# Patient Record
Sex: FEMALE | Race: WHITE | NOT HISPANIC OR LATINO | Employment: OTHER | URBAN - METROPOLITAN AREA
[De-identification: names, ages, dates, MRNs, and addresses within clinical notes are randomized per-mention and may not be internally consistent; named-entity substitution may affect disease eponyms.]

---

## 2017-09-22 ENCOUNTER — LAB (OUTPATIENT)
Dept: LAB | Facility: HOSPITAL | Age: 77
End: 2017-09-22

## 2017-09-22 ENCOUNTER — TRANSCRIBE ORDERS (OUTPATIENT)
Dept: ADMINISTRATIVE | Facility: HOSPITAL | Age: 77
End: 2017-09-22

## 2017-09-22 DIAGNOSIS — M10.9 GOUT, UNSPECIFIED: ICD-10-CM

## 2017-09-22 DIAGNOSIS — I10 ESSENTIAL HYPERTENSION, MALIGNANT: ICD-10-CM

## 2017-09-22 DIAGNOSIS — I10 ESSENTIAL HYPERTENSION, MALIGNANT: Primary | ICD-10-CM

## 2017-09-22 DIAGNOSIS — Z00.00 ROUTINE GENERAL MEDICAL EXAMINATION AT A HEALTH CARE FACILITY: ICD-10-CM

## 2017-09-22 LAB
ALBUMIN SERPL-MCNC: 4 G/DL (ref 3.5–5.2)
ALBUMIN/GLOB SERPL: 1.5 G/DL
ALP SERPL-CCNC: 103 U/L (ref 39–117)
ALT SERPL W P-5'-P-CCNC: 19 U/L (ref 1–33)
ANION GAP SERPL CALCULATED.3IONS-SCNC: 11.6 MMOL/L
AST SERPL-CCNC: 21 U/L (ref 1–32)
BASOPHILS # BLD AUTO: 0.04 10*3/MM3 (ref 0–0.2)
BASOPHILS NFR BLD AUTO: 0.7 % (ref 0–1.5)
BILIRUB SERPL-MCNC: 0.4 MG/DL (ref 0.1–1.2)
BUN BLD-MCNC: 17 MG/DL (ref 8–23)
BUN/CREAT SERPL: 18.3 (ref 7–25)
CALCIUM SPEC-SCNC: 9.3 MG/DL (ref 8.6–10.5)
CHLORIDE SERPL-SCNC: 101 MMOL/L (ref 98–107)
CHOLEST SERPL-MCNC: 146 MG/DL (ref 0–200)
CO2 SERPL-SCNC: 28.4 MMOL/L (ref 22–29)
CREAT BLD-MCNC: 0.93 MG/DL (ref 0.57–1)
DEPRECATED RDW RBC AUTO: 47.2 FL (ref 37–54)
EOSINOPHIL # BLD AUTO: 0.24 10*3/MM3 (ref 0–0.7)
EOSINOPHIL NFR BLD AUTO: 4 % (ref 0.3–6.2)
ERYTHROCYTE [DISTWIDTH] IN BLOOD BY AUTOMATED COUNT: 15.3 % (ref 11.7–13)
GFR SERPL CREATININE-BSD FRML MDRD: 59 ML/MIN/1.73
GLOBULIN UR ELPH-MCNC: 2.6 GM/DL
GLUCOSE BLD-MCNC: 160 MG/DL (ref 65–99)
HCT VFR BLD AUTO: 38.3 % (ref 35.6–45.5)
HDLC SERPL-MCNC: 48 MG/DL (ref 40–60)
HGB BLD-MCNC: 11.7 G/DL (ref 11.9–15.5)
IMM GRANULOCYTES # BLD: 0 10*3/MM3 (ref 0–0.03)
IMM GRANULOCYTES NFR BLD: 0 % (ref 0–0.5)
LDLC SERPL CALC-MCNC: 70 MG/DL (ref 0–100)
LDLC/HDLC SERPL: 1.47 {RATIO}
LYMPHOCYTES # BLD AUTO: 1.08 10*3/MM3 (ref 0.9–4.8)
LYMPHOCYTES NFR BLD AUTO: 18.2 % (ref 19.6–45.3)
MCH RBC QN AUTO: 25.8 PG (ref 26.9–32)
MCHC RBC AUTO-ENTMCNC: 30.5 G/DL (ref 32.4–36.3)
MCV RBC AUTO: 84.4 FL (ref 80.5–98.2)
MONOCYTES # BLD AUTO: 0.39 10*3/MM3 (ref 0.2–1.2)
MONOCYTES NFR BLD AUTO: 6.6 % (ref 5–12)
NEUTROPHILS # BLD AUTO: 4.18 10*3/MM3 (ref 1.9–8.1)
NEUTROPHILS NFR BLD AUTO: 70.5 % (ref 42.7–76)
PLATELET # BLD AUTO: 277 10*3/MM3 (ref 140–500)
PMV BLD AUTO: 10.5 FL (ref 6–12)
POTASSIUM BLD-SCNC: 3.4 MMOL/L (ref 3.5–5.2)
PROT SERPL-MCNC: 6.6 G/DL (ref 6–8.5)
RBC # BLD AUTO: 4.54 10*6/MM3 (ref 3.9–5.2)
SODIUM BLD-SCNC: 141 MMOL/L (ref 136–145)
TRIGL SERPL-MCNC: 138 MG/DL (ref 0–150)
TSH SERPL DL<=0.05 MIU/L-ACNC: 1.98 MIU/ML (ref 0.27–4.2)
URATE SERPL-MCNC: 7.5 MG/DL (ref 2.4–5.7)
VLDLC SERPL-MCNC: 27.6 MG/DL (ref 5–40)
WBC NRBC COR # BLD: 5.93 10*3/MM3 (ref 4.5–10.7)

## 2017-09-22 PROCEDURE — 36415 COLL VENOUS BLD VENIPUNCTURE: CPT

## 2017-09-22 PROCEDURE — 84550 ASSAY OF BLOOD/URIC ACID: CPT | Performed by: INTERNAL MEDICINE

## 2017-09-22 PROCEDURE — 84443 ASSAY THYROID STIM HORMONE: CPT | Performed by: INTERNAL MEDICINE

## 2017-09-22 PROCEDURE — 80061 LIPID PANEL: CPT | Performed by: INTERNAL MEDICINE

## 2017-09-22 PROCEDURE — 80053 COMPREHEN METABOLIC PANEL: CPT | Performed by: INTERNAL MEDICINE

## 2017-09-22 PROCEDURE — 85025 COMPLETE CBC W/AUTO DIFF WBC: CPT | Performed by: INTERNAL MEDICINE

## 2017-09-26 ENCOUNTER — TRANSCRIBE ORDERS (OUTPATIENT)
Dept: ADMINISTRATIVE | Facility: HOSPITAL | Age: 77
End: 2017-09-26

## 2017-09-26 ENCOUNTER — LAB (OUTPATIENT)
Dept: LAB | Facility: HOSPITAL | Age: 77
End: 2017-09-26

## 2017-09-26 DIAGNOSIS — R73.09 OTHER ABNORMAL GLUCOSE: ICD-10-CM

## 2017-09-26 DIAGNOSIS — D64.9 ANEMIA, UNSPECIFIED: Primary | ICD-10-CM

## 2017-09-26 DIAGNOSIS — D64.9 ANEMIA, UNSPECIFIED: ICD-10-CM

## 2017-09-26 LAB
BASOPHILS # BLD AUTO: 0.03 10*3/MM3 (ref 0–0.2)
BASOPHILS NFR BLD AUTO: 0.5 % (ref 0–1.5)
DEPRECATED RDW RBC AUTO: 48 FL (ref 37–54)
EOSINOPHIL # BLD AUTO: 0.17 10*3/MM3 (ref 0–0.7)
EOSINOPHIL NFR BLD AUTO: 2.9 % (ref 0.3–6.2)
ERYTHROCYTE [DISTWIDTH] IN BLOOD BY AUTOMATED COUNT: 15.6 % (ref 11.7–13)
FERRITIN SERPL-MCNC: 18.7 NG/ML (ref 13–150)
FOLATE SERPL-MCNC: 8.71 NG/ML (ref 4.78–24.2)
HBA1C MFR BLD: 5.9 % (ref 4.8–5.6)
HCT VFR BLD AUTO: 38 % (ref 35.6–45.5)
HGB BLD-MCNC: 11.6 G/DL (ref 11.9–15.5)
IMM GRANULOCYTES # BLD: 0.02 10*3/MM3 (ref 0–0.03)
IMM GRANULOCYTES NFR BLD: 0.3 % (ref 0–0.5)
IRON 24H UR-MRATE: 35 MCG/DL (ref 37–145)
IRON SATN MFR SERPL: 8 % (ref 20–50)
LYMPHOCYTES # BLD AUTO: 1.13 10*3/MM3 (ref 0.9–4.8)
LYMPHOCYTES NFR BLD AUTO: 19.4 % (ref 19.6–45.3)
MCH RBC QN AUTO: 25.6 PG (ref 26.9–32)
MCHC RBC AUTO-ENTMCNC: 30.5 G/DL (ref 32.4–36.3)
MCV RBC AUTO: 83.7 FL (ref 80.5–98.2)
MONOCYTES # BLD AUTO: 0.36 10*3/MM3 (ref 0.2–1.2)
MONOCYTES NFR BLD AUTO: 6.2 % (ref 5–12)
NEUTROPHILS # BLD AUTO: 4.12 10*3/MM3 (ref 1.9–8.1)
NEUTROPHILS NFR BLD AUTO: 70.7 % (ref 42.7–76)
PLATELET # BLD AUTO: 310 10*3/MM3 (ref 140–500)
PMV BLD AUTO: 10.9 FL (ref 6–12)
RBC # BLD AUTO: 4.54 10*6/MM3 (ref 3.9–5.2)
RETICS/RBC NFR AUTO: 1.67 % (ref 0.5–1.5)
TIBC SERPL-MCNC: 443 MCG/DL
TRANSFERRIN SERPL-MCNC: 297 MG/DL (ref 200–360)
VIT B12 BLD-MCNC: 411 PG/ML (ref 211–946)
WBC NRBC COR # BLD: 5.83 10*3/MM3 (ref 4.5–10.7)

## 2017-09-26 PROCEDURE — 85025 COMPLETE CBC W/AUTO DIFF WBC: CPT | Performed by: INTERNAL MEDICINE

## 2017-09-26 PROCEDURE — 82607 VITAMIN B-12: CPT | Performed by: INTERNAL MEDICINE

## 2017-09-26 PROCEDURE — 83540 ASSAY OF IRON: CPT | Performed by: INTERNAL MEDICINE

## 2017-09-26 PROCEDURE — 84466 ASSAY OF TRANSFERRIN: CPT | Performed by: INTERNAL MEDICINE

## 2017-09-26 PROCEDURE — 82728 ASSAY OF FERRITIN: CPT | Performed by: INTERNAL MEDICINE

## 2017-09-26 PROCEDURE — 83036 HEMOGLOBIN GLYCOSYLATED A1C: CPT | Performed by: INTERNAL MEDICINE

## 2017-09-26 PROCEDURE — 36415 COLL VENOUS BLD VENIPUNCTURE: CPT

## 2017-09-26 PROCEDURE — 82746 ASSAY OF FOLIC ACID SERUM: CPT | Performed by: INTERNAL MEDICINE

## 2017-09-26 PROCEDURE — 85045 AUTOMATED RETICULOCYTE COUNT: CPT | Performed by: INTERNAL MEDICINE

## 2017-09-27 ENCOUNTER — PROCEDURE VISIT (OUTPATIENT)
Dept: OBSTETRICS AND GYNECOLOGY | Facility: CLINIC | Age: 77
End: 2017-09-27

## 2017-09-27 DIAGNOSIS — Z13.820 SCREENING FOR OSTEOPOROSIS: Primary | ICD-10-CM

## 2017-09-27 DIAGNOSIS — Z78.0 POSTMENOPAUSAL: ICD-10-CM

## 2017-09-27 PROCEDURE — 77080 DXA BONE DENSITY AXIAL: CPT | Performed by: OBSTETRICS & GYNECOLOGY

## 2017-12-18 ENCOUNTER — LAB (OUTPATIENT)
Dept: LAB | Facility: HOSPITAL | Age: 77
End: 2017-12-18

## 2017-12-18 ENCOUNTER — TRANSCRIBE ORDERS (OUTPATIENT)
Dept: ADMINISTRATIVE | Facility: HOSPITAL | Age: 77
End: 2017-12-18

## 2017-12-18 DIAGNOSIS — R73.09 OTHER ABNORMAL GLUCOSE: ICD-10-CM

## 2017-12-18 DIAGNOSIS — D64.9 ANEMIA, UNSPECIFIED TYPE: ICD-10-CM

## 2017-12-18 DIAGNOSIS — I10 ESSENTIAL HYPERTENSION, MALIGNANT: ICD-10-CM

## 2017-12-18 DIAGNOSIS — I10 ESSENTIAL HYPERTENSION, MALIGNANT: Primary | ICD-10-CM

## 2017-12-18 LAB
ALBUMIN SERPL-MCNC: 3.8 G/DL (ref 3.5–5.2)
ALBUMIN/GLOB SERPL: 1.1 G/DL
ALP SERPL-CCNC: 119 U/L (ref 39–117)
ALT SERPL W P-5'-P-CCNC: 19 U/L (ref 1–33)
ANION GAP SERPL CALCULATED.3IONS-SCNC: 11.6 MMOL/L
AST SERPL-CCNC: 20 U/L (ref 1–32)
BASOPHILS # BLD AUTO: 0.03 10*3/MM3 (ref 0–0.2)
BASOPHILS NFR BLD AUTO: 0.5 % (ref 0–1.5)
BILIRUB SERPL-MCNC: 0.3 MG/DL (ref 0.1–1.2)
BUN BLD-MCNC: 20 MG/DL (ref 8–23)
BUN/CREAT SERPL: 20.4 (ref 7–25)
CALCIUM SPEC-SCNC: 9.3 MG/DL (ref 8.6–10.5)
CHLORIDE SERPL-SCNC: 100 MMOL/L (ref 98–107)
CO2 SERPL-SCNC: 30.4 MMOL/L (ref 22–29)
CREAT BLD-MCNC: 0.98 MG/DL (ref 0.57–1)
DEPRECATED RDW RBC AUTO: 43.4 FL (ref 37–54)
EOSINOPHIL # BLD AUTO: 0.23 10*3/MM3 (ref 0–0.7)
EOSINOPHIL NFR BLD AUTO: 3.8 % (ref 0.3–6.2)
ERYTHROCYTE [DISTWIDTH] IN BLOOD BY AUTOMATED COUNT: 13.6 % (ref 11.7–13)
GFR SERPL CREATININE-BSD FRML MDRD: 55 ML/MIN/1.73
GLOBULIN UR ELPH-MCNC: 3.4 GM/DL
GLUCOSE BLD-MCNC: 112 MG/DL (ref 65–99)
HBA1C MFR BLD: 6.01 % (ref 4.8–5.6)
HCT VFR BLD AUTO: 41.2 % (ref 35.6–45.5)
HGB BLD-MCNC: 13.2 G/DL (ref 11.9–15.5)
IMM GRANULOCYTES # BLD: 0.02 10*3/MM3 (ref 0–0.03)
IMM GRANULOCYTES NFR BLD: 0.3 % (ref 0–0.5)
LYMPHOCYTES # BLD AUTO: 1.36 10*3/MM3 (ref 0.9–4.8)
LYMPHOCYTES NFR BLD AUTO: 22.5 % (ref 19.6–45.3)
MCH RBC QN AUTO: 27.9 PG (ref 26.9–32)
MCHC RBC AUTO-ENTMCNC: 32 G/DL (ref 32.4–36.3)
MCV RBC AUTO: 87.1 FL (ref 80.5–98.2)
MONOCYTES # BLD AUTO: 0.41 10*3/MM3 (ref 0.2–1.2)
MONOCYTES NFR BLD AUTO: 6.8 % (ref 5–12)
NEUTROPHILS # BLD AUTO: 4 10*3/MM3 (ref 1.9–8.1)
NEUTROPHILS NFR BLD AUTO: 66.1 % (ref 42.7–76)
PLATELET # BLD AUTO: 285 10*3/MM3 (ref 140–500)
PMV BLD AUTO: 10.8 FL (ref 6–12)
POTASSIUM BLD-SCNC: 3.9 MMOL/L (ref 3.5–5.2)
PROT SERPL-MCNC: 7.2 G/DL (ref 6–8.5)
RBC # BLD AUTO: 4.73 10*6/MM3 (ref 3.9–5.2)
SODIUM BLD-SCNC: 142 MMOL/L (ref 136–145)
WBC NRBC COR # BLD: 6.05 10*3/MM3 (ref 4.5–10.7)

## 2017-12-18 PROCEDURE — 83036 HEMOGLOBIN GLYCOSYLATED A1C: CPT | Performed by: INTERNAL MEDICINE

## 2017-12-18 PROCEDURE — 36415 COLL VENOUS BLD VENIPUNCTURE: CPT

## 2017-12-18 PROCEDURE — 85025 COMPLETE CBC W/AUTO DIFF WBC: CPT | Performed by: INTERNAL MEDICINE

## 2017-12-18 PROCEDURE — 80053 COMPREHEN METABOLIC PANEL: CPT | Performed by: INTERNAL MEDICINE

## 2018-06-06 DIAGNOSIS — D50.0 IRON DEFICIENCY ANEMIA DUE TO CHRONIC BLOOD LOSS: Primary | ICD-10-CM

## 2018-06-07 ENCOUNTER — APPOINTMENT (OUTPATIENT)
Dept: LAB | Facility: HOSPITAL | Age: 78
End: 2018-06-07

## 2018-06-07 DIAGNOSIS — D50.0 IRON DEFICIENCY ANEMIA DUE TO CHRONIC BLOOD LOSS: Primary | ICD-10-CM

## 2018-06-07 LAB
ALBUMIN SERPL-MCNC: 3.9 G/DL (ref 3.5–5.2)
ALBUMIN/GLOB SERPL: 1.1 G/DL
ALP SERPL-CCNC: 96 U/L (ref 39–117)
ALT SERPL W P-5'-P-CCNC: 23 U/L (ref 1–33)
ANION GAP SERPL CALCULATED.3IONS-SCNC: 12.4 MMOL/L
AST SERPL-CCNC: 24 U/L (ref 1–32)
BASOPHILS # BLD AUTO: 0.04 10*3/MM3 (ref 0–0.2)
BASOPHILS NFR BLD AUTO: 0.7 % (ref 0–1.5)
BILIRUB SERPL-MCNC: 0.4 MG/DL (ref 0.1–1.2)
BUN BLD-MCNC: 23 MG/DL (ref 8–23)
BUN/CREAT SERPL: 24.7 (ref 7–25)
CALCIUM SPEC-SCNC: 9 MG/DL (ref 8.6–10.5)
CHLORIDE SERPL-SCNC: 102 MMOL/L (ref 98–107)
CO2 SERPL-SCNC: 26.6 MMOL/L (ref 22–29)
CREAT BLD-MCNC: 0.93 MG/DL (ref 0.57–1)
DEPRECATED RDW RBC AUTO: 42.5 FL (ref 37–54)
EOSINOPHIL # BLD AUTO: 0.25 10*3/MM3 (ref 0–0.7)
EOSINOPHIL NFR BLD AUTO: 4.2 % (ref 0.3–6.2)
ERYTHROCYTE [DISTWIDTH] IN BLOOD BY AUTOMATED COUNT: 12.5 % (ref 11.7–13)
GFR SERPL CREATININE-BSD FRML MDRD: 59 ML/MIN/1.73
GLOBULIN UR ELPH-MCNC: 3.4 GM/DL
GLUCOSE BLD-MCNC: 86 MG/DL (ref 65–99)
HCT VFR BLD AUTO: 36.7 % (ref 35.6–45.5)
HGB BLD-MCNC: 11.3 G/DL (ref 11.9–15.5)
IMM GRANULOCYTES # BLD: 0 10*3/MM3 (ref 0–0.03)
IMM GRANULOCYTES NFR BLD: 0 % (ref 0–0.5)
LYMPHOCYTES # BLD AUTO: 1.21 10*3/MM3 (ref 0.9–4.8)
LYMPHOCYTES NFR BLD AUTO: 20.1 % (ref 19.6–45.3)
MCH RBC QN AUTO: 28.5 PG (ref 26.9–32)
MCHC RBC AUTO-ENTMCNC: 30.8 G/DL (ref 32.4–36.3)
MCV RBC AUTO: 92.7 FL (ref 80.5–98.2)
MONOCYTES # BLD AUTO: 0.32 10*3/MM3 (ref 0.2–1.2)
MONOCYTES NFR BLD AUTO: 5.3 % (ref 5–12)
NEUTROPHILS # BLD AUTO: 4.2 10*3/MM3 (ref 1.9–8.1)
NEUTROPHILS NFR BLD AUTO: 69.7 % (ref 42.7–76)
PLATELET # BLD AUTO: 296 10*3/MM3 (ref 140–500)
PMV BLD AUTO: 10.8 FL (ref 6–12)
POTASSIUM BLD-SCNC: 4.2 MMOL/L (ref 3.5–5.2)
PROT SERPL-MCNC: 7.3 G/DL (ref 6–8.5)
RBC # BLD AUTO: 3.96 10*6/MM3 (ref 3.9–5.2)
SODIUM BLD-SCNC: 141 MMOL/L (ref 136–145)
WBC NRBC COR # BLD: 6.02 10*3/MM3 (ref 4.5–10.7)

## 2018-06-07 PROCEDURE — 82652 VIT D 1 25-DIHYDROXY: CPT | Performed by: OBSTETRICS & GYNECOLOGY

## 2018-06-07 PROCEDURE — 85025 COMPLETE CBC W/AUTO DIFF WBC: CPT | Performed by: OBSTETRICS & GYNECOLOGY

## 2018-06-07 PROCEDURE — 80053 COMPREHEN METABOLIC PANEL: CPT | Performed by: OBSTETRICS & GYNECOLOGY

## 2018-06-07 PROCEDURE — 36415 COLL VENOUS BLD VENIPUNCTURE: CPT | Performed by: OBSTETRICS & GYNECOLOGY

## 2018-06-11 LAB — 1,25(OH)2D3 SERPL-MCNC: 31.4 PG/ML (ref 19.9–79.3)

## 2018-10-02 ENCOUNTER — LAB (OUTPATIENT)
Dept: LAB | Facility: HOSPITAL | Age: 78
End: 2018-10-02

## 2018-10-02 ENCOUNTER — TRANSCRIBE ORDERS (OUTPATIENT)
Dept: ADMINISTRATIVE | Facility: HOSPITAL | Age: 78
End: 2018-10-02

## 2018-10-02 DIAGNOSIS — Z00.00 ROUTINE GENERAL MEDICAL EXAMINATION AT A HEALTH CARE FACILITY: ICD-10-CM

## 2018-10-02 DIAGNOSIS — Z00.00 ROUTINE GENERAL MEDICAL EXAMINATION AT A HEALTH CARE FACILITY: Primary | ICD-10-CM

## 2018-10-02 LAB
ALBUMIN SERPL-MCNC: 3.8 G/DL (ref 3.5–5.2)
ALBUMIN/GLOB SERPL: 1.3 G/DL
ALP SERPL-CCNC: 109 U/L (ref 39–117)
ALT SERPL W P-5'-P-CCNC: 19 U/L (ref 1–33)
ANION GAP SERPL CALCULATED.3IONS-SCNC: 11.5 MMOL/L
AST SERPL-CCNC: 19 U/L (ref 1–32)
BACTERIA UR QL AUTO: ABNORMAL /HPF
BASOPHILS # BLD AUTO: 0.02 10*3/MM3 (ref 0–0.2)
BASOPHILS NFR BLD AUTO: 0.3 % (ref 0–1.5)
BILIRUB SERPL-MCNC: 0.3 MG/DL (ref 0.1–1.2)
BILIRUB UR QL STRIP: NEGATIVE
BUN BLD-MCNC: 21 MG/DL (ref 8–23)
BUN/CREAT SERPL: 22.8 (ref 7–25)
CALCIUM SPEC-SCNC: 9.3 MG/DL (ref 8.6–10.5)
CHLORIDE SERPL-SCNC: 101 MMOL/L (ref 98–107)
CHOLEST SERPL-MCNC: 138 MG/DL (ref 0–200)
CLARITY UR: ABNORMAL
CO2 SERPL-SCNC: 29.5 MMOL/L (ref 22–29)
COLOR UR: YELLOW
CREAT BLD-MCNC: 0.92 MG/DL (ref 0.57–1)
DEPRECATED RDW RBC AUTO: 40.7 FL (ref 37–54)
EOSINOPHIL # BLD AUTO: 0.27 10*3/MM3 (ref 0–0.7)
EOSINOPHIL NFR BLD AUTO: 4.3 % (ref 0.3–6.2)
ERYTHROCYTE [DISTWIDTH] IN BLOOD BY AUTOMATED COUNT: 13.5 % (ref 11.7–13)
GFR SERPL CREATININE-BSD FRML MDRD: 59 ML/MIN/1.73
GLOBULIN UR ELPH-MCNC: 3 GM/DL
GLUCOSE BLD-MCNC: 94 MG/DL (ref 65–99)
GLUCOSE UR STRIP-MCNC: NEGATIVE MG/DL
HCT VFR BLD AUTO: 36 % (ref 35.6–45.5)
HDLC SERPL-MCNC: 52 MG/DL (ref 40–60)
HGB BLD-MCNC: 11 G/DL (ref 11.9–15.5)
HGB UR QL STRIP.AUTO: NEGATIVE
HYALINE CASTS UR QL AUTO: ABNORMAL /LPF
IMM GRANULOCYTES # BLD: 0.01 10*3/MM3 (ref 0–0.03)
IMM GRANULOCYTES NFR BLD: 0.2 % (ref 0–0.5)
KETONES UR QL STRIP: NEGATIVE
LDLC SERPL CALC-MCNC: 61 MG/DL (ref 0–100)
LDLC/HDLC SERPL: 1.17 {RATIO}
LEUKOCYTE ESTERASE UR QL STRIP.AUTO: ABNORMAL
LYMPHOCYTES # BLD AUTO: 1.24 10*3/MM3 (ref 0.9–4.8)
LYMPHOCYTES NFR BLD AUTO: 19.7 % (ref 19.6–45.3)
MCH RBC QN AUTO: 25.5 PG (ref 26.9–32)
MCHC RBC AUTO-ENTMCNC: 30.6 G/DL (ref 32.4–36.3)
MCV RBC AUTO: 83.5 FL (ref 80.5–98.2)
MONOCYTES # BLD AUTO: 0.34 10*3/MM3 (ref 0.2–1.2)
MONOCYTES NFR BLD AUTO: 5.4 % (ref 5–12)
NEUTROPHILS # BLD AUTO: 4.42 10*3/MM3 (ref 1.9–8.1)
NEUTROPHILS NFR BLD AUTO: 70.3 % (ref 42.7–76)
NITRITE UR QL STRIP: NEGATIVE
PH UR STRIP.AUTO: 6 [PH] (ref 5–8)
PLATELET # BLD AUTO: 279 10*3/MM3 (ref 140–500)
PMV BLD AUTO: 10.9 FL (ref 6–12)
POTASSIUM BLD-SCNC: 3.7 MMOL/L (ref 3.5–5.2)
PROT SERPL-MCNC: 6.8 G/DL (ref 6–8.5)
PROT UR QL STRIP: NEGATIVE
RBC # BLD AUTO: 4.31 10*6/MM3 (ref 3.9–5.2)
RBC # UR: ABNORMAL /HPF
REF LAB TEST METHOD: ABNORMAL
SODIUM BLD-SCNC: 142 MMOL/L (ref 136–145)
SP GR UR STRIP: 1.02 (ref 1–1.03)
SQUAMOUS #/AREA URNS HPF: ABNORMAL /HPF
TRIGL SERPL-MCNC: 127 MG/DL (ref 0–150)
UROBILINOGEN UR QL STRIP: ABNORMAL
VLDLC SERPL-MCNC: 25.4 MG/DL (ref 5–40)
WBC NRBC COR # BLD: 6.29 10*3/MM3 (ref 4.5–10.7)
WBC UR QL AUTO: ABNORMAL /HPF

## 2018-10-02 PROCEDURE — 81001 URINALYSIS AUTO W/SCOPE: CPT | Performed by: INTERNAL MEDICINE

## 2018-10-02 PROCEDURE — 80061 LIPID PANEL: CPT | Performed by: INTERNAL MEDICINE

## 2018-10-02 PROCEDURE — 85025 COMPLETE CBC W/AUTO DIFF WBC: CPT | Performed by: INTERNAL MEDICINE

## 2018-10-02 PROCEDURE — 36415 COLL VENOUS BLD VENIPUNCTURE: CPT

## 2018-10-02 PROCEDURE — 80053 COMPREHEN METABOLIC PANEL: CPT | Performed by: INTERNAL MEDICINE

## 2018-10-04 ENCOUNTER — HOSPITAL ENCOUNTER (OUTPATIENT)
Dept: GENERAL RADIOLOGY | Facility: HOSPITAL | Age: 78
Discharge: HOME | End: 2018-10-04
Admitting: INTERNAL MEDICINE

## 2018-10-04 ENCOUNTER — TRANSCRIBE ORDERS (OUTPATIENT)
Dept: ADMINISTRATIVE | Facility: HOSPITAL | Age: 78
End: 2018-10-04

## 2018-10-04 DIAGNOSIS — M25.552 PAIN OF BOTH HIP JOINTS: Primary | ICD-10-CM

## 2018-10-04 DIAGNOSIS — M25.552 PAIN OF BOTH HIP JOINTS: ICD-10-CM

## 2018-10-04 DIAGNOSIS — M25.551 PAIN OF BOTH HIP JOINTS: Primary | ICD-10-CM

## 2018-10-04 DIAGNOSIS — M25.551 PAIN OF BOTH HIP JOINTS: ICD-10-CM

## 2018-10-04 PROCEDURE — 73521 X-RAY EXAM HIPS BI 2 VIEWS: CPT

## 2018-10-23 ENCOUNTER — TRANSCRIBE ORDERS (OUTPATIENT)
Dept: PHYSICAL THERAPY | Facility: HOSPITAL | Age: 78
End: 2018-10-23

## 2018-10-23 DIAGNOSIS — M16.0 OSTEOARTHRITIS OF BOTH HIPS, UNSPECIFIED OSTEOARTHRITIS TYPE: Primary | ICD-10-CM

## 2018-11-05 ENCOUNTER — HOSPITAL ENCOUNTER (OUTPATIENT)
Dept: PHYSICAL THERAPY | Facility: HOSPITAL | Age: 78
Setting detail: THERAPIES SERIES
Discharge: HOME | End: 2018-11-05
Attending: ORTHOPAEDIC SURGERY

## 2018-11-05 DIAGNOSIS — M25.551 HIP PAIN, BILATERAL: ICD-10-CM

## 2018-11-05 DIAGNOSIS — Z78.9 DIFFICULTY NAVIGATING STAIRS: ICD-10-CM

## 2018-11-05 DIAGNOSIS — R26.2 DIFFICULTY WALKING: ICD-10-CM

## 2018-11-05 DIAGNOSIS — G89.29 CHRONIC MIDLINE LOW BACK PAIN WITHOUT SCIATICA: Primary | ICD-10-CM

## 2018-11-05 DIAGNOSIS — M25.552 HIP PAIN, BILATERAL: ICD-10-CM

## 2018-11-05 DIAGNOSIS — M54.50 CHRONIC MIDLINE LOW BACK PAIN WITHOUT SCIATICA: Primary | ICD-10-CM

## 2018-11-05 PROCEDURE — 97110 THERAPEUTIC EXERCISES: CPT | Performed by: PHYSICAL THERAPIST

## 2018-11-05 PROCEDURE — G8979 MOBILITY GOAL STATUS: HCPCS

## 2018-11-05 PROCEDURE — G8978 MOBILITY CURRENT STATUS: HCPCS

## 2018-11-05 PROCEDURE — 97162 PT EVAL MOD COMPLEX 30 MIN: CPT | Performed by: PHYSICAL THERAPIST

## 2018-11-05 NOTE — THERAPY EVALUATION
Outpatient Physical Therapy Ortho Initial Evaluation  [facility]     Patient Name: Ivet Diaz  : 1940  MRN: [id number]  Today's Date: 2018      Visit Date: 2018    There is no problem list on file for this patient.       Past Medical History:   Diagnosis Date   • Breast cancer (CMS/HCC)    • Cancer (CMS/HCC)     Left breast   • Depression    • Hypertension    • Reflux esophagitis         Past Surgical History:   Procedure Laterality Date   • BREAST BIOPSY     • BREAST SURGERY      Left masectomy   • ENDOSCOPY N/A 2016    Procedure: ESOPHAGOGASTRODUODENOSCOPY  WITH BIOPSY;  Surgeon: Peter Corrigan MD;  Location: [facility] ENDOSCOPY;  Service:    • MASTECTOMY         Visit Dx:     ICD-10-CM ICD-9-CM   1. Chronic midline low back pain without sciatica M54.5 724.2    G89.29 338.29   2. Hip pain, bilateral M25.551 719.45    M25.552    3. Difficulty navigating stairs R68.89 V62.89   4. Difficulty walking R26.2 719.7             Patient History     Row Name 18 1300             History    Chief Complaint Difficulty with daily activities;Difficulty Walking;Pain;Falls/history of falls;Fatigue/poor endurance;Muscle weakness  -MP      Type of Pain Back pain;Hip pain  -MP      Date Current Problem(s) Began 10/19/18  -MP      Brief Description of Current Complaint vIet reports having pain in her hips B, R groin area and lower back.  Pain is intermittent, peaking at 8/10, in her R groin, but pain is not constant.   For example, sitting or being supine with a pillow under her knees she will be pain free.  She has difficulty with stairs and has a basement and goes down into there once per day.  She can walk 10 minutes and then pain will become an issue.  PMH:  L mastectomy and she is cancer free.  HTN fairly controlled by medication.  She reports falling at home in 2018 in her kitchen.  She feels that it was caused by slipping on a rug.    -MP      Patient/Caregiver Goals Relieve  pain;Improve mobility;Improve strength;Know what to do to help the symptoms  -MP      Current Tobacco Use No  -MP      Smoking Status Never  -MP      Patient's Rating of General Health Very good  -MP      Hand Dominance left-handed  -MP      Occupation/sports/leisure activities Retired.  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  She loves reading, travelling, [facility] and social activities.    -MP         Pain     Is your sleep disturbed? Yes  -MP      Total hours of sleep per night 7-8   -MP      What position do you sleep in? Right sidelying;Left sidelying  -MP         Fall Risk Assessment    Any falls in the past year: Yes  -MP      Number of falls reported in the last 12 months One  -MP      Factors that contributed to the fall: Tripped  -MP         Services    Are you currently receiving Home Health services No  -MP         Daily Activities    Primary Language English  -MP      Are you able to read Yes  -MP      Are you able to write Yes  -MP      How does patient learn best? Listening;Demonstration  -MP      Pt Participated in POC and Goals Yes  -MP         Safety    Are you being hurt, hit, or frightened by anyone at home or in your life? No  -MP      Are you being neglected by a caregiver No  -MP        User Key  (r) = Recorded By, (t) = Taken By, (c) = Cosigned By    Initials Name Provider Type    Prieto Phan, PT Physical Therapist                PT Ortho     Row Name 11/05/18 1300       Quarter Clearing    Quarter Clearing Lower Quarter Clearing  -MP       DTR- Lower Quarter Clearing    Patellar tendon (L2-4) Bilateral:;3- Slightly hyperactive response  -MP    Achilles tendon (S1-2) Bilateral:;1- Minimal response  -MP       Pathological Reflexes- Lower Quarter Clearing    Clonus Negative  -MP    Babinski Negative  -MP    Ambrose Negative  -MP       Sensory Screen for Light Touch- Lower Quarter Clearing    L1 (inguinal area) Bilateral:;Intact   -MP    L2 (anterior mid thigh) Bilateral:;Intact  -MP    L3 (distal anterior thigh) Bilateral:;Intact  -MP    L4 (medial lower leg/foot) Bilateral:;Intact  -MP    L5 (lateral lower leg/great toe) Bilateral:;Intact  -MP    S1 (bottom of foot) Bilateral:;Intact  -MP       Myotomal Screen- Lower Quarter Clearing    Hip flexion (L2) Bilateral:;4+ (Good +)  -MP    Knee extension (L3) Bilateral:;5 (Normal)  -MP    Ankle DF (L4) Bilateral:;4+ (Good +)  -MP    Great toe extension (L5) Bilateral:;4 (Good)  -MP    Ankle PF (S1) Bilateral:;4+ (Good +)  -MP    Knee flexion (S2) Bilateral:;4 (Good)  -MP       Lumbar ROM Screen- Lower Quarter Clearing    Lumbar Flexion Impaired   minimal loss  -MP    Lumbar Extension Impaired   moderate to severe loss  -MP    Lumbar Lateral Flexion Impaired   B moderate loss  -MP    Lumbar Rotation Impaired   moderate loss B  -MP       General ROM    GENERAL ROM COMMENTS B hip, standing, flexion 90 plus degrees B, abduction moderate loss B and extension B moderate loss.  In sitting, B knee motions and ankle motions were WNL B.  -MP       Pathomechanics    Pathomechanics Comments Hip inner quadrant test B positive, Lumbar Spine Loading Compression Test was negative.    -MP       Gait/Stairs Assessment/Training    Comment (Gait/Stairs) Ivet ambulated 40 feet on level surface using no assistive device, independently.  Her gait pattern is abnormal. There is less heel strike, B, L greater than the R.  She has bunions at the first MTP joints, B, but the L is worse and she has a severe Hallux Valgus deformity on the L where the first digit is so far over that it is going underneath the second.       -MP      User Key  (r) = Recorded By, (t) = Taken By, (c) = Cosigned By    Initials Name Provider Type    Prieto Phan PT Physical Therapist          Therapy Education  Education Details: Began her HEP with hip clams, hooklying, red theraband issued and SKTC stretch.  Given: HEP, Symptoms/condition  management  Program: New  How Provided: Written  Provided to: Patient  Level of Understanding: Teach back education performed           PT OP Goals     Row Name 11/05/18 1800          PT Short Term Goals    STG Date to Achieve 12/05/18  -MP     STG 1 Ivet is instructed in lumbar bracing, hooklying, to begin learning to use her core musculature to assist to offset forces in the lumbar spine with ADL performance.  -MP     STG 1 Progress New  -MP     STG 2 Light intensity NuSTep activity is begun.  -MP     STG 2 Progress New  -MP     STG 3 PSFS disability is reduced by 12%.  -MP     STG 3 Progress New  -MP        Long Term Goals    LTG Date to Achieve 02/03/19  -MP     LTG 1 Ivet is independent with aqua exercises for self care.  -MP     LTG 1 Progress New  -MP     LTG 2 She is also independent with a HEP, land activities in the wellness center and all self care.  -MP     LTG 2 Progress New  -MP     LTG 3 PSFS disability is reduced by 24%.  -MP     LTG 3 Progress New  -MP        Time Calculation    PT Goal Re-Cert Due Date 12/05/18  -MP       User Key  (r) = Recorded By, (t) = Taken By, (c) = Cosigned By    Initials Name Provider Type    Prieto Phan, PT Physical Therapist                PT Assessment/Plan     Row Name 11/05/18 1808          PT Assessment    Functional Limitations Decreased safety during functional activities;Impaired gait;Impaired locomotion;Limitations in functional capacity and performance;Performance in leisure activities  -MP     Impairments Balance;Locomotion;Endurance;Poor body mechanics;Posture;Pain;Range of motion;Gait  -MP     Assessment Comments Ivet Diaz presents today with and evolving issue of  pain, diminished tolerance to ADLs and history of a recent fall.  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  -MP     Please refer to paper survey for additional self-reported information Yes  -MP     Rehab Potential Good  -MP     Patient/caregiver participated in establishment of treatment plan and goals Yes  -MP     Patient would benefit from skilled therapy intervention Yes  -MP        PT Plan    PT Frequency --   1-2 x per week  -MP     Predicted Duration of Therapy Intervention (Therapy Eval) 6-8 weeks  -MP     Planned CPT's? PT EVAL MOD COMPLELITY: 07296;PT RE-EVAL: 34872;PT THER PROC EA 15 MIN: 14078;PT THER ACT EA 15 MIN: 25967;PT MANUAL THERAPY EA 15 MIN: 13567;PT AQUATIC THERAPY EA 15 MIN: 25354;PT NEUROMUSC RE-EDUCATION EA 15 MIN: 59480;PT SELF CARE/HOME MGMT/TRAIN EA 15: 15783;PT ELECTRICAL STIM UNATTEND: ;PT ULTRASOUND EA 15 MIN: 32922  -MP     PT Plan Comments Progress with lumbar bracing to introduce stabilization of the lumbar spine region and introduce the NuStep to her.  -MP       User Key  (r) = Recorded By, (t) = Taken By, (c) = Cosigned By    Initials Name Provider Type    Prieto Phan, PT Physical Therapist                  Exercises     Row Name 11/05/18 1800             Total Minutes    67021 - PT Therapeutic Exercise Minutes 10  -MP         Exercise 1    Exercise Name 1 Hip clams, red theraband in hooklying, x 20.  SKTC x 10 B.  -MP        User Key  (r) = Recorded By, (t) = Taken By, (c) = Cosigned By    Initials Name Provider Type    Prieto Phan, PT Physical Therapist           Outcome Measure Options: Patient Specific Functional Scale  Patient Specific Functional Scale  Activity 1: Ambulate 10 minutes on level surface.  Activity 1 Score: 5  Activity 2: Ascending/descending stairs.  Activity 2 Score: 3  Activity 3: Sit to stand transfer.  Activity 3 Score: 4  Total Score: 0  Patient Specific Functional Scale Commetns: 12/30, 60k% disability      Time Calculation:     Therapy Suggested Charges     Code   Minutes Charges    24684 (CPT®) Hc  Pt Neuromusc Re Education Ea 15 Min      45463 (CPT®) Hc Pt Ther Proc Ea 15 Min 10 1    43620 (CPT®) Hc Gait Training Ea 15 Min      34153 (CPT®) Hc Pt Therapeutic Act Ea 15 Min      81002 (CPT®) Hc Pt Manual Therapy Ea 15 Min      54659 (CPT®) Hc Pt Ther Massage- Per 15 Min      10042 (CPT®) Hc Pt Iontophoresis Ea 15 Min      62003 (CPT®) Hc Pt Elec Stim Ea-Per 15 Min      87251 (CPT®) Hc Pt Ultrasound Ea 15 Min      02556 (CPT®) Hc Pt Self Care/Mgmt/Train Ea 15 Min      38780 (CPT®) Hc Pt Prosthetic (S) Train Initial Encounter, Each 15 Min      44934 (CPT®) Hc Orthotic(S) Mgmt/Train Initial Encounter, Each 15min      99378 (CPT®) Hc Pt Aquatic Therapy Ea 15 Min      56043 (CPT®) Hc Pt Orthotic(S)/Prosthetic(S) Encounter, Each 15 Min       (CPT®) Hc Pt Electrical Stim Unattended      Total  10 1          Start Time: 1345  Stop Time: 1430  Time Calculation (min): 45 min     Therapy Charges for Today     Code Description Service Date Service Provider Modifiers Qty    12224739238 HC PT EVAL MOD COMPLEXITY 2 11/5/2018 Prieto Vincent, PT GP 1    19210917323 HC PT THER PROC EA 15 MIN 11/5/2018 Prieto Vincent, PT GP 1          PT G-Codes  Outcome Measure Options: Patient Specific Functional Scale         Prieto Vincent PT  11/5/2018

## 2018-11-07 ENCOUNTER — HOSPITAL ENCOUNTER (OUTPATIENT)
Dept: PHYSICAL THERAPY | Facility: HOSPITAL | Age: 78
Setting detail: THERAPIES SERIES
Discharge: HOME | End: 2018-11-07

## 2018-11-07 DIAGNOSIS — M54.50 CHRONIC MIDLINE LOW BACK PAIN WITHOUT SCIATICA: Primary | ICD-10-CM

## 2018-11-07 DIAGNOSIS — M25.552 HIP PAIN, BILATERAL: ICD-10-CM

## 2018-11-07 DIAGNOSIS — R26.2 DIFFICULTY WALKING: ICD-10-CM

## 2018-11-07 DIAGNOSIS — Z78.9 DIFFICULTY NAVIGATING STAIRS: ICD-10-CM

## 2018-11-07 DIAGNOSIS — G89.29 CHRONIC MIDLINE LOW BACK PAIN WITHOUT SCIATICA: Primary | ICD-10-CM

## 2018-11-07 DIAGNOSIS — M25.551 HIP PAIN, BILATERAL: ICD-10-CM

## 2018-11-07 PROCEDURE — 97110 THERAPEUTIC EXERCISES: CPT | Performed by: PHYSICAL THERAPIST

## 2018-11-07 PROCEDURE — 97761 PROSTHETIC TRAING 1ST ENC: CPT | Performed by: PHYSICAL THERAPIST

## 2018-11-07 NOTE — THERAPY TREATMENT NOTE
Outpatient Physical Therapy Ortho Treatment Note  [facility]     Patient Name: Ivet Diaz  : 1940  MRN: [id number]  Today's Date: 2018      Visit Date: 2018    Visit Dx:    ICD-10-CM ICD-9-CM   1. Chronic midline low back pain without sciatica M54.5 724.2    G89.29 338.29   2. Hip pain, bilateral M25.551 719.45    M25.552    3. Difficulty navigating stairs R68.89 V62.89   4. Difficulty walking R26.2 719.7       There is no problem list on file for this patient.       Past Medical History:   Diagnosis Date   • Breast cancer (CMS/HCC)    • Cancer (CMS/HCC)     Left breast   • Depression    • Hypertension    • Reflux esophagitis         Past Surgical History:   Procedure Laterality Date   • BREAST BIOPSY     • BREAST SURGERY      Left masectomy   • ENDOSCOPY N/A 2016    Procedure: ESOPHAGOGASTRODUODENOSCOPY  WITH BIOPSY;  Surgeon: Peter Corrigan MD;  Location: [facility] ENDOSCOPY;  Service:    • MASTECTOMY               PT Ortho     Row Name 18 1300       Quarter Clearing    Quarter Clearing Lower Quarter Clearing  -MP       DTR- Lower Quarter Clearing    Patellar tendon (L2-4) Bilateral:;3- Slightly hyperactive response  -MP    Achilles tendon (S1-2) Bilateral:;1- Minimal response  -MP       Pathological Reflexes- Lower Quarter Clearing    Clonus Negative  -MP    Babinski Negative  -MP    Ambrose Negative  -MP       Sensory Screen for Light Touch- Lower Quarter Clearing    L1 (inguinal area) Bilateral:;Intact  -MP    L2 (anterior mid thigh) Bilateral:;Intact  -MP    L3 (distal anterior thigh) Bilateral:;Intact  -MP    L4 (medial lower leg/foot) Bilateral:;Intact  -MP    L5 (lateral lower leg/great toe) Bilateral:;Intact  -MP    S1 (bottom of foot) Bilateral:;Intact  -MP       Myotomal Screen- Lower Quarter Clearing    Hip flexion (L2) Bilateral:;4+ (Good +)  -MP    Knee extension (L3) Bilateral:;5 (Normal)  -MP    Ankle DF (L4) Bilateral:;4+ (Good +)  -MP    Great toe  extension (L5) Bilateral:;4 (Good)  -MP    Ankle PF (S1) Bilateral:;4+ (Good +)  -MP    Knee flexion (S2) Bilateral:;4 (Good)  -MP       Lumbar ROM Screen- Lower Quarter Clearing    Lumbar Flexion Impaired   minimal loss  -MP    Lumbar Extension Impaired   moderate to severe loss  -MP    Lumbar Lateral Flexion Impaired   B moderate loss  -MP    Lumbar Rotation Impaired   moderate loss B  -MP       General ROM    GENERAL ROM COMMENTS B hip, standing, flexion 90 plus degrees B, abduction moderate loss B and extension B moderate loss.  In sitting, B knee motions and ankle motions were WNL B.  -MP       Pathomechanics    Pathomechanics Comments Hip inner quadrant test B positive, Lumbar Spine Loading Compression Test was negative.    -MP       Gait/Stairs Assessment/Training    Comment (Gait/Stairs) Ivet ambulated 40 feet on level surface using no assistive device, independently.  Her gait pattern is abnormal. There is less heel strike, B, L greater than the R.  She has bunions at the first MTP joints, B, but the L is worse and she has a severe Hallux Valgus deformity on the L where the first digit is so far over that it is going underneath the second.       -MP      User Key  (r) = Recorded By, (t) = Taken By, (c) = Cosigned By    Initials Name Provider Type    Prieto Phan PT Physical Therapist                PT Assessment/Plan     Row Name 11/07/18 1429          PT Assessment    Assessment Comments Ivet tolerated treatment well.  She has kinetic chain issues in the lumbar spine and hip that will be helped with treatment over time.  -MP        PT Plan    PT Plan Comments Monitor the effects of today's treatment, begin NuStep and manual therapy to relieve pain and increase mobility.  -MP       User Key  (r) = Recorded By, (t) = Taken By, (c) = Cosigned By    Initials Name Provider Type    Prieto Phan, PT Physical Therapist                Exercises     Row Name 11/07/18 1400             Subjective Comments     Subjective Comments Pain is located in the R groin   -MP         Subjective Pain    Able to rate subjective pain? yes  -MP      Pre-Treatment Pain Level 5  -MP      Post-Treatment Pain Level 7  -MP         Total Minutes    29942 - PT Therapeutic Exercise Minutes 30  -MP         Exercise 1    Exercise Name 1 Refer to land flow sheet  -MP      Additional Comments Lumbar bracing, hooklying, 10s x 10 and step up/throught, 2 inch step, 10 x 2 B.  -MP        User Key  (r) = Recorded By, (t) = Taken By, (c) = Cosigned By    Initials Name Provider Type    Prieto Phan PT Physical Therapist                  PT OP Goals     Row Name 11/07/18 1400          PT Short Term Goals    STG Date to Achieve 12/05/18  -MP     STG 1 Ivet is instructed in lumbar bracing, hooklying, to begin learning to use her core musculature to assist to offset forces in the lumbar spine with ADL performance.  -MP     STG 1 Progress Met  -MP     STG 2 Light intensity NuSTep activity is begun.  -MP     STG 2 Progress Ongoing  -MP     STG 3 PSFS disability is reduced by 12%.  -MP     STG 3 Progress Ongoing  -MP        Long Term Goals    LTG Date to Achieve 02/03/19  -MP     LTG 1 Ivet is independent with aqua exercises for self care.  -MP     LTG 1 Progress Ongoing  -MP     LTG 2 She is also independent with a HEP, land activities in the wellness center and all self care.  -MP     LTG 2 Progress Ongoing  -MP     LTG 3 PSFS disability is reduced by 24%.  -MP     LTG 3 Progress Ongoing  -MP       User Key  (r) = Recorded By, (t) = Taken By, (c) = Cosigned By    Initials Name Provider Type    Prieto Phan PT Physical Therapist          Therapy Education  Education Details: Progressed HEP with lumbar bracing, hooklying.  Given: HEP, Symptoms/condition management  Program: New  How Provided: Written  Provided to: Patient  Level of Understanding: Teach back education performed       Time Calculation:   Start Time: 1345  Stop Time: 1430  Time  Calculation (min): 45 min  Therapy Suggested Charges     Code   Minutes Charges    01546 (CPT®) Hc Pt Neuromusc Re Education Ea 15 Min      62112 (CPT®) Hc Pt Ther Proc Ea 15 Min 30 2    27685 (CPT®) Hc Gait Training Ea 15 Min      92384 (CPT®) Hc Pt Therapeutic Act Ea 15 Min      33481 (CPT®) Hc Pt Manual Therapy Ea 15 Min      10633 (CPT®) Hc Pt Ther Massage- Per 15 Min      16164 (CPT®) Hc Pt Iontophoresis Ea 15 Min      30545 (CPT®) Hc Pt Elec Stim Ea-Per 15 Min      71406 (CPT®) Hc Pt Ultrasound Ea 15 Min      81731 (CPT®) Hc Pt Self Care/Mgmt/Train Ea 15 Min      75270 (CPT®) Hc Pt Prosthetic (S) Train Initial Encounter, Each 15 Min      65238 (CPT®) Hc Orthotic(S) Mgmt/Train Initial Encounter, Each 15min      84080 (CPT®) Hc Pt Aquatic Therapy Ea 15 Min      79076 (CPT®) Hc Pt Orthotic(S)/Prosthetic(S) Encounter, Each 15 Min       (CPT®) Hc Pt Electrical Stim Unattended      Total  30 2        Therapy Charges for Today     Code Description Service Date Service Provider Modifiers Qty    99332100449 HC PT THER PROC EA 15 MIN 11/7/2018 Prieto Vincent, PT GP 2    83840635591 HC PT PROSTHETIC (S) TRAIN INITIAL ENCOUNTER, EACH 15 MIN 11/7/2018 Prieto Vincent, PT GP 1        Prieto Vincent, PT  11/7/2018

## 2018-11-12 ENCOUNTER — HOSPITAL ENCOUNTER (OUTPATIENT)
Dept: PHYSICAL THERAPY | Facility: HOSPITAL | Age: 78
Setting detail: THERAPIES SERIES
Discharge: HOME | End: 2018-11-12

## 2018-11-12 DIAGNOSIS — M54.50 CHRONIC MIDLINE LOW BACK PAIN WITHOUT SCIATICA: Primary | ICD-10-CM

## 2018-11-12 DIAGNOSIS — R26.2 DIFFICULTY WALKING: ICD-10-CM

## 2018-11-12 DIAGNOSIS — M25.552 HIP PAIN, BILATERAL: ICD-10-CM

## 2018-11-12 DIAGNOSIS — G89.29 CHRONIC MIDLINE LOW BACK PAIN WITHOUT SCIATICA: Primary | ICD-10-CM

## 2018-11-12 DIAGNOSIS — Z78.9 DIFFICULTY NAVIGATING STAIRS: ICD-10-CM

## 2018-11-12 DIAGNOSIS — M25.551 HIP PAIN, BILATERAL: ICD-10-CM

## 2018-11-12 PROCEDURE — 97110 THERAPEUTIC EXERCISES: CPT | Performed by: PHYSICAL THERAPIST

## 2018-11-12 PROCEDURE — 97140 MANUAL THERAPY 1/> REGIONS: CPT | Performed by: PHYSICAL THERAPIST

## 2018-11-12 NOTE — THERAPY TREATMENT NOTE
Outpatient Physical Therapy Ortho Treatment Note  [facility]     Patient Name: Ivet Diaz  : 1940  MRN: [id number]  Today's Date: 2018      Visit Date: 2018    Visit Dx:    ICD-10-CM ICD-9-CM   1. Chronic midline low back pain without sciatica M54.5 724.2    G89.29 338.29   2. Hip pain, bilateral M25.551 719.45    M25.552    3. Difficulty navigating stairs R68.89 V62.89   4. Difficulty walking R26.2 719.7       There is no problem list on file for this patient.       Past Medical History:   Diagnosis Date   • Breast cancer (CMS/HCC)    • Cancer (CMS/HCC)     Left breast   • Depression    • Hypertension    • Reflux esophagitis         Past Surgical History:   Procedure Laterality Date   • BREAST BIOPSY     • BREAST SURGERY      Left masectomy   • MASTECTOMY             PT Assessment/Plan     Row Name 18 1431          PT Assessment    Assessment Comments  Ivet tolerated treatment well.  There was a reduction in pain from 6/10 to 2/10 and she progressed therapeutic exercises.  -MP        PT Plan    PT Plan Comments  Continue progressing per her response to treatment.  -MP       User Key  (r) = Recorded By, (t) = Taken By, (c) = Cosigned By    Initials Name Provider Type    Prieto Phan, PT Physical Therapist              Exercises     Row Name 18 1400 18 1300          Subjective Comments    Subjective Comments  Ivet reports that her R groin while she was trying to sleep last night.    -MP  --        Subjective Pain    Able to rate subjective pain?  yes  -MP  --     Pre-Treatment Pain Level  6  -MP  --        Total Minutes    08713 - PT Therapeutic Exercise Minutes  35  -MP  --     29308 - PT Manual Therapy Minutes  --  10  -MP        Exercise 1    Exercise Name 1  Refer to land flow sheet  -MP  --     Additional Comments  Progressed therapeutic exercises with the NuStep x 5 minutes, workload of 4.  -MP  --        Exercise 2    Additional Comments  Also began  wand flexion, hooklying, x 10 with dowel ladarius.  -MP  --       User Key  (r) = Recorded By, (t) = Taken By, (c) = Cosigned By    Initials Name Provider Type    Prieto Phan PT Physical Therapist             Manual Rx (last 36 hours)      Manual Treatments     Row Name 11/12/18 1300             Total Minutes    81104 - PT Manual Therapy Minutes  10  -MP         Manual Rx 1    Manual Rx 1 Location  R hip  -MP      Manual Rx 1 Type  Inner quadrant approximation/AROM  -MP      Manual Rx 1 Duration  30 repetitions  -MP         Manual Rx 2    Manual Rx 2 Location  Lumbar spine  -MP      Manual Rx 2 Type  Decompression/legs pulled  -MP      Manual Rx 2 Duration  60s on, 15s off x 3  -MP        User Key  (r) = Recorded By, (t) = Taken By, (c) = Cosigned By    Initials Name Provider Type    Prieto Phan PT Physical Therapist          PT OP Goals     Row Name 11/12/18 1400          PT Short Term Goals    STG Date to Achieve  12/05/18  -MP     STG 1  Ivet is instructed in lumbar bracing, hooklying, to begin learning to use her core musculature to assist to offset forces in the lumbar spine with ADL performance.  -MP     STG 1 Progress  Met  -MP     STG 2  Light intensity NuSTep activity is begun.  -MP     STG 2 Progress  Met  -MP     STG 3  PSFS disability is reduced by 12%.  -MP     STG 3 Progress  Ongoing  -MP        Long Term Goals    LTG Date to Achieve  02/03/19  -MP     LTG 1  Ivet is independent with aqua exercises for self care.  -MP     LTG 1 Progress  Ongoing  -MP     LTG 2  She is also independent with a HEP, land activities in the wellness center and all self care.  -MP     LTG 2 Progress  Ongoing  -MP     LTG 3  PSFS disability is reduced by 24%.  -MP     LTG 3 Progress  Ongoing  -MP       User Key  (r) = Recorded By, (t) = Taken By, (c) = Cosigned By    Initials Name Provider Type    Prieto Phan PT Physical Therapist          Therapy Education  Education Details: Progressed her HEP with wand  flexion, hooklying, to elongate the spine, improve posture and help the lumbar spine.  Given: HEP, Symptoms/condition management  Program: New  How Provided: Written  Provided to: Patient  Level of Understanding: Teach back education performed     Time Calculation:   Start Time: 1345  Stop Time: 1430  Time Calculation (min): 45 min  Therapy Suggested Charges     Code   Minutes Charges    66533 (CPT®) Hc Pt Neuromusc Re Education Ea 15 Min      49466 (CPT®) Hc Pt Ther Proc Ea 15 Min 35 2    16156 (CPT®) Hc Gait Training Ea 15 Min      44106 (CPT®) Hc Pt Therapeutic Act Ea 15 Min      60373 (CPT®) Hc Pt Manual Therapy Ea 15 Min 10 1    55533 (CPT®) Hc Pt Ther Massage- Per 15 Min      87762 (CPT®) Hc Pt Iontophoresis Ea 15 Min      70592 (CPT®) Hc Pt Elec Stim Ea-Per 15 Min      85060 (CPT®) Hc Pt Ultrasound Ea 15 Min      22015 (CPT®) Hc Pt Self Care/Mgmt/Train Ea 15 Min      76254 (CPT®) Hc Pt Prosthetic (S) Train Initial Encounter, Each 15 Min      40361 (CPT®) Hc Orthotic(S) Mgmt/Train Initial Encounter, Each 15min      46340 (CPT®) Hc Pt Aquatic Therapy Ea 15 Min      47279 (CPT®) Hc Pt Orthotic(S)/Prosthetic(S) Encounter, Each 15 Min       (CPT®) Hc Pt Electrical Stim Unattended      Total  45 3        Therapy Charges for Today     Code Description Service Date Service Provider Modifiers Qty    83630115232 HC PT THER PROC EA 15 MIN 11/12/2018 Prieto Vincent, PT GP 2    68465994165 HC PT MANUAL THERAPY EA 15 MIN 11/12/2018 Prieto Vincent, PT GP 1        Prieto Vincent PT  11/12/2018

## 2018-11-13 DIAGNOSIS — Z12.31 VISIT FOR SCREENING MAMMOGRAM: Primary | ICD-10-CM

## 2018-11-14 ENCOUNTER — HOSPITAL ENCOUNTER (OUTPATIENT)
Dept: PHYSICAL THERAPY | Facility: HOSPITAL | Age: 78
Setting detail: THERAPIES SERIES
Discharge: HOME | End: 2018-11-14

## 2018-11-14 DIAGNOSIS — Z78.9 DIFFICULTY NAVIGATING STAIRS: ICD-10-CM

## 2018-11-14 DIAGNOSIS — R26.2 DIFFICULTY WALKING: ICD-10-CM

## 2018-11-14 DIAGNOSIS — G89.29 CHRONIC MIDLINE LOW BACK PAIN WITHOUT SCIATICA: Primary | ICD-10-CM

## 2018-11-14 DIAGNOSIS — M25.551 HIP PAIN, BILATERAL: ICD-10-CM

## 2018-11-14 DIAGNOSIS — M25.552 HIP PAIN, BILATERAL: ICD-10-CM

## 2018-11-14 DIAGNOSIS — M54.50 CHRONIC MIDLINE LOW BACK PAIN WITHOUT SCIATICA: Primary | ICD-10-CM

## 2018-11-14 PROCEDURE — 97140 MANUAL THERAPY 1/> REGIONS: CPT | Performed by: PHYSICAL THERAPIST

## 2018-11-14 PROCEDURE — 97110 THERAPEUTIC EXERCISES: CPT | Performed by: PHYSICAL THERAPIST

## 2018-11-14 NOTE — THERAPY TREATMENT NOTE
"    Outpatient Physical Therapy Ortho Treatment Note  [facility]     Patient Name: Ivet Diaz  : 1940  MRN: [id number]  Today's Date: 2018      Visit Date: 2018    Visit Dx:    ICD-10-CM ICD-9-CM   1. Chronic midline low back pain without sciatica M54.5 724.2    G89.29 338.29   2. Hip pain, bilateral M25.551 719.45    M25.552    3. Difficulty navigating stairs R68.89 V62.89   4. Difficulty walking R26.2 719.7       There is no problem list on file for this patient.       Past Medical History:   Diagnosis Date   • Breast cancer (CMS/HCC)    • Cancer (CMS/HCC)     Left breast   • Depression    • Hypertension    • Reflux esophagitis         Past Surgical History:   Procedure Laterality Date   • BREAST BIOPSY     • BREAST SURGERY      Left masectomy   • MASTECTOMY                         PT Assessment/Plan     Row Name 18 1330          PT Assessment    Functional Limitations  --  -RA     Assessment Comments  Patient does report some benefit from therapy and steroid medication.  She notes pain worse with extended standing/walking and going up>down stairs.  After performing 2\" step ups, she noted she was starting to get \"twinge\" in R groin/hip.  She does feel manual distraction is helpful.    -RA        PT Plan    PT Plan Comments  Continue skilled therapy working on mobility, core/LE strengthening, and function  -RA       User Key  (r) = Recorded By, (t) = Taken By, (c) = Cosigned By    Initials Name Provider Type    RA Shantelle Bonilla, PT Physical Therapist              Exercises     Row Name 18 1300             Subjective Comments    Subjective Comments  Taking prednisone - think it's hellping some.  Pain is worse after extended standing/walking (especially on concrete) with activities like standing at [employer]  and 45 minute [employer] shopping trip.   -RA         Subjective Pain    Able to rate subjective pain?  yes  -RA      Pre-Treatment Pain Level  5  -RA      " Post-Treatment Pain Level  3  -RA      Subjective Pain Comment  hip/groin hurting some  -RA         Total Minutes    42959 - PT Therapeutic Exercise Minutes  35  -RA      63251 - PT Manual Therapy Minutes  10  -RA         Exercise 1    Exercise Name 1  Refer to land flow sheet  -RA        User Key  (r) = Recorded By, (t) = Taken By, (c) = Cosigned By    Initials Name Provider Type    Shantelle Eastman, PT Physical Therapist                        Manual Rx (last 36 hours)      Manual Treatments     Row Name 11/14/18 1300             Total Minutes    82259 - PT Manual Therapy Minutes  10  -RA         Manual Rx 1    Manual Rx 1 Location  R hip  -RA      Manual Rx 1 Type  Inner quadrant approximation/AROM  -RA      Manual Rx 1 Duration  30 repetitions  -RA         Manual Rx 2    Manual Rx 2 Location  Lumbar spine  -RA      Manual Rx 2 Type  Decompression/legs pulled  -RA      Manual Rx 2 Duration  60s on, 15s off x 3  -RA        User Key  (r) = Recorded By, (t) = Taken By, (c) = Cosigned By    Initials Name Provider Type    Shantelle Eastman, PT Physical Therapist              Therapy Education  Given: HEP, Symptoms/condition management  Program: Reinforced  How Provided: Verbal, Demonstration  Provided to: Patient  Level of Understanding: Teach back education performed, Verbalized              Time Calculation:   Start Time: 1335  Stop Time: 1420  Time Calculation (min): 45 min  Therapy Suggested Charges     Code   Minutes Charges    70564 (CPT®) Hc Pt Neuromusc Re Education Ea 15 Min      42941 (CPT®) Hc Pt Ther Proc Ea 15 Min 35 2    40689 (CPT®) Hc Gait Training Ea 15 Min      35149 (CPT®) Hc Pt Therapeutic Act Ea 15 Min      58941 (CPT®) Hc Pt Manual Therapy Ea 15 Min 10 1    45388 (CPT®) Hc Pt Ther Massage- Per 15 Min      54239 (CPT®) Hc Pt Iontophoresis Ea 15 Min      28162 (CPT®) Hc Pt Elec Stim Ea-Per 15 Min      11675 (CPT®) Hc Pt Ultrasound Ea 15 Min      10573 (CPT®) Hc Pt Self Care/Mgmt/Train Ea 15  Min      01904 (CPT®) Hc Pt Prosthetic (S) Train Initial Encounter, Each 15 Min      22843 (CPT®) Hc Orthotic(S) Mgmt/Train Initial Encounter, Each 15min      28699 (CPT®) Hc Pt Aquatic Therapy Ea 15 Min      42860 (CPT®) Hc Pt Orthotic(S)/Prosthetic(S) Encounter, Each 15 Min       (CPT®) Hc Pt Electrical Stim Unattended      Total  45 3        Therapy Charges for Today     Code Description Service Date Service Provider Modifiers Qty    67666363003 HC PT THER PROC EA 15 MIN 11/14/2018 Shantelle Bonilla, PT GP 2    85762219894 HC PT MANUAL THERAPY EA 15 MIN 11/14/2018 Shantelle Bonilla, PT GP 1                    Shantelle Bonilla, PT  11/14/2018

## 2018-11-19 ENCOUNTER — HOSPITAL ENCOUNTER (OUTPATIENT)
Dept: PHYSICAL THERAPY | Facility: HOSPITAL | Age: 78
Setting detail: THERAPIES SERIES
Discharge: HOME | End: 2018-11-19

## 2018-11-19 DIAGNOSIS — M25.551 HIP PAIN, BILATERAL: ICD-10-CM

## 2018-11-19 DIAGNOSIS — M25.552 HIP PAIN, BILATERAL: ICD-10-CM

## 2018-11-19 DIAGNOSIS — R26.2 DIFFICULTY WALKING: ICD-10-CM

## 2018-11-19 DIAGNOSIS — Z78.9 DIFFICULTY NAVIGATING STAIRS: ICD-10-CM

## 2018-11-19 DIAGNOSIS — M54.50 CHRONIC MIDLINE LOW BACK PAIN WITHOUT SCIATICA: Primary | ICD-10-CM

## 2018-11-19 DIAGNOSIS — G89.29 CHRONIC MIDLINE LOW BACK PAIN WITHOUT SCIATICA: Primary | ICD-10-CM

## 2018-11-19 PROCEDURE — 97110 THERAPEUTIC EXERCISES: CPT | Performed by: PHYSICAL THERAPIST

## 2018-11-21 ENCOUNTER — APPOINTMENT (OUTPATIENT)
Dept: PHYSICAL THERAPY | Facility: HOSPITAL | Age: 78
End: 2018-11-21

## 2018-11-26 ENCOUNTER — HOSPITAL ENCOUNTER (OUTPATIENT)
Dept: PHYSICAL THERAPY | Facility: HOSPITAL | Age: 78
Setting detail: THERAPIES SERIES
Discharge: HOME | End: 2018-11-26

## 2018-11-26 DIAGNOSIS — G89.29 CHRONIC MIDLINE LOW BACK PAIN WITHOUT SCIATICA: Primary | ICD-10-CM

## 2018-11-26 DIAGNOSIS — Z78.9 DIFFICULTY NAVIGATING STAIRS: ICD-10-CM

## 2018-11-26 DIAGNOSIS — M54.50 CHRONIC MIDLINE LOW BACK PAIN WITHOUT SCIATICA: Primary | ICD-10-CM

## 2018-11-26 DIAGNOSIS — R26.2 DIFFICULTY WALKING: ICD-10-CM

## 2018-11-26 DIAGNOSIS — M25.552 HIP PAIN, BILATERAL: ICD-10-CM

## 2018-11-26 DIAGNOSIS — M25.551 HIP PAIN, BILATERAL: ICD-10-CM

## 2018-11-26 PROCEDURE — 97110 THERAPEUTIC EXERCISES: CPT | Performed by: PHYSICAL THERAPIST

## 2018-11-26 NOTE — THERAPY TREATMENT NOTE
Outpatient Physical Therapy Ortho Treatment Note  [facility]     Patient Name: Ivet Diaz  : 1940  MRN: [id number]  Today's Date: 2018      Visit Date: 2018    Visit Dx:    ICD-10-CM ICD-9-CM   1. Chronic midline low back pain without sciatica M54.5 724.2    G89.29 338.29   2. Hip pain, bilateral M25.551 719.45    M25.552    3. Difficulty navigating stairs R68.89 V62.89   4. Difficulty walking R26.2 719.7       There is no problem list on file for this patient.       Past Medical History:   Diagnosis Date   • Breast cancer (CMS/HCC)    • Cancer (CMS/HCC)     Left breast   • Depression    • Hypertension    • Reflux esophagitis         Past Surgical History:   Procedure Laterality Date   • BREAST BIOPSY     • BREAST SURGERY      Left masectomy   • MASTECTOMY         PT Ortho     Row Name 18 1400       Subjective Comments    Subjective Comments  Ivet reported that she   -MP       Subjective Pain    Able to rate subjective pain?  yes  -MP    Pre-Treatment Pain Level  1  -MP    Post-Treatment Pain Level  0  -MP       Posture/Observations    Posture/Observations Comments  In sitting, the orthotics were checked for contact in full plantarflexion B.  They remained in full contact B.  -MP      User Key  (r) = Recorded By, (t) = Taken By, (c) = Cosigned By    Initials Name Provider Type    Prieto Phan, PT Physical Therapist            PT Assessment/Plan     Row Name 18 1445          PT Assessment    Assessment Comments  Initial response to walking with the orthotics was good.  The fit is proper and with time she should adapt well and her kineitic chain should benefit from the use of the orthtoics.  -MP        PT Plan    PT Plan Comments  Monitor her response to treatment/orthotics, reasses and continue.  -MP       User Key  (r) = Recorded By, (t) = Taken By, (c) = Cosigned By    Initials Name Provider Type    Prieto Phan PT Physical Therapist              Exercises      Row Name 11/26/18 1400             Subjective Comments    Subjective Comments  Ivet reported that she   -MP         Subjective Pain    Able to rate subjective pain?  yes  -MP      Pre-Treatment Pain Level  1  -MP      Post-Treatment Pain Level  0  -MP         Total Minutes    18616 - PT Therapeutic Exercise Minutes  45  -MP         Exercise 1    Exercise Name 1  Refer to land flow sheet  -MP      Additional Comments  Progressed theraeputic exercises with runner's stretch, orthotics in her shoes, 30s x 3 B.  Step ups and through were advanced to 4 inches, 10 x 2 B.  -MP        User Key  (r) = Recorded By, (t) = Taken By, (c) = Cosigned By    Initials Name Provider Type    Prieto Phan, PT Physical Therapist          PT OP Goals     Row Name 11/26/18 1400          PT Short Term Goals    STG Date to Achieve  12/05/18  -MP     STG 1  Ivet is instructed in lumbar bracing, hooklying, to begin learning to use her core musculature to assist to offset forces in the lumbar spine with ADL performance.  -MP     STG 1 Progress  Met  -MP     STG 2  Light intensity NuSTep activity is begun.  -MP     STG 2 Progress  Met  -MP     STG 3  PSFS disability is reduced by 12%.  -MP     STG 3 Progress  Ongoing  -MP        Long Term Goals    LTG Date to Achieve  02/03/19  -MP     LTG 1  Ivet is independent with aqua exercises for self care.  -MP     LTG 1 Progress  Ongoing  -MP     LTG 2  She is also independent with a HEP, land activities in the wellness center and all self care.  -MP     LTG 2 Progress  Ongoing  -MP     LTG 3  PSFS disability is reduced by 24%.  -MP     LTG 3 Progress  Ongoing  -MP       User Key  (r) = Recorded By, (t) = Taken By, (c) = Cosigned By    Initials Name Provider Type    Prieto Phan, PT Physical Therapist          Therapy Education  Education Details: Runner's stretch was added to her HEP.  Given: HEP, Symptoms/condition management  Program: New  How Provided: Written  Provided to:  Patient  Level of Understanding: Teach back education performed       Time Calculation:   Start Time: 1345  Stop Time: 1430  Time Calculation (min): 45 min  Therapy Suggested Charges     Code   Minutes Charges    09805 (CPT®) Hc Pt Neuromusc Re Education Ea 15 Min      96711 (CPT®) Hc Pt Ther Proc Ea 15 Min 45 3    80616 (CPT®) Hc Gait Training Ea 15 Min      70947 (CPT®) Hc Pt Therapeutic Act Ea 15 Min      14944 (CPT®) Hc Pt Manual Therapy Ea 15 Min      17901 (CPT®) Hc Pt Ther Massage- Per 15 Min      84729 (CPT®) Hc Pt Iontophoresis Ea 15 Min      01859 (CPT®) Hc Pt Elec Stim Ea-Per 15 Min      19923 (CPT®) Hc Pt Ultrasound Ea 15 Min      02386 (CPT®) Hc Pt Self Care/Mgmt/Train Ea 15 Min      48296 (CPT®) Hc Pt Prosthetic (S) Train Initial Encounter, Each 15 Min      32338 (CPT®) Hc Orthotic(S) Mgmt/Train Initial Encounter, Each 15min      83864 (CPT®) Hc Pt Aquatic Therapy Ea 15 Min      43352 (CPT®) Hc Pt Orthotic(S)/Prosthetic(S) Encounter, Each 15 Min       (CPT®) Hc Pt Electrical Stim Unattended      Total  45 3        Therapy Charges for Today     Code Description Service Date Service Provider Modifiers Qty    18775781188 HC PT THER PROC EA 15 MIN 11/26/2018 Prieto Vincent, PT GP 3        Prieto Vincent PT  11/26/2018

## 2018-11-28 ENCOUNTER — HOSPITAL ENCOUNTER (OUTPATIENT)
Dept: PHYSICAL THERAPY | Facility: HOSPITAL | Age: 78
Setting detail: THERAPIES SERIES
Discharge: HOME | End: 2018-11-28

## 2018-11-28 DIAGNOSIS — Z78.9 DIFFICULTY NAVIGATING STAIRS: ICD-10-CM

## 2018-11-28 DIAGNOSIS — G89.29 CHRONIC MIDLINE LOW BACK PAIN WITHOUT SCIATICA: Primary | ICD-10-CM

## 2018-11-28 DIAGNOSIS — M25.552 HIP PAIN, BILATERAL: ICD-10-CM

## 2018-11-28 DIAGNOSIS — M54.50 CHRONIC MIDLINE LOW BACK PAIN WITHOUT SCIATICA: Primary | ICD-10-CM

## 2018-11-28 DIAGNOSIS — R26.2 DIFFICULTY WALKING: ICD-10-CM

## 2018-11-28 DIAGNOSIS — M25.551 HIP PAIN, BILATERAL: ICD-10-CM

## 2018-11-28 PROCEDURE — 97110 THERAPEUTIC EXERCISES: CPT | Performed by: PHYSICAL THERAPIST

## 2018-11-28 NOTE — THERAPY PROGRESS REPORT/RE-CERT
Outpatient Physical Therapy Ortho Re-Assessment  [facility]     Patient Name: Ivet Diaz  : 1940  MRN: [id number]  Today's Date: 2018      Visit Date: 2018    There is no problem list on file for this patient.       Past Medical History:   Diagnosis Date   • Breast cancer (CMS/HCC)    • Cancer (CMS/HCC)     Left breast   • Depression    • Hypertension    • Reflux esophagitis         Past Surgical History:   Procedure Laterality Date   • BREAST BIOPSY     • BREAST SURGERY      Left masectomy   • MASTECTOMY         Visit Dx:     ICD-10-CM ICD-9-CM   1. Chronic midline low back pain without sciatica M54.5 724.2    G89.29 338.29   2. Hip pain, bilateral M25.551 719.45    M25.552    3. Difficulty navigating stairs R68.89 V62.89   4. Difficulty walking R26.2 719.7           PT Ortho     Row Name 18 1400       Myotomal Screen- Lower Quarter Clearing    Hip flexion (L2)  Bilateral:;4+ (Good +)  -MP    Knee extension (L3)  Bilateral:;5 (Normal)  -MP    Ankle DF (L4)  Bilateral:;5 (Normal)  -MP    Great toe extension (L5)  Bilateral:;4+ (Good +)  -MP    Ankle PF (S1)  -- R 4+/5, L 4-/5  -MP    Knee flexion (S2)  Bilateral:;4+ (Good +)  -MP    Row Name 18 1400       Subjective Comments    Subjective Comments  Ivet reported that she   -MP       Subjective Pain    Able to rate subjective pain?  yes  -MP    Pre-Treatment Pain Level  1  -MP    Post-Treatment Pain Level  0  -MP       Posture/Observations    Posture/Observations Comments  In sitting, the orthotics were checked for contact in full plantarflexion B.  They remained in full contact B.  -MP      User Key  (r) = Recorded By, (t) = Taken By, (c) = Cosigned By    Initials Name Provider Type    Prieto Phan PT Physical Therapist        Therapy Education  Education Details: Heel raises were added to her HEP.  Given: HEP, Symptoms/condition management  Program: New  How Provided: Written  Provided to: Patient  Level of  Understanding: Teach back education performed     PT OP Goals     Row Name 11/28/18 1400          PT Short Term Goals    STG Date to Achieve  12/05/18  -MP     STG 1  Ivet is instructed in lumbar bracing, hooklying, to begin learning to use her core musculature to assist to offset forces in the lumbar spine with ADL performance.  -MP     STG 1 Progress  Met  -MP     STG 2  Light intensity NuSTep activity is begun.  -MP     STG 2 Progress  Met  -MP     STG 3  PSFS disability is reduced by 12%.  -MP     STG 3 Progress  Ongoing  -MP        Long Term Goals    LTG Date to Achieve  02/03/19  -MP     LTG 1  Ivet is independent with aqua exercises for self care.  -MP     LTG 1 Progress  Ongoing  -MP     LTG 2  She is also independent with a HEP, land activities in the wellness center and all self care.  -MP     LTG 2 Progress  Ongoing  -MP     LTG 3  PSFS disability is reduced by 24%.  -MP     LTG 3 Progress  Ongoing  -MP       User Key  (r) = Recorded By, (t) = Taken By, (c) = Cosigned By    Initials Name Provider Type    Prieto Phan, PT Physical Therapist          PT Assessment/Plan     Row Name 11/28/18 2256          PT Assessment    Functional Limitations  Decreased safety during functional activities;Impaired gait;Impaired locomotion;Limitations in community activities;Limitations in functional capacity and performance  -MP     Impairments  Pain;Range of motion;Muscle strength;Poor body mechanics;Locomotion;Endurance;Gait  -MP     Assessment Comments  After seven skilled PT visits Ivet is experiencing pain less frequently and improving tolerance to weightbearing activities like standing and walking.  She continues to be a skilled PT candidate and would benefit from 4 more weeks, part aqua and returning to land, to meet goals and continue to improve pain and function.  -MP     Please refer to paper survey for additional self-reported information  Yes  -MP     Rehab Potential  Good  -MP     Patient/caregiver  participated in establishment of treatment plan and goals  Yes  -MP     Patient would benefit from skilled therapy intervention  Yes  -MP        PT Plan    PT Frequency  2x/week  -MP     Predicted Duration of Therapy Intervention (Therapy Eval)  4 weeks  -MP     Planned CPT's?  PT RE-EVAL: 84733;PT EVAL MOD COMPLELITY: 11578;PT THER ACT EA 15 MIN: 81041;PT THER PROC EA 15 MIN: 64367;PT AQUATIC THERAPY EA 15 MIN: 27002;PT MANUAL THERAPY EA 15 MIN: 11811;PT NEUROMUSC RE-EDUCATION EA 15 MIN: 30240;PT SELF CARE/HOME MGMT/TRAIN EA 15: 79675;PT ELECTRICAL STIM UNATTEND: ;PT ULTRASOUND EA 15 MIN: 96196  -MP     PT Plan Comments  She is to go to water for 2 weeks to learn appropriate therapeutic exercises and then return to land to finish treatment.  -MP       User Key  (r) = Recorded By, (t) = Taken By, (c) = Cosigned By    Initials Name Provider Type    Prieto Phan PT Physical Therapist            Exercises     Row Name 11/28/18 1400             Subjective Comments    Subjective Comments  Ivet stated that she has been shopping at [employer] for 45 minutes and her hips, R greater than L, are hurting  -MP         Subjective Pain    Able to rate subjective pain?  yes  -MP      Pre-Treatment Pain Level  7  -MP      Subjective Pain Comment  Ivet reports that since beginning PT she has pain less frequently and standing and walking tolerance is greater.  Standing greater than 10 minutes is uncomfortable.  -MP         Total Minutes    49824 - PT Therapeutic Exercise Minutes  45  -MP         Exercise 1    Exercise Name 1  Refer to land flow sheet  -MP        User Key  (r) = Recorded By, (t) = Taken By, (c) = Cosigned By    Initials Name Provider Type    Prieto Phan, PT Physical Therapist         Patient Specific Functional Scale  Activity 1 Score: 6  Activity 2 Score: 3  Activity 3 Score: 5  Total Score: 0  Patient Specific Functional Scale Commetns: 14/30, 53% disability      Time Calculation:     Therapy Suggested  Charges     Code   Minutes Charges    60575 (CPT®) Hc Pt Neuromusc Re Education Ea 15 Min      26900 (CPT®) Hc Pt Ther Proc Ea 15 Min 45 3    62138 (CPT®) Hc Gait Training Ea 15 Min      29172 (CPT®) Hc Pt Therapeutic Act Ea 15 Min      99615 (CPT®) Hc Pt Manual Therapy Ea 15 Min      53101 (CPT®) Hc Pt Ther Massage- Per 15 Min      52457 (CPT®) Hc Pt Iontophoresis Ea 15 Min      89855 (CPT®) Hc Pt Elec Stim Ea-Per 15 Min      20142 (CPT®) Hc Pt Ultrasound Ea 15 Min      21621 (CPT®) Hc Pt Self Care/Mgmt/Train Ea 15 Min      36622 (CPT®) Hc Pt Prosthetic (S) Train Initial Encounter, Each 15 Min      36684 (CPT®) Hc Orthotic(S) Mgmt/Train Initial Encounter, Each 15min      56892 (CPT®) Hc Pt Aquatic Therapy Ea 15 Min      94269 (CPT®) Hc Pt Orthotic(S)/Prosthetic(S) Encounter, Each 15 Min       (CPT®) Hc Pt Electrical Stim Unattended      Total  45 3          Start Time: 1345  Stop Time: 1430  Time Calculation (min): 45 min     Therapy Charges for Today     Code Description Service Date Service Provider Modifiers Qty    02813863288 HC PT THER PROC EA 15 MIN 11/28/2018 Prieto Vincetn, PT GP 3          Prieto Vincent, PT  11/28/2018

## 2018-12-03 ENCOUNTER — HOSPITAL ENCOUNTER (OUTPATIENT)
Dept: PHYSICAL THERAPY | Facility: HOSPITAL | Age: 78
Setting detail: THERAPIES SERIES
Discharge: HOME | End: 2018-12-03

## 2018-12-03 DIAGNOSIS — M25.551 HIP PAIN, BILATERAL: ICD-10-CM

## 2018-12-03 DIAGNOSIS — Z78.9 DIFFICULTY NAVIGATING STAIRS: ICD-10-CM

## 2018-12-03 DIAGNOSIS — M25.552 HIP PAIN, BILATERAL: ICD-10-CM

## 2018-12-03 DIAGNOSIS — G89.29 CHRONIC MIDLINE LOW BACK PAIN WITHOUT SCIATICA: Primary | ICD-10-CM

## 2018-12-03 DIAGNOSIS — R26.2 DIFFICULTY WALKING: ICD-10-CM

## 2018-12-03 DIAGNOSIS — M54.50 CHRONIC MIDLINE LOW BACK PAIN WITHOUT SCIATICA: Primary | ICD-10-CM

## 2018-12-03 PROCEDURE — 97113 AQUATIC THERAPY/EXERCISES: CPT

## 2018-12-04 ENCOUNTER — APPOINTMENT (OUTPATIENT)
Dept: WOMENS IMAGING | Facility: HOSPITAL | Age: 78
End: 2018-12-04

## 2018-12-04 PROCEDURE — 77067 SCR MAMMO BI INCL CAD: CPT | Performed by: RADIOLOGY

## 2018-12-04 PROCEDURE — MDREVIEWSP: Performed by: RADIOLOGY

## 2018-12-04 PROCEDURE — 77063 BREAST TOMOSYNTHESIS BI: CPT | Performed by: RADIOLOGY

## 2018-12-05 ENCOUNTER — HOSPITAL ENCOUNTER (OUTPATIENT)
Dept: PHYSICAL THERAPY | Facility: HOSPITAL | Age: 78
Setting detail: THERAPIES SERIES
Discharge: HOME | End: 2018-12-05

## 2018-12-05 DIAGNOSIS — Z12.31 VISIT FOR SCREENING MAMMOGRAM: ICD-10-CM

## 2018-12-05 DIAGNOSIS — G89.29 CHRONIC MIDLINE LOW BACK PAIN WITHOUT SCIATICA: Primary | ICD-10-CM

## 2018-12-05 DIAGNOSIS — M25.551 HIP PAIN, BILATERAL: ICD-10-CM

## 2018-12-05 DIAGNOSIS — Z78.9 DIFFICULTY NAVIGATING STAIRS: ICD-10-CM

## 2018-12-05 DIAGNOSIS — M25.552 HIP PAIN, BILATERAL: ICD-10-CM

## 2018-12-05 DIAGNOSIS — R26.2 DIFFICULTY WALKING: ICD-10-CM

## 2018-12-05 DIAGNOSIS — M54.50 CHRONIC MIDLINE LOW BACK PAIN WITHOUT SCIATICA: Primary | ICD-10-CM

## 2018-12-05 PROCEDURE — 97113 AQUATIC THERAPY/EXERCISES: CPT

## 2018-12-10 ENCOUNTER — HOSPITAL ENCOUNTER (OUTPATIENT)
Dept: PHYSICAL THERAPY | Facility: HOSPITAL | Age: 78
Setting detail: THERAPIES SERIES
Discharge: HOME | End: 2018-12-10

## 2018-12-10 DIAGNOSIS — R26.2 DIFFICULTY WALKING: ICD-10-CM

## 2018-12-10 DIAGNOSIS — G89.29 CHRONIC MIDLINE LOW BACK PAIN WITHOUT SCIATICA: Primary | ICD-10-CM

## 2018-12-10 DIAGNOSIS — M25.552 HIP PAIN, BILATERAL: ICD-10-CM

## 2018-12-10 DIAGNOSIS — Z78.9 DIFFICULTY NAVIGATING STAIRS: ICD-10-CM

## 2018-12-10 DIAGNOSIS — M54.50 CHRONIC MIDLINE LOW BACK PAIN WITHOUT SCIATICA: Primary | ICD-10-CM

## 2018-12-10 DIAGNOSIS — M25.551 HIP PAIN, BILATERAL: ICD-10-CM

## 2018-12-10 PROCEDURE — 97113 AQUATIC THERAPY/EXERCISES: CPT

## 2018-12-11 NOTE — THERAPY TREATMENT NOTE
Outpatient Physical Therapy Ortho Treatment Note  [facility]     Patient Name: Ivet Diaz  : 1940  MRN: [id number]  Today's Date: 12/10/2018      Visit Date: 2018    Visit Dx:    ICD-10-CM ICD-9-CM   1. Chronic midline low back pain without sciatica M54.5 724.2    G89.29 338.29   2. Hip pain, bilateral M25.551 719.45    M25.552    3. Difficulty navigating stairs R68.89 V62.89   4. Difficulty walking R26.2 719.7       There is no problem list on file for this patient.       Past Medical History:   Diagnosis Date   • Breast cancer (CMS/HCC)    • Cancer (CMS/HCC)     Left breast   • Depression    • Hypertension    • Reflux esophagitis         Past Surgical History:   Procedure Laterality Date   • BREAST BIOPSY     • BREAST SURGERY      Left masectomy   • MASTECTOMY             18 1600   Subjective Comments   Subjective Comments was a little sore not bad.    Subjective Pain   Able to rate subjective pain? yes   Pre-Treatment Pain Level 3   Subjective Pain Comment I dont mind some soreness,    Aquatics   Aquatics performed? Yes   53307 - Aquatic Therapy Minutes 50   Aquatics LE   Water Walk forward;side;backward  (100ft, 100 ft, 50 ft )   Stretch 1 seated water plank x 10 LN    Stretch 2 seated LAQ for HS warm up x 10    Stretch 3 standing calf stretch x 30 sec x 2   Vertical Traction LN and able to stabilize quickley   (1 min and then progressed ex )   Abdominals noodle  (LN rectus x 10 and obliques x 10 )   Hip Abd/Add 10 b    Hip Flex/Ext 10 flex b/ ex 5 b    March in Place 20 alt    Toe/Heel Raises 10/10   Bicycle with LN x 1 min    Mini Squat 10    Stretch Other 1 wall walk stretch knees bent but feels stretch x 30 sec x 1    Aquatics UE   Stretch 1 wall push up x 10    Stretch 2 standing side bend B    Scap Retraction/Row row x 10 blue paddles L 1   I's/T's/Y's I, T and horiz shld add abd x 10 blue paddles L1   Abdominals blue paddless rotation open for obliques x 5 b    Exercise  1   Exercise Name 1 single leg stance x 3 sec b x 3    Exercise 2   Exercise Name 2 march walking 1 lap         12/05/18 1600   PT Assessment   Assessment Comments second pool session, pt only had mild soreness so progressed ex. Needs a few ex for balance and then will transition back to land.    PT Plan   PT Plan Comments progress to self management with pictures/HEP and review.           Time Calculation:   Start Time: 0405  Stop Time: 0455  Time Calculation (min): 50 min  Total Timed Code Minutes- PT: 50 minute(s)  Therapy Suggested Charges     Code   Minutes Charges    45432 (CPT®) Hc Pt Neuromusc Re Education Ea 15 Min      05727 (CPT®) Hc Pt Ther Proc Ea 15 Min      76309 (CPT®) Hc Gait Training Ea 15 Min      93597 (CPT®) Hc Pt Therapeutic Act Ea 15 Min      19944 (CPT®) Hc Pt Manual Therapy Ea 15 Min      33337 (CPT®) Hc Pt Ther Massage- Per 15 Min      34881 (CPT®) Hc Pt Iontophoresis Ea 15 Min      21366 (CPT®) Hc Pt Elec Stim Ea-Per 15 Min      46351 (CPT®) Hc Pt Ultrasound Ea 15 Min      81507 (CPT®) Hc Pt Self Care/Mgmt/Train Ea 15 Min      56865 (CPT®) Hc Pt Prosthetic (S) Train Initial Encounter, Each 15 Min      41018 (CPT®) Hc Orthotic(S) Mgmt/Train Initial Encounter, Each 15min      37268 (CPT®) Hc Pt Aquatic Therapy Ea 15 Min 50 3    53677 (CPT®) Hc Pt Orthotic(S)/Prosthetic(S) Encounter, Each 15 Min       (CPT®) Hc Pt Electrical Stim Unattended      Total  50 3            HC PT AQUATIC THERAPY EA 15 MIN [id number]   12/05/2018 Bev Guadalupe, PT GP 3 Filed                   Bev Guadalupe, PT  12/10/2018

## 2018-12-11 NOTE — THERAPY TREATMENT NOTE
Outpatient Physical Therapy Ortho Treatment Note  [facility]     Patient Name: Ivet Diaz  : 1940  MRN: [id number]  Today's Date: 12/10/2018      Visit Date: 2018    Visit Dx:    ICD-10-CM ICD-9-CM   1. Chronic midline low back pain without sciatica M54.5 724.2    G89.29 338.29   2. Hip pain, bilateral M25.551 719.45    M25.552    3. Difficulty navigating stairs R68.89 V62.89   4. Difficulty walking R26.2 719.7       There is no problem list on file for this patient.       Past Medical History:   Diagnosis Date   • Breast cancer (CMS/HCC)    • Cancer (CMS/HCC)     Left breast   • Depression    • Hypertension    • Reflux esophagitis         Past Surgical History:   Procedure Laterality Date   • BREAST BIOPSY     • BREAST SURGERY      Left masectomy   • MASTECTOMY             18 1400   Subjective Comments   Subjective Comments Is doing a little better. Has a membership to gym/pool. Hope to avoid surgery    Subjective Pain   Able to rate subjective pain? yes   Pre-Treatment Pain Level 6   Subjective Pain Comment water feels good. It is probably what i would stick with. I did some classes but these exercises are different    Aquatics   Aquatics performed? Yes   88570 - Aquatic Therapy Minutes 40   Aquatics LE   Water Walk forward;side;backward  (50 ft each )   Stretch 1 seated water plank x 10 LN    Stretch 2 seated LAQ for HS warm up x 10    Stretch 3 standing calf stretch x 30 sec x 2   Vertical Traction LN x 1 min to learn how, rail as needed to stabilize    Abdominals noodle  (small noodle x 10 rectus )   Hip Abd/Add 10 b    Hip Flex/Ext 10 flex b    March in Place 10 alt    Toe/Heel Raises 10/10   Bicycle bench and with LN 1 min each              18   PT Assessment   Assessment Comments first pool session, has 3, will need to progress quicklly and review HEP by 3rd visit. Pt gets flexibility benefits in water.,    PT Plan   PT Plan Comments f/u on soreness. Progress  core/arms if mild.          Time Calculation:   Start Time: 1350  Stop Time: 1430  Time Calculation (min): 40 min  Total Timed Code Minutes- PT: 40 minute(s)  Therapy Suggested Charges     Code   Minutes Charges    25080 (CPT®) Hc Pt Neuromusc Re Education Ea 15 Min      53527 (CPT®) Hc Pt Ther Proc Ea 15 Min      73376 (CPT®) Hc Gait Training Ea 15 Min      23475 (CPT®) Hc Pt Therapeutic Act Ea 15 Min      03437 (CPT®) Hc Pt Manual Therapy Ea 15 Min      52521 (CPT®) Hc Pt Ther Massage- Per 15 Min      51655 (CPT®) Hc Pt Iontophoresis Ea 15 Min      84020 (CPT®) Hc Pt Elec Stim Ea-Per 15 Min      36308 (CPT®) Hc Pt Ultrasound Ea 15 Min      93872 (CPT®) Hc Pt Self Care/Mgmt/Train Ea 15 Min      97742 (CPT®) Hc Pt Prosthetic (S) Train Initial Encounter, Each 15 Min      43442 (CPT®) Hc Orthotic(S) Mgmt/Train Initial Encounter, Each 15min      22409 (CPT®) Hc Pt Aquatic Therapy Ea 15 Min 40 3    41400 (CPT®) Hc Pt Orthotic(S)/Prosthetic(S) Encounter, Each 15 Min       (CPT®) Hc Pt Electrical Stim Unattended      Total  40 3           HC PT AQUATIC THERAPY EA 15 MIN [id number]   12/03/2018 Bev Guadalupe, PT GP 3 Filed                 Bev Guadalupe, PT  12/10/2018

## 2018-12-11 NOTE — THERAPY TREATMENT NOTE
Outpatient Physical Therapy Ortho Treatment Note  [facility]     Patient Name: Ivet Diaz  : 1940  MRN: [id number]  Today's Date: 12/10/2018      Visit Date: 12/10/2018    Visit Dx:    ICD-10-CM ICD-9-CM   1. Chronic midline low back pain without sciatica M54.5 724.2    G89.29 338.29   2. Hip pain, bilateral M25.551 719.45    M25.552    3. Difficulty navigating stairs R68.89 V62.89   4. Difficulty walking R26.2 719.7       There is no problem list on file for this patient.       Past Medical History:   Diagnosis Date   • Breast cancer (CMS/HCC)    • Cancer (CMS/HCC)     Left breast   • Depression    • Hypertension    • Reflux esophagitis         Past Surgical History:   Procedure Laterality Date   • BREAST BIOPSY     • BREAST SURGERY      Left masectomy   • MASTECTOMY                         PT Assessment/Plan     Row Name 12/10/18 2200          PT Assessment    Assessment Comments  3 or 3 pool sessions. Pt feels benefits of strength, but still has pain. 6/10 today pre session /10 post.. Pt is overall pleased with progress.   -SP        PT Plan    PT Plan Comments  transitions back to land PT. Has water HEP pictures.   -SP       User Key  (r) = Recorded By, (t) = Taken By, (c) = Cosigned By    Initials Name Provider Type    Bev Travis, PT Physical Therapist              Exercises     Row Name 12/10/18 1800             Subjective Comments    Subjective Comments  Was doing a little better, but sore today.   -SP         Subjective Pain    Able to rate subjective pain?  yes  -SP      Pre-Treatment Pain Level  6  -SP      Post-Treatment Pain Level  4  -SP      Subjective Pain Comment  goal is not surgery, I think the exercise program is good.   -SP         Aquatics    Aquatics performed?  Yes  -SP      60519 - Aquatic Therapy Minutes  45  -SP         Aquatics LE    Water Walk  forward;side;backward 50ft each   -SP      Stretch 1  seated water plank x 10 LN   -SP      Stretch 2  seated  LAQ for HS warm up x 10   -SP      Stretch 3  standing calf stretch x 30 sec x 2  -SP      Stretch Other 1  wall walk stretch knees bent,  feels stretch x 30 sec x 2  -SP      Stretch Other 2  hip glides small noodle at knee, then anklle for HS stretch then for hip rotoator stretch   -SP      Vertical Traction  LN x 1 min   -SP      Abdominals  noodle rectus x 10/ obliques x 10   -SP      Hip Abd/Add  10 b  -SP      Hip Flex/Ext  10/10b  -SP      March in Place  20   -SP      Mini Squat  10  -SP      Toe/Heel Raises  10/10  -SP      Uni-Clock  1xb changed from tap in 3 directions to clock/ccw x 10 b   -SP      Step Ups  5 b last pool step rail ed to march up and then hip flex opposite and step down.   -SP      Bicycle  Ln and bench x 2 min   -SP         Aquatics UE    Stretch 1  wall push up x 10   -SP      Stretch 2  standing sb x 2 b   -SP      Scap Retraction/Row  row x 10 blue paddles L 1  -SP      I's/T's/Y's  I, T and horiz shld add abd x 10 blue paddles L1  -SP      Abdominals  blue paddless rotation open for obliques x 5 b   -SP         Exercise 1    Exercise Name 1  single leg stance x 3-5 sec b x 5  -SP         Exercise 2    Exercise Name 2  march walking 1 lap  -SP         Exercise 3    Exercise Name 3  tandam walk   -SP      Time 3  50 ft   -SP         Exercise 4    Exercise Name 4  double knee tuck ups x 10   -SP      Cueing 4  Verbal  -SP         Exercise 5    Exercise Name 5  Pt ed on program, and provided a copy of hep pictures.   -SP        User Key  (r) = Recorded By, (t) = Taken By, (c) = Cosigned By    Initials Name Provider Type    Bev Travis, PT Physical Therapist                    Time Calculation:   Start Time: 0145  Stop Time: 0230  Time Calculation (min): 45 min  Total Timed Code Minutes- PT: 45 minute(s)  Therapy Suggested Charges     Code   Minutes Charges    44398 (CPT®) Hc Pt Neuromusc Re Education Ea 15 Min      34876 (CPT®) Hc Pt Ther Proc Ea 15 Min      95886 (CPT®) Hc Gait  Training Ea 15 Min      54849 (CPT®) Hc Pt Therapeutic Act Ea 15 Min      77996 (CPT®) Hc Pt Manual Therapy Ea 15 Min      94342 (CPT®) Hc Pt Ther Massage- Per 15 Min      23467 (CPT®) Hc Pt Iontophoresis Ea 15 Min      80700 (CPT®) Hc Pt Elec Stim Ea-Per 15 Min      78502 (CPT®) Hc Pt Ultrasound Ea 15 Min      89761 (CPT®) Hc Pt Self Care/Mgmt/Train Ea 15 Min      20649 (CPT®) Hc Pt Prosthetic (S) Train Initial Encounter, Each 15 Min      94737 (CPT®) Hc Orthotic(S) Mgmt/Train Initial Encounter, Each 15min      11067 (CPT®) Hc Pt Aquatic Therapy Ea 15 Min 45 3    64273 (CPT®) Hc Pt Orthotic(S)/Prosthetic(S) Encounter, Each 15 Min       (CPT®) Hc Pt Electrical Stim Unattended      Total  45 3        Therapy Charges for Today     Code Description Service Date Service Provider Modifiers Qty    69990541863 HC PT AQUATIC THERAPY EA 15 MIN 12/10/2018 Bev Guadalupe, PT GP 3                    Bev Guadalupe, PT  12/10/2018

## 2018-12-12 ENCOUNTER — HOSPITAL ENCOUNTER (OUTPATIENT)
Dept: PHYSICAL THERAPY | Facility: HOSPITAL | Age: 78
Setting detail: THERAPIES SERIES
Discharge: HOME | End: 2018-12-12

## 2018-12-12 DIAGNOSIS — R26.2 DIFFICULTY WALKING: ICD-10-CM

## 2018-12-12 DIAGNOSIS — M54.50 CHRONIC MIDLINE LOW BACK PAIN WITHOUT SCIATICA: Primary | ICD-10-CM

## 2018-12-12 DIAGNOSIS — G89.29 CHRONIC MIDLINE LOW BACK PAIN WITHOUT SCIATICA: Primary | ICD-10-CM

## 2018-12-12 DIAGNOSIS — M25.552 HIP PAIN, BILATERAL: ICD-10-CM

## 2018-12-12 DIAGNOSIS — M25.551 HIP PAIN, BILATERAL: ICD-10-CM

## 2018-12-12 DIAGNOSIS — Z78.9 DIFFICULTY NAVIGATING STAIRS: ICD-10-CM

## 2018-12-12 PROCEDURE — G8978 MOBILITY CURRENT STATUS: HCPCS

## 2018-12-12 PROCEDURE — G8979 MOBILITY GOAL STATUS: HCPCS

## 2018-12-12 PROCEDURE — 97530 THERAPEUTIC ACTIVITIES: CPT

## 2018-12-14 NOTE — SIGNIFICANT NOTE
12/13/18 1900   Patient Specific Functional Scale   Activity 1 Score 4   Activity 2 Score 3   Activity 3 Score 3   Total Score 0   Patient Specific Functional Scale Commetns 10/30 or 33%

## 2018-12-14 NOTE — THERAPY TREATMENT NOTE
Outpatient Physical Therapy Ortho Treatment Note  [facility]     Patient Name: Ivet Diaz  : 1940  MRN: [id number]  Today's Date: 2018      Visit Date: 2018    Visit Dx:    ICD-10-CM ICD-9-CM   1. Chronic midline low back pain without sciatica M54.5 724.2    G89.29 338.29   2. Hip pain, bilateral M25.551 719.45    M25.552    3. Difficulty navigating stairs R68.89 V62.89   4. Difficulty walking R26.2 719.7       There is no problem list on file for this patient.       Past Medical History:   Diagnosis Date   • Breast cancer (CMS/HCC)    • Cancer (CMS/HCC)     Left breast   • Depression    • Hypertension    • Reflux esophagitis         Past Surgical History:   Procedure Laterality Date   • BREAST BIOPSY     • BREAST SURGERY      Left masectomy   • MASTECTOMY                         PT Assessment/Plan     Row Name 18          PT Assessment    Assessment Comments  Transitioned back to land. Pain 3-4/10 pre session and 0/10 post session. Benefits from LE strength. Pt needs cues with ex and with equipment cued on how to self set up. Gait has decreased heel strike and push off.   -SP        PT Plan    PT Plan Comments  Step ex with limited hold today and needs to progress quaility and reps.   -SP       User Key  (r) = Recorded By, (t) = Taken By, (c) = Cosigned By    Initials Name Provider Type    Bev Travis, PT Physical Therapist              Exercises     Row Name 18 1900 18          Subjective Comments    Subjective Comments  --  Doing better.   -SP        Subjective Pain    Able to rate subjective pain?  --  yes  -SP     Pre-Treatment Pain Level  --  4  -SP     Post-Treatment Pain Level  --  0  -SP     Subjective Pain Comment  --  it is not hurting after the exercis   -SP        Aquatics    Aquatics performed?  --  No  -SP        Total Minutes    90249 - PT Therapeutic Activity Minutes  --  -SP  40  -SP        Exercise 1    Exercise Name 1  --   see exercise flow sheet in paper chart.   -SP     Additional Comments  --  Resumed land ex after 3 sessions in water. She has tight heelcords. and walks with decreased heel strike and push off. . Ex focus LE strength.   -SP       User Key  (r) = Recorded By, (t) = Taken By, (c) = Cosigned By    Initials Name Provider Type    SP Bev Guadalupe, PT Physical Therapist                                  Outcome Measure Options: Patient Specific Functional Scale  Patient Specific Functional Scale  Activity 1 Score: 4  Activity 2 Score: 3  Activity 3 Score: 3  Total Score: 0  Patient Specific Functional Scale Commetns: 10/30 or 33%       Time Calculation:   Start Time: 1350  Stop Time: 1430  Time Calculation (min): 40 min  Total Timed Code Minutes- PT: 40 minute(s)  Therapy Suggested Charges     Code   Minutes Charges    27759 (CPT®) Hc Pt Neuromusc Re Education Ea 15 Min      86687 (CPT®) Hc Pt Ther Proc Ea 15 Min      19503 (CPT®) Hc Gait Training Ea 15 Min      96604 (CPT®) Hc Pt Therapeutic Act Ea 15 Min 40 3    85927 (CPT®) Hc Pt Manual Therapy Ea 15 Min      37278 (CPT®) Hc Pt Ther Massage- Per 15 Min      64483 (CPT®) Hc Pt Iontophoresis Ea 15 Min      44811 (CPT®) Hc Pt Elec Stim Ea-Per 15 Min      98390 (CPT®) Hc Pt Ultrasound Ea 15 Min      38197 (CPT®) Hc Pt Self Care/Mgmt/Train Ea 15 Min      62250 (CPT®) Hc Pt Prosthetic (S) Train Initial Encounter, Each 15 Min      53985 (CPT®) Hc Orthotic(S) Mgmt/Train Initial Encounter, Each 15min      09297 (CPT®) Hc Pt Aquatic Therapy Ea 15 Min      71701 (CPT®) Hc Pt Orthotic(S)/Prosthetic(S) Encounter, Each 15 Min       (CPT®) Hc Pt Electrical Stim Unattended      Total  40 3        Therapy Charges for Today     Code Description Service Date Service Provider Modifiers Qty    67774417810 HC PT MOBILITY CURRENT 12/12/2018 Bev Guadalupe, PT GP, CJ 1    16739299225 HC PT MOBILITY PROJECTED 12/12/2018 Bev Guadalupe, PT GP, CJ 1    89691401273 HC PT THERAPEUTIC  ACT EA 15 MIN 12/12/2018 Bev Guadalupe, PT GP 3          PT G-Codes  PT Professional Judgement Used?: Yes  Outcome Measure Options: Patient Specific Functional Scale  Functional Limitation: Mobility: Walking and moving around  Mobility: Walking and Moving Around Current Status (): At least 20 percent but less than 40 percent impaired, limited or restricted  Mobility: Walking and Moving Around Goal Status (): At least 20 percent but less than 40 percent impaired, limited or restricted         Bev Guadalupe, PT  12/13/2018

## 2018-12-17 ENCOUNTER — HOSPITAL ENCOUNTER (OUTPATIENT)
Dept: PHYSICAL THERAPY | Facility: HOSPITAL | Age: 78
Setting detail: THERAPIES SERIES
Discharge: HOME | End: 2018-12-17

## 2018-12-17 DIAGNOSIS — M54.50 CHRONIC MIDLINE LOW BACK PAIN WITHOUT SCIATICA: Primary | ICD-10-CM

## 2018-12-17 DIAGNOSIS — R26.2 DIFFICULTY WALKING: ICD-10-CM

## 2018-12-17 DIAGNOSIS — M25.551 HIP PAIN, BILATERAL: ICD-10-CM

## 2018-12-17 DIAGNOSIS — Z78.9 DIFFICULTY NAVIGATING STAIRS: ICD-10-CM

## 2018-12-17 DIAGNOSIS — M25.552 HIP PAIN, BILATERAL: ICD-10-CM

## 2018-12-17 DIAGNOSIS — G89.29 CHRONIC MIDLINE LOW BACK PAIN WITHOUT SCIATICA: Primary | ICD-10-CM

## 2018-12-17 PROCEDURE — 97110 THERAPEUTIC EXERCISES: CPT | Performed by: PHYSICAL THERAPIST

## 2018-12-17 PROCEDURE — 97140 MANUAL THERAPY 1/> REGIONS: CPT | Performed by: PHYSICAL THERAPIST

## 2018-12-17 NOTE — THERAPY TREATMENT NOTE
Outpatient Physical Therapy Ortho Treatment Note  [facility]     Patient Name: Ivet Diaz  : 1940  MRN: [id number]  Today's Date: 2018      Visit Date: 2018    Visit Dx:    ICD-10-CM ICD-9-CM   1. Chronic midline low back pain without sciatica M54.5 724.2    G89.29 338.29   2. Hip pain, bilateral M25.551 719.45    M25.552    3. Difficulty navigating stairs R68.89 V62.89   4. Difficulty walking R26.2 719.7       There is no problem list on file for this patient.       Past Medical History:   Diagnosis Date   • Breast cancer (CMS/HCC)    • Cancer (CMS/HCC)     Left breast   • Depression    • Hypertension    • Reflux esophagitis         Past Surgical History:   Procedure Laterality Date   • BREAST BIOPSY     • BREAST SURGERY      Left masectomy   • MASTECTOMY           PT Assessment/Plan     Row Name 18 1635          PT Assessment    Assessment Comments  Ivet is noticing improved sleep which is a sign that skilled PT has helped.  She is still adjusting to her orthotics and that will take time to help her further.    -MP        PT Plan    PT Plan Comments  She is taking off for two weeks due to holiday schedule and her  having surgery. She will need reassessing when she comes back for her next visit.  -MP       User Key  (r) = Recorded By, (t) = Taken By, (c) = Cosigned By    Initials Name Provider Type    Prieto Phan, PT Physical Therapist              Exercises     Row Name 18 1500             Subjective Comments    Subjective Comments  Ivet stated that she is noticing pain in the R hip and groin area.  She is improved in that she now gets awakened by pain once every 6 nights instead of every night.  -MP         Subjective Pain    Able to rate subjective pain?  yes  -MP      Pre-Treatment Pain Level  2  -MP      Post-Treatment Pain Level  2  -MP         Total Minutes    98412 - PT Therapeutic Exercise Minutes  35  -MP      34642 - PT Manual Therapy Minutes   10  -MP         Exercise 1    Exercise Name 1  Refer to land flow sheet  -MP      Cueing 1  Verbal  -MP      Additional Comments  Progressed therapeutic exercises with SKTC x 10 B.   -MP        User Key  (r) = Recorded By, (t) = Taken By, (c) = Cosigned By    Initials Name Provider Type    MP Prieto Vincent, PT Physical Therapist             Manual Rx (last 36 hours)      Manual Treatments     Row Name 12/17/18 1500             Total Minutes    64177 - PT Manual Therapy Minutes  10  -MP         Manual Rx 1    Manual Rx 1 Location  R hip  -MP      Manual Rx 1 Type  Inner quadrant compression and PROM  -MP         Manual Rx 2    Manual Rx 2 Location  R hip  -MP      Manual Rx 2 Type  inferior glides  -MP      Manual Rx 2 Grade  2-3  -MP      Manual Rx 2 Duration  x 20  -MP        User Key  (r) = Recorded By, (t) = Taken By, (c) = Cosigned By    Initials Name Provider Type    MP Prieto Vincent, PT Physical Therapist          PT OP Goals     Row Name 12/17/18 1600          PT Short Term Goals    STG Date to Achieve  12/05/18  -MP     STG 1  Ivet is instructed in lumbar bracing, hooklying, to begin learning to use her core musculature to assist to offset forces in the lumbar spine with ADL performance.  -MP     STG 1 Progress  Met  -MP     STG 2  Light intensity NuSTep activity is begun.  -MP     STG 2 Progress  Met  -MP     STG 3  PSFS disability is reduced by 12%.  -MP     STG 3 Progress  Ongoing  -MP        Long Term Goals    LTG Date to Achieve  02/03/19  -MP     LTG 1  Ivet is independent with aqua exercises for self care.  -MP     LTG 1 Progress  Ongoing  -MP     LTG 2  She is also independent with a HEP, land activities in the wellness center and all self care.  -MP     LTG 2 Progress  Ongoing  -MP     LTG 3  PSFS disability is reduced by 24%.  -MP     LTG 3 Progress  Ongoing  -MP       User Key  (r) = Recorded By, (t) = Taken By, (c) = Cosigned By    Initials Name Provider Type    MP Prieto Vincent PT Physical  Therapist          Therapy Education  Education Details: Progressed her HEP with SKTC stretch.  She also required a reminder to progress the hold time on her lumbar bracing exercise.  Given: HEP, Symptoms/condition management  Program: New, Reinforced  How Provided: Written, Verbal  Provided to: Patient  Level of Understanding: Teach back education performed    Time Calculation:   Start Time: 1515  Stop Time: 1600  Time Calculation (min): 45 min  Therapy Suggested Charges     Code   Minutes Charges    21351 (CPT®) Hc Pt Neuromusc Re Education Ea 15 Min      71249 (CPT®) Hc Pt Ther Proc Ea 15 Min 35 2    21062 (CPT®) Hc Gait Training Ea 15 Min      91274 (CPT®) Hc Pt Therapeutic Act Ea 15 Min      22079 (CPT®) Hc Pt Manual Therapy Ea 15 Min 10 1    68577 (CPT®) Hc Pt Ther Massage- Per 15 Min      88525 (CPT®) Hc Pt Iontophoresis Ea 15 Min      82843 (CPT®) Hc Pt Elec Stim Ea-Per 15 Min      11383 (CPT®) Hc Pt Ultrasound Ea 15 Min      57388 (CPT®) Hc Pt Self Care/Mgmt/Train Ea 15 Min      00059 (CPT®) Hc Pt Prosthetic (S) Train Initial Encounter, Each 15 Min      71498 (CPT®) Hc Orthotic(S) Mgmt/Train Initial Encounter, Each 15min      19793 (CPT®) Hc Pt Aquatic Therapy Ea 15 Min      69455 (CPT®) Hc Pt Orthotic(S)/Prosthetic(S) Encounter, Each 15 Min       (CPT®) Hc Pt Electrical Stim Unattended      Total  45 3        Therapy Charges for Today     Code Description Service Date Service Provider Modifiers Qty    55925570712 HC PT THER PROC EA 15 MIN 12/17/2018 Prieto Vincent, PT GP 2    78397567054 HC PT MANUAL THERAPY EA 15 MIN 12/17/2018 Prieto Vincent, PT GP 1            Prieto Vincent PT  12/17/2018

## 2018-12-20 ENCOUNTER — APPOINTMENT (OUTPATIENT)
Dept: PHYSICAL THERAPY | Facility: HOSPITAL | Age: 78
End: 2018-12-20

## 2018-12-27 ENCOUNTER — LAB (OUTPATIENT)
Dept: LAB | Facility: HOSPITAL | Age: 78
End: 2018-12-27

## 2018-12-27 ENCOUNTER — TRANSCRIBE ORDERS (OUTPATIENT)
Dept: ADMINISTRATIVE | Facility: HOSPITAL | Age: 78
End: 2018-12-27

## 2018-12-27 DIAGNOSIS — R10.9 STOMACH ACHE: Primary | ICD-10-CM

## 2018-12-27 DIAGNOSIS — R10.9 STOMACH ACHE: ICD-10-CM

## 2018-12-27 LAB
ALBUMIN SERPL-MCNC: 3.9 G/DL (ref 3.5–5.2)
ALBUMIN/GLOB SERPL: 1 G/DL
ALP SERPL-CCNC: 123 U/L (ref 39–117)
ALT SERPL W P-5'-P-CCNC: 68 U/L (ref 1–33)
AMYLASE SERPL-CCNC: 36 U/L (ref 28–100)
ANION GAP SERPL CALCULATED.3IONS-SCNC: 14 MMOL/L
AST SERPL-CCNC: 113 U/L (ref 1–32)
BACTERIA UR QL AUTO: ABNORMAL /HPF
BASOPHILS # BLD AUTO: 0.03 10*3/MM3 (ref 0–0.2)
BASOPHILS NFR BLD AUTO: 0.2 % (ref 0–1.5)
BILIRUB SERPL-MCNC: 0.5 MG/DL (ref 0.1–1.2)
BILIRUB UR QL STRIP: NEGATIVE
BUN BLD-MCNC: 21 MG/DL (ref 8–23)
BUN/CREAT SERPL: 16.4 (ref 7–25)
CALCIUM SPEC-SCNC: 10.5 MG/DL (ref 8.6–10.5)
CHLORIDE SERPL-SCNC: 98 MMOL/L (ref 98–107)
CLARITY UR: CLEAR
CO2 SERPL-SCNC: 29 MMOL/L (ref 22–29)
COLOR UR: YELLOW
CREAT BLD-MCNC: 1.28 MG/DL (ref 0.57–1)
DEPRECATED RDW RBC AUTO: 42.5 FL (ref 37–54)
EOSINOPHIL # BLD AUTO: 0.04 10*3/MM3 (ref 0–0.7)
EOSINOPHIL NFR BLD AUTO: 0.3 % (ref 0.3–6.2)
ERYTHROCYTE [DISTWIDTH] IN BLOOD BY AUTOMATED COUNT: 13.1 % (ref 11.7–13)
GFR SERPL CREATININE-BSD FRML MDRD: 40 ML/MIN/1.73
GLOBULIN UR ELPH-MCNC: 4 GM/DL
GLUCOSE BLD-MCNC: 114 MG/DL (ref 65–99)
GLUCOSE UR STRIP-MCNC: NEGATIVE MG/DL
HCT VFR BLD AUTO: 38.9 % (ref 35.6–45.5)
HGB BLD-MCNC: 12.4 G/DL (ref 11.9–15.5)
HGB UR QL STRIP.AUTO: ABNORMAL
HYALINE CASTS UR QL AUTO: ABNORMAL /LPF
IMM GRANULOCYTES # BLD AUTO: 0.03 10*3/MM3 (ref 0–0.03)
IMM GRANULOCYTES NFR BLD AUTO: 0.2 % (ref 0–0.5)
KETONES UR QL STRIP: NEGATIVE
LEUKOCYTE ESTERASE UR QL STRIP.AUTO: ABNORMAL
LIPASE SERPL-CCNC: 17 U/L (ref 13–60)
LYMPHOCYTES # BLD AUTO: 1.3 10*3/MM3 (ref 0.9–4.8)
LYMPHOCYTES NFR BLD AUTO: 10.6 % (ref 19.6–45.3)
MCH RBC QN AUTO: 28.2 PG (ref 26.9–32)
MCHC RBC AUTO-ENTMCNC: 31.9 G/DL (ref 32.4–36.3)
MCV RBC AUTO: 88.6 FL (ref 80.5–98.2)
MONOCYTES # BLD AUTO: 0.85 10*3/MM3 (ref 0.2–1.2)
MONOCYTES NFR BLD AUTO: 6.9 % (ref 5–12)
NEUTROPHILS # BLD AUTO: 10.02 10*3/MM3 (ref 1.9–8.1)
NEUTROPHILS NFR BLD AUTO: 81.8 % (ref 42.7–76)
NITRITE UR QL STRIP: NEGATIVE
PH UR STRIP.AUTO: 6 [PH] (ref 5–8)
PLATELET # BLD AUTO: 316 10*3/MM3 (ref 140–500)
PMV BLD AUTO: 10.8 FL (ref 6–12)
POTASSIUM BLD-SCNC: 3.4 MMOL/L (ref 3.5–5.2)
PROT SERPL-MCNC: 7.9 G/DL (ref 6–8.5)
PROT UR QL STRIP: ABNORMAL
RBC # BLD AUTO: 4.39 10*6/MM3 (ref 3.9–5.2)
RBC # UR: ABNORMAL /HPF
REF LAB TEST METHOD: ABNORMAL
SODIUM BLD-SCNC: 141 MMOL/L (ref 136–145)
SP GR UR STRIP: 1.03 (ref 1–1.03)
SQUAMOUS #/AREA URNS HPF: ABNORMAL /HPF
UROBILINOGEN UR QL STRIP: ABNORMAL
WBC NRBC COR # BLD: 12.27 10*3/MM3 (ref 4.5–10.7)
WBC UR QL AUTO: ABNORMAL /HPF

## 2018-12-27 PROCEDURE — 82150 ASSAY OF AMYLASE: CPT | Performed by: INTERNAL MEDICINE

## 2018-12-27 PROCEDURE — 36415 COLL VENOUS BLD VENIPUNCTURE: CPT

## 2018-12-27 PROCEDURE — 83690 ASSAY OF LIPASE: CPT | Performed by: INTERNAL MEDICINE

## 2018-12-27 PROCEDURE — 85025 COMPLETE CBC W/AUTO DIFF WBC: CPT | Performed by: INTERNAL MEDICINE

## 2018-12-27 PROCEDURE — 81001 URINALYSIS AUTO W/SCOPE: CPT | Performed by: INTERNAL MEDICINE

## 2018-12-27 PROCEDURE — 80053 COMPREHEN METABOLIC PANEL: CPT | Performed by: INTERNAL MEDICINE

## 2018-12-31 ENCOUNTER — APPOINTMENT (OUTPATIENT)
Dept: PHYSICAL THERAPY | Facility: HOSPITAL | Age: 78
End: 2018-12-31

## 2019-01-02 ENCOUNTER — HOSPITAL ENCOUNTER (OUTPATIENT)
Dept: PHYSICAL THERAPY | Facility: HOSPITAL | Age: 79
Setting detail: THERAPIES SERIES
Discharge: HOME | End: 2019-01-02

## 2019-01-02 DIAGNOSIS — M54.50 CHRONIC MIDLINE LOW BACK PAIN WITHOUT SCIATICA: Primary | ICD-10-CM

## 2019-01-02 DIAGNOSIS — M25.552 HIP PAIN, BILATERAL: ICD-10-CM

## 2019-01-02 DIAGNOSIS — Z78.9 DIFFICULTY NAVIGATING STAIRS: ICD-10-CM

## 2019-01-02 DIAGNOSIS — G89.29 CHRONIC MIDLINE LOW BACK PAIN WITHOUT SCIATICA: Primary | ICD-10-CM

## 2019-01-02 DIAGNOSIS — M25.551 HIP PAIN, BILATERAL: ICD-10-CM

## 2019-01-02 DIAGNOSIS — R26.2 DIFFICULTY WALKING: ICD-10-CM

## 2019-01-02 PROCEDURE — 97110 THERAPEUTIC EXERCISES: CPT | Performed by: PHYSICAL THERAPIST

## 2019-01-02 NOTE — THERAPY TREATMENT NOTE
Outpatient Physical Therapy Ortho Treatment Note  [facility]     Patient Name: Ivet Diaz  : 1940  MRN: [id number]  Today's Date: 2019      Visit Date: 2019    Visit Dx:    ICD-10-CM ICD-9-CM   1. Chronic midline low back pain without sciatica M54.5 724.2    G89.29 338.29   2. Hip pain, bilateral M25.551 719.45    M25.552    3. Difficulty navigating stairs R68.89 V62.89   4. Difficulty walking R26.2 719.7       There is no problem list on file for this patient.       Past Medical History:   Diagnosis Date   • Breast cancer (CMS/HCC)    • Cancer (CMS/HCC)     Left breast   • Depression    • Hypertension    • Reflux esophagitis         Past Surgical History:   Procedure Laterality Date   • BREAST BIOPSY     • BREAST SURGERY      Left masectomy   • ENDOSCOPY N/A 2016    Procedure: ESOPHAGOGASTRODUODENOSCOPY  WITH BIOPSY;  Surgeon: Peter Corrigan MD;  Location: [facility] ENDOSCOPY;  Service:    • MASTECTOMY           PT Assessment/Plan     Row Name 19 1810          PT Assessment    Assessment Comments  Ivet is improving slowly and adjusting to her orthotics.  -MP        PT Plan    PT Plan Comments  Reassess and treat next visit.  -MP       User Key  (r) = Recorded By, (t) = Taken By, (c) = Cosigned By    Initials Name Provider Type    Prieto Phan, PT Physical Therapist              Exercises     Row Name 19 1700             Subjective Comments    Subjective Comments  Ivet reported that she purchased some new shoes and her orthotics are feeling better since doing that.  -MP         Subjective Pain    Able to rate subjective pain?  yes  -MP      Pre-Treatment Pain Level  0  -MP      Post-Treatment Pain Level  0  -MP         Total Minutes    60691 - PT Therapeutic Exercise Minutes  40  -MP         Exercise 1    Exercise Name 1  Refer to land flow sheet  -MP      Additional Comments  Progressed therapeutic exercises with heel rasises, dual, x 20, and runner's stretch  30s x 3.  -MP        User Key  (r) = Recorded By, (t) = Taken By, (c) = Cosigned By    Initials Name Provider Type    Prieto Phan PT Physical Therapist          PT OP Goals     Row Name 01/02/19 1700          PT Short Term Goals    STG Date to Achieve  12/05/18  -MP     STG 1  Ivet is instructed in lumbar bracing, hooklying, to begin learning to use her core musculature to assist to offset forces in the lumbar spine with ADL performance.  -MP     STG 1 Progress  Met  -MP     STG 2  Light intensity NuSTep activity is begun.  -MP     STG 2 Progress  Met  -MP     STG 3  PSFS disability is reduced by 12%.  -MP     STG 3 Progress  Ongoing  -MP        Long Term Goals    LTG Date to Achieve  02/03/19  -MP     LTG 1  Ivet is independent with aqua exercises for self care.  -MP     LTG 1 Progress  Ongoing  -MP     LTG 2  She is also independent with a HEP, land activities in the wellness center and all self care.  -MP     LTG 2 Progress  Ongoing  -MP     LTG 3  PSFS disability is reduced by 24%.  -MP     LTG 3 Progress  Ongoing  -MP       User Key  (r) = Recorded By, (t) = Taken By, (c) = Cosigned By    Initials Name Provider Type    Prieto Phan PT Physical Therapist          Therapy Education  Education Details: Runner's stretch and heel raises were added to her HEP.  Given: HEP  Program: New  How Provided: Written, Verbal  Provided to: Patient  Level of Understanding: Teach back education performed     Time Calculation:   Start Time: 1510  Stop Time: 1550  Time Calculation (min): 40 min  Therapy Suggested Charges     Code   Minutes Charges    39116 (CPT®) Hc Pt Neuromusc Re Education Ea 15 Min      63843 (CPT®) Hc Pt Ther Proc Ea 15 Min 40 3    04901 (CPT®) Hc Gait Training Ea 15 Min      63013 (CPT®) Hc Pt Therapeutic Act Ea 15 Min      18389 (CPT®) Hc Pt Manual Therapy Ea 15 Min      11177 (CPT®) Hc Pt Ther Massage- Per 15 Min      56213 (CPT®) Hc Pt Iontophoresis Ea 15 Min      16176 (CPT®) Hc Pt Elec Stim  Ea-Per 15 Min      65946 (CPT®) Hc Pt Ultrasound Ea 15 Min      46807 (CPT®) Hc Pt Self Care/Mgmt/Train Ea 15 Min      20412 (CPT®) Hc Pt Prosthetic (S) Train Initial Encounter, Each 15 Min      06533 (CPT®) Hc Orthotic(S) Mgmt/Train Initial Encounter, Each 15min      94422 (CPT®) Hc Pt Aquatic Therapy Ea 15 Min      62968 (CPT®) Hc Pt Orthotic(S)/Prosthetic(S) Encounter, Each 15 Min       (CPT®) Hc Pt Electrical Stim Unattended      Total  40 3        Therapy Charges for Today     Code Description Service Date Service Provider Modifiers Qty    45710696553 HC PT THER PROC EA 15 MIN 1/2/2019 Prieto Vincent, PT GP 3          Prieto Vincent, PT  1/2/2019

## 2019-01-04 ENCOUNTER — LAB (OUTPATIENT)
Dept: LAB | Facility: HOSPITAL | Age: 79
End: 2019-01-04

## 2019-01-04 ENCOUNTER — TRANSCRIBE ORDERS (OUTPATIENT)
Dept: ADMINISTRATIVE | Facility: HOSPITAL | Age: 79
End: 2019-01-04

## 2019-01-04 ENCOUNTER — HOSPITAL ENCOUNTER (OUTPATIENT)
Dept: GENERAL RADIOLOGY | Facility: HOSPITAL | Age: 79
Discharge: HOME | End: 2019-01-04
Admitting: INTERNAL MEDICINE

## 2019-01-04 DIAGNOSIS — R10.9 STOMACH ACHE: ICD-10-CM

## 2019-01-04 DIAGNOSIS — R05.9 COUGH: Primary | ICD-10-CM

## 2019-01-04 DIAGNOSIS — R05.9 COUGH: ICD-10-CM

## 2019-01-04 LAB
ALBUMIN SERPL-MCNC: 3.7 G/DL (ref 3.5–5.2)
ALBUMIN/GLOB SERPL: 1.2 G/DL
ALP SERPL-CCNC: 151 U/L (ref 39–117)
ALT SERPL W P-5'-P-CCNC: 20 U/L (ref 1–33)
ANION GAP SERPL CALCULATED.3IONS-SCNC: 8.4 MMOL/L
AST SERPL-CCNC: 18 U/L (ref 1–32)
BASOPHILS # BLD AUTO: 0.01 10*3/MM3 (ref 0–0.2)
BASOPHILS NFR BLD AUTO: 0.2 % (ref 0–1.5)
BILIRUB SERPL-MCNC: 0.2 MG/DL (ref 0.1–1.2)
BUN BLD-MCNC: 25 MG/DL (ref 8–23)
BUN/CREAT SERPL: 25.3 (ref 7–25)
CALCIUM SPEC-SCNC: 9.9 MG/DL (ref 8.6–10.5)
CHLORIDE SERPL-SCNC: 101 MMOL/L (ref 98–107)
CO2 SERPL-SCNC: 31.6 MMOL/L (ref 22–29)
CREAT BLD-MCNC: 0.99 MG/DL (ref 0.57–1)
DEPRECATED RDW RBC AUTO: 40.6 FL (ref 37–54)
EOSINOPHIL # BLD AUTO: 0.35 10*3/MM3 (ref 0–0.7)
EOSINOPHIL NFR BLD AUTO: 5.7 % (ref 0.3–6.2)
ERYTHROCYTE [DISTWIDTH] IN BLOOD BY AUTOMATED COUNT: 12.9 % (ref 11.7–13)
GFR SERPL CREATININE-BSD FRML MDRD: 54 ML/MIN/1.73
GLOBULIN UR ELPH-MCNC: 3 GM/DL
GLUCOSE BLD-MCNC: 102 MG/DL (ref 65–99)
HCT VFR BLD AUTO: 37.9 % (ref 35.6–45.5)
HGB BLD-MCNC: 12.1 G/DL (ref 11.9–15.5)
IMM GRANULOCYTES # BLD AUTO: 0.02 10*3/MM3 (ref 0–0.03)
IMM GRANULOCYTES NFR BLD AUTO: 0.3 % (ref 0–0.5)
LYMPHOCYTES # BLD AUTO: 1.08 10*3/MM3 (ref 0.9–4.8)
LYMPHOCYTES NFR BLD AUTO: 17.7 % (ref 19.6–45.3)
MCH RBC QN AUTO: 27.4 PG (ref 26.9–32)
MCHC RBC AUTO-ENTMCNC: 31.9 G/DL (ref 32.4–36.3)
MCV RBC AUTO: 85.7 FL (ref 80.5–98.2)
MONOCYTES # BLD AUTO: 0.41 10*3/MM3 (ref 0.2–1.2)
MONOCYTES NFR BLD AUTO: 6.7 % (ref 5–12)
NEUTROPHILS # BLD AUTO: 4.24 10*3/MM3 (ref 1.9–8.1)
NEUTROPHILS NFR BLD AUTO: 69.7 % (ref 42.7–76)
PLATELET # BLD AUTO: 397 10*3/MM3 (ref 140–500)
PMV BLD AUTO: 10.4 FL (ref 6–12)
POTASSIUM BLD-SCNC: 4.4 MMOL/L (ref 3.5–5.2)
PROT SERPL-MCNC: 6.7 G/DL (ref 6–8.5)
RBC # BLD AUTO: 4.42 10*6/MM3 (ref 3.9–5.2)
SODIUM BLD-SCNC: 141 MMOL/L (ref 136–145)
WBC NRBC COR # BLD: 6.09 10*3/MM3 (ref 4.5–10.7)

## 2019-01-04 PROCEDURE — 36415 COLL VENOUS BLD VENIPUNCTURE: CPT

## 2019-01-04 PROCEDURE — 71046 X-RAY EXAM CHEST 2 VIEWS: CPT

## 2019-01-04 PROCEDURE — 85025 COMPLETE CBC W/AUTO DIFF WBC: CPT | Performed by: INTERNAL MEDICINE

## 2019-01-04 PROCEDURE — 80053 COMPREHEN METABOLIC PANEL: CPT | Performed by: INTERNAL MEDICINE

## 2019-01-08 ENCOUNTER — HOSPITAL ENCOUNTER (OUTPATIENT)
Dept: PHYSICAL THERAPY | Facility: HOSPITAL | Age: 79
Setting detail: THERAPIES SERIES
Discharge: HOME | End: 2019-01-08

## 2019-01-08 DIAGNOSIS — M54.50 CHRONIC MIDLINE LOW BACK PAIN WITHOUT SCIATICA: Primary | ICD-10-CM

## 2019-01-08 DIAGNOSIS — Z78.9 DIFFICULTY NAVIGATING STAIRS: ICD-10-CM

## 2019-01-08 DIAGNOSIS — G89.29 CHRONIC MIDLINE LOW BACK PAIN WITHOUT SCIATICA: Primary | ICD-10-CM

## 2019-01-08 DIAGNOSIS — R26.2 DIFFICULTY WALKING: ICD-10-CM

## 2019-01-08 DIAGNOSIS — M25.552 HIP PAIN, BILATERAL: ICD-10-CM

## 2019-01-08 DIAGNOSIS — M25.551 HIP PAIN, BILATERAL: ICD-10-CM

## 2019-01-08 PROCEDURE — 97110 THERAPEUTIC EXERCISES: CPT | Performed by: PHYSICAL THERAPIST

## 2019-01-08 NOTE — THERAPY DISCHARGE NOTE
Outpatient Physical Therapy Ortho Treatment Note/Discharge Summary  [facility]     Patient Name: Ivet Diaz  : 1940  MRN: [id number]  Today's Date: 2019      Visit Date: 2019    Visit Dx:    ICD-10-CM ICD-9-CM   1. Chronic midline low back pain without sciatica M54.5 724.2    G89.29 338.29   2. Hip pain, bilateral M25.551 719.45    M25.552    3. Difficulty navigating stairs R68.89 V62.89   4. Difficulty walking R26.2 719.7       There is no problem list on file for this patient.       Past Medical History:   Diagnosis Date   • Breast cancer (CMS/HCC)    • Cancer (CMS/HCC)     Left breast   • Depression    • Hypertension    • Reflux esophagitis         Past Surgical History:   Procedure Laterality Date   • BREAST BIOPSY     • BREAST SURGERY      Left masectomy   • ENDOSCOPY N/A 2016    Procedure: ESOPHAGOGASTRODUODENOSCOPY  WITH BIOPSY;  Surgeon: Peter Corrigan MD;  Location: [facility] ENDOSCOPY;  Service:    • MASTECTOMY               Exercises     Row Name 19 1500             Subjective Comments    Subjective Comments  Ivet reported that she experienced 2 days of zero pain last weekend.  -MP         Subjective Pain    Able to rate subjective pain?  yes  -MP      Pre-Treatment Pain Level  0  -MP      Post-Treatment Pain Level  0  -MP         Total Minutes    75215 - PT Therapeutic Exercise Minutes  30  -MP         Exercise 1    Exercise Name 1  Refer to land flow sheet  -MP        User Key  (r) = Recorded By, (t) = Taken By, (c) = Cosigned By    Initials Name Provider Type    Prieto Phan, PT Physical Therapist              PT OP Goals     Row Name 19 1500          PT Short Term Goals    STG Date to Achieve  18  -MP     STG 1  Ivet is instructed in lumbar bracing, hooklying, to begin learning to use her core musculature to assist to offset forces in the lumbar spine with ADL performance.  -MP     STG 1 Progress  Met  -MP     STG 2  Light intensity NuSTep  activity is begun.  -MP     STG 2 Progress  Met  -MP     STG 3  PSFS disability is reduced by 12%.  -MP     STG 3 Progress  Met  -MP        Long Term Goals    LTG Date to Achieve  02/03/19  -MP     LTG 1  Ivet is independent with aqua exercises for self care.  -MP     LTG 1 Progress  Met  -MP     LTG 2  She is also independent with a HEP, land activities in the wellness center and all self care.  -MP     LTG 2 Progress  Met  -MP     LTG 3  PSFS disability is reduced by 24%.  -MP     LTG 3 Progress  Not Met  -MP       User Key  (r) = Recorded By, (t) = Taken By, (c) = Cosigned By    Initials Name Provider Type    Prieto Phan, PT Physical Therapist          Therapy Education  Education Details: Reviewed her HEP and gave instructions to continue the NuStep, leg press, hip abduction and step ups.  I also recommended that she confer with her  and possible try the seated hamstring curl.    Given: HEP, Symptoms/condition management  Program: New, Reinforced  How Provided: Verbal, Written  Provided to: Patient  Level of Understanding: Teach back education performed       Patient Specific Functional Scale  Activity 1 Score: 8  Activity 2 Score: 5  Activity 3 Score: 6  Total Score: 0  Patient Specific Functional Scale Commetns: 19/30, 37% disability      Time Calculation:   Start Time: 1445  Stop Time: 1515  Time Calculation (min): 30 min  Therapy Suggested Charges     Code   Minutes Charges    48188 (CPT®) Hc Pt Neuromusc Re Education Ea 15 Min      12239 (CPT®) Hc Pt Ther Proc Ea 15 Min 30 2    19041 (CPT®) Hc Gait Training Ea 15 Min      57917 (CPT®) Hc Pt Therapeutic Act Ea 15 Min      10583 (CPT®) Hc Pt Manual Therapy Ea 15 Min      76276 (CPT®) Hc Pt Ther Massage- Per 15 Min      57710 (CPT®) Hc Pt Iontophoresis Ea 15 Min      58133 (CPT®) Hc Pt Elec Stim Ea-Per 15 Min      52982 (CPT®) Hc Pt Ultrasound Ea 15 Min      92716 (CPT®) Hc Pt Self Care/Mgmt/Train Ea 15 Min      67949 (CPT®) Hc Pt Prosthetic (S)  Train Initial Encounter, Each 15 Min      49841 (CPT®) Hc Orthotic(S) Mgmt/Train Initial Encounter, Each 15min      33728 (CPT®) Hc Pt Aquatic Therapy Ea 15 Min      63434 (CPT®) Hc Pt Orthotic(S)/Prosthetic(S) Encounter, Each 15 Min       (CPT®) Hc Pt Electrical Stim Unattended      Total  30 2        Therapy Charges for Today     Code Description Service Date Service Provider Modifiers Qty    35606148695 HC PT THER PROC EA 15 MIN 1/8/2019 Prieto Vincent, PT GP 2        OP PT Discharge Summary  Date of Discharge: 01/08/19  Reason for Discharge: Independent  Outcomes Achieved: Patient able to partially acheive established goals  Discharge Destination: Home with home program  Discharge Instructions/Additional Comments: Ivet is improved and will need to continue her HEP and new activites at the wellness center to keep things moving in the right direction.      Prieto Vincent, PT  1/8/2019

## 2019-03-25 ENCOUNTER — APPOINTMENT (OUTPATIENT)
Dept: GENERAL RADIOLOGY | Facility: HOSPITAL | Age: 79
End: 2019-03-25

## 2019-03-25 ENCOUNTER — ANESTHESIA EVENT (OUTPATIENT)
Dept: PERIOP | Facility: HOSPITAL | Age: 79
End: 2019-03-25

## 2019-03-25 ENCOUNTER — APPOINTMENT (OUTPATIENT)
Dept: CARDIOLOGY | Facility: HOSPITAL | Age: 79
End: 2019-03-25

## 2019-03-25 ENCOUNTER — APPOINTMENT (OUTPATIENT)
Dept: CT IMAGING | Facility: HOSPITAL | Age: 79
End: 2019-03-25

## 2019-03-25 ENCOUNTER — HOSPITAL ENCOUNTER (INPATIENT)
Facility: HOSPITAL | Age: 79
LOS: 4 days | Discharge: HOME | End: 2019-03-29
Attending: EMERGENCY MEDICINE | Admitting: INTERNAL MEDICINE

## 2019-03-25 ENCOUNTER — ANESTHESIA (OUTPATIENT)
Dept: PERIOP | Facility: HOSPITAL | Age: 79
End: 2019-03-25

## 2019-03-25 DIAGNOSIS — R53.1 RIGHT SIDED WEAKNESS: ICD-10-CM

## 2019-03-25 DIAGNOSIS — I63.9 ACUTE CVA (CEREBROVASCULAR ACCIDENT) (HCC): Primary | ICD-10-CM

## 2019-03-25 LAB
ABO GROUP BLD: NORMAL
ALBUMIN SERPL-MCNC: 4.1 G/DL (ref 3.5–5.2)
ALBUMIN/GLOB SERPL: 1.3 G/DL
ALP SERPL-CCNC: 95 U/L (ref 39–117)
ALT SERPL W P-5'-P-CCNC: 14 U/L (ref 1–33)
ANION GAP SERPL CALCULATED.3IONS-SCNC: 13.8 MMOL/L
APTT PPP: 25 SECONDS (ref 22.7–35.4)
AST SERPL-CCNC: 15 U/L (ref 1–32)
BASOPHILS # BLD AUTO: 0.04 10*3/MM3 (ref 0–0.2)
BASOPHILS NFR BLD AUTO: 0.5 % (ref 0–1.5)
BILIRUB SERPL-MCNC: 0.3 MG/DL (ref 0.2–1.2)
BLD GP AB SCN SERPL QL: NEGATIVE
BUN BLD-MCNC: 25 MG/DL (ref 8–23)
BUN/CREAT SERPL: 23.4 (ref 7–25)
CALCIUM SPEC-SCNC: 9.5 MG/DL (ref 8.6–10.5)
CHLORIDE SERPL-SCNC: 103 MMOL/L (ref 98–107)
CO2 SERPL-SCNC: 26.2 MMOL/L (ref 22–29)
CREAT BLD-MCNC: 1.07 MG/DL (ref 0.57–1)
DEPRECATED RDW RBC AUTO: 38.8 FL (ref 37–54)
EOSINOPHIL # BLD AUTO: 0.16 10*3/MM3 (ref 0–0.4)
EOSINOPHIL NFR BLD AUTO: 2.1 % (ref 0.3–6.2)
ERYTHROCYTE [DISTWIDTH] IN BLOOD BY AUTOMATED COUNT: 12.4 % (ref 12.3–15.4)
GFR SERPL CREATININE-BSD FRML MDRD: 50 ML/MIN/1.73
GLOBULIN UR ELPH-MCNC: 3.1 GM/DL
GLUCOSE BLD-MCNC: 160 MG/DL (ref 65–99)
GLUCOSE BLDC GLUCOMTR-MCNC: 135 MG/DL (ref 70–130)
GLUCOSE BLDC GLUCOMTR-MCNC: 149 MG/DL (ref 70–130)
HCT VFR BLD AUTO: 36.2 % (ref 34–46.6)
HGB BLD-MCNC: 10.9 G/DL (ref 12–15.9)
HOLD SPECIMEN: NORMAL
HOLD SPECIMEN: NORMAL
IMM GRANULOCYTES # BLD AUTO: 0.03 10*3/MM3 (ref 0–0.05)
IMM GRANULOCYTES NFR BLD AUTO: 0.4 % (ref 0–0.5)
INR PPP: 1.04 (ref 0.9–1.1)
LYMPHOCYTES # BLD AUTO: 0.87 10*3/MM3 (ref 0.7–3.1)
LYMPHOCYTES NFR BLD AUTO: 11.5 % (ref 19.6–45.3)
MCH RBC QN AUTO: 25.8 PG (ref 26.6–33)
MCHC RBC AUTO-ENTMCNC: 30.1 G/DL (ref 31.5–35.7)
MCV RBC AUTO: 85.6 FL (ref 79–97)
MONOCYTES # BLD AUTO: 0.4 10*3/MM3 (ref 0.1–0.9)
MONOCYTES NFR BLD AUTO: 5.3 % (ref 5–12)
NEUTROPHILS # BLD AUTO: 6.06 10*3/MM3 (ref 1.4–7)
NEUTROPHILS NFR BLD AUTO: 80.2 % (ref 42.7–76)
NRBC BLD AUTO-RTO: 0 /100 WBC (ref 0–0)
PLATELET # BLD AUTO: 301 10*3/MM3 (ref 140–450)
PMV BLD AUTO: 11 FL (ref 6–12)
POTASSIUM BLD-SCNC: 3.5 MMOL/L (ref 3.5–5.2)
PROT SERPL-MCNC: 7.2 G/DL (ref 6–8.5)
PROTHROMBIN TIME: 13.4 SECONDS (ref 11.7–14.2)
RBC # BLD AUTO: 4.23 10*6/MM3 (ref 3.77–5.28)
RH BLD: POSITIVE
SODIUM BLD-SCNC: 143 MMOL/L (ref 136–145)
T&S EXPIRATION DATE: NORMAL
TROPONIN T SERPL-MCNC: <0.01 NG/ML (ref 0–0.03)
WBC NRBC COR # BLD: 7.56 10*3/MM3 (ref 3.4–10.8)
WHOLE BLOOD HOLD SPECIMEN: NORMAL
WHOLE BLOOD HOLD SPECIMEN: NORMAL

## 2019-03-25 PROCEDURE — B31FYZZ FLUOROSCOPY OF LEFT VERTEBRAL ARTERY USING OTHER CONTRAST: ICD-10-PCS | Performed by: RADIOLOGY

## 2019-03-25 PROCEDURE — 0042T HC CT CEREBRAL PERFUSION W/WO CONTRAST: CPT

## 2019-03-25 PROCEDURE — 25010000002 DEXAMETHASONE PER 1 MG: Performed by: NURSE ANESTHETIST, CERTIFIED REGISTERED

## 2019-03-25 PROCEDURE — 93306 TTE W/DOPPLER COMPLETE: CPT | Performed by: INTERNAL MEDICINE

## 2019-03-25 PROCEDURE — C1887 CATHETER, GUIDING: HCPCS | Performed by: RADIOLOGY

## 2019-03-25 PROCEDURE — 85730 THROMBOPLASTIN TIME PARTIAL: CPT | Performed by: EMERGENCY MEDICINE

## 2019-03-25 PROCEDURE — C1760 CLOSURE DEV, VASC: HCPCS | Performed by: RADIOLOGY

## 2019-03-25 PROCEDURE — 25010000002 FENTANYL CITRATE (PF) 100 MCG/2ML SOLUTION: Performed by: NURSE ANESTHETIST, CERTIFIED REGISTERED

## 2019-03-25 PROCEDURE — 0 IODIXANOL PER 1 ML: Performed by: EMERGENCY MEDICINE

## 2019-03-25 PROCEDURE — 86850 RBC ANTIBODY SCREEN: CPT | Performed by: EMERGENCY MEDICINE

## 2019-03-25 PROCEDURE — 93005 ELECTROCARDIOGRAM TRACING: CPT | Performed by: EMERGENCY MEDICINE

## 2019-03-25 PROCEDURE — 93306 TTE W/DOPPLER COMPLETE: CPT

## 2019-03-25 PROCEDURE — C1894 INTRO/SHEATH, NON-LASER: HCPCS | Performed by: RADIOLOGY

## 2019-03-25 PROCEDURE — 70496 CT ANGIOGRAPHY HEAD: CPT

## 2019-03-25 PROCEDURE — 03CG3Z7 EXTIRPATION OF MATTER FROM INTRACRANIAL ARTERY USING STENT RETRIEVER, PERCUTANEOUS APPROACH: ICD-10-PCS | Performed by: RADIOLOGY

## 2019-03-25 PROCEDURE — 93010 ELECTROCARDIOGRAM REPORT: CPT | Performed by: INTERNAL MEDICINE

## 2019-03-25 PROCEDURE — 0 IOPAMIDOL PER 1 ML: Performed by: EMERGENCY MEDICINE

## 2019-03-25 PROCEDURE — 82962 GLUCOSE BLOOD TEST: CPT

## 2019-03-25 PROCEDURE — 70498 CT ANGIOGRAPHY NECK: CPT

## 2019-03-25 PROCEDURE — 86900 BLOOD TYPING SEROLOGIC ABO: CPT | Performed by: EMERGENCY MEDICINE

## 2019-03-25 PROCEDURE — 99285 EMERGENCY DEPT VISIT HI MDM: CPT

## 2019-03-25 PROCEDURE — 86901 BLOOD TYPING SEROLOGIC RH(D): CPT | Performed by: EMERGENCY MEDICINE

## 2019-03-25 PROCEDURE — 80053 COMPREHEN METABOLIC PANEL: CPT | Performed by: EMERGENCY MEDICINE

## 2019-03-25 PROCEDURE — 85610 PROTHROMBIN TIME: CPT | Performed by: EMERGENCY MEDICINE

## 2019-03-25 PROCEDURE — C1773 RET DEV, INSERTABLE: HCPCS | Performed by: RADIOLOGY

## 2019-03-25 PROCEDURE — 25010000002 PROPOFOL 10 MG/ML EMULSION: Performed by: NURSE ANESTHETIST, CERTIFIED REGISTERED

## 2019-03-25 PROCEDURE — 84484 ASSAY OF TROPONIN QUANT: CPT | Performed by: EMERGENCY MEDICINE

## 2019-03-25 PROCEDURE — 25010000002 FENTANYL CITRATE (PF) 100 MCG/2ML SOLUTION: Performed by: INTERNAL MEDICINE

## 2019-03-25 PROCEDURE — 25010000002 HEPARIN (PORCINE) PER 1000 UNITS: Performed by: RADIOLOGY

## 2019-03-25 PROCEDURE — 82565 ASSAY OF CREATININE: CPT

## 2019-03-25 PROCEDURE — 83036 HEMOGLOBIN GLYCOSYLATED A1C: CPT | Performed by: INTERNAL MEDICINE

## 2019-03-25 PROCEDURE — C1769 GUIDE WIRE: HCPCS | Performed by: RADIOLOGY

## 2019-03-25 PROCEDURE — 85025 COMPLETE CBC W/AUTO DIFF WBC: CPT | Performed by: EMERGENCY MEDICINE

## 2019-03-25 PROCEDURE — 71045 X-RAY EXAM CHEST 1 VIEW: CPT

## 2019-03-25 PROCEDURE — 25010000002 PERFLUTREN (DEFINITY) 8.476 MG IN SODIUM CHLORIDE 0.9 % 10 ML INJECTION: Performed by: PSYCHIATRY & NEUROLOGY

## 2019-03-25 PROCEDURE — 80061 LIPID PANEL: CPT | Performed by: INTERNAL MEDICINE

## 2019-03-25 PROCEDURE — 99221 1ST HOSP IP/OBS SF/LOW 40: CPT | Performed by: PSYCHIATRY & NEUROLOGY

## 2019-03-25 PROCEDURE — 25010000002 ONDANSETRON PER 1 MG: Performed by: NURSE ANESTHETIST, CERTIFIED REGISTERED

## 2019-03-25 RX ORDER — HYDROCODONE BITARTRATE AND ACETAMINOPHEN 7.5; 325 MG/1; MG/1
1 TABLET ORAL ONCE AS NEEDED
Status: CANCELLED | OUTPATIENT
Start: 2019-03-25

## 2019-03-25 RX ORDER — PROMETHAZINE HYDROCHLORIDE 25 MG/ML
12.5 INJECTION, SOLUTION INTRAMUSCULAR; INTRAVENOUS ONCE AS NEEDED
Status: CANCELLED | OUTPATIENT
Start: 2019-03-25

## 2019-03-25 RX ORDER — DIPHENHYDRAMINE HCL 25 MG
25 CAPSULE ORAL
Status: CANCELLED | OUTPATIENT
Start: 2019-03-25

## 2019-03-25 RX ORDER — FENTANYL CITRATE 50 UG/ML
50 INJECTION, SOLUTION INTRAMUSCULAR; INTRAVENOUS
Status: CANCELLED | OUTPATIENT
Start: 2019-03-25

## 2019-03-25 RX ORDER — PROPOFOL 10 MG/ML
VIAL (ML) INTRAVENOUS AS NEEDED
Status: DISCONTINUED | OUTPATIENT
Start: 2019-03-25 | End: 2019-03-25 | Stop reason: SURG

## 2019-03-25 RX ORDER — OXYCODONE AND ACETAMINOPHEN 7.5; 325 MG/1; MG/1
1 TABLET ORAL ONCE AS NEEDED
Status: CANCELLED | OUTPATIENT
Start: 2019-03-25

## 2019-03-25 RX ORDER — SODIUM CHLORIDE 0.9 % (FLUSH) 0.9 %
3-10 SYRINGE (ML) INJECTION AS NEEDED
Status: DISCONTINUED | OUTPATIENT
Start: 2019-03-25 | End: 2019-03-29 | Stop reason: HOSPADM

## 2019-03-25 RX ORDER — EPHEDRINE SULFATE 50 MG/ML
INJECTION, SOLUTION INTRAVENOUS AS NEEDED
Status: DISCONTINUED | OUTPATIENT
Start: 2019-03-25 | End: 2019-03-25 | Stop reason: SURG

## 2019-03-25 RX ORDER — SODIUM CHLORIDE 0.9 % (FLUSH) 0.9 %
3 SYRINGE (ML) INJECTION EVERY 12 HOURS SCHEDULED
Status: DISCONTINUED | OUTPATIENT
Start: 2019-03-25 | End: 2019-03-29 | Stop reason: HOSPADM

## 2019-03-25 RX ORDER — ATORVASTATIN CALCIUM 80 MG/1
80 TABLET, FILM COATED ORAL NIGHTLY
Status: DISCONTINUED | OUTPATIENT
Start: 2019-03-25 | End: 2019-03-29 | Stop reason: HOSPADM

## 2019-03-25 RX ORDER — SODIUM CHLORIDE 0.9 % (FLUSH) 0.9 %
10 SYRINGE (ML) INJECTION AS NEEDED
Status: DISCONTINUED | OUTPATIENT
Start: 2019-03-25 | End: 2019-03-29 | Stop reason: HOSPADM

## 2019-03-25 RX ORDER — DEXAMETHASONE SODIUM PHOSPHATE 10 MG/ML
INJECTION INTRAMUSCULAR; INTRAVENOUS AS NEEDED
Status: DISCONTINUED | OUTPATIENT
Start: 2019-03-25 | End: 2019-03-25 | Stop reason: SURG

## 2019-03-25 RX ORDER — PROMETHAZINE HYDROCHLORIDE 25 MG/1
25 TABLET ORAL ONCE AS NEEDED
Status: CANCELLED | OUTPATIENT
Start: 2019-03-25

## 2019-03-25 RX ORDER — LABETALOL HYDROCHLORIDE 5 MG/ML
5 INJECTION, SOLUTION INTRAVENOUS
Status: CANCELLED | OUTPATIENT
Start: 2019-03-25

## 2019-03-25 RX ORDER — NALOXONE HCL 0.4 MG/ML
0.2 VIAL (ML) INJECTION AS NEEDED
Status: CANCELLED | OUTPATIENT
Start: 2019-03-25

## 2019-03-25 RX ORDER — ASPIRIN 325 MG
325 TABLET ORAL DAILY
Status: DISCONTINUED | OUTPATIENT
Start: 2019-03-25 | End: 2019-03-26

## 2019-03-25 RX ORDER — SODIUM CHLORIDE 9 MG/ML
INJECTION, SOLUTION INTRAVENOUS CONTINUOUS PRN
Status: DISCONTINUED | OUTPATIENT
Start: 2019-03-25 | End: 2019-03-25 | Stop reason: SURG

## 2019-03-25 RX ORDER — FENTANYL CITRATE 50 UG/ML
25 INJECTION, SOLUTION INTRAMUSCULAR; INTRAVENOUS
Status: DISCONTINUED | OUTPATIENT
Start: 2019-03-25 | End: 2019-03-27

## 2019-03-25 RX ORDER — HYDRALAZINE HYDROCHLORIDE 20 MG/ML
5 INJECTION INTRAMUSCULAR; INTRAVENOUS
Status: CANCELLED | OUTPATIENT
Start: 2019-03-25

## 2019-03-25 RX ORDER — ACETAMINOPHEN 325 MG/1
650 TABLET ORAL EVERY 6 HOURS PRN
Status: DISCONTINUED | OUTPATIENT
Start: 2019-03-25 | End: 2019-03-29 | Stop reason: HOSPADM

## 2019-03-25 RX ORDER — ONDANSETRON 2 MG/ML
INJECTION INTRAMUSCULAR; INTRAVENOUS AS NEEDED
Status: DISCONTINUED | OUTPATIENT
Start: 2019-03-25 | End: 2019-03-25 | Stop reason: SURG

## 2019-03-25 RX ORDER — ACETAMINOPHEN 325 MG/1
650 TABLET ORAL ONCE AS NEEDED
Status: CANCELLED | OUTPATIENT
Start: 2019-03-25

## 2019-03-25 RX ORDER — FLUMAZENIL 0.1 MG/ML
0.2 INJECTION INTRAVENOUS AS NEEDED
Status: CANCELLED | OUTPATIENT
Start: 2019-03-25

## 2019-03-25 RX ORDER — ASPIRIN 300 MG/1
300 SUPPOSITORY RECTAL DAILY
Status: DISCONTINUED | OUTPATIENT
Start: 2019-03-25 | End: 2019-03-26

## 2019-03-25 RX ORDER — ROCURONIUM BROMIDE 10 MG/ML
INJECTION, SOLUTION INTRAVENOUS AS NEEDED
Status: DISCONTINUED | OUTPATIENT
Start: 2019-03-25 | End: 2019-03-25 | Stop reason: SURG

## 2019-03-25 RX ORDER — ONDANSETRON 2 MG/ML
4 INJECTION INTRAMUSCULAR; INTRAVENOUS ONCE AS NEEDED
Status: CANCELLED | OUTPATIENT
Start: 2019-03-25

## 2019-03-25 RX ORDER — EPHEDRINE SULFATE 50 MG/ML
5 INJECTION, SOLUTION INTRAVENOUS ONCE AS NEEDED
Status: CANCELLED | OUTPATIENT
Start: 2019-03-25

## 2019-03-25 RX ORDER — IODIXANOL 320 MG/ML
200 INJECTION, SOLUTION INTRAVASCULAR
Status: COMPLETED | OUTPATIENT
Start: 2019-03-25 | End: 2019-03-25

## 2019-03-25 RX ORDER — DIPHENHYDRAMINE HYDROCHLORIDE 50 MG/ML
12.5 INJECTION INTRAMUSCULAR; INTRAVENOUS
Status: CANCELLED | OUTPATIENT
Start: 2019-03-25

## 2019-03-25 RX ORDER — PROMETHAZINE HYDROCHLORIDE 25 MG/1
25 SUPPOSITORY RECTAL ONCE AS NEEDED
Status: CANCELLED | OUTPATIENT
Start: 2019-03-25

## 2019-03-25 RX ORDER — FENTANYL CITRATE 50 UG/ML
INJECTION, SOLUTION INTRAMUSCULAR; INTRAVENOUS AS NEEDED
Status: DISCONTINUED | OUTPATIENT
Start: 2019-03-25 | End: 2019-03-25 | Stop reason: SURG

## 2019-03-25 RX ORDER — LIDOCAINE HYDROCHLORIDE 20 MG/ML
INJECTION, SOLUTION INFILTRATION; PERINEURAL AS NEEDED
Status: DISCONTINUED | OUTPATIENT
Start: 2019-03-25 | End: 2019-03-25 | Stop reason: SURG

## 2019-03-25 RX ORDER — HYDROMORPHONE HYDROCHLORIDE 1 MG/ML
0.5 INJECTION, SOLUTION INTRAMUSCULAR; INTRAVENOUS; SUBCUTANEOUS
Status: CANCELLED | OUTPATIENT
Start: 2019-03-25

## 2019-03-25 RX ADMIN — IODIXANOL 90 ML: 320 INJECTION, SOLUTION INTRAVASCULAR at 12:15

## 2019-03-25 RX ADMIN — PROPOFOL 150 MG: 10 INJECTION, EMULSION INTRAVENOUS at 12:34

## 2019-03-25 RX ADMIN — IOPAMIDOL 150 ML: 755 INJECTION, SOLUTION INTRAVENOUS at 11:34

## 2019-03-25 RX ADMIN — SUGAMMADEX 200 MG: 100 INJECTION, SOLUTION INTRAVENOUS at 13:55

## 2019-03-25 RX ADMIN — FENTANYL CITRATE 25 MCG: 50 INJECTION INTRAMUSCULAR; INTRAVENOUS at 15:04

## 2019-03-25 RX ADMIN — LIDOCAINE HYDROCHLORIDE 100 MG: 20 INJECTION, SOLUTION INFILTRATION; PERINEURAL at 12:34

## 2019-03-25 RX ADMIN — SODIUM CHLORIDE: 9 INJECTION, SOLUTION INTRAVENOUS at 12:28

## 2019-03-25 RX ADMIN — ATORVASTATIN CALCIUM 80 MG: 80 TABLET, FILM COATED ORAL at 20:47

## 2019-03-25 RX ADMIN — FENTANYL CITRATE 100 MCG: 50 INJECTION INTRAMUSCULAR; INTRAVENOUS at 12:34

## 2019-03-25 RX ADMIN — SODIUM CHLORIDE, PRESERVATIVE FREE 3 ML: 5 INJECTION INTRAVENOUS at 20:47

## 2019-03-25 RX ADMIN — ONDANSETRON 4 MG: 2 INJECTION INTRAMUSCULAR; INTRAVENOUS at 13:52

## 2019-03-25 RX ADMIN — PROPOFOL 50 MG: 10 INJECTION, EMULSION INTRAVENOUS at 13:08

## 2019-03-25 RX ADMIN — DEXAMETHASONE SODIUM PHOSPHATE 4 MG: 10 INJECTION INTRAMUSCULAR; INTRAVENOUS at 12:43

## 2019-03-25 RX ADMIN — ACETAMINOPHEN 650 MG: 325 TABLET, FILM COATED ORAL at 16:35

## 2019-03-25 RX ADMIN — ASPIRIN 325 MG: 325 TABLET ORAL at 20:54

## 2019-03-25 RX ADMIN — ROCURONIUM BROMIDE 50 MG: 10 INJECTION INTRAVENOUS at 12:34

## 2019-03-25 RX ADMIN — EPHEDRINE SULFATE 5 MG: 50 INJECTION INTRAMUSCULAR; INTRAVENOUS; SUBCUTANEOUS at 12:49

## 2019-03-25 RX ADMIN — PERFLUTREN 2 ML: 6.52 INJECTION, SUSPENSION INTRAVENOUS at 18:25

## 2019-03-25 NOTE — ANESTHESIA PREPROCEDURE EVALUATION
Anesthesia Evaluation     Patient summary reviewed and Nursing notes reviewed     NPO Liquid Status: Waived due to emergency           Airway   Mallampati: II  TM distance: >3 FB  Neck ROM: full  No difficulty expected  Dental      Pulmonary    Cardiovascular     (+) hypertension,       Neuro/Psych  (+) CVA, psychiatric history Depression,     GI/Hepatic/Renal/Endo      Musculoskeletal     Abdominal    Substance History      OB/GYN          Other      history of cancer                    Anesthesia Plan    ASA 3 - emergent     general     intravenous induction   Anesthetic plan, all risks, benefits, and alternatives have been provided, discussed and informed consent has been obtained with: patient.

## 2019-03-25 NOTE — ANESTHESIA PROCEDURE NOTES
Airway  Urgency: elective      General Information and Staff    Patient location during procedure: OR  CRNA: Gilbert Drew CRNA    Indications and Patient Condition  Indications for airway management: airway protection    Preoxygenated: yes  MILS maintained throughout  Mask difficulty assessment: 1 - vent by mask    Final Airway Details  Final airway type: endotracheal airway      Successful airway: ETT    Successful intubation technique: direct laryngoscopy  Blade: Chavez  Blade size: 3  ETT size (mm): 7.0  Cormack-Lehane Classification: grade I - full view of glottis  Placement verified by: chest auscultation   Measured from: teeth  ETT to teeth (cm): 22  Number of attempts at approach: 1

## 2019-03-25 NOTE — ANESTHESIA POSTPROCEDURE EVALUATION
"Patient: Ivet Diaz    Procedure Summary     Date:  03/25/19 [facility] / [facility]    Anesthesia Start:  1227 Anesthesia Stop:  1426    Procedure:  MECHANICAL THROMBECTOMY (Left ) Diagnosis:      Surgeon:  Truong Chambers MD Provider:  Vera Aguirre MD    Anesthesia Type:  general ASA Status:  3 - Emergent          Anesthesia Type: general  Last vitals  BP   132/74 (03/25/19 1156)   Temp   36.5 °C (97.7 °F) (03/25/19 1428)   Pulse   55 (03/25/19 1156)   Resp   16 (03/25/19 1156)     SpO2   95 % (03/25/19 1156)     Post Anesthesia Care and Evaluation      Comments: Patient discharged before being evaluated by an Anesthesiologist. No apparent complications per the record.  This case was not medically directed. I am completing this chart for medical records purposes; I personally have no medical involvement with this patient.    /74   Pulse 55   Temp 36.5 °C (97.7 °F) (Oral)   Resp 16   Ht 170.2 cm (67\")   Wt 79.7 kg (175 lb 9.6 oz)   SpO2 95%   BMI 27.50 kg/m²           "

## 2019-03-26 ENCOUNTER — APPOINTMENT (OUTPATIENT)
Dept: CARDIOLOGY | Facility: HOSPITAL | Age: 79
End: 2019-03-26

## 2019-03-26 ENCOUNTER — APPOINTMENT (OUTPATIENT)
Dept: MRI IMAGING | Facility: HOSPITAL | Age: 79
End: 2019-03-26

## 2019-03-26 LAB
AORTIC DIMENSIONLESS INDEX: 0.7 (DI)
BH CV ECHO MEAS - AI DEC SLOPE: 303 CM/SEC^2
BH CV ECHO MEAS - AI MAX PG: 88 MMHG
BH CV ECHO MEAS - AI MAX VEL: 469 CM/SEC
BH CV ECHO MEAS - AI P1/2T: 453.4 MSEC
BH CV ECHO MEAS - AO MAX PG (FULL): 4.6 MMHG
BH CV ECHO MEAS - AO MAX PG: 10.8 MMHG
BH CV ECHO MEAS - AO MEAN PG (FULL): 2 MMHG
BH CV ECHO MEAS - AO MEAN PG: 5 MMHG
BH CV ECHO MEAS - AO ROOT AREA (BSA CORRECTED): 1.6
BH CV ECHO MEAS - AO ROOT AREA: 7.5 CM^2
BH CV ECHO MEAS - AO ROOT DIAM: 3.1 CM
BH CV ECHO MEAS - AO V2 MAX: 164 CM/SEC
BH CV ECHO MEAS - AO V2 MEAN: 108 CM/SEC
BH CV ECHO MEAS - AO V2 VTI: 39.2 CM
BH CV ECHO MEAS - ASC AORTA: 2.8 CM
BH CV ECHO MEAS - AVA(I,A): 2.1 CM^2
BH CV ECHO MEAS - AVA(I,D): 2.1 CM^2
BH CV ECHO MEAS - AVA(V,A): 2.1 CM^2
BH CV ECHO MEAS - AVA(V,D): 2.1 CM^2
BH CV ECHO MEAS - BSA(HAYCOCK): 2 M^2
BH CV ECHO MEAS - BSA: 1.9 M^2
BH CV ECHO MEAS - BZI_BMI: 27.4 KILOGRAMS/M^2
BH CV ECHO MEAS - BZI_METRIC_HEIGHT: 170.2 CM
BH CV ECHO MEAS - BZI_METRIC_WEIGHT: 79.4 KG
BH CV ECHO MEAS - EDV(CUBED): 97.3 ML
BH CV ECHO MEAS - EDV(MOD-SP2): 57 ML
BH CV ECHO MEAS - EDV(MOD-SP4): 80 ML
BH CV ECHO MEAS - EDV(TEICH): 97.3 ML
BH CV ECHO MEAS - EF(CUBED): 87.5 %
BH CV ECHO MEAS - EF(MOD-BP): 65 %
BH CV ECHO MEAS - EF(MOD-SP2): 63.2 %
BH CV ECHO MEAS - EF(MOD-SP4): 70 %
BH CV ECHO MEAS - EF(TEICH): 81.4 %
BH CV ECHO MEAS - ESV(CUBED): 12.2 ML
BH CV ECHO MEAS - ESV(MOD-SP2): 21 ML
BH CV ECHO MEAS - ESV(MOD-SP4): 24 ML
BH CV ECHO MEAS - ESV(TEICH): 18.1 ML
BH CV ECHO MEAS - FS: 50 %
BH CV ECHO MEAS - IVS/LVPW: 1.1
BH CV ECHO MEAS - IVSD: 1 CM
BH CV ECHO MEAS - LAT PEAK E' VEL: 9.9 CM/SEC
BH CV ECHO MEAS - LV DIASTOLIC VOL/BSA (35-75): 41.9 ML/M^2
BH CV ECHO MEAS - LV MASS(C)D: 148.1 GRAMS
BH CV ECHO MEAS - LV MASS(C)DI: 77.5 GRAMS/M^2
BH CV ECHO MEAS - LV MAX PG: 6.2 MMHG
BH CV ECHO MEAS - LV MEAN PG: 3 MMHG
BH CV ECHO MEAS - LV SYSTOLIC VOL/BSA (12-30): 12.6 ML/M^2
BH CV ECHO MEAS - LV V1 MAX: 124 CM/SEC
BH CV ECHO MEAS - LV V1 MEAN: 84 CM/SEC
BH CV ECHO MEAS - LV V1 VTI: 28.6 CM
BH CV ECHO MEAS - LVIDD: 4.6 CM
BH CV ECHO MEAS - LVIDS: 2.3 CM
BH CV ECHO MEAS - LVLD AP2: 6.8 CM
BH CV ECHO MEAS - LVLD AP4: 7.4 CM
BH CV ECHO MEAS - LVLS AP2: 5.5 CM
BH CV ECHO MEAS - LVLS AP4: 5.9 CM
BH CV ECHO MEAS - LVOT AREA (M): 2.8 CM^2
BH CV ECHO MEAS - LVOT AREA: 2.8 CM^2
BH CV ECHO MEAS - LVOT DIAM: 1.9 CM
BH CV ECHO MEAS - LVPWD: 0.9 CM
BH CV ECHO MEAS - MED PEAK E' VEL: 8 CM/SEC
BH CV ECHO MEAS - MV A DUR: 194 SEC
BH CV ECHO MEAS - MV A MAX VEL: 96 CM/SEC
BH CV ECHO MEAS - MV DEC SLOPE: 215 CM/SEC^2
BH CV ECHO MEAS - MV DEC TIME: 366 SEC
BH CV ECHO MEAS - MV E MAX VEL: 78.6 CM/SEC
BH CV ECHO MEAS - MV E/A: 0.82
BH CV ECHO MEAS - MV MAX PG: 4.2 MMHG
BH CV ECHO MEAS - MV MEAN PG: 1 MMHG
BH CV ECHO MEAS - MV V2 MAX: 103 CM/SEC
BH CV ECHO MEAS - MV V2 MEAN: 51 CM/SEC
BH CV ECHO MEAS - MV V2 VTI: 33.1 CM
BH CV ECHO MEAS - MVA(VTI): 2.4 CM^2
BH CV ECHO MEAS - PA ACC TIME: 0.1 SEC
BH CV ECHO MEAS - PA MAX PG (FULL): 4 MMHG
BH CV ECHO MEAS - PA MAX PG: 8.6 MMHG
BH CV ECHO MEAS - PA PR(ACCEL): 36.3 MMHG
BH CV ECHO MEAS - PA V2 MAX: 147 CM/SEC
BH CV ECHO MEAS - PVA(V,A): 1.5 CM^2
BH CV ECHO MEAS - PVA(V,D): 1.5 CM^2
BH CV ECHO MEAS - QP/QS: 0.51
BH CV ECHO MEAS - RV MAX PG: 4.7 MMHG
BH CV ECHO MEAS - RV MEAN PG: 2 MMHG
BH CV ECHO MEAS - RV V1 MAX: 108 CM/SEC
BH CV ECHO MEAS - RV V1 MEAN: 62.5 CM/SEC
BH CV ECHO MEAS - RV V1 VTI: 20.4 CM
BH CV ECHO MEAS - RVOT AREA: 2 CM^2
BH CV ECHO MEAS - RVOT DIAM: 1.6 CM
BH CV ECHO MEAS - SI(AO): 154.9 ML/M^2
BH CV ECHO MEAS - SI(CUBED): 44.6 ML/M^2
BH CV ECHO MEAS - SI(LVOT): 42.4 ML/M^2
BH CV ECHO MEAS - SI(MOD-SP2): 18.8 ML/M^2
BH CV ECHO MEAS - SI(MOD-SP4): 29.3 ML/M^2
BH CV ECHO MEAS - SI(TEICH): 41.5 ML/M^2
BH CV ECHO MEAS - SV(AO): 295.9 ML
BH CV ECHO MEAS - SV(CUBED): 85.2 ML
BH CV ECHO MEAS - SV(LVOT): 81.1 ML
BH CV ECHO MEAS - SV(MOD-SP2): 36 ML
BH CV ECHO MEAS - SV(MOD-SP4): 56 ML
BH CV ECHO MEAS - SV(RVOT): 41 ML
BH CV ECHO MEAS - SV(TEICH): 79.2 ML
BH CV ECHO MEAS - TAPSE (>1.6): 3.2 CM2
BH CV ECHO MEASUREMENTS AVERAGE E/E' RATIO: 8.78
BH CV VAS BP RIGHT ARM: NORMAL MMHG
BH CV XLRA - RV BASE: 3.87 CM
BH CV XLRA - TDI S': 18.2 CM/SEC
CHOLEST SERPL-MCNC: 149 MG/DL (ref 0–200)
GLUCOSE BLDC GLUCOMTR-MCNC: 134 MG/DL (ref 70–130)
GLUCOSE BLDC GLUCOMTR-MCNC: 146 MG/DL (ref 70–130)
GLUCOSE BLDC GLUCOMTR-MCNC: 153 MG/DL (ref 70–130)
GLUCOSE BLDC GLUCOMTR-MCNC: 167 MG/DL (ref 70–130)
HBA1C MFR BLD: 5.2 % (ref 4.8–5.6)
HDLC SERPL-MCNC: 49 MG/DL (ref 40–60)
LDLC SERPL CALC-MCNC: 68 MG/DL (ref 0–100)
LDLC/HDLC SERPL: 1.39 {RATIO}
LEFT ATRIUM VOLUME INDEX: 27.7 ML/M2
LV EF 2D ECHO EST: 65 %
MAXIMAL PREDICTED HEART RATE: 143 BPM
STRESS TARGET HR: 122 BPM
TRIGL SERPL-MCNC: 159 MG/DL (ref 0–150)
VLDLC SERPL-MCNC: 31.8 MG/DL (ref 5–40)

## 2019-03-26 PROCEDURE — 97110 THERAPEUTIC EXERCISES: CPT

## 2019-03-26 PROCEDURE — 82962 GLUCOSE BLOOD TEST: CPT

## 2019-03-26 PROCEDURE — 99233 SBSQ HOSP IP/OBS HIGH 50: CPT | Performed by: NURSE PRACTITIONER

## 2019-03-26 PROCEDURE — 97162 PT EVAL MOD COMPLEX 30 MIN: CPT

## 2019-03-26 PROCEDURE — A9577 INJ MULTIHANCE: HCPCS | Performed by: INTERNAL MEDICINE

## 2019-03-26 PROCEDURE — 97535 SELF CARE MNGMENT TRAINING: CPT

## 2019-03-26 PROCEDURE — 99223 1ST HOSP IP/OBS HIGH 75: CPT | Performed by: INTERNAL MEDICINE

## 2019-03-26 PROCEDURE — 97166 OT EVAL MOD COMPLEX 45 MIN: CPT

## 2019-03-26 PROCEDURE — 0296T HC EXT ECG > 48HR TO 21 DAY RCRD W/CONECT INTL RCRD: CPT

## 2019-03-26 PROCEDURE — 0 GADOBENATE DIMEGLUMINE 529 MG/ML SOLUTION: Performed by: INTERNAL MEDICINE

## 2019-03-26 PROCEDURE — 70553 MRI BRAIN STEM W/O & W/DYE: CPT

## 2019-03-26 RX ORDER — LOSARTAN POTASSIUM AND HYDROCHLOROTHIAZIDE 25; 100 MG/1; MG/1
1 TABLET ORAL DAILY
COMMUNITY
End: 2019-03-29 | Stop reason: HOSPADM

## 2019-03-26 RX ORDER — CLOPIDOGREL BISULFATE 75 MG/1
75 TABLET ORAL DAILY
Status: DISCONTINUED | OUTPATIENT
Start: 2019-03-26 | End: 2019-03-26

## 2019-03-26 RX ORDER — ASPIRIN 81 MG/1
81 TABLET, CHEWABLE ORAL DAILY
Status: DISCONTINUED | OUTPATIENT
Start: 2019-03-27 | End: 2019-03-28

## 2019-03-26 RX ORDER — PANTOPRAZOLE SODIUM 40 MG/1
40 TABLET, DELAYED RELEASE ORAL
Status: DISCONTINUED | OUTPATIENT
Start: 2019-03-26 | End: 2019-03-29 | Stop reason: HOSPADM

## 2019-03-26 RX ORDER — AMLODIPINE BESYLATE 10 MG/1
10 TABLET ORAL DAILY
COMMUNITY
End: 2019-03-29 | Stop reason: HOSPADM

## 2019-03-26 RX ADMIN — SODIUM CHLORIDE, PRESERVATIVE FREE 3 ML: 5 INJECTION INTRAVENOUS at 20:13

## 2019-03-26 RX ADMIN — ASPIRIN 325 MG: 325 TABLET ORAL at 08:29

## 2019-03-26 RX ADMIN — SODIUM CHLORIDE, PRESERVATIVE FREE 3 ML: 5 INJECTION INTRAVENOUS at 08:29

## 2019-03-26 RX ADMIN — PANTOPRAZOLE SODIUM 40 MG: 40 TABLET, DELAYED RELEASE ORAL at 14:49

## 2019-03-26 RX ADMIN — ATORVASTATIN CALCIUM 80 MG: 80 TABLET, FILM COATED ORAL at 20:13

## 2019-03-26 RX ADMIN — GADOBENATE DIMEGLUMINE 16 ML: 529 INJECTION, SOLUTION INTRAVENOUS at 10:11

## 2019-03-27 ENCOUNTER — APPOINTMENT (OUTPATIENT)
Dept: CT IMAGING | Facility: HOSPITAL | Age: 79
End: 2019-03-27

## 2019-03-27 LAB
GLUCOSE BLDC GLUCOMTR-MCNC: 131 MG/DL (ref 70–130)
TSH SERPL DL<=0.05 MIU/L-ACNC: 0.68 MIU/ML (ref 0.27–4.2)
VIT B12 BLD-MCNC: 344 PG/ML (ref 211–946)

## 2019-03-27 PROCEDURE — 99233 SBSQ HOSP IP/OBS HIGH 50: CPT | Performed by: NURSE PRACTITIONER

## 2019-03-27 PROCEDURE — 99233 SBSQ HOSP IP/OBS HIGH 50: CPT | Performed by: INTERNAL MEDICINE

## 2019-03-27 PROCEDURE — 82962 GLUCOSE BLOOD TEST: CPT

## 2019-03-27 PROCEDURE — 82607 VITAMIN B-12: CPT | Performed by: NURSE PRACTITIONER

## 2019-03-27 PROCEDURE — 97110 THERAPEUTIC EXERCISES: CPT

## 2019-03-27 PROCEDURE — 25010000002 HYDRALAZINE PER 20 MG: Performed by: NURSE PRACTITIONER

## 2019-03-27 PROCEDURE — 84443 ASSAY THYROID STIM HORMONE: CPT | Performed by: NURSE PRACTITIONER

## 2019-03-27 PROCEDURE — 70450 CT HEAD/BRAIN W/O DYE: CPT

## 2019-03-27 RX ORDER — METOPROLOL TARTRATE 50 MG/1
50 TABLET, FILM COATED ORAL DAILY
Status: DISCONTINUED | OUTPATIENT
Start: 2019-03-27 | End: 2019-03-28

## 2019-03-27 RX ORDER — NITROGLYCERIN 0.4 MG/1
0.4 TABLET SUBLINGUAL
Status: DISCONTINUED | OUTPATIENT
Start: 2019-03-27 | End: 2019-03-29 | Stop reason: HOSPADM

## 2019-03-27 RX ORDER — LANSOPRAZOLE
30 KIT
Status: DISCONTINUED | OUTPATIENT
Start: 2019-03-28 | End: 2019-03-28 | Stop reason: SDUPTHER

## 2019-03-27 RX ORDER — HYDRALAZINE HYDROCHLORIDE 20 MG/ML
20 INJECTION INTRAMUSCULAR; INTRAVENOUS EVERY 6 HOURS PRN
Status: DISCONTINUED | OUTPATIENT
Start: 2019-03-27 | End: 2019-03-29 | Stop reason: HOSPADM

## 2019-03-27 RX ORDER — HYDRALAZINE HYDROCHLORIDE 20 MG/ML
10 INJECTION INTRAMUSCULAR; INTRAVENOUS EVERY 6 HOURS PRN
Status: DISCONTINUED | OUTPATIENT
Start: 2019-03-27 | End: 2019-03-29 | Stop reason: HOSPADM

## 2019-03-27 RX ADMIN — METOPROLOL TARTRATE 50 MG: 50 TABLET, FILM COATED ORAL at 20:34

## 2019-03-27 RX ADMIN — ATORVASTATIN CALCIUM 80 MG: 80 TABLET, FILM COATED ORAL at 20:34

## 2019-03-27 RX ADMIN — SODIUM CHLORIDE, PRESERVATIVE FREE 3 ML: 5 INJECTION INTRAVENOUS at 20:37

## 2019-03-27 RX ADMIN — SODIUM CHLORIDE, PRESERVATIVE FREE 3 ML: 5 INJECTION INTRAVENOUS at 08:03

## 2019-03-27 RX ADMIN — LOSARTAN POTASSIUM: 50 TABLET, FILM COATED ORAL at 20:34

## 2019-03-27 RX ADMIN — PANTOPRAZOLE SODIUM 40 MG: 40 TABLET, DELAYED RELEASE ORAL at 06:58

## 2019-03-27 RX ADMIN — HYDRALAZINE HYDROCHLORIDE 10 MG: 20 INJECTION INTRAMUSCULAR; INTRAVENOUS at 09:46

## 2019-03-28 LAB
ANION GAP SERPL CALCULATED.3IONS-SCNC: 11.8 MMOL/L
BUN BLD-MCNC: 24 MG/DL (ref 8–23)
BUN/CREAT SERPL: 27.3 (ref 7–25)
CALCIUM SPEC-SCNC: 9 MG/DL (ref 8.6–10.5)
CHLORIDE SERPL-SCNC: 105 MMOL/L (ref 98–107)
CO2 SERPL-SCNC: 25.2 MMOL/L (ref 22–29)
CREAT BLD-MCNC: 0.88 MG/DL (ref 0.57–1)
CREAT BLDA-MCNC: 1.1 MG/DL (ref 0.6–1.3)
DEPRECATED RDW RBC AUTO: 40.6 FL (ref 37–54)
ERYTHROCYTE [DISTWIDTH] IN BLOOD BY AUTOMATED COUNT: 12.9 % (ref 12.3–15.4)
GFR SERPL CREATININE-BSD FRML MDRD: 62 ML/MIN/1.73
GLUCOSE BLD-MCNC: 129 MG/DL (ref 65–99)
HCT VFR BLD AUTO: 32 % (ref 34–46.6)
HGB BLD-MCNC: 9.5 G/DL (ref 12–15.9)
MCH RBC QN AUTO: 25.7 PG (ref 26.6–33)
MCHC RBC AUTO-ENTMCNC: 29.7 G/DL (ref 31.5–35.7)
MCV RBC AUTO: 86.7 FL (ref 79–97)
PLATELET # BLD AUTO: 356 10*3/MM3 (ref 140–450)
PMV BLD AUTO: 10.3 FL (ref 6–12)
POTASSIUM BLD-SCNC: 3.4 MMOL/L (ref 3.5–5.2)
RBC # BLD AUTO: 3.69 10*6/MM3 (ref 3.77–5.28)
SODIUM BLD-SCNC: 142 MMOL/L (ref 136–145)
WBC NRBC COR # BLD: 9.42 10*3/MM3 (ref 3.4–10.8)

## 2019-03-28 PROCEDURE — 80048 BASIC METABOLIC PNL TOTAL CA: CPT | Performed by: INTERNAL MEDICINE

## 2019-03-28 PROCEDURE — 93010 ELECTROCARDIOGRAM REPORT: CPT | Performed by: INTERNAL MEDICINE

## 2019-03-28 PROCEDURE — 97110 THERAPEUTIC EXERCISES: CPT | Performed by: OCCUPATIONAL THERAPIST

## 2019-03-28 PROCEDURE — 97110 THERAPEUTIC EXERCISES: CPT

## 2019-03-28 PROCEDURE — 93005 ELECTROCARDIOGRAM TRACING: CPT | Performed by: INTERNAL MEDICINE

## 2019-03-28 PROCEDURE — 99232 SBSQ HOSP IP/OBS MODERATE 35: CPT | Performed by: INTERNAL MEDICINE

## 2019-03-28 PROCEDURE — 97535 SELF CARE MNGMENT TRAINING: CPT | Performed by: OCCUPATIONAL THERAPIST

## 2019-03-28 PROCEDURE — 85027 COMPLETE CBC AUTOMATED: CPT | Performed by: INTERNAL MEDICINE

## 2019-03-28 PROCEDURE — 99232 SBSQ HOSP IP/OBS MODERATE 35: CPT | Performed by: NURSE PRACTITIONER

## 2019-03-28 RX ORDER — METOPROLOL TARTRATE 50 MG/1
50 TABLET, FILM COATED ORAL EVERY 12 HOURS SCHEDULED
Status: DISCONTINUED | OUTPATIENT
Start: 2019-03-28 | End: 2019-03-29 | Stop reason: HOSPADM

## 2019-03-28 RX ORDER — CHOLECALCIFEROL (VITAMIN D3) 125 MCG
1000 CAPSULE ORAL DAILY
Status: DISCONTINUED | OUTPATIENT
Start: 2019-03-28 | End: 2019-03-29 | Stop reason: HOSPADM

## 2019-03-28 RX ORDER — LORAZEPAM 0.5 MG/1
0.5 TABLET ORAL ONCE
Status: COMPLETED | OUTPATIENT
Start: 2019-03-28 | End: 2019-03-28

## 2019-03-28 RX ADMIN — ASPIRIN 81 MG: 81 TABLET, CHEWABLE ORAL at 08:19

## 2019-03-28 RX ADMIN — SERTRALINE 50 MG: 50 TABLET, FILM COATED ORAL at 08:19

## 2019-03-28 RX ADMIN — SODIUM CHLORIDE, PRESERVATIVE FREE 3 ML: 5 INJECTION INTRAVENOUS at 20:49

## 2019-03-28 RX ADMIN — METOPROLOL TARTRATE 50 MG: 50 TABLET, FILM COATED ORAL at 08:19

## 2019-03-28 RX ADMIN — APIXABAN 5 MG: 5 TABLET, FILM COATED ORAL at 20:48

## 2019-03-28 RX ADMIN — METOPROLOL TARTRATE 50 MG: 50 TABLET, FILM COATED ORAL at 02:14

## 2019-03-28 RX ADMIN — ACETAMINOPHEN 650 MG: 325 TABLET, FILM COATED ORAL at 08:19

## 2019-03-28 RX ADMIN — METOPROLOL TARTRATE 50 MG: 50 TABLET, FILM COATED ORAL at 20:48

## 2019-03-28 RX ADMIN — LORAZEPAM 0.5 MG: 0.5 TABLET ORAL at 01:26

## 2019-03-28 RX ADMIN — ATORVASTATIN CALCIUM 80 MG: 80 TABLET, FILM COATED ORAL at 20:48

## 2019-03-28 RX ADMIN — Medication 1000 MCG: at 11:44

## 2019-03-28 RX ADMIN — SODIUM CHLORIDE, PRESERVATIVE FREE 3 ML: 5 INJECTION INTRAVENOUS at 08:20

## 2019-03-29 VITALS
DIASTOLIC BLOOD PRESSURE: 78 MMHG | SYSTOLIC BLOOD PRESSURE: 144 MMHG | TEMPERATURE: 98.4 F | OXYGEN SATURATION: 97 % | BODY MASS INDEX: 26.64 KG/M2 | WEIGHT: 169.75 LBS | RESPIRATION RATE: 18 BRPM | HEIGHT: 67 IN | HEART RATE: 61 BPM

## 2019-03-29 PROCEDURE — 99232 SBSQ HOSP IP/OBS MODERATE 35: CPT | Performed by: NURSE PRACTITIONER

## 2019-03-29 RX ORDER — METOPROLOL TARTRATE 50 MG/1
50 TABLET, FILM COATED ORAL 2 TIMES DAILY
Qty: 60 TABLET | Refills: 0 | Status: SHIPPED | OUTPATIENT
Start: 2019-03-29 | End: 2022-06-13 | Stop reason: DRUGHIGH

## 2019-03-29 RX ORDER — POTASSIUM CHLORIDE 750 MG/1
40 CAPSULE, EXTENDED RELEASE ORAL ONCE
Status: COMPLETED | OUTPATIENT
Start: 2019-03-29 | End: 2019-03-29

## 2019-03-29 RX ORDER — ATORVASTATIN CALCIUM 80 MG/1
80 TABLET, FILM COATED ORAL NIGHTLY
Qty: 30 TABLET | Refills: 0 | Status: SHIPPED | OUTPATIENT
Start: 2019-03-29

## 2019-03-29 RX ADMIN — Medication 1000 MCG: at 08:32

## 2019-03-29 RX ADMIN — POTASSIUM CHLORIDE 40 MEQ: 750 CAPSULE, EXTENDED RELEASE ORAL at 16:18

## 2019-03-29 RX ADMIN — SERTRALINE 50 MG: 50 TABLET, FILM COATED ORAL at 08:32

## 2019-03-29 RX ADMIN — METOPROLOL TARTRATE 50 MG: 50 TABLET, FILM COATED ORAL at 08:32

## 2019-03-29 RX ADMIN — PANTOPRAZOLE SODIUM 40 MG: 40 TABLET, DELAYED RELEASE ORAL at 06:34

## 2019-03-29 RX ADMIN — APIXABAN 5 MG: 5 TABLET, FILM COATED ORAL at 08:32

## 2019-03-29 RX ADMIN — SODIUM CHLORIDE, PRESERVATIVE FREE 3 ML: 5 INJECTION INTRAVENOUS at 08:32

## 2019-03-30 ENCOUNTER — READMISSION MANAGEMENT (OUTPATIENT)
Dept: CALL CENTER | Facility: HOSPITAL | Age: 79
End: 2019-03-30

## 2019-04-01 ENCOUNTER — READMISSION MANAGEMENT (OUTPATIENT)
Dept: CALL CENTER | Facility: HOSPITAL | Age: 79
End: 2019-04-01

## 2019-04-01 NOTE — OUTREACH NOTE
Stroke Week 1 Survey      Responses   Facility patient discharged from?  [place]   Does the patient have one of the following disease processes/diagnoses(primary or secondary)?  Stroke (TIA)   Is there a successful TCM telephone encounter documented?  No   Week 1 attempt successful?  Yes   Call start time  1610   Call end time  1625   Discharge diagnosis  Acute CVA-Mechanical thrombectomy this visit   Is patient permission given to speak with other caregiver?  Yes   Person spoke with today (if not patient) and relationship  Mr Diaz /    Meds reviewed with patient/caregiver?  Yes   Is the patient having any side effects they believe may be caused by any medication additions or changes?  No   Does the patient have all medications ordered at discharge?  Yes   Is the patient taking all medications as directed (includes completed medication regime)?  Yes   What is the Home health agency?   [facility]   Has home health visited the patient within 72 hours of discharge?  Call prior to 72 hours   Psychosocial issues?  No   Does the patient require any assistance with activities of daily living such as eating, bathing, dressing, walking, etc.?  No   Does the patient have any residual symptoms from stroke/TIA?  Yes   Residual symptoms comments   reports symptoms have improved but she will follow up with opthalmatology for mild vision changes.    Does the patient understand the diet ordered at discharge?  Yes   Did the patient receive a copy of their discharge instructions?  Yes   Nursing interventions  Reviewed instructions with patient   What is the patient's perception of their health status since discharge?  Improving   Nursing interventions  Nurse provided patient education   Is the patient able to teach back FAST for Stroke?  Yes   Is the patient/caregiver able to teach back the risk factors for a stroke?  High blood pressure-goal below 120/80, High Cholesterol, History of Afib   Is the patient/caregiver able  to teach back signs and symptoms related to disease process for when to call PCP?  Yes   Is the patient/caregiver able to teach back signs and symptoms related to disease process for when to call 911?  Yes   Is the patient/caregiver able to teach back the hierarchy of who to call/visit for symptoms/problems? PCP, Specialist, Home health nurse, Urgent Care, ED, 911  Yes   Additional teach back comments  Instructed patient's  if wife develops trouble breathing, confusion, weakness or numbness, chest pain, or difficulty with vision or speech to seek care right away.    Week 1 call completed?  Yes          Osei Pompa RN

## 2019-04-02 NOTE — PROGRESS NOTES
Case Management Discharge Note    Final Note: Home with [facility] and family. David PZA/CCP    Destination      No service has been selected for the patient.      Durable Medical Equipment      No service has been selected for the patient.      Dialysis/Infusion      No service has been selected for the patient.      Home Medical Care - Selection Complete      Service Provider Request Status Selected Services Address Phone Number Fax Number    [facility] Selected Home Health Services [address] [address] [phone]      Therapy      No service has been selected for the patient.      Community Resources      No service has been selected for the patient.        Transportation Services  Private: Car    Final Discharge Disposition Code: 06 - home with home health care

## 2019-04-08 ENCOUNTER — READMISSION MANAGEMENT (OUTPATIENT)
Dept: CALL CENTER | Facility: HOSPITAL | Age: 79
End: 2019-04-08

## 2019-04-08 NOTE — OUTREACH NOTE
Stroke Week 2 Survey      Responses   Facility patient discharged from?  [place]   Does the patient have one of the following disease processes/diagnoses(primary or secondary)?  Stroke (TIA)   Week 2 attempt successful?  No   Unsuccessful attempts  Attempt 1          Rosie Orozco RN

## 2019-04-09 ENCOUNTER — READMISSION MANAGEMENT (OUTPATIENT)
Dept: CALL CENTER | Facility: HOSPITAL | Age: 79
End: 2019-04-09

## 2019-04-09 NOTE — OUTREACH NOTE
Stroke Week 2 Survey      Responses   Facility patient discharged from?  [place]   Does the patient have one of the following disease processes/diagnoses(primary or secondary)?  Stroke (TIA)   Week 2 attempt successful?  Yes   Call start time  1647   Call end time  1706   Discharge diagnosis  Acute CVA-Mechanical thrombectomy this visit   Is patient permission given to speak with other caregiver?  Yes   List who call center can speak with  Dr Diaz /    Person spoke with today (if not patient) and relationship  Spoke with patient and    Meds reviewed with patient/caregiver?  Yes   Is the patient having any side effects they believe may be caused by any medication additions or changes?  No   Does the patient have all medications ordered at discharge?  Yes   Is the patient taking all medications as directed (includes completed medication regime)?  Yes   Does the patient have a primary care provider?   Yes   Does the patient have an appointment with their PCP within 7 days of discharge?  Greater than 7 days   Comments regarding PCP  Patient saw PCP yesterday. Had appt scheduled prior to hospitalization.    Nursing Interventions  Verified appointment date/time/provider   Has the patient kept scheduled appointments due by today?  Yes   What is the Home health agency?   [facility]   Has home health visited the patient within 72 hours of discharge?  Yes   Home health comments   states that PT came for evaluation, but not needed. He states that patient not homebound now and still need cognitive, occupational and speech therapy at outpt.    Psychosocial issues?  No   Does the patient require any assistance with activities of daily living such as eating, bathing, dressing, walking, etc.?  No   Does the patient have any residual symptoms from stroke/TIA?  Yes   Residual symptoms comments  Word finding and expressive aphasia, cognitive deficit, right sided weakness   Does the patient understand the diet  ordered at discharge?  Yes   Notified Case Management  -- [Patient /  would like to know where to obtain the outpt therapy services needed. Email sent to case management. ]   Did the patient receive a copy of their discharge instructions?  Yes   Nursing interventions  Reviewed instructions with patient   What is the patient's perception of their health status since discharge?  Improving   Nursing interventions  Nurse provided patient education   Is the patient able to teach back FAST for Stroke?  Yes   Is the patient/caregiver able to teach back the risk factors for a stroke?  High blood pressure-goal below 120/80, High Cholesterol, Physical inactivity and obesity, History of TIAs   Is the patient/caregiver able to teach back signs and symptoms related to disease process for when to call PCP?  Yes   Is the patient/caregiver able to teach back signs and symptoms related to disease process for when to call 911?  Yes   Week 2 call completed?  Yes          Ofelia Castillo RN

## 2019-04-10 ENCOUNTER — TELEPHONE (OUTPATIENT)
Dept: SOCIAL WORK | Facility: HOSPITAL | Age: 79
End: 2019-04-10

## 2019-04-10 ENCOUNTER — TRANSCRIBE ORDERS (OUTPATIENT)
Dept: ADMINISTRATIVE | Facility: HOSPITAL | Age: 79
End: 2019-04-10

## 2019-04-10 ENCOUNTER — HOSPITAL ENCOUNTER (OUTPATIENT)
Dept: GENERAL RADIOLOGY | Facility: HOSPITAL | Age: 79
Discharge: HOME | End: 2019-04-10
Admitting: INTERNAL MEDICINE

## 2019-04-10 DIAGNOSIS — R05.9 COUGH: Primary | ICD-10-CM

## 2019-04-10 DIAGNOSIS — R05.9 COUGH: ICD-10-CM

## 2019-04-10 DIAGNOSIS — R06.00 DYSPNEA, UNSPECIFIED TYPE: ICD-10-CM

## 2019-04-10 PROCEDURE — 71046 X-RAY EXAM CHEST 2 VIEWS: CPT

## 2019-04-10 NOTE — TELEPHONE ENCOUNTER
"Received notice from the call center that pt's spouse would like her to be referred for outpt OT and ST.  Call placed to pt's  Dr. Tylor Diaz at number listed.  He requests a call back due to being unavailable.  Attempted a call back after approximately one hour and got VM which states that it \"is full\" and not allowed to leave VM.  Will attempt f/u at a later time.  "

## 2019-04-10 NOTE — TELEPHONE ENCOUNTER
Call again placed to pt's  and spoke with Dr. Diaz.  They have obtained the orders for Ms. Diaz's therapy and have an appointment arranged for Monday.  No further assistance needed.

## 2019-04-11 ENCOUNTER — TELEPHONE (OUTPATIENT)
Dept: NEUROLOGY | Facility: CLINIC | Age: 79
End: 2019-04-11

## 2019-04-11 NOTE — TELEPHONE ENCOUNTER
Two Week  Stroke Phone Call  Spoke with the patient  · Admission Date: 3/25/2019   · Discharge Date: 3/29/2019  · Discharge Destination: Home  · Meds reviewed with patient/caregiver?    [x]Yes [] No   o Cholesterol Reducing: Lipitor  - Understands purpose     [x]  Yes     []  No    - Understands how to take      [x]  Yes     []  No   o Anit-Coagulate: Eliquis  - Understands purpose     [x]  Yes     []  No    - Understands how to take      [x]  Yes     []  No     · Is the patient taking all medication as directed?   [x]  Yes  []  No  · Discussed personal risk factors   [x]  Yes []  No    o High blood pressure   - Bp goal <130/80   o High cholesterol   - Review desired LDL goal <70  o Atrial fibrillation   • Discussed signs and symptoms of stroke and when patient to call 911?   [x]  Yes []  No  o Sudden weakness or numbness of the face, arm, or leg especially on one side of the body  o Sudden confusion, trouble speaking or understanding  o Sudden trouble seeing in one or both eyes   o Sudden trouble walking, dizziness, loss of balance or coordination  o Sudden severe headaches with no known cause    Notified Patient that if any of these symptoms occur to call 911  · Does the patient have any new signs or symptoms of a stroke?   []  Yes     [x]  No  · Does the patietnt have an appointment with PCP?  [x]  Yes     []  No  · Does the patient have 3 month Stroke Clinic appointment?  Office will call to schedule.  Reminded patient to schedule her follow up apt  · Is the patient currently in therapy, outpatient, or home health?  [x]  Yes     []  No    Needs a referral?      []  Yes     [x]  No  Patient Satisfaction   · How would you rate your satisfaction with the instructions provided about your specific risk factors for stroke?   []Poor  [] Fair  [] Good [x] Very Good  [] Excellent   · How would you rate your satisfaction with the instructions provided on the warnings signs and symptoms of stroke?   []Poor  [] Fair  []  Good [x] Very Good  [] Excellent   · How well did we explain the importance of calling 911 to activate the emergency medical system for new signs and symptoms of stroke?    []Poor  [] Fair  [] Good [x] Very Good  [] Excellent   · Would you recommend the stroke center to your friends and family?  Definitely Would Not  [] Probably Would Not [] Neutral []  Probably Would [x] Definitely Would

## 2019-04-15 ENCOUNTER — TRANSCRIBE ORDERS (OUTPATIENT)
Dept: SPEECH THERAPY | Facility: HOSPITAL | Age: 79
End: 2019-04-15

## 2019-04-15 DIAGNOSIS — R41.89 COGNITIVE DEFICITS: ICD-10-CM

## 2019-04-15 DIAGNOSIS — Z86.73 HISTORY OF RECENT STROKE: Primary | ICD-10-CM

## 2019-04-17 ENCOUNTER — HOSPITAL ENCOUNTER (OUTPATIENT)
Dept: SPEECH THERAPY | Facility: HOSPITAL | Age: 79
Setting detail: THERAPIES SERIES
Discharge: HOME | End: 2019-04-17

## 2019-04-17 ENCOUNTER — READMISSION MANAGEMENT (OUTPATIENT)
Dept: CALL CENTER | Facility: HOSPITAL | Age: 79
End: 2019-04-17

## 2019-04-17 DIAGNOSIS — I63.9 ACUTE CVA (CEREBROVASCULAR ACCIDENT) (HCC): Primary | ICD-10-CM

## 2019-04-17 DIAGNOSIS — R47.01 APHASIA: ICD-10-CM

## 2019-04-17 DIAGNOSIS — R41.841 COGNITIVE COMMUNICATION DEFICIT: ICD-10-CM

## 2019-04-17 PROCEDURE — 96105 ASSESSMENT OF APHASIA: CPT

## 2019-04-17 NOTE — OUTREACH NOTE
Stroke Week 3 Survey      Responses   Facility patient discharged from?  [place]   Does the patient have one of the following disease processes/diagnoses(primary or secondary)?  Stroke (TIA)   Week 3 attempt successful?  Yes   Call start time  1505   Rescheduled  Rescheduled-pt requested [Answerer requests patient be called back tomorrow.]   Call end time  150          Ofelia Castillo RN

## 2019-04-17 NOTE — THERAPY EVALUATION
Outpatient Speech Language Pathology   Adult Speech Language Cognitive Initial Evaluation  [facility]     Patient Name: Ivet Diaz  : 1940  MRN: [id number]  Today's Date: 2019        Visit Date: 2019   Patient Active Problem List   Diagnosis   • Acute CVA (cerebrovascular accident) (CMS/HCC)        Past Medical History:   Diagnosis Date   • Breast cancer (CMS/HCC)    • Cancer (CMS/HCC)     Left breast   • Depression    • Hypertension    • Reflux esophagitis         Past Surgical History:   Procedure Laterality Date   • BREAST BIOPSY     • BREAST SURGERY  1998    Left masectomy   • EMBOLECTOMY Left 3/25/2019    Procedure: MECHANICAL THROMBECTOMY;  Surgeon: Truong Chambers MD;  Location: [facility] HYBRID OR ;  Service: Interventional Radiology   • ENDOSCOPY N/A 2016    Procedure: ESOPHAGOGASTRODUODENOSCOPY  WITH BIOPSY;  Surgeon: Peter Corrigan MD;  Location: [facility] ENDOSCOPY;  Service:    • MASTECTOMY Left          Visit Dx:    ICD-10-CM ICD-9-CM   1. Acute CVA (cerebrovascular accident) (CMS/HCC) I63.9 434.91   2. Aphasia R47.01 784.3   3. Cognitive communication deficit R41.841 799.52           SLP SLC Evaluation - 19 1400        Communication Assessment/Intervention    Document Type  evaluation  (Pended)    -BL    Subjective Information  no complaints  (Pended)    -    Patient Observations  alert;cooperative;agree to therapy  (Pended)    -BL    Patient/Family Observations  Pt accompanied to session with her older sister. Pt demonstrated good effort and motivation.   (Pended)    -BL    Patient Effort  good  (Pended)    -    Symptoms Noted During/After Treatment  none  (Pended)    -       General Information    Patient Profile Reviewed  yes  (Pended)    -BL    Pertinent History Of Current Problem  Pt presents to SLP with history of CVA, acute infarct L temporal lobe extending to L occipital lobe. Admitted to [facility] 3/25/2019-3/29/2019. Pt reports no previous CVA in  history. Pt complains of problems with short term memory and sometimes confuses dates, times and the year. Pt reports no trouble with word finding. Mrs. Diaz enjoys reading, watching tennis, and traveling. Her prior education level is bachelors degree and she took some masters level classes as well.   (Pended)    -BL    Precautions/Limitations, Vision  WFL with corrective lenses, patient with right hemianopsia, right visual field cut. Will continue diagnostic assessment of her visual deficits  (Pended)    -BL    Precautions/Limitations, Hearing  WFL  (Pended)    -BL    Prior Level of Function-Communication  WFL  (Pended)    -    Plans/Goals Discussed with  patient;family  (Pended)    -    Barriers to Rehab  none identified  (Pended)    -BL    Patient's Goals for Discharge  functional communication  (Pended)    -    Family Goals for Discharge  family did not state  (Pended)    -    Standardized Assessment Used  Western Aphasia Battery;CLQT  (Pended)    -       Standardized Tests    Formal Speech Language Tests  WAB: Western Aphasia Battery  (Pended)    -BL    Cognitive/Memory Tests  CLQT: Cognitive Linguistic Quick Test  (Pended)    -BL       Spontaneous Speech    Information Content  9  (Pended)    -BL    Fluency  9  (Pended)    -BL    Spontaneous Speech Total  18  (Pended)    -BL       Comprehension    Yes/No Questions  60  (Pended)    -BL    Auditory Word Recongition  60  (Pended)    -    Sequential Commands  80  (Pended)    -BL    Comprehension Total  200  (Pended)    -BL       Repetition    Repetition Total  98  (Pended)    -BL       Naming    Object  51  (Pended)    -BL    Word Fluency  5  (Pended)    -BL    Sentence Completion  10  (Pended)    -BL    Responsive Speech  10  (Pended)    -BL    Naming Total  76  (Pended)    -BL       Aphasia    Aphasia Quotient  90.8  (Pended)    -BL    Aphasia Severity  Very Mild (>90)  (Pended)    -BL    Type of Aphasia  Mild expressive aphasia   (Pended)    -BL        Cortical    WAB Comments  Pt demonstrated good effort during the assessment. Mrs. Diaz's strengths include constructing sentences, following commands, and answering simple and complex yes/no questions. Pt showed difficulty within the areas of word fluency and object naming. Regarding word fluency, pt named 5 animals within 1 minute. She also repeated two animals when doing word fluency task. Pt demonstrated mild difficulty with object naming characterized by perseveration, hestiations, and halting with word finding. Pt needed prompts for the following words; matches, paperclip, lock, tape, and screwdriver. Overall, pt scores within mild aphasia range. However, word fluency and object naming were moderately impaired.    (Pended)    -BL       CLQT (The Cognitive Linguistic Quick Test)    CLQT Comments  SLP started administration of CLQT. Will complete next week, pt is demonstrating visual spatial deficits and reports vision changes with R eye.   (Pended)    -BL       SLP Clinical Impressions    SLP Diagnosis  Mild expressive aphasia, mild/moderate cognitive communication impairment  (Pended)    -BL    Rehab Potential/Prognosis  good  (Pended)    -    SLC Criteria for Skilled Therapy Interventions Met  yes  (Pended)    -BL    Functional Impact  difficulty completing home management task;restrictions in personal and social life;difficulty in expressing complex messages  (Pended)    -BL       Recommendations    Therapy Frequency (SLP SLC)  1 day per week  (Pended)    -BL    Predicted Duration Therapy Intervention (Days)  until discharge  (Pended)    -BL    Anticipated Dischage Disposition  home  (Pended)    -      User Key  (r) = Recorded By, (t) = Taken By, (c) = Cosigned By    Initials Name Provider Type    BL Soo Beckett Speech Therapy Student Speech and Language Pathologist                         OP SLP Education     Row Name 04/17/19 0697       Education    Barriers to Learning  No barriers  identified  (Pended)   -    Education Provided  Described results of evaluation;Patient expressed understanding of evaluation  (Pended)   -    Assessed  Learning needs;Learning motivation  (Pended)   -BL    Learning Motivation  Strong  (Pended)   -    Learning Method  Explanation;Demonstration  (Pended)   -    Teaching Response  Verbalized understanding;Demonstrated understanding  (Pended)   -      User Key  (r) = Recorded By, (t) = Taken By, (c) = Cosigned By    Initials Name Effective Dates    BL Soo Beckett Speech Therapy Student 01/07/19 -           SLP OP Goals     Row Name 04/17/19 1400          Goal Type Needed    Goal Type Needed  Verbal Expression;Written Language Comprehension  (Pended)   -BL        Subjective Pain    Able to rate subjective pain?  yes  (Pended)   -BL     Pre-Treatment Pain Level  0  (Pended)   -BL     Post-Treatment Pain Level  0  (Pended)   -BL        Written Language Comprehension Goals    Written Language Comprehension LTG's  Patient will be able to comprehend written material in home/home and social  environment  (Pended)   -BL     Patient will be able to comprehend written material in home/home and social  environment  90%:;without cues  (Pended)   -BL     Status: Patient will be able to comprehend written material in home/home and social  environment  New  (Pended)   -BL     Written Language Comprehension STG's  Patient will improve comprehension of written language skills by following two-step written directions;Patient will improve comprehension of written language skills by scanning appropriately (left to right, top to bottom) to complete functional reading task  (Pended)   -BL     Patient will improve comprehension of written language skills by scanning appropriately (left to right, top to bottom) to complete functional reading task  90%:;without cues  (Pended)   -BL     Status:Patient will improve comprehension of written language skills by scanning appropriately  (left to right, top to bottom) to complete functional reading task  New  (Pended)   -BL     Patient will improve comprehension of written language skills by following two-step written directions  90%:;without cues  (Pended)   -BL     Status: Patient will improve comprehension of written language skills by following two-step written directions  New  (Pended)   -BL        Verbal Expression Goals    Verbal Expression LTG's  Patient will be able to use verbal expressive language skills to communicate effectively in social/avocational/work setting  (Pended)   -BL     Patient will be able to use verbal expressive language skills to communicate effectively in social/avocational/work setting  90%:;without cues  (Pended)   -BL     Status: Patient will be able to use verbal expressive language skills to communicate effectively in social/avocational/work setting  New  (Pended)   -BL     Verbal Expression STG's  Patient will improve verbal expressive language skills by completing divergent naming tasks;Patient will improve verbal expressive language skills by describing attributes, function, action and/or uses of an object/item;Patient will improve verbal expressive language skills by completing convergent naming tasks  (Pended)   -BL     Patient will improve verbal expressive language skills by completing divergent naming tasks  90%:;without cues  (Pended)   -BL     Status: Patient will improve verbal expressive language skills by completing divergent naming tasks  New  (Pended)   -BL     Patient will improve verbal expressive language skills by describing attributes, function, action and/or uses of an object/item  90%:;without cues  (Pended)   -BL     Status: Patient will improve verbal expressive language skills by describing attributes, function, action and/or uses of an object/item  New  (Pended)   -BL     Patient will improve verbal expressive language skills by completing convergent naming tasks  90%:;without cues   (Pended)   -BL     Status: Patient will improve verbal expressive language skills by completing convergent naming tasks  New  (Pended)   -BL       User Key  (r) = Recorded By, (t) = Taken By, (c) = Cosigned By    Initials Name Provider Type    BL Soo Beckett Speech Therapy Student Speech and Language Pathologist          OP SLP Assessment/Plan - 04/17/19 1454        SLP Assessment    Functional Problems  Speech Language- Adult/Cognition  (Pended)    -BL    Impact on Function: Adult Speech Language/Cognition  Restrictions in personal and social life;Difficulty participating in avocational activities  (Pended)    -BL    Clinical Impression: Speech Language-Adult/Congnition  Mild:;Expressive Aphasia  (Pended)    -BL    Please refer to paper survey for additional self-reported information  Yes  (Pended)    -BL    Please refer to items scanned into chart for additional diagnostic informaiton and handouts as provided by clinician  Yes  (Pended)    -BL    SLP Diagnosis  --  (Pended)  mild expressive aphasia     mild expressive aphasia   -BL    Prognosis  Good (comment)  (Pended)    -BL    Patient/caregiver participated in establishment of treatment plan and goals  Yes  (Pended)    -BL    Patient would benefit from skilled therapy intervention  Yes  (Pended)    -BL       SLP Plan    Frequency  --  (Pended)  1x per week    1x per week  -BL    Duration  --  (Pended)  until discharge    until discharge  -BL    Planned CPT's?  SLP INDIVIDUAL SPEECH THERAPY: 87987  (Pended)    -BL    Expected Duration Therapy Session - minutes  45-60 minutes  (Pended)    -BL      User Key  (r) = Recorded By, (t) = Taken By, (c) = Cosigned By    Initials Name Provider Type    BL Soo Beckett Speech Therapy Student Speech and Language Pathologist             SLP Outcome Measures (last 72 hours)      SLP Outcome Measures     Row Name 04/17/19 1400             SLP Outcome Measures    Outcome Measure Used?  Adult NOMS  (Pended)   -BL          Adult FCM Scores    FCM Chosen  Verbal Expression  (Pended)   -BL      Verbal Expression FCM Score  5  (Pended)   -BL        User Key  (r) = Recorded By, (t) = Taken By, (c) = Cosigned By    Initials Name Effective Dates    Soo Finley Speech Therapy Student 01/07/19 -            [id number]  Time Calculation:   SLP Start Time: (P) 0900  SLP Stop Time: (P) 1000  SLP Time Calculation (min): (P) 60 min    Therapy Charges for Today     Code Description Service Date Service Provider Modifiers Qty    [id number]  ST ASSESSMENT OF APHASIA PER HOUR 4/17/2019 Soo Beckett, Speech Therapy Student GN 2                   Soo Beckett Speech Therapy Student  4/17/2019

## 2019-04-18 ENCOUNTER — TRANSCRIBE ORDERS (OUTPATIENT)
Dept: ADMINISTRATIVE | Facility: HOSPITAL | Age: 79
End: 2019-04-18

## 2019-04-18 ENCOUNTER — READMISSION MANAGEMENT (OUTPATIENT)
Dept: CALL CENTER | Facility: HOSPITAL | Age: 79
End: 2019-04-18

## 2019-04-18 DIAGNOSIS — Z86.73 HISTORY OF STROKE: ICD-10-CM

## 2019-04-18 DIAGNOSIS — R06.02 SOB (SHORTNESS OF BREATH): Primary | ICD-10-CM

## 2019-04-18 NOTE — OUTREACH NOTE
Stroke Week 3 Survey      Responses   Facility patient discharged from?  [place]   Does the patient have one of the following disease processes/diagnoses(primary or secondary)?  Stroke (TIA)   Week 3 attempt successful?  No   Rescheduled  Revoked          Nettie Montoya RN

## 2019-04-19 ENCOUNTER — HOSPITAL ENCOUNTER (OUTPATIENT)
Dept: CARDIOLOGY | Facility: HOSPITAL | Age: 79
Discharge: HOME | End: 2019-04-19

## 2019-04-19 ENCOUNTER — HOSPITAL ENCOUNTER (OUTPATIENT)
Dept: CT IMAGING | Facility: HOSPITAL | Age: 79
Discharge: HOME | End: 2019-04-19
Admitting: INTERNAL MEDICINE

## 2019-04-19 DIAGNOSIS — R06.02 SOB (SHORTNESS OF BREATH): ICD-10-CM

## 2019-04-19 DIAGNOSIS — Z86.73 HISTORY OF STROKE: ICD-10-CM

## 2019-04-19 LAB
BH CV LOWER VASCULAR LEFT COMMON FEMORAL AUGMENT: NORMAL
BH CV LOWER VASCULAR LEFT COMMON FEMORAL COMPETENT: NORMAL
BH CV LOWER VASCULAR LEFT COMMON FEMORAL COMPRESS: NORMAL
BH CV LOWER VASCULAR LEFT COMMON FEMORAL PHASIC: NORMAL
BH CV LOWER VASCULAR LEFT COMMON FEMORAL SPONT: NORMAL
BH CV LOWER VASCULAR LEFT DISTAL FEMORAL COMPRESS: NORMAL
BH CV LOWER VASCULAR LEFT GASTRONEMIUS COMPRESS: NORMAL
BH CV LOWER VASCULAR LEFT GREATER SAPH AK COMPRESS: NORMAL
BH CV LOWER VASCULAR LEFT GREATER SAPH BK COMPRESS: NORMAL
BH CV LOWER VASCULAR LEFT MID FEMORAL AUGMENT: NORMAL
BH CV LOWER VASCULAR LEFT MID FEMORAL COMPETENT: NORMAL
BH CV LOWER VASCULAR LEFT MID FEMORAL COMPRESS: NORMAL
BH CV LOWER VASCULAR LEFT MID FEMORAL PHASIC: NORMAL
BH CV LOWER VASCULAR LEFT MID FEMORAL SPONT: NORMAL
BH CV LOWER VASCULAR LEFT PERONEAL COMPRESS: NORMAL
BH CV LOWER VASCULAR LEFT POPLITEAL AUGMENT: NORMAL
BH CV LOWER VASCULAR LEFT POPLITEAL COMPETENT: NORMAL
BH CV LOWER VASCULAR LEFT POPLITEAL COMPRESS: NORMAL
BH CV LOWER VASCULAR LEFT POPLITEAL PHASIC: NORMAL
BH CV LOWER VASCULAR LEFT POPLITEAL SPONT: NORMAL
BH CV LOWER VASCULAR LEFT POSTERIOR TIBIAL COMPRESS: NORMAL
BH CV LOWER VASCULAR LEFT PROXIMAL FEMORAL COMPRESS: NORMAL
BH CV LOWER VASCULAR LEFT SAPHENOFEMORAL JUNCTION AUGMENT: NORMAL
BH CV LOWER VASCULAR LEFT SAPHENOFEMORAL JUNCTION COMPETENT: NORMAL
BH CV LOWER VASCULAR LEFT SAPHENOFEMORAL JUNCTION COMPRESS: NORMAL
BH CV LOWER VASCULAR LEFT SAPHENOFEMORAL JUNCTION PHASIC: NORMAL
BH CV LOWER VASCULAR LEFT SAPHENOFEMORAL JUNCTION SPONT: NORMAL
BH CV LOWER VASCULAR RIGHT COMMON FEMORAL AUGMENT: NORMAL
BH CV LOWER VASCULAR RIGHT COMMON FEMORAL COMPETENT: NORMAL
BH CV LOWER VASCULAR RIGHT COMMON FEMORAL COMPRESS: NORMAL
BH CV LOWER VASCULAR RIGHT COMMON FEMORAL PHASIC: NORMAL
BH CV LOWER VASCULAR RIGHT COMMON FEMORAL SPONT: NORMAL
BH CV LOWER VASCULAR RIGHT DISTAL FEMORAL COMPRESS: NORMAL
BH CV LOWER VASCULAR RIGHT GASTRONEMIUS COMPRESS: NORMAL
BH CV LOWER VASCULAR RIGHT GREATER SAPH AK COMPRESS: NORMAL
BH CV LOWER VASCULAR RIGHT GREATER SAPH BK COMPRESS: NORMAL
BH CV LOWER VASCULAR RIGHT MID FEMORAL AUGMENT: NORMAL
BH CV LOWER VASCULAR RIGHT MID FEMORAL COMPETENT: NORMAL
BH CV LOWER VASCULAR RIGHT MID FEMORAL COMPRESS: NORMAL
BH CV LOWER VASCULAR RIGHT MID FEMORAL PHASIC: NORMAL
BH CV LOWER VASCULAR RIGHT MID FEMORAL SPONT: NORMAL
BH CV LOWER VASCULAR RIGHT PERONEAL COMPRESS: NORMAL
BH CV LOWER VASCULAR RIGHT POPLITEAL AUGMENT: NORMAL
BH CV LOWER VASCULAR RIGHT POPLITEAL COMPETENT: NORMAL
BH CV LOWER VASCULAR RIGHT POPLITEAL COMPRESS: NORMAL
BH CV LOWER VASCULAR RIGHT POPLITEAL PHASIC: NORMAL
BH CV LOWER VASCULAR RIGHT POPLITEAL SPONT: NORMAL
BH CV LOWER VASCULAR RIGHT POSTERIOR TIBIAL COMPRESS: NORMAL
BH CV LOWER VASCULAR RIGHT PROXIMAL FEMORAL COMPRESS: NORMAL
BH CV LOWER VASCULAR RIGHT SAPHENOFEMORAL JUNCTION AUGMENT: NORMAL
BH CV LOWER VASCULAR RIGHT SAPHENOFEMORAL JUNCTION COMPETENT: NORMAL
BH CV LOWER VASCULAR RIGHT SAPHENOFEMORAL JUNCTION COMPRESS: NORMAL
BH CV LOWER VASCULAR RIGHT SAPHENOFEMORAL JUNCTION PHASIC: NORMAL
BH CV LOWER VASCULAR RIGHT SAPHENOFEMORAL JUNCTION SPONT: NORMAL
CREAT BLDA-MCNC: 0.8 MG/DL (ref 0.6–1.3)

## 2019-04-19 PROCEDURE — 82565 ASSAY OF CREATININE: CPT

## 2019-04-19 PROCEDURE — 93970 EXTREMITY STUDY: CPT

## 2019-04-19 PROCEDURE — 71275 CT ANGIOGRAPHY CHEST: CPT

## 2019-04-19 PROCEDURE — 0 IOPAMIDOL PER 1 ML: Performed by: INTERNAL MEDICINE

## 2019-04-19 RX ADMIN — IOPAMIDOL 95 ML: 755 INJECTION, SOLUTION INTRAVENOUS at 12:21

## 2019-04-24 ENCOUNTER — HOSPITAL ENCOUNTER (OUTPATIENT)
Dept: SPEECH THERAPY | Facility: HOSPITAL | Age: 79
Setting detail: THERAPIES SERIES
Discharge: HOME | End: 2019-04-24

## 2019-04-24 DIAGNOSIS — R47.01 APHASIA: ICD-10-CM

## 2019-04-24 DIAGNOSIS — I63.9 ACUTE CVA (CEREBROVASCULAR ACCIDENT) (HCC): Primary | ICD-10-CM

## 2019-04-24 DIAGNOSIS — R41.841 COGNITIVE COMMUNICATION DEFICIT: ICD-10-CM

## 2019-04-24 PROCEDURE — 92507 TX SP LANG VOICE COMM INDIV: CPT

## 2019-04-24 NOTE — THERAPY TREATMENT NOTE
Outpatient Speech Language Pathology   Adult Speech Language Cognitive Treatment Note  [facility]     Patient Name: Ivet Diaz  : 1940  MRN: [id number]  Today's Date: 2019         Visit Date: 2019   Patient Active Problem List   Diagnosis   • Acute CVA (cerebrovascular accident) (CMS/HCC)          Visit Dx:    ICD-10-CM ICD-9-CM   1. Acute CVA (cerebrovascular accident) (CMS/HCC) I63.9 434.91   2. Aphasia R47.01 784.3   3. Cognitive communication deficit R41.841 799.52                         SLP OP Goals     Row Name 19 0900          Goal Type Needed    Goal Type Needed  Memory;Auditory Comprehension;Probelm Solving  (Pended)   -BL        Subjective Comments    Subjective Comments  Pt was pleasant and cooperative during the session. She demonstrated good effort on all tasks. SLP completed CLQT and performed diagnostic assessment.   (Pended)   -BL        Subjective Pain    Able to rate subjective pain?  yes  (Pended)   -BL     Pre-Treatment Pain Level  0  (Pended)   -BL     Post-Treatment Pain Level  0  (Pended)   -BL        Written Language Comprehension Goals    Patient will improve comprehension of written language skills by following two-step written directions  90%:;without cues  (Pended)   -BL     Status: Patient will improve comprehension of written language skills by following two-step written directions  --  (Pended)  Ongoing  -BL     Comments: Patient will improve comprehension of written language skills by following two-step written directions  70% accuracy without cues following 2 step written directives   (Pended)   -BL        Verbal Expression Goals    Patient will improve verbal expressive language skills by completing divergent naming tasks  90%:;without cues  (Pended)   -BL     Status: Patient will improve verbal expressive language skills by completing divergent naming tasks  --  (Pended)  Ongoing  -BL     Comments: Patient will improve verbal expressive language  skills by completing divergent naming tasks  Avg. 5 items named per category without cues. Pt often named items that were previously repeated.   (Pended)   -BL        Auditory Comprehension Goals    Auditory Comprehension LTG's  Patient will comprehend abstract and complex information presented in all social, avocational, or work settings  (Pended)   -BL     Patient will comprehend abstract and complex information presented in all social, avocational, or work settings  90%:;without cues  (Pended)   -BL     Status: Patient will comprehend abstract and complex information presented in all social, avocational, or work settings  New  (Pended)   -BL     Auditory Comprehension STG's  Patient will improve comprehension of spoken language by following 2 step directions  (Pended)   -BL     Patient will improve comprehension of spoken language by following 2 step directions  90%:;without cues  (Pended)   -BL     Status: Patient will improve comprehension of spoken language by following 2 step directions  New  (Pended)   -BL     Comments: Patient will improve comprehension of spoken language by following 2 step directions  Completed diagnostic assessment, 70% accuracy without cues following 2-step verbal directives.  (Pended)   -BL        Memory Goals    Memory LTG's  Patient will be able to remember  information needed to participate in activities of daily living  (Pended)   -BL     Patient will be able to remember  information needed to participate in activities of daily living  Independently  (Pended)   -BL     Status: Patient will be able to remember  information needed to participate in activities of daily living  New  (Pended)   -BL     Memory STG's  Patient will demonstrate improved ability to recall information by immediately recalling a series of words;Patient’s memory skills will be enhanced as reported by patient by utilizing internal memory strategies to recall up to 3 pieces of information after a 5- minute delay   (Pended)   -BL     Patient will demonstrate improved ability to recall information by immediately recalling a series of words  90%:;without cues  (Pended)   -BL     Status: Patient will demonstrate improved ability to recall information by immediately recalling a series of words  New  (Pended)   -BL     Comments: Patient will demonstrate improved ability to recall information by immediately recalling a series of words  Word list retention task, 50% accuracy without cues.   (Pended)   -BL     Patient’s memory skills will be enhanced as reported by patient by utilizing internal memory strategies to recall up to 3 pieces of information after a 5- minute delay  90%:;without cues  (Pended)   -BL     Status: Patient’s memory skills will be enhanced as reported by patient by utilizing internal memory strategies to recall up to 3 pieces of information after a 5- minute delay  New  (Pended)   -BL     Comments: Patient’s memory skills will be enhanced as reported by patient by utilizing internal memory strategies to recall up to 3 pieces of information after a 5- minute delay  Diagnostic assessment completed over immediate and delayed recall. 50% accuracy without cues immediate recall of doctors' appointments. 10% accuracy without cues delayed recall (5 minutes) of doctors' appointments.   (Pended)   -BL        Problem Solving Goals    Problem Solving LTG's  Patient will be able to engage in avocational activities requiring high level cognitive skills  (Pended)   -BL     Patient will be able to engage in avocational activities requiring high level cognitive skills  Independently  (Pended)   -BL     Status: Patient will be able to engage in avocational activities requiring high level cognitive skills  New  (Pended)   -BL     Problem Solving STG's  Patient will improve ability to analyze problems and determine solutions by correctly sequencing __ steps to complete an activity  (Pended)   -BL     Patient will improve ability to  analyze problems and determine solutions by correctly sequencing __ steps to complete an activity  90%:;without cues  (Pended)   -BL     Status: Patient will improve ability to analyze problems and determine solutions by correctly sequencing __ steps to complete an activity  New  (Pended)   -BL     Comments: Patient will improve ability to analyze problems and determine solutions by correctly sequencing __ steps to complete an activity  Diagnostic assessment completed for sequencing steps for a simple task, 80% accuracy without cues.   (Pended)   -BL       User Key  (r) = Recorded By, (t) = Taken By, (c) = Cosigned By    Initials Name Provider Type    BL Soo Beckett Speech Therapy Student Speech and Language Pathologist        CLQT scores:   Attention: 90 (Moderately impaired)  Memory: 125 (Mildly impaired)  Executive function: 15 (Mildly impaired)  Language: 28 (WNL)  Visuospatial skills: 42 (Mildly impaired)   Clock drawin (Moderately impaired)     Composite severity rating: 3.0 (Mildly impaired)                Time Calculation:   SLP Start Time: (P) 0900  SLP Stop Time: (P) 1000  SLP Time Calculation (min): (P) 60 min    Therapy Charges for Today     Code Description Service Date Service Provider Modifiers Qty    [id number]  ST TREATMENT SPEECH 4 2019 Soo Beckett Speech Therapy Student GN 1                   Soo Beckett Speech Therapy Student  2019

## 2019-04-25 ENCOUNTER — HOSPITAL ENCOUNTER (OUTPATIENT)
Facility: HOSPITAL | Age: 79
Discharge: HOME | End: 2019-04-27
Attending: EMERGENCY MEDICINE | Admitting: HOSPITALIST

## 2019-04-25 DIAGNOSIS — D64.9 FATIGUE ASSOCIATED WITH ANEMIA: ICD-10-CM

## 2019-04-25 DIAGNOSIS — D64.9 SYMPTOMATIC ANEMIA: Primary | ICD-10-CM

## 2019-04-25 DIAGNOSIS — K92.2 LOWER GI BLEED: ICD-10-CM

## 2019-04-25 DIAGNOSIS — I10 HYPERTENSION, UNSPECIFIED TYPE: ICD-10-CM

## 2019-04-25 PROBLEM — C50.919 BREAST CANCER (HCC): Status: ACTIVE | Noted: 2019-04-25

## 2019-04-25 PROBLEM — F32.A DEPRESSION: Status: ACTIVE | Noted: 2019-04-25

## 2019-04-25 PROBLEM — K21.00 REFLUX ESOPHAGITIS: Status: ACTIVE | Noted: 2019-04-25

## 2019-04-25 LAB
ABO GROUP BLD: NORMAL
ALBUMIN SERPL-MCNC: 3.7 G/DL (ref 3.5–5.2)
ALBUMIN/GLOB SERPL: 1.3 G/DL
ALP SERPL-CCNC: 165 U/L (ref 39–117)
ALT SERPL W P-5'-P-CCNC: 43 U/L (ref 1–33)
ANION GAP SERPL CALCULATED.3IONS-SCNC: 8.7 MMOL/L
AST SERPL-CCNC: 42 U/L (ref 1–32)
BACTERIA UR QL AUTO: ABNORMAL /HPF
BASOPHILS # BLD AUTO: 0.04 10*3/MM3 (ref 0–0.2)
BASOPHILS NFR BLD AUTO: 0.7 % (ref 0–1.5)
BILIRUB SERPL-MCNC: 0.5 MG/DL (ref 0.2–1.2)
BILIRUB UR QL STRIP: NEGATIVE
BLD GP AB SCN SERPL QL: NEGATIVE
BUN BLD-MCNC: 14 MG/DL (ref 8–23)
BUN/CREAT SERPL: 13 (ref 7–25)
CALCIUM SPEC-SCNC: 9.3 MG/DL (ref 8.6–10.5)
CHLORIDE SERPL-SCNC: 103 MMOL/L (ref 98–107)
CLARITY UR: CLEAR
CO2 SERPL-SCNC: 26.3 MMOL/L (ref 22–29)
COLOR UR: YELLOW
CREAT BLD-MCNC: 1.08 MG/DL (ref 0.57–1)
DEPRECATED RDW RBC AUTO: 46.7 FL (ref 37–54)
EOSINOPHIL # BLD AUTO: 0.24 10*3/MM3 (ref 0–0.4)
EOSINOPHIL NFR BLD AUTO: 4.2 % (ref 0.3–6.2)
ERYTHROCYTE [DISTWIDTH] IN BLOOD BY AUTOMATED COUNT: 16.5 % (ref 12.3–15.4)
GFR SERPL CREATININE-BSD FRML MDRD: 49 ML/MIN/1.73
GLOBULIN UR ELPH-MCNC: 2.8 GM/DL
GLUCOSE BLD-MCNC: 146 MG/DL (ref 65–99)
GLUCOSE UR STRIP-MCNC: NEGATIVE MG/DL
HCT VFR BLD AUTO: 25.9 % (ref 34–46.6)
HEMOCCULT STL QL: NEGATIVE
HGB BLD-MCNC: 7.2 G/DL (ref 12–15.9)
HGB UR QL STRIP.AUTO: ABNORMAL
HOLD SPECIMEN: NORMAL
HOLD SPECIMEN: NORMAL
HYALINE CASTS UR QL AUTO: ABNORMAL /LPF
IMM GRANULOCYTES # BLD AUTO: 0.04 10*3/MM3 (ref 0–0.05)
IMM GRANULOCYTES NFR BLD AUTO: 0.7 % (ref 0–0.5)
KETONES UR QL STRIP: NEGATIVE
LEUKOCYTE ESTERASE UR QL STRIP.AUTO: ABNORMAL
LIPASE SERPL-CCNC: 20 U/L (ref 13–60)
LYMPHOCYTES # BLD AUTO: 0.95 10*3/MM3 (ref 0.7–3.1)
LYMPHOCYTES NFR BLD AUTO: 16.5 % (ref 19.6–45.3)
MAGNESIUM SERPL-MCNC: 2 MG/DL (ref 1.6–2.4)
MCH RBC QN AUTO: 21.6 PG (ref 26.6–33)
MCHC RBC AUTO-ENTMCNC: 27.8 G/DL (ref 31.5–35.7)
MCV RBC AUTO: 77.5 FL (ref 79–97)
MONOCYTES # BLD AUTO: 0.41 10*3/MM3 (ref 0.1–0.9)
MONOCYTES NFR BLD AUTO: 7.1 % (ref 5–12)
NEUTROPHILS # BLD AUTO: 4.08 10*3/MM3 (ref 1.7–7)
NEUTROPHILS NFR BLD AUTO: 70.8 % (ref 42.7–76)
NITRITE UR QL STRIP: NEGATIVE
NRBC BLD AUTO-RTO: 0.2 /100 WBC (ref 0–0.2)
NT-PROBNP SERPL-MCNC: 1364 PG/ML (ref 5–1800)
PH UR STRIP.AUTO: 6.5 [PH] (ref 5–8)
PLATELET # BLD AUTO: 260 10*3/MM3 (ref 140–450)
PMV BLD AUTO: 10.4 FL (ref 6–12)
POTASSIUM BLD-SCNC: 3.4 MMOL/L (ref 3.5–5.2)
PROT SERPL-MCNC: 6.5 G/DL (ref 6–8.5)
PROT UR QL STRIP: ABNORMAL
RBC # BLD AUTO: 3.34 10*6/MM3 (ref 3.77–5.28)
RBC # UR: ABNORMAL /HPF
REF LAB TEST METHOD: ABNORMAL
RH BLD: POSITIVE
SODIUM BLD-SCNC: 138 MMOL/L (ref 136–145)
SP GR UR STRIP: 1.02 (ref 1–1.03)
SQUAMOUS #/AREA URNS HPF: ABNORMAL /HPF
T&S EXPIRATION DATE: NORMAL
TROPONIN T SERPL-MCNC: <0.01 NG/ML (ref 0–0.03)
UROBILINOGEN UR QL STRIP: ABNORMAL
WBC NRBC COR # BLD: 5.76 10*3/MM3 (ref 3.4–10.8)
WBC UR QL AUTO: ABNORMAL /HPF
WHOLE BLOOD HOLD SPECIMEN: NORMAL
WHOLE BLOOD HOLD SPECIMEN: NORMAL

## 2019-04-25 PROCEDURE — 83735 ASSAY OF MAGNESIUM: CPT | Performed by: EMERGENCY MEDICINE

## 2019-04-25 PROCEDURE — 86900 BLOOD TYPING SEROLOGIC ABO: CPT | Performed by: EMERGENCY MEDICINE

## 2019-04-25 PROCEDURE — 80053 COMPREHEN METABOLIC PANEL: CPT | Performed by: EMERGENCY MEDICINE

## 2019-04-25 PROCEDURE — 86900 BLOOD TYPING SEROLOGIC ABO: CPT

## 2019-04-25 PROCEDURE — 99284 EMERGENCY DEPT VISIT MOD MDM: CPT

## 2019-04-25 PROCEDURE — G0378 HOSPITAL OBSERVATION PER HR: HCPCS

## 2019-04-25 PROCEDURE — 96375 TX/PRO/DX INJ NEW DRUG ADDON: CPT

## 2019-04-25 PROCEDURE — 81001 URINALYSIS AUTO W/SCOPE: CPT | Performed by: EMERGENCY MEDICINE

## 2019-04-25 PROCEDURE — 84484 ASSAY OF TROPONIN QUANT: CPT | Performed by: EMERGENCY MEDICINE

## 2019-04-25 PROCEDURE — 83880 ASSAY OF NATRIURETIC PEPTIDE: CPT | Performed by: EMERGENCY MEDICINE

## 2019-04-25 PROCEDURE — 85025 COMPLETE CBC W/AUTO DIFF WBC: CPT | Performed by: EMERGENCY MEDICINE

## 2019-04-25 PROCEDURE — 86901 BLOOD TYPING SEROLOGIC RH(D): CPT | Performed by: EMERGENCY MEDICINE

## 2019-04-25 PROCEDURE — 86850 RBC ANTIBODY SCREEN: CPT | Performed by: EMERGENCY MEDICINE

## 2019-04-25 PROCEDURE — 83690 ASSAY OF LIPASE: CPT | Performed by: EMERGENCY MEDICINE

## 2019-04-25 PROCEDURE — 36430 TRANSFUSION BLD/BLD COMPNT: CPT

## 2019-04-25 PROCEDURE — 96374 THER/PROPH/DIAG INJ IV PUSH: CPT

## 2019-04-25 PROCEDURE — 25010000002 HYDRALAZINE PER 20 MG: Performed by: INTERNAL MEDICINE

## 2019-04-25 PROCEDURE — 86923 COMPATIBILITY TEST ELECTRIC: CPT

## 2019-04-25 PROCEDURE — 82272 OCCULT BLD FECES 1-3 TESTS: CPT | Performed by: EMERGENCY MEDICINE

## 2019-04-25 PROCEDURE — P9016 RBC LEUKOCYTES REDUCED: HCPCS

## 2019-04-25 RX ORDER — HYDRALAZINE HYDROCHLORIDE 20 MG/ML
40 INJECTION INTRAMUSCULAR; INTRAVENOUS EVERY 6 HOURS PRN
Status: DISCONTINUED | OUTPATIENT
Start: 2019-04-25 | End: 2019-04-27 | Stop reason: HOSPADM

## 2019-04-25 RX ORDER — ATORVASTATIN CALCIUM 80 MG/1
80 TABLET, FILM COATED ORAL NIGHTLY
Status: DISCONTINUED | OUTPATIENT
Start: 2019-04-25 | End: 2019-04-27 | Stop reason: HOSPADM

## 2019-04-25 RX ORDER — PANTOPRAZOLE SODIUM 40 MG/10ML
80 INJECTION, POWDER, LYOPHILIZED, FOR SOLUTION INTRAVENOUS ONCE
Status: COMPLETED | OUTPATIENT
Start: 2019-04-25 | End: 2019-04-25

## 2019-04-25 RX ORDER — SODIUM CHLORIDE 0.9 % (FLUSH) 0.9 %
3 SYRINGE (ML) INJECTION EVERY 12 HOURS SCHEDULED
Status: DISCONTINUED | OUTPATIENT
Start: 2019-04-25 | End: 2019-04-27 | Stop reason: HOSPADM

## 2019-04-25 RX ORDER — METOPROLOL TARTRATE 50 MG/1
50 TABLET, FILM COATED ORAL EVERY 12 HOURS SCHEDULED
Status: DISCONTINUED | OUTPATIENT
Start: 2019-04-25 | End: 2019-04-27 | Stop reason: HOSPADM

## 2019-04-25 RX ORDER — ONDANSETRON 2 MG/ML
4 INJECTION INTRAMUSCULAR; INTRAVENOUS EVERY 6 HOURS PRN
Status: DISCONTINUED | OUTPATIENT
Start: 2019-04-25 | End: 2019-04-27 | Stop reason: HOSPADM

## 2019-04-25 RX ORDER — NITROGLYCERIN 0.4 MG/1
0.4 TABLET SUBLINGUAL
Status: DISCONTINUED | OUTPATIENT
Start: 2019-04-25 | End: 2019-04-27 | Stop reason: HOSPADM

## 2019-04-25 RX ORDER — ACETAMINOPHEN 325 MG/1
650 TABLET ORAL EVERY 4 HOURS PRN
Status: DISCONTINUED | OUTPATIENT
Start: 2019-04-25 | End: 2019-04-27 | Stop reason: HOSPADM

## 2019-04-25 RX ORDER — CHOLECALCIFEROL (VITAMIN D3) 125 MCG
1000 CAPSULE ORAL DAILY
Status: DISCONTINUED | OUTPATIENT
Start: 2019-04-25 | End: 2019-04-27 | Stop reason: HOSPADM

## 2019-04-25 RX ORDER — SODIUM CHLORIDE 0.9 % (FLUSH) 0.9 %
3-10 SYRINGE (ML) INJECTION AS NEEDED
Status: DISCONTINUED | OUTPATIENT
Start: 2019-04-25 | End: 2019-04-27 | Stop reason: HOSPADM

## 2019-04-25 RX ORDER — PANTOPRAZOLE SODIUM 40 MG/1
40 TABLET, DELAYED RELEASE ORAL
Status: DISCONTINUED | OUTPATIENT
Start: 2019-04-26 | End: 2019-04-27 | Stop reason: HOSPADM

## 2019-04-25 RX ADMIN — METOPROLOL TARTRATE 50 MG: 50 TABLET, FILM COATED ORAL at 23:54

## 2019-04-25 RX ADMIN — Medication 1000 MCG: at 23:37

## 2019-04-25 RX ADMIN — ATORVASTATIN CALCIUM 80 MG: 80 TABLET, FILM COATED ORAL at 23:36

## 2019-04-25 RX ADMIN — SODIUM CHLORIDE, PRESERVATIVE FREE 3 ML: 5 INJECTION INTRAVENOUS at 21:30

## 2019-04-25 RX ADMIN — SERTRALINE 50 MG: 50 TABLET, FILM COATED ORAL at 23:36

## 2019-04-25 RX ADMIN — HYDRALAZINE HYDROCHLORIDE 40 MG: 20 INJECTION INTRAMUSCULAR; INTRAVENOUS at 19:53

## 2019-04-25 RX ADMIN — PANTOPRAZOLE SODIUM 80 MG: 40 INJECTION, POWDER, FOR SOLUTION INTRAVENOUS at 15:34

## 2019-04-26 ENCOUNTER — APPOINTMENT (OUTPATIENT)
Dept: GENERAL RADIOLOGY | Facility: HOSPITAL | Age: 79
End: 2019-04-26

## 2019-04-26 PROBLEM — I50.33 ACUTE ON CHRONIC DIASTOLIC HEART FAILURE (HCC): Status: ACTIVE | Noted: 2019-04-26

## 2019-04-26 PROBLEM — D68.32 HEMORRHAGIC DISORDER DUE TO EXTRINSIC CIRCULATING ANTICOAGULANTS: Status: ACTIVE | Noted: 2019-04-26

## 2019-04-26 LAB
ABO + RH BLD: NORMAL
ABO + RH BLD: NORMAL
ALBUMIN SERPL-MCNC: 4.1 G/DL (ref 3.5–5.2)
ALBUMIN/GLOB SERPL: 1.3 G/DL
ALP SERPL-CCNC: 167 U/L (ref 39–117)
ALT SERPL W P-5'-P-CCNC: 40 U/L (ref 1–33)
ANION GAP SERPL CALCULATED.3IONS-SCNC: 12.2 MMOL/L
AST SERPL-CCNC: 34 U/L (ref 1–32)
BASOPHILS # BLD AUTO: 0.04 10*3/MM3 (ref 0–0.2)
BASOPHILS NFR BLD AUTO: 0.5 % (ref 0–1.5)
BH BB BLOOD EXPIRATION DATE: NORMAL
BH BB BLOOD EXPIRATION DATE: NORMAL
BH BB BLOOD TYPE BARCODE: 5100
BH BB BLOOD TYPE BARCODE: 5100
BH BB DISPENSE STATUS: NORMAL
BH BB DISPENSE STATUS: NORMAL
BH BB PRODUCT CODE: NORMAL
BH BB PRODUCT CODE: NORMAL
BH BB UNIT NUMBER: NORMAL
BH BB UNIT NUMBER: NORMAL
BILIRUB SERPL-MCNC: 1.2 MG/DL (ref 0.2–1.2)
BUN BLD-MCNC: 11 MG/DL (ref 8–23)
BUN/CREAT SERPL: 13.9 (ref 7–25)
CALCIUM SPEC-SCNC: 9.5 MG/DL (ref 8.6–10.5)
CHLORIDE SERPL-SCNC: 102 MMOL/L (ref 98–107)
CO2 SERPL-SCNC: 23.8 MMOL/L (ref 22–29)
CREAT BLD-MCNC: 0.79 MG/DL (ref 0.57–1)
DEPRECATED RDW RBC AUTO: 47.5 FL (ref 37–54)
EOSINOPHIL # BLD AUTO: 0 10*3/MM3 (ref 0–0.4)
EOSINOPHIL NFR BLD AUTO: 0 % (ref 0.3–6.2)
ERYTHROCYTE [DISTWIDTH] IN BLOOD BY AUTOMATED COUNT: 16.7 % (ref 12.3–15.4)
FERRITIN SERPL-MCNC: 18.5 NG/ML (ref 13–150)
FOLATE SERPL-MCNC: 9.64 NG/ML (ref 4.78–24.2)
GFR SERPL CREATININE-BSD FRML MDRD: 71 ML/MIN/1.73
GLOBULIN UR ELPH-MCNC: 3.2 GM/DL
GLUCOSE BLD-MCNC: 148 MG/DL (ref 65–99)
HCT VFR BLD AUTO: 36.8 % (ref 34–46.6)
HCT VFR BLD AUTO: 37.1 % (ref 34–46.6)
HGB BLD-MCNC: 10.8 G/DL (ref 12–15.9)
HGB BLD-MCNC: 11.1 G/DL (ref 12–15.9)
IRON 24H UR-MRATE: 30 MCG/DL (ref 37–145)
IRON SATN MFR SERPL: 6 % (ref 20–50)
LYMPHOCYTES # BLD AUTO: 0.79 10*3/MM3 (ref 0.7–3.1)
LYMPHOCYTES NFR BLD AUTO: 9.5 % (ref 19.6–45.3)
MCH RBC QN AUTO: 23.4 PG (ref 26.6–33)
MCHC RBC AUTO-ENTMCNC: 29.9 G/DL (ref 31.5–35.7)
MCV RBC AUTO: 78.1 FL (ref 79–97)
MONOCYTES # BLD AUTO: 0.39 10*3/MM3 (ref 0.1–0.9)
MONOCYTES NFR BLD AUTO: 4.7 % (ref 5–12)
NEUTROPHILS # BLD AUTO: 6.98 10*3/MM3 (ref 1.7–7)
NEUTROPHILS NFR BLD AUTO: 84.3 % (ref 42.7–76)
PLATELET # BLD AUTO: 279 10*3/MM3 (ref 140–450)
PMV BLD AUTO: 10.6 FL (ref 6–12)
POTASSIUM BLD-SCNC: 4 MMOL/L (ref 3.5–5.2)
PROT SERPL-MCNC: 7.3 G/DL (ref 6–8.5)
RBC # BLD AUTO: 4.75 10*6/MM3 (ref 3.77–5.28)
RETICS # AUTO: 0.09 10*6/MM3 (ref 0.02–0.13)
RETICS/RBC NFR AUTO: 1.9 % (ref 0.7–1.9)
SODIUM BLD-SCNC: 138 MMOL/L (ref 136–145)
TIBC SERPL-MCNC: 522 MCG/DL (ref 298–536)
TRANSFERRIN SERPL-MCNC: 350 MG/DL (ref 200–360)
UNIT  ABO: NORMAL
UNIT  ABO: NORMAL
UNIT  RH: NORMAL
UNIT  RH: NORMAL
VIT B12 BLD-MCNC: 1532 PG/ML (ref 211–946)
WBC NRBC COR # BLD: 8.28 10*3/MM3 (ref 3.4–10.8)

## 2019-04-26 PROCEDURE — 83540 ASSAY OF IRON: CPT | Performed by: INTERNAL MEDICINE

## 2019-04-26 PROCEDURE — 84466 ASSAY OF TRANSFERRIN: CPT | Performed by: INTERNAL MEDICINE

## 2019-04-26 PROCEDURE — 99204 OFFICE O/P NEW MOD 45 MIN: CPT | Performed by: INTERNAL MEDICINE

## 2019-04-26 PROCEDURE — 82746 ASSAY OF FOLIC ACID SERUM: CPT | Performed by: INTERNAL MEDICINE

## 2019-04-26 PROCEDURE — 85018 HEMOGLOBIN: CPT | Performed by: INTERNAL MEDICINE

## 2019-04-26 PROCEDURE — 71045 X-RAY EXAM CHEST 1 VIEW: CPT

## 2019-04-26 PROCEDURE — 82607 VITAMIN B-12: CPT | Performed by: INTERNAL MEDICINE

## 2019-04-26 PROCEDURE — 85025 COMPLETE CBC W/AUTO DIFF WBC: CPT | Performed by: INTERNAL MEDICINE

## 2019-04-26 PROCEDURE — 85045 AUTOMATED RETICULOCYTE COUNT: CPT | Performed by: INTERNAL MEDICINE

## 2019-04-26 PROCEDURE — 25010000002 FUROSEMIDE PER 20 MG: Performed by: HOSPITALIST

## 2019-04-26 PROCEDURE — 96375 TX/PRO/DX INJ NEW DRUG ADDON: CPT

## 2019-04-26 PROCEDURE — G0378 HOSPITAL OBSERVATION PER HR: HCPCS

## 2019-04-26 PROCEDURE — 82728 ASSAY OF FERRITIN: CPT | Performed by: INTERNAL MEDICINE

## 2019-04-26 PROCEDURE — 80053 COMPREHEN METABOLIC PANEL: CPT | Performed by: INTERNAL MEDICINE

## 2019-04-26 PROCEDURE — 85014 HEMATOCRIT: CPT | Performed by: INTERNAL MEDICINE

## 2019-04-26 RX ORDER — POLYETHYLENE GLYCOL 3350, SODIUM CHLORIDE, POTASSIUM CHLORIDE, SODIUM BICARBONATE, AND SODIUM SULFATE 240; 5.84; 2.98; 6.72; 22.72 G/4L; G/4L; G/4L; G/4L; G/4L
2000 POWDER, FOR SOLUTION ORAL 2 TIMES DAILY
Status: COMPLETED | OUTPATIENT
Start: 2019-04-26 | End: 2019-04-27

## 2019-04-26 RX ORDER — HYDROXYZINE PAMOATE 25 MG/1
25 CAPSULE ORAL ONCE
Status: COMPLETED | OUTPATIENT
Start: 2019-04-26 | End: 2019-04-26

## 2019-04-26 RX ORDER — POTASSIUM CHLORIDE 750 MG/1
40 CAPSULE, EXTENDED RELEASE ORAL ONCE
Status: COMPLETED | OUTPATIENT
Start: 2019-04-26 | End: 2019-04-26

## 2019-04-26 RX ORDER — FUROSEMIDE 10 MG/ML
40 INJECTION INTRAMUSCULAR; INTRAVENOUS ONCE
Status: COMPLETED | OUTPATIENT
Start: 2019-04-26 | End: 2019-04-26

## 2019-04-26 RX ADMIN — METOPROLOL TARTRATE 50 MG: 50 TABLET, FILM COATED ORAL at 20:17

## 2019-04-26 RX ADMIN — Medication 1000 MCG: at 08:49

## 2019-04-26 RX ADMIN — SODIUM CHLORIDE, PRESERVATIVE FREE 3 ML: 5 INJECTION INTRAVENOUS at 08:49

## 2019-04-26 RX ADMIN — ATORVASTATIN CALCIUM 80 MG: 80 TABLET, FILM COATED ORAL at 20:17

## 2019-04-26 RX ADMIN — HYDROXYZINE PAMOATE 25 MG: 25 CAPSULE ORAL at 02:04

## 2019-04-26 RX ADMIN — SERTRALINE 50 MG: 50 TABLET, FILM COATED ORAL at 08:49

## 2019-04-26 RX ADMIN — METOPROLOL TARTRATE 50 MG: 50 TABLET, FILM COATED ORAL at 08:49

## 2019-04-26 RX ADMIN — FUROSEMIDE 40 MG: 10 INJECTION, SOLUTION INTRAMUSCULAR; INTRAVENOUS at 17:50

## 2019-04-26 RX ADMIN — POLYETHYLENE GLYCOL 3350, SODIUM CHLORIDE, POTASSIUM CHLORIDE, SODIUM BICARBONATE, AND SODIUM SULFATE 2000 ML: 240; 5.84; 2.98; 6.72; 22.72 POWDER, FOR SOLUTION ORAL at 18:22

## 2019-04-26 RX ADMIN — SODIUM CHLORIDE, PRESERVATIVE FREE 3 ML: 5 INJECTION INTRAVENOUS at 20:17

## 2019-04-26 RX ADMIN — PANTOPRAZOLE SODIUM 40 MG: 40 TABLET, DELAYED RELEASE ORAL at 17:50

## 2019-04-26 RX ADMIN — PANTOPRAZOLE SODIUM 40 MG: 40 TABLET, DELAYED RELEASE ORAL at 06:46

## 2019-04-26 RX ADMIN — POTASSIUM CHLORIDE 40 MEQ: 750 CAPSULE, EXTENDED RELEASE ORAL at 02:04

## 2019-04-27 ENCOUNTER — ANESTHESIA (OUTPATIENT)
Dept: GASTROENTEROLOGY | Facility: HOSPITAL | Age: 79
End: 2019-04-27

## 2019-04-27 ENCOUNTER — APPOINTMENT (OUTPATIENT)
Dept: GENERAL RADIOLOGY | Facility: HOSPITAL | Age: 79
End: 2019-04-27

## 2019-04-27 ENCOUNTER — ANESTHESIA EVENT (OUTPATIENT)
Dept: GASTROENTEROLOGY | Facility: HOSPITAL | Age: 79
End: 2019-04-27

## 2019-04-27 VITALS
RESPIRATION RATE: 16 BRPM | OXYGEN SATURATION: 96 % | TEMPERATURE: 98.2 F | HEIGHT: 64 IN | DIASTOLIC BLOOD PRESSURE: 59 MMHG | HEART RATE: 60 BPM | SYSTOLIC BLOOD PRESSURE: 166 MMHG | WEIGHT: 156.5 LBS | BODY MASS INDEX: 26.72 KG/M2

## 2019-04-27 LAB
ALBUMIN SERPL-MCNC: 4.1 G/DL (ref 3.5–5.2)
ANION GAP SERPL CALCULATED.3IONS-SCNC: 13.1 MMOL/L
BUN BLD-MCNC: 11 MG/DL (ref 8–23)
BUN/CREAT SERPL: 10 (ref 7–25)
CALCIUM SPEC-SCNC: 9.8 MG/DL (ref 8.6–10.5)
CHLORIDE SERPL-SCNC: 100 MMOL/L (ref 98–107)
CO2 SERPL-SCNC: 28.9 MMOL/L (ref 22–29)
CREAT BLD-MCNC: 1.1 MG/DL (ref 0.57–1)
DEPRECATED RDW RBC AUTO: 47.8 FL (ref 37–54)
ERYTHROCYTE [DISTWIDTH] IN BLOOD BY AUTOMATED COUNT: 17.2 % (ref 12.3–15.4)
GFR SERPL CREATININE-BSD FRML MDRD: 48 ML/MIN/1.73
GLUCOSE BLD-MCNC: 113 MG/DL (ref 65–99)
HCT VFR BLD AUTO: 37.9 % (ref 34–46.6)
HCT VFR BLD AUTO: 39.1 % (ref 34–46.6)
HGB BLD-MCNC: 10.9 G/DL (ref 12–15.9)
HGB BLD-MCNC: 11.6 G/DL (ref 12–15.9)
MAGNESIUM SERPL-MCNC: 2 MG/DL (ref 1.6–2.4)
MCH RBC QN AUTO: 22.9 PG (ref 26.6–33)
MCHC RBC AUTO-ENTMCNC: 29.7 G/DL (ref 31.5–35.7)
MCV RBC AUTO: 77.1 FL (ref 79–97)
PHOSPHATE SERPL-MCNC: 3.2 MG/DL (ref 2.5–4.5)
PLATELET # BLD AUTO: 323 10*3/MM3 (ref 140–450)
PMV BLD AUTO: 10.5 FL (ref 6–12)
POTASSIUM BLD-SCNC: 3.5 MMOL/L (ref 3.5–5.2)
RBC # BLD AUTO: 5.07 10*6/MM3 (ref 3.77–5.28)
SODIUM BLD-SCNC: 142 MMOL/L (ref 136–145)
WBC NRBC COR # BLD: 7.75 10*3/MM3 (ref 3.4–10.8)

## 2019-04-27 PROCEDURE — 63710000001 VITAMIN B-12 500 MCG TABLET: Performed by: INTERNAL MEDICINE

## 2019-04-27 PROCEDURE — G0378 HOSPITAL OBSERVATION PER HR: HCPCS

## 2019-04-27 PROCEDURE — 43239 EGD BIOPSY SINGLE/MULTIPLE: CPT | Performed by: INTERNAL MEDICINE

## 2019-04-27 PROCEDURE — 25010000002 PROPOFOL 10 MG/ML EMULSION: Performed by: ANESTHESIOLOGY

## 2019-04-27 PROCEDURE — A9270 NON-COVERED ITEM OR SERVICE: HCPCS | Performed by: INTERNAL MEDICINE

## 2019-04-27 PROCEDURE — 85027 COMPLETE CBC AUTOMATED: CPT | Performed by: HOSPITALIST

## 2019-04-27 PROCEDURE — 74250 X-RAY XM SM INT 1CNTRST STD: CPT

## 2019-04-27 PROCEDURE — 85014 HEMATOCRIT: CPT | Performed by: INTERNAL MEDICINE

## 2019-04-27 PROCEDURE — 88305 TISSUE EXAM BY PATHOLOGIST: CPT | Performed by: INTERNAL MEDICINE

## 2019-04-27 PROCEDURE — 80069 RENAL FUNCTION PANEL: CPT | Performed by: HOSPITALIST

## 2019-04-27 PROCEDURE — 85018 HEMOGLOBIN: CPT | Performed by: INTERNAL MEDICINE

## 2019-04-27 PROCEDURE — 63710000001 SERTRALINE 50 MG TABLET: Performed by: INTERNAL MEDICINE

## 2019-04-27 PROCEDURE — 45380 COLONOSCOPY AND BIOPSY: CPT | Performed by: INTERNAL MEDICINE

## 2019-04-27 PROCEDURE — 87081 CULTURE SCREEN ONLY: CPT | Performed by: INTERNAL MEDICINE

## 2019-04-27 PROCEDURE — 83735 ASSAY OF MAGNESIUM: CPT | Performed by: HOSPITALIST

## 2019-04-27 RX ORDER — AMLODIPINE BESYLATE 10 MG/1
10 TABLET ORAL DAILY
Qty: 30 TABLET | Refills: 1 | Status: SHIPPED | OUTPATIENT
Start: 2019-04-27

## 2019-04-27 RX ORDER — LIDOCAINE HYDROCHLORIDE 20 MG/ML
INJECTION, SOLUTION INFILTRATION; PERINEURAL AS NEEDED
Status: DISCONTINUED | OUTPATIENT
Start: 2019-04-27 | End: 2019-04-27 | Stop reason: SURG

## 2019-04-27 RX ORDER — PROPOFOL 10 MG/ML
VIAL (ML) INTRAVENOUS AS NEEDED
Status: DISCONTINUED | OUTPATIENT
Start: 2019-04-27 | End: 2019-04-27 | Stop reason: SURG

## 2019-04-27 RX ORDER — PROPOFOL 10 MG/ML
VIAL (ML) INTRAVENOUS CONTINUOUS PRN
Status: DISCONTINUED | OUTPATIENT
Start: 2019-04-27 | End: 2019-04-27 | Stop reason: SURG

## 2019-04-27 RX ORDER — SODIUM CHLORIDE, SODIUM LACTATE, POTASSIUM CHLORIDE, CALCIUM CHLORIDE 600; 310; 30; 20 MG/100ML; MG/100ML; MG/100ML; MG/100ML
INJECTION, SOLUTION INTRAVENOUS CONTINUOUS PRN
Status: DISCONTINUED | OUTPATIENT
Start: 2019-04-27 | End: 2019-04-27 | Stop reason: SURG

## 2019-04-27 RX ADMIN — BARIUM SULFATE 240 ML: 960 POWDER, FOR SUSPENSION ORAL at 13:24

## 2019-04-27 RX ADMIN — LIDOCAINE HYDROCHLORIDE 60 MG: 20 INJECTION, SOLUTION INFILTRATION; PERINEURAL at 11:01

## 2019-04-27 RX ADMIN — SODIUM CHLORIDE, POTASSIUM CHLORIDE, SODIUM LACTATE AND CALCIUM CHLORIDE: 600; 310; 30; 20 INJECTION, SOLUTION INTRAVENOUS at 10:46

## 2019-04-27 RX ADMIN — PROPOFOL 100 MCG/KG/MIN: 10 INJECTION, EMULSION INTRAVENOUS at 11:00

## 2019-04-27 RX ADMIN — Medication 1000 MCG: at 14:12

## 2019-04-27 RX ADMIN — SODIUM CHLORIDE, PRESERVATIVE FREE 3 ML: 5 INJECTION INTRAVENOUS at 08:57

## 2019-04-27 RX ADMIN — POLYETHYLENE GLYCOL 3350, SODIUM CHLORIDE, POTASSIUM CHLORIDE, SODIUM BICARBONATE, AND SODIUM SULFATE 2000 ML: 240; 5.84; 2.98; 6.72; 22.72 POWDER, FOR SOLUTION ORAL at 05:10

## 2019-04-27 RX ADMIN — METOPROLOL TARTRATE 50 MG: 50 TABLET, FILM COATED ORAL at 08:24

## 2019-04-27 RX ADMIN — SERTRALINE 50 MG: 50 TABLET, FILM COATED ORAL at 14:12

## 2019-04-27 RX ADMIN — PROPOFOL 100 MG: 10 INJECTION, EMULSION INTRAVENOUS at 11:01

## 2019-04-27 NOTE — ANESTHESIA POSTPROCEDURE EVALUATION
"Patient: Ivet Diaz    Procedure Summary     Date:  04/27/19 Room / Location:  [facility] ENDOSCOPY 1 /  [facility]    Anesthesia Start:  1057 Anesthesia Stop:  1134    Procedures:       COLONOSCOPY TO CECUM AND TO TI WITH COLD BX POLYPECTOMY (N/A )      ESOPHAGOGASTRODUODENOSCOPY WITH BIOPSIES (N/A Esophagus) Diagnosis:       Symptomatic anemia      (Symptomatic anemia [D64.9])    Surgeon:  Peter Jimenez MD Provider:  Guillermo Addison MD    Anesthesia Type:  MAC ASA Status:  3          Anesthesia Type: MAC  Last vitals  BP   118/69 (04/27/19 1145)   Temp   36.7 °C (98.1 °F) (04/27/19 0802)   Pulse   55 (04/27/19 1145)   Resp   16 (04/27/19 1145)     SpO2   93 % (04/27/19 1156)     Post Anesthesia Care and Evaluation    Patient location during evaluation: PACU  Patient participation: complete - patient participated  Level of consciousness: awake  Pain score: 0  Pain management: adequate  Airway patency: patent  Anesthetic complications: No anesthetic complications  PONV Status: none  Cardiovascular status: acceptable  Respiratory status: acceptable  Hydration status: acceptable    Comments: /69 (BP Location: Right leg, Patient Position: Lying)   Pulse 55   Temp 36.7 °C (98.1 °F) (Oral)   Resp 16   Ht 163.8 cm (64.49\")   Wt 71 kg (156 lb 8 oz)   SpO2 93%   BMI 26.46 kg/m²       "

## 2019-04-27 NOTE — ANESTHESIA PREPROCEDURE EVALUATION
Anesthesia Evaluation     Patient summary reviewed and Nursing notes reviewed                Airway   Mallampati: I  TM distance: >3 FB  Neck ROM: full  No difficulty expected  Dental - normal exam     Pulmonary - negative pulmonary ROS and normal exam   Cardiovascular - normal exam    (+) hypertension,       Neuro/Psych  (+) CVA, psychiatric history,     GI/Hepatic/Renal/Endo - negative ROS     Musculoskeletal (-) negative ROS    Abdominal  - normal exam    Bowel sounds: normal.   Substance History - negative use     OB/GYN negative ob/gyn ROS         Other      history of cancer                    Anesthesia Plan    ASA 3     MAC     Anesthetic plan, all risks, benefits, and alternatives have been provided, discussed and informed consent has been obtained with: patient.

## 2019-04-28 ENCOUNTER — READMISSION MANAGEMENT (OUTPATIENT)
Dept: CALL CENTER | Facility: HOSPITAL | Age: 79
End: 2019-04-28

## 2019-04-28 LAB — UREASE TISS QL: NEGATIVE

## 2019-04-28 NOTE — OUTREACH NOTE
Prep Survey      Responses   Facility patient discharged from?  [place]   Is patient eligible?  Yes   Discharge diagnosis  Symptomatic anemia,    Acute on chronic diastolic heart failure     Does the patient have one of the following disease processes/diagnoses(primary or secondary)?  CHF   Does the patient have Home health ordered?  No   Is there a DME ordered?  No   General alerts for this patient  Recent CVA   Prep survey completed?  Yes          Judith Alanis RN

## 2019-04-29 LAB
CYTO UR: NORMAL
LAB AP CASE REPORT: NORMAL
PATH REPORT.FINAL DX SPEC: NORMAL
PATH REPORT.GROSS SPEC: NORMAL

## 2019-04-30 ENCOUNTER — READMISSION MANAGEMENT (OUTPATIENT)
Dept: CALL CENTER | Facility: HOSPITAL | Age: 79
End: 2019-04-30

## 2019-04-30 NOTE — OUTREACH NOTE
CHF Week 1 Survey      Responses   Facility patient discharged from?  [place]   Does the patient have one of the following disease processes/diagnoses(primary or secondary)?  CHF   Is there a successful TCM telephone encounter documented?  No   CHF Week 1 attempt successful?  No   Unsuccessful attempts  Attempt 1          Lima Lovett, RN

## 2019-05-01 ENCOUNTER — READMISSION MANAGEMENT (OUTPATIENT)
Dept: CALL CENTER | Facility: HOSPITAL | Age: 79
End: 2019-05-01

## 2019-05-01 ENCOUNTER — HOSPITAL ENCOUNTER (OUTPATIENT)
Dept: SPEECH THERAPY | Facility: HOSPITAL | Age: 79
Setting detail: THERAPIES SERIES
Discharge: HOME | End: 2019-05-01

## 2019-05-01 DIAGNOSIS — I63.9 ACUTE CVA (CEREBROVASCULAR ACCIDENT) (HCC): Primary | ICD-10-CM

## 2019-05-01 DIAGNOSIS — R47.01 APHASIA: ICD-10-CM

## 2019-05-01 DIAGNOSIS — R41.841 COGNITIVE COMMUNICATION DEFICIT: ICD-10-CM

## 2019-05-01 PROCEDURE — 92507 TX SP LANG VOICE COMM INDIV: CPT | Performed by: SPEECH-LANGUAGE PATHOLOGIST

## 2019-05-01 NOTE — OUTREACH NOTE
CHF Week 1 Survey      Responses   Facility patient discharged from?  [place]   Does the patient have one of the following disease processes/diagnoses(primary or secondary)?  CHF   Is there a successful TCM telephone encounter documented?  No   CHF Week 1 attempt successful?  Yes   Call start time  1741   Call end time  1744   General alerts for this patient  Recent CVA   Discharge diagnosis  Symptomatic anemia,    Acute on chronic diastolic heart failure     Is patient permission given to speak with other caregiver?  Yes   List who call center can speak with  Dr Diaz /    Person spoke with today (if not patient) and relationship  Spoke with patient and    Meds reviewed with patient/caregiver?  Yes   Is the patient having any side effects they believe may be caused by any medication additions or changes?  No   Does the patient have all medications ordered at discharge?  Yes   Is the patient taking all medications as directed (includes completed medication regime)?  Yes   Does the patient have a primary care provider?   Yes   Does the patient have an appointment with their PCP within 7 days of discharge?  Yes   Comments regarding PCP  She has seen her PCP .    Has the patient kept scheduled appointments due by today?  Yes   What is the Home health agency?   [facility]   Has home health visited the patient within 72 hours of discharge?  Yes   Psychosocial issues?  No   Did the patient receive a copy of their discharge instructions?  Yes   Nursing interventions  Reviewed instructions with patient   What is the patient's perception of their health status since discharge?  Improving   Is the patient weighing daily?  No   Does the patient have scales?  Yes   Daily weight interventions  Education provided on importance of daily weight   Is the patient able to teach back Heart Failure diet management?  Yes   Is the patient able to teach back Heart Failure Zones?  Yes   Is the patient able to teach back signs  and symptoms of worsening condition? (i.e. weight gain, shortness of air, etc.)  Yes   Is the patient/caregiver able to teach back the hierarchy of who to call/visit for symptoms/problems? PCP, Specialist, Home health nurse, Urgent Care, ED, 911  Yes    CHF Week 1 call completed?  Yes          Deb Aldridge RN

## 2019-05-01 NOTE — THERAPY TREATMENT NOTE
Outpatient Speech Language Pathology   Adult Speech Language Cognitive Treatment Note  [facility]     Patient Name: Ivet Diaz  : 1940  MRN: [id number]  Today's Date: 2019         Visit Date: 2019   Patient Active Problem List   Diagnosis   • Acute CVA (cerebrovascular accident) (CMS/HCC)   • Symptomatic anemia   • Hypertension   • Depression   • Reflux esophagitis   • Breast cancer (CMS/HCC)   • Acute on chronic diastolic heart failure (CMS/HCC)   • Hemorrhagic disorder due to extrinsic circulating anticoagulants (CMS/HCC)          Visit Dx:    ICD-10-CM ICD-9-CM   1. Acute CVA (cerebrovascular accident) (CMS/HCC) I63.9 434.91   2. Aphasia R47.01 784.3   3. Cognitive communication deficit R41.841 799.52                         SLP OP Goals     Row Name 19 1200          Subjective Comments    Subjective Comments  Patient is pleasant and cooperative  -KA        Subjective Pain    Able to rate subjective pain?  yes  -KA     Pre-Treatment Pain Level  0  -KA     Post-Treatment Pain Level  0  -KA        Written Language Comprehension Goals    Patient will improve comprehension of written language skills by scanning appropriately (left to right, top to bottom) to complete functional reading task  90%:;without cues  -KA     Status:Patient will improve comprehension of written language skills by scanning appropriately (left to right, top to bottom) to complete functional reading task  Progressing as expected  -KA     Comments: Patient will improve comprehension of written language skills by scanning appropriately (left to right, top to bottom) to complete functional reading task  Patient participated in multiple visuospatial tasks today, visual scanning in the maze drawing lines=80% difficulty determining where to start, symbol matching task on IPAD where pt had to match a target symbol within a large grid=70% without cues, pattern recreation task incorporating visual memory where patient to  recreate order of a pattern within a grid pt unable to complete task without max cues and repeititions 0% without cues and 50% max cues.  -KA        Memory Goals    Patient will demonstrate improved ability to recall information by immediately recalling a series of words  90%:;without cues  -KA     Status: Patient will demonstrate improved ability to recall information by immediately recalling a series of words  -- ongoing  -KA     Comments: Patient will demonstrate improved ability to recall information by immediately recalling a series of words  Immediate recall of pictures in visual memory task picture back memory was pt has to recall previous picture shown 50% without cues  -KA        Problem Solving Goals    Problem Solving STG's  Patient will improve ability to analyze problems and determine solutions by completing functional math problems  -KA     Patient will improve ability to analyze problems and determine solutions by completing functional math problems  90%:;without cues  -KA     Status: Patient will improve ability to analyze problems and determine solutions by completing functional math problems  New  -KA     Comments: Patient will improve ability to analyze problems and determine solutions by completing functional math problems  clock math=60% without cues and determining correct time on a clock=80% increased time at times  -KA       User Key  (r) = Recorded By, (t) = Taken By, (c) = Cosigned By    Initials Name Provider Type    Chetan Nicholas MA,CCC-SLP Speech and Language Pathologist                         Time Calculation:   SLP Start Time: 0900  SLP Stop Time: 1000  SLP Time Calculation (min): 60 min    Therapy Charges for Today     Code Description Service Date Service Provider Modifiers Qty    87108527059  ST TREATMENT SPEECH 4 5/1/2019 Chetan Machuca MA,CCC-SLP GN 1                   Chetan Machuca MA,CCC-SLP  5/1/2019

## 2019-05-07 DIAGNOSIS — D50.0 IRON DEFICIENCY ANEMIA DUE TO CHRONIC BLOOD LOSS: Primary | ICD-10-CM

## 2019-05-07 DIAGNOSIS — D50.0 IRON DEFICIENCY ANEMIA DUE TO CHRONIC BLOOD LOSS: ICD-10-CM

## 2019-05-08 ENCOUNTER — READMISSION MANAGEMENT (OUTPATIENT)
Dept: CALL CENTER | Facility: HOSPITAL | Age: 79
End: 2019-05-08

## 2019-05-08 ENCOUNTER — TELEPHONE (OUTPATIENT)
Dept: GASTROENTEROLOGY | Facility: CLINIC | Age: 79
End: 2019-05-08

## 2019-05-08 ENCOUNTER — HOSPITAL ENCOUNTER (OUTPATIENT)
Dept: SPEECH THERAPY | Facility: HOSPITAL | Age: 79
Setting detail: THERAPIES SERIES
Discharge: HOME | End: 2019-05-08

## 2019-05-08 DIAGNOSIS — I63.9 ACUTE CVA (CEREBROVASCULAR ACCIDENT) (HCC): Primary | ICD-10-CM

## 2019-05-08 DIAGNOSIS — R41.841 COGNITIVE COMMUNICATION DEFICIT: ICD-10-CM

## 2019-05-08 DIAGNOSIS — R47.01 APHASIA: ICD-10-CM

## 2019-05-08 LAB
BASOPHILS # BLD AUTO: 0 X10E3/UL (ref 0–0.2)
BASOPHILS NFR BLD AUTO: 1 %
EOSINOPHIL # BLD AUTO: 0.2 X10E3/UL (ref 0–0.4)
EOSINOPHIL NFR BLD AUTO: 4 %
ERYTHROCYTE [DISTWIDTH] IN BLOOD BY AUTOMATED COUNT: 18.1 % (ref 12.3–15.4)
HCT VFR BLD AUTO: 34.5 % (ref 34–46.6)
HGB BLD-MCNC: 10.2 G/DL (ref 11.1–15.9)
IMM GRANULOCYTES # BLD AUTO: 0 X10E3/UL (ref 0–0.1)
IMM GRANULOCYTES NFR BLD AUTO: 0 %
LYMPHOCYTES # BLD AUTO: 1.1 X10E3/UL (ref 0.7–3.1)
LYMPHOCYTES NFR BLD AUTO: 19 %
MCH RBC QN AUTO: 22.4 PG (ref 26.6–33)
MCHC RBC AUTO-ENTMCNC: 29.6 G/DL (ref 31.5–35.7)
MCV RBC AUTO: 76 FL (ref 79–97)
MONOCYTES # BLD AUTO: 0.4 X10E3/UL (ref 0.1–0.9)
MONOCYTES NFR BLD AUTO: 7 %
NEUTROPHILS # BLD AUTO: 3.8 X10E3/UL (ref 1.4–7)
NEUTROPHILS NFR BLD AUTO: 69 %
PLATELET # BLD AUTO: 354 X10E3/UL (ref 150–379)
RBC # BLD AUTO: 4.55 X10E6/UL (ref 3.77–5.28)
WBC # BLD AUTO: 5.5 X10E3/UL (ref 3.4–10.8)

## 2019-05-08 PROCEDURE — 92507 TX SP LANG VOICE COMM INDIV: CPT | Performed by: SPEECH-LANGUAGE PATHOLOGIST

## 2019-05-08 NOTE — OUTREACH NOTE
CHF Week 2 Survey      Responses   Facility patient discharged from?  [place]   Does the patient have one of the following disease processes/diagnoses(primary or secondary)?  CHF   Week 2 attempt successful?  No   Unsuccessful attempts  Attempt 1          Vrea Castañeda RN

## 2019-05-08 NOTE — TELEPHONE ENCOUNTER
Call to pt.  Advise per Dr Jimenez that path from recent egd came back normal.  Colon polyp that was removed was not cancerous, but was precancerous.  Recommend consideration of another c/s in 5 yrs.  Verb understanding.    C/s for 4/27/24 placed in recall.

## 2019-05-08 NOTE — TELEPHONE ENCOUNTER
----- Message from Peter Jimenez MD sent at 5/4/2019  3:17 PM EDT -----  Tell him that pathology from his recent EGD came back normal.  The colon polyp that was removed was not cancerous but was precancerous.  I would recommend consideration of another colonoscopy in 5 years.. Thx. kjh

## 2019-05-08 NOTE — THERAPY TREATMENT NOTE
Outpatient Speech Language Pathology   Adult Speech Language Cognitive Treatment Note  [facility]     Patient Name: Ivet Diaz  : 1940  MRN: [id number]  Today's Date: 2019         Visit Date: 2019   Patient Active Problem List   Diagnosis   • Acute CVA (cerebrovascular accident) (CMS/HCC)   • Symptomatic anemia   • Hypertension   • Depression   • Reflux esophagitis   • Breast cancer (CMS/HCC)   • Acute on chronic diastolic heart failure (CMS/HCC)   • Hemorrhagic disorder due to extrinsic circulating anticoagulants (CMS/HCC)          Visit Dx:    ICD-10-CM ICD-9-CM   1. Acute CVA (cerebrovascular accident) (CMS/HCC) I63.9 434.91   2. Aphasia R47.01 784.3   3. Cognitive communication deficit R41.841 799.52                         SLP OP Goals     Row Name 19 1100          Subjective Comments    Subjective Comments  Patient is pleasant and cooperative. Patient has been added to twice a week starting next week on  at 11. Patient puts forth adequate effort in therapy.  -KA        Subjective Pain    Able to rate subjective pain?  yes  -KA     Pre-Treatment Pain Level  0  -KA     Post-Treatment Pain Level  0  -KA        Written Language Comprehension Goals    Written Language Comprehension STG's  Patient will improve comprehension of written language skills by answering written questions related to home management/social/work tasks (bills, recipes, tv guide, emails, procedures)  -KA     Patient will improve comprehension of written language skills by scanning appropriately (left to right, top to bottom) to complete functional reading task  90%:;without cues  -KA     Status:Patient will improve comprehension of written language skills by scanning appropriately (left to right, top to bottom) to complete functional reading task  Progressing as expected  -KA     Comments: Patient will improve comprehension of written language skills by scanning appropriately (left to right, top to bottom)  to complete functional reading task  Visual scanning during all reading tasks today including presciption label and functional math word problems 100% without cues   -KA     Patient will improve comprehension of written language skills by answering written questions related to home management/social/work tasks (bills, recipes, tv guide, emails, procedures)  90%:;without cues  -KA     Status: Patient will improve comprehension of written language skills by answering written questions related to home management/social/work tasks (bills, recipes, tv guide, emails, procedures)  New  -KA     Comments: Patient will improve comprehension of written language skills by answering written questions related to home management/social/work tasks (bills, recipes, tv guide, emails, procedures)  Answering questions on prescription label task 70% without cues and 100% with min cues   -KA        Memory Goals    Patient will demonstrate improved ability to recall information by immediately recalling a series of words  90%:;without cues  -KA     Status: Patient will demonstrate improved ability to recall information by immediately recalling a series of words  -- ongoing  -KA     Comments: Patient will demonstrate improved ability to recall information by immediately recalling a series of words  immediate recall of four words and answer question on four words 85% without cues and immediate recall of short functional andre/email=75% and delayed recall after 3-5 minutes on three attempts (review of email every time) 50% without cues and 75% with cues   -KA     Comments: Patient’s memory skills will be enhanced as reported by patient by utilizing internal memory strategies to recall up to 3 pieces of information after a 5- minute delay  see goal above  -KA        Problem Solving Goals    Problem Solving STG's  Patient will improve ability to analyze problems and determine solutions by completing a visual representation/filling in a chart by  following  written directions  -KA     Patient will improve ability to analyze problems and determine solutions by completing a visual representation/filling in a chart by following  written directions  90%:;without cues  -KA     Status: Patient will improve ability to analyze problems and determine solutions by completing a visual representation/filling in a chart by following  written directions  New  -KA     Comments: Patient will improve ability to analyze problems and determine solutions by completing a visual representation/filling in a chart by following  written directions  min to mod logic puzzle determining location of items based on following directions and utilizng clues 70% without cues and 100% with min to mod cues   -KA     Patient will improve ability to analyze problems and determine solutions by completing functional math problems  90%:;without cues  -KA     Status: Patient will improve ability to analyze problems and determine solutions by completing functional math problems  -- ongoing  -KA     Comments: Patient will improve ability to analyze problems and determine solutions by completing functional math problems  functional math word problems=66% without cues   -KA       User Key  (r) = Recorded By, (t) = Taken By, (c) = Cosigned By    Initials Name Provider Type    Chetan Nicholas MA,CCC-SLP Speech and Language Pathologist                         Time Calculation:   SLP Start Time: 0900  SLP Stop Time: 1000  SLP Time Calculation (min): 60 min    Therapy Charges for Today     Code Description Service Date Service Provider Modifiers Qty    35405111299  ST TREATMENT SPEECH 4 5/8/2019 Chetan Machuca MA,CCC-SLP GN 1                   Chetan Machuca MA,CCC-SLP  5/8/2019

## 2019-05-10 ENCOUNTER — READMISSION MANAGEMENT (OUTPATIENT)
Dept: CALL CENTER | Facility: HOSPITAL | Age: 79
End: 2019-05-10

## 2019-05-10 NOTE — OUTREACH NOTE
CHF Week 2 Survey      Responses   Facility patient discharged from?  [place]   Does the patient have one of the following disease processes/diagnoses(primary or secondary)?  CHF   Week 2 attempt successful?  Yes   Call start time  1050   Call end time  1057   Discharge diagnosis  Symptomatic anemia,    Acute on chronic diastolic heart failure     Meds reviewed with patient/caregiver?  Yes   Is the patient having any side effects they believe may be caused by any medication additions or changes?  No   Does the patient have all medications ordered at discharge?  Yes   Is the patient taking all medications as directed (includes completed medication regime)?  Yes   Comments regarding appointments  outpt speech therapy changed to 2x/week   Does the patient have a primary care provider?   Yes   Does the patient have an appointment with their PCP within 7 days of discharge?  Yes   Has the patient kept scheduled appointments due by today?  Yes   What is the Home health agency?   none needed   Has home health visited the patient within 72 hours of discharge?  N/A   Psychosocial issues?  No   Did the patient receive a copy of their discharge instructions?  Yes   Nursing interventions  Reviewed instructions with patient   What is the patient's perception of their health status since discharge?  Improving   Nursing interventions  Nurse provided patient education   Is the patient weighing daily?  No   Does the patient have scales?  Yes   Daily weight interventions  Education provided on importance of daily weight   Is the patient able to teach back Heart Failure diet management?  Yes   Is the patient able to teach back Heart Failure Zones?  Yes   Is the patient able to teach back signs and symptoms of worsening condition? (i.e. weight gain, shortness of air, etc.)  Yes   Is the patient/caregiver able to teach back the hierarchy of who to call/visit for symptoms/problems? PCP, Specialist, Home health nurse, Urgent Care, ED,  911  Yes   CHF Week 2 call completed?  Yes          Iliana Bran RN

## 2019-05-13 ENCOUNTER — HOSPITAL ENCOUNTER (OUTPATIENT)
Dept: SPEECH THERAPY | Facility: HOSPITAL | Age: 79
Setting detail: THERAPIES SERIES
Discharge: HOME | End: 2019-05-13

## 2019-05-13 DIAGNOSIS — R41.841 COGNITIVE COMMUNICATION DEFICIT: ICD-10-CM

## 2019-05-13 DIAGNOSIS — R47.01 APHASIA: ICD-10-CM

## 2019-05-13 DIAGNOSIS — I63.9 ACUTE CVA (CEREBROVASCULAR ACCIDENT) (HCC): Primary | ICD-10-CM

## 2019-05-13 PROCEDURE — 92507 TX SP LANG VOICE COMM INDIV: CPT | Performed by: SPEECH-LANGUAGE PATHOLOGIST

## 2019-05-13 NOTE — THERAPY TREATMENT NOTE
Outpatient Speech Language Pathology   Adult Speech Language Cognitive Treatment Note  [facility]     Patient Name: Ivet Diaz  : 1940  MRN: [id number]  Today's Date: 2019         Visit Date: 2019   Patient Active Problem List   Diagnosis   • Acute CVA (cerebrovascular accident) (CMS/HCC)   • Symptomatic anemia   • Hypertension   • Depression   • Reflux esophagitis   • Breast cancer (CMS/HCC)   • Acute on chronic diastolic heart failure (CMS/HCC)   • Hemorrhagic disorder due to extrinsic circulating anticoagulants (CMS/HCC)          Visit Dx:    ICD-10-CM ICD-9-CM   1. Acute CVA (cerebrovascular accident) (CMS/HCC) I63.9 434.91   2. Aphasia R47.01 784.3   3. Cognitive communication deficit R41.841 799.52                         SLP OP Goals     Row Name 19 1200          Subjective Comments    Subjective Comments  Patient is pleasant and cooperative, she brought her IPAD today however did not have her apple ID password to download apps  -KA        Subjective Pain    Able to rate subjective pain?  yes  -KA     Pre-Treatment Pain Level  0  -KA     Post-Treatment Pain Level  0  -KA        Written Language Comprehension Goals    Patient will improve comprehension of written language skills by scanning appropriately (left to right, top to bottom) to complete functional reading task  90%:;without cues  -KA     Status:Patient will improve comprehension of written language skills by scanning appropriately (left to right, top to bottom) to complete functional reading task  Achieved  -KA     Comments: Patient will improve comprehension of written language skills by scanning appropriately (left to right, top to bottom) to complete functional reading task  Visual scanning during all reading tasks today including reading a paragraph and  functional math word problems 100% without cues   -KA     Patient will improve comprehension of written language skills by answering written questions related to  home management/social/work tasks (bills, recipes, tv guide, emails, procedures)  90%:;without cues  -KA     Status: Patient will improve comprehension of written language skills by answering written questions related to home management/social/work tasks (bills, recipes, tv guide, emails, procedures)  -- ongoing  -KA     Comments: Patient will improve comprehension of written language skills by answering written questions related to home management/social/work tasks (bills, recipes, tv guide, emails, procedures)  Inferences about paragraphs 80% without cues   -KA        Memory Goals    Memory STG's  Patient’s memory skills will be enhanced as reported by patient by using external memory aides  -KA     Patient will demonstrate improved ability to recall information by immediately recalling a series of words  90%:;without cues  -KA     Status: Patient will demonstrate improved ability to recall information by immediately recalling a series of words  -- ongoing  -KA     Comments: Patient will demonstrate improved ability to recall information by immediately recalling a series of words   In IPAD task with delayed recall of 2 words after 10 second delay 80% without cues. immediate recall of four facts from a functional visual memory task where patient had to recall doctors appointment 75% with immediate recall, attempted delayed recall of same information after 2 to 4 minute delays with multiple attempts of rereading material and utilizing visualization and repetition however pt unable to recall with multiple attempts after delay 0%    -KA     Patient’s memory skills will be enhanced as reported by patient by using external memory aides  90%:;without cues  -KA     Status: Patient’s memory skills will be enhanced as reported by patient by using external memory aides  New  -KA     Comments: Patient’s memory skills will be enhanced as reported by patient by using external memory aides  Patient unable to recall current  month, and SLP discussed external memory aids including use of calendar. Patient has a calendar she reports on her table in her kitchen and states she utilizes daily. SLP printed a May 2019 calendar and wrote down her speech therapy appointments and practiced recall of date and month. SLP discussed importance she hang on refrigerator and practice reviewing daily and frequently..   -KA        Problem Solving Goals    Patient will improve ability to analyze problems and determine solutions by correctly sequencing __ steps to complete an activity  90%:;without cues  -KA     Status: Patient will improve ability to analyze problems and determine solutions by correctly sequencing __ steps to complete an activity  Progressing as expected  -KA     Comments: Patient will improve ability to analyze problems and determine solutions by correctly sequencing __ steps to complete an activity  100% with 5 step functional sequencing task without cues  -KA     Patient will improve ability to analyze problems and determine solutions by completing functional math problems  90%:;without cues  -KA     Status: Patient will improve ability to analyze problems and determine solutions by completing functional math problems  Progressing as expected  -KA     Comments: Patient will improve ability to analyze problems and determine solutions by completing functional math problems  functional math word problems=75% without cues   -KA       User Key  (r) = Recorded By, (t) = Taken By, (c) = Cosigned By    Initials Name Provider Type    Chetan Nicholas MA,CCC-SLP Speech and Language Pathologist                         Time Calculation:   SLP Start Time: 1100  SLP Stop Time: 1200  SLP Time Calculation (min): 60 min    Therapy Charges for Today     Code Description Service Date Service Provider Modifiers Qty    [id number] [facility] TREATMENT SPEECH 4 5/13/2019 Chetan Machuca MA,CCC-SLP GN 1                   Chetan Machuca MA,CCC-SLP  5/13/2019

## 2019-05-15 ENCOUNTER — HOSPITAL ENCOUNTER (OUTPATIENT)
Dept: SPEECH THERAPY | Facility: HOSPITAL | Age: 79
Setting detail: THERAPIES SERIES
Discharge: HOME | End: 2019-05-15

## 2019-05-15 DIAGNOSIS — I63.9 ACUTE CVA (CEREBROVASCULAR ACCIDENT) (HCC): Primary | ICD-10-CM

## 2019-05-15 DIAGNOSIS — R41.841 COGNITIVE COMMUNICATION DEFICIT: ICD-10-CM

## 2019-05-15 DIAGNOSIS — R47.01 APHASIA: ICD-10-CM

## 2019-05-15 PROCEDURE — 92507 TX SP LANG VOICE COMM INDIV: CPT | Performed by: SPEECH-LANGUAGE PATHOLOGIST

## 2019-05-15 NOTE — THERAPY TREATMENT NOTE
Outpatient Speech Language Pathology   Adult Speech Language Cognitive Treatment Note  [facility]     Patient Name: Ivet Diaz  : 1940  MRN: [id number]  Today's Date: 5/15/2019         Visit Date: 05/15/2019   Patient Active Problem List   Diagnosis   • Acute CVA (cerebrovascular accident) (CMS/HCC)   • Symptomatic anemia   • Hypertension   • Depression   • Reflux esophagitis   • Breast cancer (CMS/HCC)   • Acute on chronic diastolic heart failure (CMS/HCC)   • Hemorrhagic disorder due to extrinsic circulating anticoagulants (CMS/HCC)          Visit Dx:    ICD-10-CM ICD-9-CM   1. Acute CVA (cerebrovascular accident) (CMS/HCC) I63.9 434.91   2. Aphasia R47.01 784.3   3. Cognitive communication deficit R41.841 799.52                         SLP OP Goals     Row Name 05/15/19 1000          Subjective Comments    Subjective Comments  Patient is pleasant and cooperative. SLP had dicussion with patient (sister present during session) regarding memory strategies and external aids she will need to implement daily with the help/assistance of her caregiver/ to carryover see below.  -KA        Subjective Pain    Able to rate subjective pain?  yes  -KA     Pre-Treatment Pain Level  0  -KA     Post-Treatment Pain Level  0  -KA        Written Language Comprehension Goals    Patient will improve comprehension of written language skills by answering written questions related to home management/social/work tasks (bills, recipes, tv guide, emails, procedures)  90%:;without cues  -KA     Status: Patient will improve comprehension of written language skills by answering written questions related to home management/social/work tasks (bills, recipes, tv guide, emails, procedures)  -- ongoing  -KA     Comments: Patient will improve comprehension of written language skills by answering written questions related to home management/social/work tasks (bills, recipes, tv guide, emails, procedures)  Inferences about  paragraphs 60% without cues   -KA        Memory Goals    Patient will demonstrate improved ability to recall information by immediately recalling a series of words  90%:;without cues  -KA     Status: Patient will demonstrate improved ability to recall information by immediately recalling a series of words  Progressing as expected  -KA     Comments: Patient will demonstrate improved ability to recall information by immediately recalling a series of words  Immediate recall of detail in short paragraphs (2 to 3 sentences) 80% without cues. Immediate recall of 5 words in wrong category task where she is determinewhich word does not belong with the others,  50% without cues and 80% with repetitions and min cues. Immediate recall of four words and answer question on four words 80% without cues   -KA     Patient’s memory skills will be enhanced as reported by patient by using external memory aides  90%:;without cues  -KA     Status: Patient’s memory skills will be enhanced as reported by patient by using external memory aides  -- ongoing  -KA     Comments: Patient’s memory skills will be enhanced as reported by patient by using external memory aides  SLP discussed the importance (her sister present) her caregiver/ help patient implement memory strategies/external aids on a daily basis for sucessful carryover and benefit. SLP provided written handouts on education completed today for pt to share with her . Patient's  is retiring next month and SLP asked he attend some sessions. Discussed with pt recommend he help her look at the calendars daily and discuss current month and cross off the previous day and discuss current date etc. Today, pt not oriented to current month and stated it was Janurary, SLP cued pt by asking her to thinkof the current weather outside to help her and to think of most recent holidays (derby and mothers day) after this prompting pt recalled current month of May. After pt studied  her calendar, delayed recall of month, date and year after 20 minute delay 0/3 without cues. SLP discussed with pt recommend she write in a notebook daily as a journal the date, summarize events during the day, activites she completed, current events etc. SLP stated her  will need to help implement this with pt and make sure she completes daily. Patient is to bring in a notebook next Monday for SLP to assist and start with patient.   -KA        Problem Solving Goals    Patient will improve ability to analyze problems and determine solutions by correctly sequencing __ steps to complete an activity  90%:;without cues  -KA     Status: Patient will improve ability to analyze problems and determine solutions by correctly sequencing __ steps to complete an activity  Achieved  -KA     Comments: Patient will improve ability to analyze problems and determine solutions by correctly sequencing __ steps to complete an activity  100% with 5 step functional sequencing task without cues  -KA     Patient will improve ability to analyze problems and determine solutions by completing functional math problems  90%:;without cues  -KA     Status: Patient will improve ability to analyze problems and determine solutions by completing functional math problems  -- ongoing  -KA     Comments: Patient will improve ability to analyze problems and determine solutions by completing functional math problems  70% without cues difficulty with time math   -KA       User Key  (r) = Recorded By, (t) = Taken By, (c) = Cosigned By    Initials Name Provider Type    Chetan Nicholas MA,CCC-SLP Speech and Language Pathologist                         Time Calculation:   SLP Start Time: 0900  SLP Stop Time: 1000  SLP Time Calculation (min): 60 min    Therapy Charges for Today     Code Description Service Date Service Provider Modifiers Qty    [id number] [facility] TREATMENT SPEECH 4 5/15/2019 Chetan Machuca MA,CCC-SLP ABISAI Benton  ALMAS Machuca,CCC-SLP  5/15/2019

## 2019-05-17 ENCOUNTER — READMISSION MANAGEMENT (OUTPATIENT)
Dept: CALL CENTER | Facility: HOSPITAL | Age: 79
End: 2019-05-17

## 2019-05-17 NOTE — OUTREACH NOTE
CHF Week 3 Survey      Responses   Facility patient discharged from?  [place]   Does the patient have one of the following disease processes/diagnoses(primary or secondary)?  CHF   Week 3 attempt successful?  Yes   Call start time  1211   Call end time  1213   Discharge diagnosis  Symptomatic anemia,    Acute on chronic diastolic heart failure     Meds reviewed with patient/caregiver?  Yes   Is the patient having any side effects they believe may be caused by any medication additions or changes?  No   Does the patient have all medications ordered at discharge?  Yes   Is the patient taking all medications as directed (includes completed medication regime)?  Yes   Does the patient have a primary care provider?   Yes   Does the patient have an appointment with their PCP within 7 days of discharge?  Yes   Has the patient kept scheduled appointments due by today?  Yes   Psychosocial issues?  No   Did the patient receive a copy of their discharge instructions?  Yes   Nursing interventions  Reviewed instructions with patient   What is the patient's perception of their health status since discharge?  Improving   Nursing interventions  Nurse provided patient education   Is the patient weighing daily?  No   Does the patient have scales?  Yes   Daily weight interventions  Education provided on importance of daily weight   Is the patient able to teach back Heart Failure diet management?  Yes   Is the patient able to teach back Heart Failure Zones?  Yes   Is the patient able to teach back signs and symptoms of worsening condition? (i.e. weight gain, shortness of air, etc.)  Yes   Is the patient/caregiver able to teach back the hierarchy of who to call/visit for symptoms/problems? PCP, Specialist, Home health nurse, Urgent Care, ED, 911  Yes   CHF Week 3 call completed?  Yes          Jennifer Fontaine RN

## 2019-05-20 ENCOUNTER — HOSPITAL ENCOUNTER (OUTPATIENT)
Dept: SPEECH THERAPY | Facility: HOSPITAL | Age: 79
Setting detail: THERAPIES SERIES
Discharge: HOME | End: 2019-05-20

## 2019-05-20 DIAGNOSIS — I63.9 ACUTE CVA (CEREBROVASCULAR ACCIDENT) (HCC): Primary | ICD-10-CM

## 2019-05-20 DIAGNOSIS — R47.01 APHASIA: ICD-10-CM

## 2019-05-20 DIAGNOSIS — R41.841 COGNITIVE COMMUNICATION DEFICIT: ICD-10-CM

## 2019-05-20 PROCEDURE — 92507 TX SP LANG VOICE COMM INDIV: CPT | Performed by: SPEECH-LANGUAGE PATHOLOGIST

## 2019-05-20 NOTE — THERAPY PROGRESS REPORT/RE-CERT
Outpatient Speech Language Pathology   Adult Speech Language Cognitive Progress Note  [facility]     Patient Name: Ivet Diaz  : 1940  MRN: [id number]  Today's Date: 2019         Visit Date: 2019   Patient Active Problem List   Diagnosis   • Acute CVA (cerebrovascular accident) (CMS/HCC)   • Symptomatic anemia   • Hypertension   • Depression   • Reflux esophagitis   • Breast cancer (CMS/HCC)   • Acute on chronic diastolic heart failure (CMS/HCC)   • Hemorrhagic disorder due to extrinsic circulating anticoagulants (CMS/HCC)          Visit Dx:    ICD-10-CM ICD-9-CM   1. Acute CVA (cerebrovascular accident) (CMS/HCC) I63.9 434.91   2. Aphasia R47.01 784.3   3. Cognitive communication deficit R41.841 799.52                         SLP OP Goals     Row Name 19 1200          Goal Type Needed    Goal Type Needed  Attention/Orientation  -KA        Subjective Comments    Subjective Comments  Patient is pleasant and cooperative.   -KA        Subjective Pain    Able to rate subjective pain?  yes  -KA     Pre-Treatment Pain Level  0  -KA     Post-Treatment Pain Level  0  -KA        Written Language Comprehension Goals    Patient will be able to comprehend written material in home/home and social  environment  90%:;without cues  -KA     Status: Patient will be able to comprehend written material in home/home and social  environment  Progressing as expected  -KA     Comments: Patient will be able to comprehend written material in home/home and social  environment  Improvement with visual scanning for all functional reading tasks where patient has met this goal. Patients reading comprehension for short paragraphs, including inferences about paragraphs incorporating reasoning skills, averages 60%). Answering functional questions on home management tasks averaging 70% without cues.    -KA     Patient will improve comprehension of written language skills by answering written questions related to home  management/social/work tasks (bills, recipes, tv guide, emails, procedures)  90%:;without cues  -KA     Status: Patient will improve comprehension of written language skills by answering written questions related to home management/social/work tasks (bills, recipes, tv guide, emails, procedures)  Progressing as expected  -KA     Comments: Patient will improve comprehension of written language skills by answering written questions related to home management/social/work tasks (bills, recipes, tv guide, emails, procedures)  Inferences about paragraphs 60% without cues   -KA        Memory Goals    Patient will be able to remember  information needed to participate in activities of daily living  Independently  -KA     Status: Patient will be able to remember  information needed to participate in activities of daily living  -- ongoing  -KA     Comments: Patient will be able to remember  information needed to participate in activities of daily living  Patient has demonstrated progress with use of external memory aids to incorporate in her every daily living including journal and calendar to assist with recall of date and day to day activities (see below). patient has not demonstrated progress with delayed/short term memory and ongoing therapy is recommended to continue training and use of memory strategies to improve pt's ability to form new memories  -KA     Patient will demonstrate improved ability to recall information by immediately recalling a series of words  90%:;without cues  -KA     Status: Patient will demonstrate improved ability to recall information by immediately recalling a series of words  -- ongoing  -KA     Comments: Patient will demonstrate improved ability to recall information by immediately recalling a series of words  Immediate recall of facts in short paragraph 80% with choice of 3 (multiple choice), delayed recall of 3 words after 10 second delay 100%, Delayed recall of three related words,  practiced throughout entire duration of therapy session, pt asked to recall word after 2-3 minute delay (one time 5 minute delay), pt wrote down words and practiced repetition and visualization as strategies to assist with recall of three words, pt unable to recall three words despite repetitively reviewing the three words throughout session 0% accuracy without cues and 33% accuracy with max cues. Patient's delayed recall of date after delay after review from calendar 3/3 1005  -KA     Comments: Patient’s memory skills will be enhanced as reported by patient by utilizing internal memory strategies to recall up to 3 pieces of information after a 5- minute delay  see goal above  -KA     Patient’s memory skills will be enhanced as reported by patient by using external memory aides  90%:;without cues  -KA     Status: Patient’s memory skills will be enhanced as reported by patient by using external memory aides  Progressing as expected  -KA     Comments: Patient’s memory skills will be enhanced as reported by patient by using external memory aides  Patient brought a notebook with her and has started writing in daily enteries including writing down the date and summarizing daily events. Patient wrote down today the current date and day of the week and brief summary of todays session with SLP assistance.   -KA        Problem Solving Goals    Patient will be able to engage in avocational activities requiring high level cognitive skills  Independently  -KA     Status: Patient will be able to engage in avocational activities requiring high level cognitive skills  Progressing as expected  -KA     Comments: Patient will be able to engage in avocational activities requiring high level cognitive skills  Improvement with sequencing, met goal for 5 steps and averaging 75% with 6 steps, Improvement with functional math skills averaging 70%   -KA     Patient will improve ability to analyze problems and determine solutions by  correctly sequencing __ steps to complete an activity  90%:;without cues  -KA     Status: Patient will improve ability to analyze problems and determine solutions by correctly sequencing __ steps to complete an activity  Progressing as expected  -KA     Comments: Patient will improve ability to analyze problems and determine solutions by correctly sequencing __ steps to complete an activity  Increased complexity today of 6 steps in functional sequencing task, patient performed 75% without cues, slight confusion during task.   -KA        Attention/Orientation Goals    Attention/Orientation LTG's  Patient will be able to interact safely in home environment  -KA     Patient will be able to interact safely in home environment  With Supervision  -KA     Status: Patient will be able to interact safely in home environment  New  -KA     Attention/Orientation STG's  Patient will improve orientation skills by orienting to time/date  -KA     Patient will improve orientation skills by orienting to time/date  90%:;without cues  -KA     Status: Patient will improve orientation skills by orienting to time/date  New  -KA     Comments: Patient will improve orientation skills by orienting to time/date  Use of strategies required to recall date, (external memoy aids see above), delayed recall of date after review from calendar 3/3 100% without cues, in the beginning of the session before pt looked at calendar 1/3 33% without cues   -KA       User Key  (r) = Recorded By, (t) = Taken By, (c) = Cosigned By    Initials Name Provider Type    Chetan Nicholas MA,[facility]SLP Speech and Language Pathologist          OP SLP Education     Row Name 05/20/19 3046       Education    Assessed  Learning needs;Learning motivation  -KA    Learning Motivation  Strong  -KA    Learning Method  Explanation;Written materials;Teach back;Demonstration  -MERRITT    Teaching Response  Verbalized understanding  -MERRITT      User Key  (r) = Recorded By, (t) = Taken By, (c)  = Cosigned By    Initials Name Effective Dates    Chetan Nicholas MA,CCC-SLP 06/08/18 -           OP SLP Assessment/Plan - 05/20/19 1354        SLP Assessment    Functional Problems  Speech Language- Adult/Cognition   -MERRITT    Impact on Function: Adult Speech Language/Cognition  Trouble learning or remembering new information;Difficulty participating in avocational activities;Difficulty sequencing or problem solving to complete ADLs;Restrictions in personal and social life;Difficulty following directions;Decreased recall of personal information and medical history;Requires supervision   -MERRITT    Clinical Impression: Speech Language-Adult/Congnition  Moderate-Severe:;Cognitive Communication Impairment   -MERRITT    Prognosis  Good (comment)   -MERRITT    Patient/caregiver participated in establishment of treatment plan and goals  Yes   -KA    Patient would benefit from skilled therapy intervention  Yes   -KA       SLP Plan    Planned CPT's?  SLP INDIVIDUAL SPEECH THERAPY: 49238   -EMRRITT    Expected Duration Therapy Session - minutes  45-60 minutes   -MERRITT      User Key  (r) = Recorded By, (t) = Taken By, (c) = Cosigned By    Initials Name Provider Type    Chetan Nicholas MA,CCC-SLP Speech and Language Pathologist                 Time Calculation:   SLP Start Time: 1100  SLP Stop Time: 1200  SLP Time Calculation (min): 60 min    Therapy Charges for Today     Code Description Service Date Service Provider Modifiers Qty    76197021632 [facility] TREATMENT SPEECH 4 5/20/2019 Chetan Machuca MA,CCC-SLP GN 1                   Chetan Machuca MA,CCC-SLP  5/20/2019

## 2019-05-22 ENCOUNTER — HOSPITAL ENCOUNTER (OUTPATIENT)
Dept: SPEECH THERAPY | Facility: HOSPITAL | Age: 79
Setting detail: THERAPIES SERIES
Discharge: HOME | End: 2019-05-22

## 2019-05-22 DIAGNOSIS — R41.841 COGNITIVE COMMUNICATION DEFICIT: ICD-10-CM

## 2019-05-22 DIAGNOSIS — R47.01 APHASIA: ICD-10-CM

## 2019-05-22 DIAGNOSIS — I63.9 ACUTE CVA (CEREBROVASCULAR ACCIDENT) (HCC): Primary | ICD-10-CM

## 2019-05-22 PROCEDURE — G0515 COGNITIVE SKILLS DEVELOPMENT: HCPCS

## 2019-05-22 NOTE — THERAPY TREATMENT NOTE
Outpatient Speech Language Pathology   Adult Speech Language Cognitive Treatment Note  [facility]     Patient Name: Ivet Diaz  : 1940  MRN: [id number]  Today's Date: 2019         Visit Date: 2019   Patient Active Problem List   Diagnosis   • Acute CVA (cerebrovascular accident) (CMS/HCC)   • Symptomatic anemia   • Hypertension   • Depression   • Reflux esophagitis   • Breast cancer (CMS/HCC)   • Acute on chronic diastolic heart failure (CMS/HCC)   • Hemorrhagic disorder due to extrinsic circulating anticoagulants (CMS/HCC)     Visit Dx:    ICD-10-CM ICD-9-CM   1. Acute CVA (cerebrovascular accident) (CMS/HCC) I63.9 434.91   2. Cognitive communication deficit R41.841 799.52   3. Aphasia R47.01 784.3     SLP OP Goals     Row Name 19 0900          Subjective Comments    Subjective Comments  Pt. arrived with her sister who remained in the session. Pt. was pleasant and cooperative throghout session, stated she was tired and did not sleep well last night.   -KB        Verbal Expression Goals    Patient will improve verbal expressive language skills by completing divergent naming tasks  90%:;without cues  -KB     Status: Patient will improve verbal expressive language skills by completing divergent naming tasks  Progressing as expected  -KB     Comments: Patient will improve verbal expressive language skills by completing divergent naming tasks  Pt. completed category exclusion task with 72% accuracy; mod cues for 100% accuracy.  -KB     Patient will improve verbal expressive language skills by describing attributes, function, action and/or uses of an object/item  90%:;without cues  -KB     Status: Patient will improve verbal expressive language skills by describing attributes, function, action and/or uses of an object/item  Progressing as expected  -KB     Comments: Patient will improve verbal expressive language skills by describing attributes, function, action and/or uses of an  object/item  POt. completed word deduction task with 50% accuracy; up to 90% with mod verbal cues.   -KB        Memory Goals    Patient will demonstrate improved ability to recall information by immediately recalling a series of words  90%:;without cues  -KB     Status: Patient will demonstrate improved ability to recall information by immediately recalling a series of words  Progressing as expected  -KB     Comments: Patient will demonstrate improved ability to recall information by immediately recalling a series of words  Pt. was able to recall 3 unrelated words following a 10 second distractor with 80% accuracy.  -KB     Patient’s memory skills will be enhanced as reported by patient by using external memory aides  90%:;without cues  -KB     Status: Patient’s memory skills will be enhanced as reported by patient by using external memory aides  Progressing as expected  -KB     Comments: Patient’s memory skills will be enhanced as reported by patient by using external memory aides  Patient brought her memory journal with two entries (5/15, 5/20). With assistance of her sister, she recalled details about events she has coming up in June. Will enterinto June calendar next session.   -KB        Problem Solving Goals    Patient will improve ability to analyze problems and determine solutions by correctly sequencing __ steps to complete an activity  90%:;without cues  -KB     Status: Patient will improve ability to analyze problems and determine solutions by correctly sequencing __ steps to complete an activity  Progressing as expected  -KB     Comments: Patient will improve ability to analyze problems and determine solutions by correctly sequencing __ steps to complete an activity  Pt. sequenced 6 steps with 71% accuracy.   -KB     Patient will improve ability to analyze problems and determine solutions by completing a visual representation/filling in a chart by following  written directions  90%:;without cues  -KB      Status: Patient will improve ability to analyze problems and determine solutions by completing a visual representation/filling in a chart by following  written directions  Progressing as expected  -KB     Comments: Patient will improve ability to analyze problems and determine solutions by completing a visual representation/filling in a chart by following  written directions  Pt. completed mildly-complex logic task with written directions with 25% accuracy independently; consistent min-mod cues required to complete task.   -KB       User Key  (r) = Recorded By, (t) = Taken By, (c) = Cosigned By    Initials Name Provider Type    KB Bruton, Katherine L Speech and Language Pathologist          OP SLP Education     Row Name 05/22/19 1000       Education    Barriers to Learning  No barriers identified  -KB    Education Provided  Patient demonstrated recommended strategies  -KB    Assessed  Learning needs;Learning motivation  -KB    Learning Motivation  Strong  -KB    Learning Method  Explanation  -KB    Teaching Response  Verbalized understanding  -KB      User Key  (r) = Recorded By, (t) = Taken By, (c) = Cosigned By    Initials Name Effective Dates    KB Bruton, Katherine L 03/07/18 -           OP SLP Assessment/Plan - 05/22/19 1000        SLP Assessment    Functional Problems  Speech Language- Adult/Cognition   -KB       SLP Plan    Plan Comments  Continue current POC.    -KB      User Key  (r) = Recorded By, (t) = Taken By, (c) = Cosigned By    Initials Name Provider Type    KB Bruton, Katherine L Speech and Language Pathologist            Time Calculation:   SLP Start Time: 0900  SLP Stop Time: 1000  SLP Time Calculation (min): 60 min    Therapy Charges for Today     Code Description Service Date Service Provider Modifiers Qty    [id number]  ST DEV OF COGN SKILLS EACH 15 MIN 5/22/2019 Bruton, Katherine L  4          Katherine L Bruton  5/22/2019

## 2019-05-24 ENCOUNTER — READMISSION MANAGEMENT (OUTPATIENT)
Dept: CALL CENTER | Facility: HOSPITAL | Age: 79
End: 2019-05-24

## 2019-05-24 NOTE — OUTREACH NOTE
CHF Week 4 Survey      Responses   Facility patient discharged from?  [place]   Does the patient have one of the following disease processes/diagnoses(primary or secondary)?  CHF   Week 4 attempt successful?  Yes   Call start time  1120   Call end time  1122   Meds reviewed with patient/caregiver?  Yes   Is the patient taking all medications as directed (includes completed medication regime)?  Yes   Has the patient kept scheduled appointments due by today?  Yes   Week 4 Call Completed?  Yes   Would the patient like one additional call?  No   Graduated  Yes   Did the patient feel the follow up calls were helpful during their recovery period?  Yes   Was the number of calls appropriate?  Yes   Wrap up additional comments  Pt doing well and reports no additional problems.          Vera Castañeda RN

## 2019-05-29 ENCOUNTER — HOSPITAL ENCOUNTER (OUTPATIENT)
Dept: SPEECH THERAPY | Facility: HOSPITAL | Age: 79
Setting detail: THERAPIES SERIES
Discharge: HOME | End: 2019-05-29

## 2019-05-29 DIAGNOSIS — R41.841 COGNITIVE COMMUNICATION DEFICIT: ICD-10-CM

## 2019-05-29 DIAGNOSIS — I63.9 ACUTE CVA (CEREBROVASCULAR ACCIDENT) (HCC): Primary | ICD-10-CM

## 2019-05-29 PROCEDURE — 92507 TX SP LANG VOICE COMM INDIV: CPT | Performed by: SPEECH-LANGUAGE PATHOLOGIST

## 2019-05-29 NOTE — THERAPY TREATMENT NOTE
Outpatient Speech Language Pathology   Adult Speech Language Cognitive Treatment Note  [facility]     Patient Name: Ivet Diaz  : 1940  MRN: [id number]  Today's Date: 2019         Visit Date: 2019   Patient Active Problem List   Diagnosis   • Acute CVA (cerebrovascular accident) (CMS/HCC)   • Symptomatic anemia   • Hypertension   • Depression   • Reflux esophagitis   • Breast cancer (CMS/HCC)   • Acute on chronic diastolic heart failure (CMS/HCC)   • Hemorrhagic disorder due to extrinsic circulating anticoagulants (CMS/HCC)          Visit Dx:    ICD-10-CM ICD-9-CM   1. Acute CVA (cerebrovascular accident) (CMS/HCC) I63.9 434.91   2. Cognitive communication deficit R41.841 799.52                         SLP OP Goals     Row Name 19 1000          Subjective Comments    Subjective Comments  Patient is pleasant and cooperative, puts forth adequate effort during therapy.  -KA        Subjective Pain    Able to rate subjective pain?  yes  -KA     Pre-Treatment Pain Level  0  -KA     Post-Treatment Pain Level  0  -KA        Written Language Comprehension Goals    Patient will improve comprehension of written language skills by answering written questions related to home management/social/work tasks (bills, recipes, tv guide, emails, procedures)  90%:;without cues  -KA     Status: Patient will improve comprehension of written language skills by answering written questions related to home management/social/work tasks (bills, recipes, tv guide, emails, procedures)  -- ongoing  -KA     Comments: Patient will improve comprehension of written language skills by answering written questions related to home management/social/work tasks (bills, recipes, tv guide, emails, procedures)  Inferences about paragraphs 60% without cues   -KA        Memory Goals    Patient will demonstrate improved ability to recall information by immediately recalling a series of words  90%:;without cues  -KA     Status:  Patient will demonstrate improved ability to recall information by immediately recalling a series of words  -- ongoing  -KA     Comments: Patient will demonstrate improved ability to recall information by immediately recalling a series of words  Utilized calendar today to assist with recall of date, pt not oriented to date without calendar. (thought it was september 2020), delayed recall of date after studying calendar 2/3 66% without cues. Immediate recall of facts from short paragraphs=77.5% without cues Delayed recall of three related words after 2-3 minute delay, four trials where pt to look at words written on sheet of paper, visualize and repeat words, 0% without cues on first 3 attempts and 33% with cues and on last attempt 2/3 66% without cues   -KA     Patient’s memory skills will be enhanced as reported by patient by using external memory aides  90%:;without cues  -KA     Status: Patient’s memory skills will be enhanced as reported by patient by using external memory aides  Progressing as expected  -KA     Comments: Patient’s memory skills will be enhanced as reported by patient by using external memory aides  Patient brought her memory journal however has not written in journal since 5/20, SLP reinforced the importance her  help her daily. Patient wrote down entry for today with SLPs assistance to summarize tasks completed today.    -KA        Problem Solving Goals    Patient will improve ability to analyze problems and determine solutions by correctly sequencing __ steps to complete an activity  90%:;without cues  -KA     Status: Patient will improve ability to analyze problems and determine solutions by correctly sequencing __ steps to complete an activity  Progressing as expected  -KA     Comments: Patient will improve ability to analyze problems and determine solutions by correctly sequencing __ steps to complete an activity  Pt. sequenced 5 steps with 75% accuracy.   -KA     Patient will  improve ability to analyze problems and determine solutions by completing functional math problems  90%:;without cues  -KA     Status: Patient will improve ability to analyze problems and determine solutions by completing functional math problems  Progressing as expected  -KA     Comments: Patient will improve ability to analyze problems and determine solutions by completing functional math problems  70% without cues difficulty with time math   -KA       User Key  (r) = Recorded By, (t) = Taken By, (c) = Cosigned By    Initials Name Provider Type    Chetan Nicholas MA,CCC-SLP Speech and Language Pathologist                         Time Calculation:   SLP Start Time: 0900  SLP Stop Time: 1000  SLP Time Calculation (min): 60 min    Therapy Charges for Today     Code Description Service Date Service Provider Modifiers Qty    [id number]  ST TREATMENT SPEECH 4 5/29/2019 Chetan Machuca MA,CCC-SLP GN 1                   Chetan Machuca MA,CCC-SLP  5/29/2019

## 2019-06-03 ENCOUNTER — HOSPITAL ENCOUNTER (OUTPATIENT)
Dept: SPEECH THERAPY | Facility: HOSPITAL | Age: 79
Setting detail: THERAPIES SERIES
Discharge: HOME | End: 2019-06-03

## 2019-06-03 DIAGNOSIS — I63.9 ACUTE CVA (CEREBROVASCULAR ACCIDENT) (HCC): Primary | ICD-10-CM

## 2019-06-03 DIAGNOSIS — R41.841 COGNITIVE COMMUNICATION DEFICIT: ICD-10-CM

## 2019-06-03 DIAGNOSIS — R47.01 APHASIA: ICD-10-CM

## 2019-06-03 PROCEDURE — 92507 TX SP LANG VOICE COMM INDIV: CPT | Performed by: SPEECH-LANGUAGE PATHOLOGIST

## 2019-06-03 NOTE — THERAPY TREATMENT NOTE
Outpatient Speech Language Pathology   Adult Speech Language Cognitive Treatment Note  [facility]     Patient Name: Ivet Diaz  : 1940  MRN: [id number]  Today's Date: 6/3/2019         Visit Date: 2019   Patient Active Problem List   Diagnosis   • Acute CVA (cerebrovascular accident) (CMS/HCC)   • Symptomatic anemia   • Hypertension   • Depression   • Reflux esophagitis   • Breast cancer (CMS/HCC)   • Acute on chronic diastolic heart failure (CMS/HCC)   • Hemorrhagic disorder due to extrinsic circulating anticoagulants (CMS/HCC)          Visit Dx:    ICD-10-CM ICD-9-CM   1. Acute CVA (cerebrovascular accident) (CMS/HCC) I63.9 434.91   2. Cognitive communication deficit R41.841 799.52   3. Aphasia R47.01 784.3                         SLP OP Goals     Row Name 19 1200          Subjective Comments    Subjective Comments  Patient is pleasant and cooperative. Continued discussion on importance of completing daily journal entry to help recall of daily important events  -KA        Subjective Pain    Able to rate subjective pain?  yes  -KA     Pre-Treatment Pain Level  0  -KA     Post-Treatment Pain Level  0  -KA        Written Language Comprehension Goals    Written Language Comprehension STG's  Patient will improve comprehension of written language skills by reading short paragraphs and correctly answering written Yes/No questions  -KA     Patient will improve comprehension of written language skills by reading short paragraphs and correctly answering written Yes/No questions  90%:;without cues  -KA     Status: Patient will improve comprehension of written language skills by reading short paragraphs and correctly answering written Yes/No questions  New  -KA     Comments: Patient will improve comprehension of written language skills by reading short paragraphs and correctly answering written Yes/No questions  Reading comprehension of short paragraph and answering questions 80% with multiple  choice  -KA     Patient will improve comprehension of written language skills by answering written questions related to home management/social/work tasks (bills, recipes, tv guide, emails, procedures)  90%:;without cues  -KA     Status: Patient will improve comprehension of written language skills by answering written questions related to home management/social/work tasks (bills, recipes, tv guide, emails, procedures)  Progressing as expected  -KA     Comments: Patient will improve comprehension of written language skills by answering written questions related to home management/social/work tasks (bills, recipes, tv guide, emails, procedures)  Inferences about paragraphs 100% without cues improvement today   -KA        Memory Goals    Memory STG's  Patient will demonstrate improved ability to recall information by listening to paragraph and answering yes/no questions  -KA     Patient will demonstrate improved ability to recall information by immediately recalling a series of words  90%:;without cues  -KA     Status: Patient will demonstrate improved ability to recall information by immediately recalling a series of words  -- ongoing  -KA     Comments: Patient will demonstrate improved ability to recall information by immediately recalling a series of words  delayed recall of four related words after 10 second delay on IPAD task with multiple choice 70% without cues, and delayed recall of three related words  (same words targeted last week) after 1-2 min delay 0% without cues and 50% with cues.   -KA     Patient’s memory skills will be enhanced as reported by patient by using external memory aides  90%:;without cues  -KA     Status: Patient’s memory skills will be enhanced as reported by patient by using external memory aides  Progress slower than expected  -KA     Comments: Patient’s memory skills will be enhanced as reported by patient by using external memory aides  Patient has not been writing journal entries,   not able to attend therapy until July. SLP had pt with cueing write down summary of tasks completing today in therapy including the three words she is to try to recall, and pt also wrote down a summary of story read in the beginning of the session (while referring to the material, pt did not recall after end of the session). Discussed the importance of writing down date every day, summarizing current events, daily acitivites  -KA     Patient will demonstrate improved ability to recall information by listening to paragraph and answering yes/no questions  90%:;without cues  -KA     Status: Patient will demonstrate improved ability to recall information by listening to paragraph and answering yes/no questions  Progressing as expected  -KA     Comments: Patient will demonstrate improved ability to recall information by listening to paragraph and answering yes/no questions  answering questions on detail from short stories with multiple choice 80% without cues.   -KA        Attention/Orientation Goals    Patient will improve orientation skills by orienting to time/date  90%:;without cues  -KA     Status: Patient will improve orientation skills by orienting to time/date  -- ongoing  -KA     Comments: Patient will improve orientation skills by orienting to time/date  oriented to date without cues or calendar 0/3 and delayed recall after reviewing calendar 3/3  -KA       User Key  (r) = Recorded By, (t) = Taken By, (c) = Cosigned By    Initials Name Provider Type    Chetan Nicholas MA,CCC-SLP Speech and Language Pathologist                         Time Calculation:   SLP Start Time: 1100  SLP Stop Time: 1200  SLP Time Calculation (min): 60 min    Therapy Charges for Today     Code Description Service Date Service Provider Modifiers Qty    [id number]  ST TREATMENT SPEECH 4 6/3/2019 Chetan Machuca MA,CCC-SLP GN 1                   Chetan Machuca MA,CCC-SLP  6/3/2019

## 2019-06-05 ENCOUNTER — HOSPITAL ENCOUNTER (OUTPATIENT)
Dept: SPEECH THERAPY | Facility: HOSPITAL | Age: 79
Setting detail: THERAPIES SERIES
Discharge: HOME | End: 2019-06-05

## 2019-06-05 DIAGNOSIS — R41.841 COGNITIVE COMMUNICATION DEFICIT: ICD-10-CM

## 2019-06-05 DIAGNOSIS — R47.01 APHASIA: ICD-10-CM

## 2019-06-05 DIAGNOSIS — I63.9 ACUTE CVA (CEREBROVASCULAR ACCIDENT) (HCC): Primary | ICD-10-CM

## 2019-06-05 PROCEDURE — 92507 TX SP LANG VOICE COMM INDIV: CPT | Performed by: SPEECH-LANGUAGE PATHOLOGIST

## 2019-06-05 NOTE — THERAPY TREATMENT NOTE
Outpatient Speech Language Pathology   Adult Speech Language Cognitive Treatment Note  [facility]     Patient Name: Ivet Diaz  : 1940  MRN: [id number]  Today's Date: 2019         Visit Date: 2019   Patient Active Problem List   Diagnosis   • Acute CVA (cerebrovascular accident) (CMS/HCC)   • Symptomatic anemia   • Hypertension   • Depression   • Reflux esophagitis   • Breast cancer (CMS/HCC)   • Acute on chronic diastolic heart failure (CMS/HCC)   • Hemorrhagic disorder due to extrinsic circulating anticoagulants (CMS/HCC)          Visit Dx:    ICD-10-CM ICD-9-CM   1. Acute CVA (cerebrovascular accident) (CMS/HCC) I63.9 434.91   2. Cognitive communication deficit R41.841 799.52   3. Aphasia R47.01 784.3                         SLP OP Goals     Row Name 19 1100 19 1000       Subjective Comments    Subjective Comments  --  -KA  Patient is pleasant and cooperative.   -KA       Subjective Pain    Able to rate subjective pain?  --  yes  -KA    Pre-Treatment Pain Level  --  0  -KA    Post-Treatment Pain Level  --  0  -KA       Written Language Comprehension Goals    Patient will improve comprehension of written language skills by answering written questions related to home management/social/work tasks (bills, recipes, tv guide, emails, procedures)  --  90%:;without cues  -KA    Status: Patient will improve comprehension of written language skills by answering written questions related to home management/social/work tasks (bills, recipes, tv guide, emails, procedures)  --  Progressing as expected  -KA    Comments: Patient will improve comprehension of written language skills by answering written questions related to home management/social/work tasks (bills, recipes, tv guide, emails, procedures)  --  Inferences about paragraphs 70% without cues   -KA       Memory Goals    Patient will demonstrate improved ability to recall information by immediately recalling a series of words  --   90%:;without cues  -KA    Status: Patient will demonstrate improved ability to recall information by immediately recalling a series of words  --  -- ongoing  -KA    Comments: Patient will demonstrate improved ability to recall information by immediately recalling a series of words  --  Recall of four related word after a 10 second delay on IPAD task, 85% (given a choice). Delayed recall of three related words (same words from previous sessions) throughout session 0% without cues and 60% with cues, some improvement with accuracy with cues.   -KA    Patient’s memory skills will be enhanced as reported by patient by using external memory aides  --  90%:;without cues  -KA    Status: Patient’s memory skills will be enhanced as reported by patient by using external memory aides  --  -- ongoing  -KA    Comments: Patient’s memory skills will be enhanced as reported by patient by using external memory aides  --  Patient continues to not write daily entires in her journal, except in therapy where she wrote a summary of todays session with SLP assistance.   -KA    Patient will demonstrate improved ability to recall information by listening to paragraph and answering yes/no questions  --  90%:;without cues  -KA    Status: Patient will demonstrate improved ability to recall information by listening to paragraph and answering yes/no questions  --  Progressing as expected  -KA    Comments: Patient will demonstrate improved ability to recall information by listening to paragraph and answering yes/no questions  --  recall of detail in short paragraph pt accuracy when given a multiple choice of 3 is 80%, however her accuracy without the choice of 3 drops to 25% when not given the multple choice.   -KA       Problem Solving Goals    Patient will improve ability to analyze problems and determine solutions by completing a visual representation/filling in a chart by following  written directions  --  90%:;without cues  -KA    Status:  Patient will improve ability to analyze problems and determine solutions by completing a visual representation/filling in a chart by following  written directions  --  Progressing as expected  -KA    Comments: Patient will improve ability to analyze problems and determine solutions by completing a visual representation/filling in a chart by following  written directions  --  Pt. completed mildly-complex logic task with written directions with 80% accuracy independently min cues required at times   -MERRITT      User Key  (r) = Recorded By, (t) = Taken By, (c) = Cosigned By    Initials Name Provider Type    Chetan Nicholas MA,CCC-SLP Speech and Language Pathologist                         Time Calculation:   SLP Start Time: 0900  SLP Stop Time: 1000  SLP Time Calculation (min): 60 min    Therapy Charges for Today     Code Description Service Date Service Provider Modifiers Qty    [id number]  ST TREATMENT SPEECH 4 6/5/2019 Chetan Machuca MA,CCC-SLP GN 1                   Chetan Machuca MA,CCC-SLP  6/5/2019

## 2019-06-10 ENCOUNTER — TRANSCRIBE ORDERS (OUTPATIENT)
Dept: ADMINISTRATIVE | Facility: HOSPITAL | Age: 79
End: 2019-06-10

## 2019-06-10 ENCOUNTER — LAB (OUTPATIENT)
Dept: LAB | Facility: HOSPITAL | Age: 79
End: 2019-06-10

## 2019-06-10 ENCOUNTER — HOSPITAL ENCOUNTER (OUTPATIENT)
Dept: SPEECH THERAPY | Facility: HOSPITAL | Age: 79
Setting detail: THERAPIES SERIES
Discharge: HOME | End: 2019-06-10

## 2019-06-10 DIAGNOSIS — M10.9 GOUT, UNSPECIFIED CAUSE, UNSPECIFIED CHRONICITY, UNSPECIFIED SITE: ICD-10-CM

## 2019-06-10 DIAGNOSIS — R41.841 COGNITIVE COMMUNICATION DEFICIT: Primary | ICD-10-CM

## 2019-06-10 DIAGNOSIS — D64.9 ANEMIA, UNSPECIFIED TYPE: ICD-10-CM

## 2019-06-10 DIAGNOSIS — E78.5 HYPERLIPIDEMIA, UNSPECIFIED HYPERLIPIDEMIA TYPE: ICD-10-CM

## 2019-06-10 DIAGNOSIS — I10 ESSENTIAL HYPERTENSION, MALIGNANT: ICD-10-CM

## 2019-06-10 DIAGNOSIS — I10 ESSENTIAL HYPERTENSION, MALIGNANT: Primary | ICD-10-CM

## 2019-06-10 LAB
ALBUMIN SERPL-MCNC: 3.7 G/DL (ref 3.5–5.2)
ALBUMIN/GLOB SERPL: 1.2 G/DL
ALP SERPL-CCNC: 266 U/L (ref 39–117)
ALT SERPL W P-5'-P-CCNC: 22 U/L (ref 1–33)
ANION GAP SERPL CALCULATED.3IONS-SCNC: 12.5 MMOL/L
AST SERPL-CCNC: 24 U/L (ref 1–32)
BASOPHILS # BLD AUTO: 0.04 10*3/MM3 (ref 0–0.2)
BASOPHILS NFR BLD AUTO: 0.6 % (ref 0–1.5)
BILIRUB SERPL-MCNC: 0.4 MG/DL (ref 0.2–1.2)
BUN BLD-MCNC: 18 MG/DL (ref 8–23)
BUN/CREAT SERPL: 19.1 (ref 7–25)
CALCIUM SPEC-SCNC: 9.5 MG/DL (ref 8.6–10.5)
CHLORIDE SERPL-SCNC: 99 MMOL/L (ref 98–107)
CHOLEST SERPL-MCNC: 96 MG/DL (ref 0–200)
CO2 SERPL-SCNC: 26.5 MMOL/L (ref 22–29)
CREAT BLD-MCNC: 0.94 MG/DL (ref 0.57–1)
DEPRECATED RDW RBC AUTO: 56.6 FL (ref 37–54)
EOSINOPHIL # BLD AUTO: 0.26 10*3/MM3 (ref 0–0.4)
EOSINOPHIL NFR BLD AUTO: 3.6 % (ref 0.3–6.2)
ERYTHROCYTE [DISTWIDTH] IN BLOOD BY AUTOMATED COUNT: 19.6 % (ref 12.3–15.4)
GFR SERPL CREATININE-BSD FRML MDRD: 58 ML/MIN/1.73
GLOBULIN UR ELPH-MCNC: 3 GM/DL
GLUCOSE BLD-MCNC: 167 MG/DL (ref 65–99)
HCT VFR BLD AUTO: 37.4 % (ref 34–46.6)
HDLC SERPL-MCNC: 53 MG/DL (ref 40–60)
HGB BLD-MCNC: 10.6 G/DL (ref 12–15.9)
IMM GRANULOCYTES # BLD AUTO: 0.02 10*3/MM3 (ref 0–0.05)
IMM GRANULOCYTES NFR BLD AUTO: 0.3 % (ref 0–0.5)
LDLC SERPL CALC-MCNC: 21 MG/DL (ref 0–100)
LDLC/HDLC SERPL: 0.39 {RATIO}
LYMPHOCYTES # BLD AUTO: 1.32 10*3/MM3 (ref 0.7–3.1)
LYMPHOCYTES NFR BLD AUTO: 18.3 % (ref 19.6–45.3)
MCH RBC QN AUTO: 22.5 PG (ref 26.6–33)
MCHC RBC AUTO-ENTMCNC: 28.3 G/DL (ref 31.5–35.7)
MCV RBC AUTO: 79.4 FL (ref 79–97)
MONOCYTES # BLD AUTO: 0.45 10*3/MM3 (ref 0.1–0.9)
MONOCYTES NFR BLD AUTO: 6.2 % (ref 5–12)
NEUTROPHILS # BLD AUTO: 5.14 10*3/MM3 (ref 1.7–7)
NEUTROPHILS NFR BLD AUTO: 71 % (ref 42.7–76)
NRBC BLD AUTO-RTO: 0 /100 WBC (ref 0–0.2)
PLATELET # BLD AUTO: 273 10*3/MM3 (ref 140–450)
PMV BLD AUTO: 11.5 FL (ref 6–12)
POTASSIUM BLD-SCNC: 4 MMOL/L (ref 3.5–5.2)
PROT SERPL-MCNC: 6.7 G/DL (ref 6–8.5)
RBC # BLD AUTO: 4.71 10*6/MM3 (ref 3.77–5.28)
SODIUM BLD-SCNC: 138 MMOL/L (ref 136–145)
TRIGL SERPL-MCNC: 112 MG/DL (ref 0–150)
URATE SERPL-MCNC: 6.2 MG/DL (ref 2.4–5.7)
VIT B12 BLD-MCNC: 1034 PG/ML (ref 211–946)
VLDLC SERPL-MCNC: 22.4 MG/DL (ref 5–40)
WBC NRBC COR # BLD: 7.23 10*3/MM3 (ref 3.4–10.8)

## 2019-06-10 PROCEDURE — 85025 COMPLETE CBC W/AUTO DIFF WBC: CPT | Performed by: INTERNAL MEDICINE

## 2019-06-10 PROCEDURE — 84550 ASSAY OF BLOOD/URIC ACID: CPT | Performed by: INTERNAL MEDICINE

## 2019-06-10 PROCEDURE — 82607 VITAMIN B-12: CPT | Performed by: INTERNAL MEDICINE

## 2019-06-10 PROCEDURE — 92507 TX SP LANG VOICE COMM INDIV: CPT

## 2019-06-10 PROCEDURE — 36415 COLL VENOUS BLD VENIPUNCTURE: CPT

## 2019-06-10 PROCEDURE — 80061 LIPID PANEL: CPT | Performed by: INTERNAL MEDICINE

## 2019-06-10 PROCEDURE — 80053 COMPREHEN METABOLIC PANEL: CPT | Performed by: INTERNAL MEDICINE

## 2019-06-10 NOTE — THERAPY TREATMENT NOTE
Outpatient Speech Language Pathology   Adult Speech Language Cognitive Treatment Note  [facility]     Patient Name: Ivet Diaz  : 1940  MRN: [id number]  Today's Date: 6/10/2019         Visit Date: 06/10/2019   Patient Active Problem List   Diagnosis   • Acute CVA (cerebrovascular accident) (CMS/HCC)   • Symptomatic anemia   • Hypertension   • Depression   • Reflux esophagitis   • Breast cancer (CMS/HCC)   • Acute on chronic diastolic heart failure (CMS/HCC)   • Hemorrhagic disorder due to extrinsic circulating anticoagulants (CMS/HCC)          Visit Dx:    ICD-10-CM ICD-9-CM   1. Cognitive communication deficit R41.841 799.52                         SLP OP Goals     Row Name 06/10/19 1200          Memory Goals    Memory STG's  Patient will demonstrate improved ability to recall information by listening to paragraph and answering yes/no questions  -CP     Status: Patient’s memory skills will be enhanced as reported by patient by using external memory aides  Progressing as expected  -CP     Comments: Patient’s memory skills will be enhanced as reported by patient by using external memory aides  Patient able to use calendar to recall upcoming events and answer simple questions regarding details. She did not record any journal entries since last session as instructed. DIscussed benefit of using external memory aids, and patient agrees this could be a helpful tool for her.  -CP     Comments: Patient will demonstrate improved ability to recall information by listening to paragraph and answering yes/no questions  Answering questions re: short paragraphs w/multiple choice: 79%  -CP        Problem Solving Goals    Problem Solving STG's  Patient will improve ability to analyze problems and determine solutions by completing a visual representation/filling in a chart by following  written directions  -CP     Status: Patient will improve ability to analyze problems and determine solutions by correctly sequencing  __ steps to complete an activity  Progressing as expected  -CP     Comments: Patient will improve ability to analyze problems and determine solutions by correctly sequencing __ steps to complete an activity  Temporal problem solving 20% independently for moderately complex; required mod assist for setting up problems.  -CP        Attention/Orientation Goals    Attention/Orientation STG's  Patient will improve orientation skills by orienting to time/date  -CP     Status: Patient will improve orientation skills by orienting to time/date  Progressing as expected  -CP     Comments: Patient will improve orientation skills by orienting to time/date  100% with use of calendar- needed cue to use  -CP       User Key  (r) = Recorded By, (t) = Taken By, (c) = Cosigned By    Initials Name Provider Type    CP Annabelle Driscoll MS CCC-SLP Speech and Language Pathologist          OP SLP Education     Row Name 06/10/19 1252       Education    Barriers to Learning  No barriers identified  -CP    Education Provided  Described results of evaluation;Patient expressed understanding of evaluation  -CP    Assessed  Learning needs;Learning motivation;Learning preferences;Learning readiness  -CP    Learning Motivation  Strong  -CP    Learning Method  Explanation  -CP    Teaching Response  Verbalized understanding  -CP      User Key  (r) = Recorded By, (t) = Taken By, (c) = Cosigned By    Initials Name Effective Dates    CP Annabelle Driscoll MS CCC-SLP 06/08/18 -           OP SLP Assessment/Plan - 06/10/19 1251        SLP Assessment    Functional Problems  Speech Language- Adult/Cognition   -CP    Impact on Function: Adult Speech Language/Cognition  Trouble learning or remembering new information;Decreased recall of personal information and medical history;Restrictions in personal and social life;Difficulty sequencing or problem solving to complete ADLs;Requires supervision   -CP      User Key  (r) = Recorded By, (t) = Taken By, (c) =  Cosigned By    Initials Name Provider Type    CP Annabelle Driscoll MS CCC-SLP Speech and Language Pathologist                 Time Calculation:   SLP Start Time: 1100  SLP Stop Time: 1150  SLP Time Calculation (min): 50 min    Therapy Charges for Today     Code Description Service Date Service Provider Modifiers Qty    [id number] [facility] TREATMENT SPEECH 3 6/10/2019 Annabelle Driscoll MS CCC-SLP GN 1                   Annabelle Driscoll MS CCC-SLP  6/10/2019

## 2019-06-12 ENCOUNTER — HOSPITAL ENCOUNTER (OUTPATIENT)
Dept: SPEECH THERAPY | Facility: HOSPITAL | Age: 79
Setting detail: THERAPIES SERIES
Discharge: HOME | End: 2019-06-12

## 2019-06-12 DIAGNOSIS — R47.01 APHASIA: ICD-10-CM

## 2019-06-12 DIAGNOSIS — I63.9 ACUTE CVA (CEREBROVASCULAR ACCIDENT) (HCC): ICD-10-CM

## 2019-06-12 DIAGNOSIS — R41.841 COGNITIVE COMMUNICATION DEFICIT: Primary | ICD-10-CM

## 2019-06-12 PROCEDURE — 92507 TX SP LANG VOICE COMM INDIV: CPT | Performed by: SPEECH-LANGUAGE PATHOLOGIST

## 2019-06-12 NOTE — THERAPY TREATMENT NOTE
Outpatient Speech Language Pathology   Adult Speech Language Cognitive Treatment Note  [facility]     Patient Name: Ivet Diaz  : 1940  MRN: [id number]  Today's Date: 2019         Visit Date: 2019   Patient Active Problem List   Diagnosis   • Acute CVA (cerebrovascular accident) (CMS/HCC)   • Symptomatic anemia   • Hypertension   • Depression   • Reflux esophagitis   • Breast cancer (CMS/HCC)   • Acute on chronic diastolic heart failure (CMS/HCC)   • Hemorrhagic disorder due to extrinsic circulating anticoagulants (CMS/HCC)          Visit Dx:    ICD-10-CM ICD-9-CM   1. Cognitive communication deficit R41.841 799.52   2. Acute CVA (cerebrovascular accident) (CMS/HCC) I63.9 434.91   3. Aphasia R47.01 784.3                         SLP OP Goals     Row Name 19 1000          Subjective Comments    Subjective Comments  Patient is plesant and cooperative  -KA        Subjective Pain    Able to rate subjective pain?  yes  -KA     Pre-Treatment Pain Level  0  -KA     Post-Treatment Pain Level  0  -KA        Written Language Comprehension Goals    Patient will improve comprehension of written language skills by answering written questions related to home management/social/work tasks (bills, recipes, tv guide, emails, procedures)  90%:;without cues  -KA     Status: Patient will improve comprehension of written language skills by answering written questions related to home management/social/work tasks (bills, recipes, tv guide, emails, procedures)  Progressing as expected  -KA     Comments: Patient will improve comprehension of written language skills by answering written questions related to home management/social/work tasks (bills, recipes, tv guide, emails, procedures)  schedule task determining the order of a schedule=57% without cues and 100% with min to mod cues   -KA        Memory Goals    Patient will demonstrate improved ability to recall information by immediately recalling a series of  words  90%:;without cues  -KA     Status: Patient will demonstrate improved ability to recall information by immediately recalling a series of words  Progressing as expected  -KA     Comments: Patient will demonstrate improved ability to recall information by immediately recalling a series of words  Patient ability to recall same three related words focused and targeted each session, today recall of the three related words between 2 to 5 minute delay without cues 44% and with cues 88% with pt demonstrating progress. In IPAD task, recall of two related words with a 10 second delay when given no choice was 100%, delayed recall of three unrelated words with 10 second delay with no choice of words 53% and accuracy increased to 90% when given a choice of words in word bank.   -KA        Problem Solving Goals    Patient will improve ability to analyze problems and determine solutions by correctly sequencing __ steps to complete an activity  90%:;without cues  -KA     Status: Patient will improve ability to analyze problems and determine solutions by correctly sequencing __ steps to complete an activity  Progressing as expected  -KA     Comments: Patient will improve ability to analyze problems and determine solutions by correctly sequencing __ steps to complete an activity  6 step functional sequencing task=85% without cues and 100% with min cus   -KA     Patient will improve ability to analyze problems and determine solutions by completing a visual representation/filling in a chart by following  written directions  90%:;without cues  -KA     Status: Patient will improve ability to analyze problems and determine solutions by completing a visual representation/filling in a chart by following  written directions  Progressing as expected  -KA     Comments: Patient will improve ability to analyze problems and determine solutions by completing a visual representation/filling in a chart by following  written directions  Patient  completed moderate complex logic task with written directions and performed 57% without cues and 100% with min to mod cues   -KA        Attention/Orientation Goals    Patient will improve orientation skills by orienting to time/date  90%:;without cues  -KA     Status: Patient will improve orientation skills by orienting to time/date  Progressing as expected  -KA     Comments: Patient will improve orientation skills by orienting to time/date  100% with use of calendar- needed cue to use  -KA       User Key  (r) = Recorded By, (t) = Taken By, (c) = Cosigned By    Initials Name Provider Type    Chetan Nicholas MA,CCC-SLP Speech and Language Pathologist                         Time Calculation:   SLP Start Time: 0900  SLP Stop Time: 1000  SLP Time Calculation (min): 60 min    Therapy Charges for Today     Code Description Service Date Service Provider Modifiers Qty    [id number] [facility] TREATMENT SPEECH 4 6/12/2019 Chetan Machuca MA,CCC-SLP GN 1                   Chetan Machuca MA,CCC-SLP  6/12/2019

## 2019-06-13 ENCOUNTER — CALL CENTER PROGRAMS (OUTPATIENT)
Dept: CALL CENTER | Facility: HOSPITAL | Age: 79
End: 2019-06-13

## 2019-06-13 NOTE — OUTREACH NOTE
Stroke [place] Survey      Responses   [facility]   Attempt successful  No   Unsuccessful attempts  Attempt 1          Annabelle Torres RN

## 2019-06-17 ENCOUNTER — HOSPITAL ENCOUNTER (OUTPATIENT)
Dept: SPEECH THERAPY | Facility: HOSPITAL | Age: 79
Setting detail: THERAPIES SERIES
Discharge: HOME | End: 2019-06-17

## 2019-06-17 DIAGNOSIS — I63.9 ACUTE CVA (CEREBROVASCULAR ACCIDENT) (HCC): ICD-10-CM

## 2019-06-17 DIAGNOSIS — R41.841 COGNITIVE COMMUNICATION DEFICIT: Primary | ICD-10-CM

## 2019-06-17 PROCEDURE — 92507 TX SP LANG VOICE COMM INDIV: CPT | Performed by: SPEECH-LANGUAGE PATHOLOGIST

## 2019-06-17 NOTE — THERAPY TREATMENT NOTE
Outpatient Speech Language Pathology   Adult Speech Language Cognitive Treatment Note  [facility]     Patient Name: Ivet Diaz  : 1940  MRN: [id number]  Today's Date: 2019         Visit Date: 2019   Patient Active Problem List   Diagnosis   • Acute CVA (cerebrovascular accident) (CMS/HCC)   • Symptomatic anemia   • Hypertension   • Depression   • Reflux esophagitis   • Breast cancer (CMS/HCC)   • Acute on chronic diastolic heart failure (CMS/HCC)   • Hemorrhagic disorder due to extrinsic circulating anticoagulants (CMS/HCC)          Visit Dx:    ICD-10-CM ICD-9-CM   1. Cognitive communication deficit R41.841 799.52   2. Acute CVA (cerebrovascular accident) (CMS/HCC) I63.9 434.91                         SLP OP Goals     Row Name 19 1200          Subjective Comments    Subjective Comments  Patient is pleasant and cooperative  -KA        Subjective Pain    Able to rate subjective pain?  yes  -KA     Pre-Treatment Pain Level  0  -KA     Post-Treatment Pain Level  0  -KA        Written Language Comprehension Goals    Patient will improve comprehension of written language skills by reading short paragraphs and correctly answering written Yes/No questions  90%:;without cues  -KA     Status: Patient will improve comprehension of written language skills by reading short paragraphs and correctly answering written Yes/No questions  -- ongoing  -KA     Comments: Patient will improve comprehension of written language skills by reading short paragraphs and correctly answering written Yes/No questions  Reading comprehension of short paragraph and answering questions 80% with multiple choice  -KA        Memory Goals    Patient will demonstrate improved ability to recall information by immediately recalling a series of words  90%:;without cues  -KA     Status: Patient will demonstrate improved ability to recall information by immediately recalling a series of words  -- ongoing  -KA     Comments:  Patient will demonstrate improved ability to recall information by immediately recalling a series of words  delayed recall of same three targeted related words across four trials= 10% accuracy without cues and 60% with cues. Delayed recall of three unrelated words after a 10 second delay on IPAD task without a word bank and choices-55% and accuracy with choices/word bank=90% accuracy.  -KA     Patient’s memory skills will be enhanced as reported by patient by using external memory aides  90%:;without cues  -KA     Status: Patient’s memory skills will be enhanced as reported by patient by using external memory aides  -- ongoing  -KA     Comments: Patient’s memory skills will be enhanced as reported by patient by using external memory aides  Patient utilized her calendar to recall the current date with min assistance, still does not write in journal entries on the days of the week she is not in therapy, pt continues to verbalize understanding of the benefits of this  -KA     Patient will demonstrate improved ability to recall information by listening to paragraph and answering yes/no questions  90%:;without cues  -KA     Status: Patient will demonstrate improved ability to recall information by listening to paragraph and answering yes/no questions  Progressing as expected  -KA     Comments: Patient will demonstrate improved ability to recall information by listening to paragraph and answering yes/no questions  Answering questions on paragraphs, accuracy without given a choice 60% and when given multiple choice accuracy is 85%  -KA        Attention/Orientation Goals    Patient will improve orientation skills by orienting to time/date  90%:;without cues  -KA     Status: Patient will improve orientation skills by orienting to time/date  -- ongoing  -KA     Comments: Patient will improve orientation skills by orienting to time/date  recall of the current date without looking at calendar for assistance was 0%, Patient  studied her calendar in therapy and delayed recall was 33%  -MERRITT       User Key  (r) = Recorded By, (t) = Taken By, (c) = Cosigned By    Initials Name Provider Type    Chetan Nicholas MA,CCC-SLP Speech and Language Pathologist                         Time Calculation:   SLP Start Time: 1100  SLP Stop Time: 1200  SLP Time Calculation (min): 60 min    Therapy Charges for Today     Code Description Service Date Service Provider Modifiers Qty    [id number] [facility] TREATMENT SPEECH 4 6/17/2019 Chetan Machuca MA,CCC-SLP GN 1                   Chetan Machuca MA,CCC-SLP  6/17/2019

## 2019-06-19 ENCOUNTER — HOSPITAL ENCOUNTER (OUTPATIENT)
Dept: SPEECH THERAPY | Facility: HOSPITAL | Age: 79
Setting detail: THERAPIES SERIES
Discharge: HOME | End: 2019-06-19

## 2019-06-19 DIAGNOSIS — R41.841 COGNITIVE COMMUNICATION DEFICIT: ICD-10-CM

## 2019-06-19 DIAGNOSIS — I63.9 ACUTE CVA (CEREBROVASCULAR ACCIDENT) (HCC): Primary | ICD-10-CM

## 2019-06-19 PROCEDURE — 92507 TX SP LANG VOICE COMM INDIV: CPT | Performed by: SPEECH-LANGUAGE PATHOLOGIST

## 2019-06-19 NOTE — THERAPY PROGRESS REPORT/RE-CERT
Outpatient Speech Language Pathology   Adult Speech Language Cognitive Progress Note  [facility]     Patient Name: Ivet Diaz  : 1940  MRN: [id number]  Today's Date: 2019         Visit Date: 2019   Patient Active Problem List   Diagnosis   • Acute CVA (cerebrovascular accident) (CMS/HCC)   • Symptomatic anemia   • Hypertension   • Depression   • Reflux esophagitis   • Breast cancer (CMS/HCC)   • Acute on chronic diastolic heart failure (CMS/HCC)   • Hemorrhagic disorder due to extrinsic circulating anticoagulants (CMS/HCC)          Visit Dx:    ICD-10-CM ICD-9-CM   1. Acute CVA (cerebrovascular accident) (CMS/HCC) I63.9 434.91   2. Cognitive communication deficit R41.841 799.52                         SLP OP Goals     Row Name 19 1000          Subjective Comments    Subjective Comments  Patient is pleasant and cooperative  -KA        Subjective Pain    Able to rate subjective pain?  yes  -KA     Pre-Treatment Pain Level  0  -KA     Post-Treatment Pain Level  0  -KA        Written Language Comprehension Goals    Patient will be able to comprehend written material in home/home and social  environment  90%:;without cues  -KA     Status: Patient will be able to comprehend written material in home/home and social  environment  -- ongoing  -KA     Comments: Patient will be able to comprehend written material in home/home and social  environment  Average accuracy with reading comprehension 70% accuracy of short paragraphs  -KA     Patient will improve comprehension of written language skills by reading short paragraphs and correctly answering written Yes/No questions  90%:;without cues  -KA     Status: Patient will improve comprehension of written language skills by reading short paragraphs and correctly answering written Yes/No questions  -- ongoing  -KA     Comments: Patient will improve comprehension of written language skills by reading short paragraphs and correctly answering written  Yes/No questions  Reading comprehension of short paragraph and answering questions 70% with multiple choice  -KA        Memory Goals    Patient will be able to remember  information needed to participate in activities of daily living  Independently  -KA     Status: Patient will be able to remember  information needed to participate in activities of daily living  Progressing as expected  -KA     Comments: Patient will be able to remember  information needed to participate in activities of daily living  SLow progress with patient's short term and immediate memory, pt's delayed recall of three unrelated words today after 10 second delay increased to 67% without choices/word bank and with choices 90% following weeks of average of 50% and delayed recall of the same three related words increased to 50% (previously average of 20%) without cues today and 100% with min cues  -KA     Patient will demonstrate improved ability to recall information by immediately recalling a series of words  90%:;without cues  -KA     Status: Patient will demonstrate improved ability to recall information by immediately recalling a series of words  Progressing as expected  -KA     Comments: Patient will demonstrate improved ability to recall information by immediately recalling a series of words  see accuracies above please,  -KA     Patient’s memory skills will be enhanced as reported by patient by using external memory aides  90%:;without cues  -KA     Status: Patient’s memory skills will be enhanced as reported by patient by using external memory aides  -- ongoing  -KA     Comments: Patient’s memory skills will be enhanced as reported by patient by using external memory aides  Patient utilized her calendar to recall the current date with min assistance, still does not write in journal entries on the days of the week she is not in therapy, pt continues to verbalize understanding of the benefits of this  -KA     Patient will demonstrate  improved ability to recall information by listening to paragraph and answering yes/no questions  90%:;without cues  -KA     Status: Patient will demonstrate improved ability to recall information by listening to paragraph and answering yes/no questions  Progressing as expected  -KA     Comments: Patient will demonstrate improved ability to recall information by listening to paragraph and answering yes/no questions  52% without cues without use of multiple choice and 80% with multiple choice  -KA        Problem Solving Goals    Patient will be able to engage in avocational activities requiring high level cognitive skills  Independently  -KA     Status: Patient will be able to engage in avocational activities requiring high level cognitive skills  Progressing as expected  -KA     Comments: Patient will be able to engage in avocational activities requiring high level cognitive skills  Improvement with sequencing, met goal for 5 steps and averaging 75% with 6 steps, Improvement with functional math skills averaging 70%   -KA     Patient will improve ability to analyze problems and determine solutions by completing functional math problems  90%:;without cues  -KA     Status: Patient will improve ability to analyze problems and determine solutions by completing functional math problems  Progressing as expected  -KA     Comments: Patient will improve ability to analyze problems and determine solutions by completing functional math problems  70% without cues difficulty with time math   -KA       User Key  (r) = Recorded By, (t) = Taken By, (c) = Cosigned By    Initials Name Provider Type    Chetan Nicholas MA,CCC-SLP Speech and Language Pathologist          OP SLP Education     Row Name 06/19/19 2989       Education    Assessed  Learning needs;Learning motivation  -KA    Learning Motivation  Strong  -KA    Learning Method  Explanation  -KA    Teaching Response  Verbalized understanding  -KA      User Key  (r) = Recorded  By, (t) = Taken By, (c) = Cosigned By    Initials Name Effective Dates    Chetan Nicholas MA,CCC-SLP 06/08/18 -           OP SLP Assessment/Plan - 06/19/19 1028        SLP Assessment    Functional Problems  Speech Language- Adult/Cognition   -    Impact on Function: Adult Speech Language/Cognition  Trouble learning or remembering new information;Decreased recall of personal information and medical history;Difficulty participating in avocational activities;Restrictions in personal and social life   -MERRITT    Clinical Impression: Speech Language-Adult/Congnition  Moderate-Severe:;Cognitive Communication Impairment   -       SLP Plan    Frequency  -- 2x a week     2x a week   -KA    Duration  12 weeks   -KA    Planned CPT's?  SLP INDIVIDUAL SPEECH THERAPY: 29312   -    Expected Duration Therapy Session - minutes  45-60 minutes   -      User Key  (r) = Recorded By, (t) = Taken By, (c) = Cosigned By    Initials Name Provider Type    Chetan Nicholas MA,CCC-SLP Speech and Language Pathologist                 Time Calculation:   SLP Start Time: 0900  SLP Stop Time: 1000  SLP Time Calculation (min): 60 min    Therapy Charges for Today     Code Description Service Date Service Provider Modifiers Qty    38803082244 [facility] TREATMENT SPEECH 4 6/19/2019 Chetan Machuca MA,CCC-SLP GN 1                   Chetan Machuca MA,CCC-SLP  6/19/2019

## 2019-06-24 ENCOUNTER — CALL CENTER PROGRAMS (OUTPATIENT)
Dept: CALL CENTER | Facility: HOSPITAL | Age: 79
End: 2019-06-24

## 2019-06-24 ENCOUNTER — HOSPITAL ENCOUNTER (OUTPATIENT)
Dept: SPEECH THERAPY | Facility: HOSPITAL | Age: 79
Setting detail: THERAPIES SERIES
Discharge: HOME | End: 2019-06-24

## 2019-06-24 DIAGNOSIS — R41.841 COGNITIVE COMMUNICATION DEFICIT: ICD-10-CM

## 2019-06-24 DIAGNOSIS — I63.9 ACUTE CVA (CEREBROVASCULAR ACCIDENT) (HCC): Primary | ICD-10-CM

## 2019-06-24 PROCEDURE — 92507 TX SP LANG VOICE COMM INDIV: CPT | Performed by: SPEECH-LANGUAGE PATHOLOGIST

## 2019-06-24 NOTE — OUTREACH NOTE
Stroke [facility] Survey      [facility]   [facility]  No   Unsuccessful attempts  Attempt 2          Annabelle Torres RN

## 2019-06-24 NOTE — THERAPY TREATMENT NOTE
Outpatient Speech Language Pathology   Adult Speech Language Cognitive Treatment Note  [facility]     Patient Name: Ivet Diaz  : 1940  MRN: [id number]  Today's Date: 2019         Visit Date: 2019   Patient Active Problem List   Diagnosis   • Acute CVA (cerebrovascular accident) (CMS/HCC)   • Symptomatic anemia   • Hypertension   • Depression   • Reflux esophagitis   • Breast cancer (CMS/HCC)   • Acute on chronic diastolic heart failure (CMS/HCC)   • Hemorrhagic disorder due to extrinsic circulating anticoagulants (CMS/HCC)          Visit Dx:    ICD-10-CM ICD-9-CM   1. Acute CVA (cerebrovascular accident) (CMS/HCC) I63.9 434.91   2. Cognitive communication deficit R41.841 799.52                         SLP OP Goals     Row Name 19 1200          Subjective Comments    Subjective Comments  Patient appeared to have difficuty concentrating today. She become emotional at one patient and stated she has alot of her mind today. She is preparing for a trip where she leaves tomorrow for almost 2 weeks and has a lot of preparation to do. SLP discussed strategies with her including writing down lists of things to do and items to pack.   -KA        Subjective Pain    Able to rate subjective pain?  yes  -KA     Pre-Treatment Pain Level  0  -KA     Post-Treatment Pain Level  0  -KA        Written Language Comprehension Goals    Patient will improve comprehension of written language skills by reading short paragraphs and correctly answering written Yes/No questions  90%:;without cues  -KA     Status: Patient will improve comprehension of written language skills by reading short paragraphs and correctly answering written Yes/No questions  Progressing as expected  -KA     Comments: Patient will improve comprehension of written language skills by reading short paragraphs and correctly answering written Yes/No questions  Reading comprehension of short paragraph and answering questions 80% with multiple  choice  -KA        Memory Goals    Patient will demonstrate improved ability to recall information by immediately recalling a series of words  90%:;without cues  -KA     Status: Patient will demonstrate improved ability to recall information by immediately recalling a series of words  Progressing as expected  -KA     Comments: Patient will demonstrate improved ability to recall information by immediately recalling a series of words  Delayed recall of three unrelated words after 10 seconds in IPAD task=43% without a word bank or multiple choice (out of 8) and with word bank-93%. Delayed recall of three unrelated words today patient was able to recall one without cues for accuracy of 33% without cues and 100% with min cues (less cueing required today).   -KA     Patient’s memory skills will be enhanced as reported by patient by using external memory aides  90%:;without cues  -KA     Status: Patient’s memory skills will be enhanced as reported by patient by using external memory aides  Progressing as expected  -KA     Comments: Patient’s memory skills will be enhanced as reported by patient by using external memory aides  see above notes   -KA     Patient will demonstrate improved ability to recall information by listening to paragraph and answering yes/no questions  90%:;without cues  -KA     Status: Patient will demonstrate improved ability to recall information by listening to paragraph and answering yes/no questions  Progressing as expected  -KA     Comments: Patient will demonstrate improved ability to recall information by listening to paragraph and answering yes/no questions  40% without cues and without multiple choice and accuracy with multiple choice 85% without cues   -KA       User Key  (r) = Recorded By, (t) = Taken By, (c) = Cosigned By    Initials Name Provider Type    Chetan Nicholas MA,[facility]-SLP Speech and Language Pathologist                         Time Calculation:   SLP Start Time: 1100  SLP Stop  Time: 1200  SLP Time Calculation (min): 60 min    Therapy Charges for Today     Code Description Service Date Service Provider Modifiers Qty    [id number]  ST TREATMENT SPEECH 4 6/24/2019 Chetan Machuca MA,CCC-SLP GN 1                   Chetan Machuca MA,CCC-SLP  6/24/2019

## 2019-06-25 ENCOUNTER — CALL CENTER PROGRAMS (OUTPATIENT)
Dept: CALL CENTER | Facility: HOSPITAL | Age: 79
End: 2019-06-25

## 2019-06-25 NOTE — OUTREACH NOTE
Stroke [place] Survey      Responses   Facility patient discharged from?  [facility]   Attempt successful  Yes   Call start time  0940   Person spoke with today (if not patient) and relationship  Caregiver   Call end time  0945   Patient location 30 days post discharge if known  Home   Was the patient readmitted within 30 days of discharge?  Yes   Number of hospitalizations within 30 days of discharge  1   Date first readmitted within 30 days of discharge   04/25/19   First readmit within 30 days of discharge reason:  Unknown   Could you live alone without any help from another person?  Yes   Can you do everything that you were doing right before your stroke even if slower and not as much?  Yes   Are you completely back to the way you were right before your stroke?  No   Can the patient sit up in bed without any help?  Yes   Call Center [place] Score  1   [place] score call completed  Yes   Comments  Spoke with spouse-Tylor Diaz.  Patient has mild word finding difficulties at times.  She continues with SLP.  She is able to drive and manage all her usual daily activities.          Annabelle Torres RN

## 2019-06-26 ENCOUNTER — APPOINTMENT (OUTPATIENT)
Dept: SPEECH THERAPY | Facility: HOSPITAL | Age: 79
End: 2019-06-26

## 2019-07-01 ENCOUNTER — APPOINTMENT (OUTPATIENT)
Dept: SPEECH THERAPY | Facility: HOSPITAL | Age: 79
End: 2019-07-01

## 2019-07-03 ENCOUNTER — APPOINTMENT (OUTPATIENT)
Dept: SPEECH THERAPY | Facility: HOSPITAL | Age: 79
End: 2019-07-03

## 2019-07-08 ENCOUNTER — HOSPITAL ENCOUNTER (OUTPATIENT)
Dept: SPEECH THERAPY | Facility: HOSPITAL | Age: 79
Setting detail: THERAPIES SERIES
Discharge: HOME | End: 2019-07-08

## 2019-07-08 DIAGNOSIS — R41.841 COGNITIVE COMMUNICATION DEFICIT: ICD-10-CM

## 2019-07-08 DIAGNOSIS — I63.9 ACUTE CVA (CEREBROVASCULAR ACCIDENT) (HCC): Primary | ICD-10-CM

## 2019-07-08 PROCEDURE — 92507 TX SP LANG VOICE COMM INDIV: CPT | Performed by: SPEECH-LANGUAGE PATHOLOGIST

## 2019-07-08 NOTE — THERAPY TREATMENT NOTE
Outpatient Speech Language Pathology   Adult Speech Language Cognitive Treatment Note  [facility]     Patient Name: Ivet Diaz  : 1940  MRN: [id number]  Today's Date: 2019         Visit Date: 2019   Patient Active Problem List   Diagnosis   • Acute CVA (cerebrovascular accident) (CMS/HCC)   • Symptomatic anemia   • Hypertension   • Depression   • Reflux esophagitis   • Breast cancer (CMS/HCC)   • Acute on chronic diastolic heart failure (CMS/HCC)   • Hemorrhagic disorder due to extrinsic circulating anticoagulants (CMS/HCC)          Visit Dx:    ICD-10-CM ICD-9-CM   1. Acute CVA (cerebrovascular accident) (CMS/HCC) I63.9 434.91   2. Cognitive communication deficit R41.841 799.52                         SLP OP Goals     Row Name 19 1200          Subjective Comments    Subjective Comments  Patient is pleasant and cooperative. She was out of town the past 1.5 weeks and her  was with patient today for therapy session.  -KA        Subjective Pain    Able to rate subjective pain?  yes  -KA     Pre-Treatment Pain Level  0  -KA     Post-Treatment Pain Level  0  -KA        Memory Goals    Patient will demonstrate improved ability to recall information by immediately recalling a series of words  90%:;without cues  -KA     Status: Patient will demonstrate improved ability to recall information by immediately recalling a series of words  Progressing as expected  -KA     Comments: Patient will demonstrate improved ability to recall information by immediately recalling a series of words  Patient demonstrated improvement today with delayed recall of the same three related words targeted throughout therapy, recall of words after 5 minute delay 1/3 without cues and 3/3 with cues, after 10 minutes 3/3 100% without cues, 15 minutes 2/3 66% without cues improvemennt. Recall of 3 unrelated words after 10 second delay on IPAD task without use of word bank/choices 73% without cues and 95% with word  bank.  -KA     Patient’s memory skills will be enhanced as reported by patient by using external memory aides  90%:;without cues  -MERRITT     Status: Patient’s memory skills will be enhanced as reported by patient by using external memory aides  Progressing as expected  -MERRITT     Comments: Patient’s memory skills will be enhanced as reported by patient by using external memory aides   present today with patient during session and SLP educated  on use of calendars, and journal where SLP recommends pt write in daily entry activites completed, information learned from the news, TV, write the date daily etc.  and SLP discussed he remind pt to write in journal daily in the PM. ALso discussed the importance she keep important items in the same place at home and  reported he does help paitent with this at home. He also helps pt look at the calendar daily and SLP encouraged to have pt look at calendar more than once a day with his encouragement and assistance to help recall orientation and important things to do etc.   -MERRITT       User Key  (r) = Recorded By, (t) = Taken By, (c) = Cosigned By    Initials Name Provider Type    Chetan Nicholas MA,CCC-SLP Speech and Language Pathologist            SLP educated  on patient's progress with therapy and all therapy tasks completed each session and goals.  reports noticeable improvement with patient's memory at home and family members have also noticed.              Time Calculation:   SLP Start Time: 1100  SLP Stop Time: 1200  SLP Time Calculation (min): 60 min    Therapy Charges for Today     Code Description Service Date Service Provider Modifiers Qty    [id number]  ST TREATMENT SPEECH 4 7/8/2019 Chetan Machuca MA,CCC-SLP GN 1                   Chetan Machuca MA,CCC-SLP  7/8/2019

## 2019-07-10 ENCOUNTER — HOSPITAL ENCOUNTER (OUTPATIENT)
Dept: SPEECH THERAPY | Facility: HOSPITAL | Age: 79
Setting detail: THERAPIES SERIES
Discharge: HOME | End: 2019-07-10

## 2019-07-10 DIAGNOSIS — R41.841 COGNITIVE COMMUNICATION DEFICIT: ICD-10-CM

## 2019-07-10 DIAGNOSIS — R47.01 APHASIA: ICD-10-CM

## 2019-07-10 DIAGNOSIS — I63.9 ACUTE CVA (CEREBROVASCULAR ACCIDENT) (HCC): Primary | ICD-10-CM

## 2019-07-10 PROCEDURE — 92507 TX SP LANG VOICE COMM INDIV: CPT | Performed by: SPEECH-LANGUAGE PATHOLOGIST

## 2019-07-10 NOTE — THERAPY TREATMENT NOTE
Outpatient Speech Language Pathology   Adult Speech Language Cognitive Treatment Note  [facility]     Patient Name: Ivet Diaz  : 1940  MRN: [id number]  Today's Date: 7/10/2019         Visit Date: 07/10/2019   Patient Active Problem List   Diagnosis   • Acute CVA (cerebrovascular accident) (CMS/HCC)   • Symptomatic anemia   • Hypertension   • Depression   • Reflux esophagitis   • Breast cancer (CMS/HCC)   • Acute on chronic diastolic heart failure (CMS/HCC)   • Hemorrhagic disorder due to extrinsic circulating anticoagulants (CMS/HCC)          Visit Dx:    ICD-10-CM ICD-9-CM   1. Acute CVA (cerebrovascular accident) (CMS/HCC) I63.9 434.91   2. Cognitive communication deficit R41.841 799.52   3. Aphasia R47.01 784.3                         SLP OP Goals     Row Name 07/10/19 1000          Subjective Comments    Subjective Comments  Patient is pleasant and cooperative  -KA        Subjective Pain    Able to rate subjective pain?  yes  -KA     Pre-Treatment Pain Level  0  -KA     Post-Treatment Pain Level  0  -KA        Memory Goals    Patient will demonstrate improved ability to recall information by immediately recalling a series of words  90%:;without cues  -KA     Status: Patient will demonstrate improved ability to recall information by immediately recalling a series of words  Progressing as expected  -KA     Comments: Patient will demonstrate improved ability to recall information by immediately recalling a series of words  Ongoing improvement with delayed recall of the same three related words throughout therapy session, Delayed recall after 8 minutes 2/3 66% without cues, 66% after 15 min 2/3 and 3/3 100% without cues end of the session. Patient does require increased time, 30 to 60 seconds to think of the words. Onging improvement, overall average 7/9 78% without cues. Delayed recall of 3 unrelated words after 10 second delay in IPAD task (10 trials)=75% with recalling 3 words with no word bank  and 95% with word bank/choices  -KA     Status: Patient’s memory skills will be enhanced as reported by patient by using external memory aides  Progressing as expected  -KA     Comments: Patient’s memory skills will be enhanced as reported by patient by using external memory aides  Patient has been writing in daily journal entries since  present since Monday and helps pt write in journal daily in the evenings.   -KA     Patient will demonstrate improved ability to recall information by listening to paragraph and answering yes/no questions  90%:;without cues  -KA     Status: Patient will demonstrate improved ability to recall information by listening to paragraph and answering yes/no questions  Progressing as expected  -KA     Comments: Patient will demonstrate improved ability to recall information by listening to paragraph and answering yes/no questions  50% without cues and without multiple choice and accuracy with multiple choice 88% without cues   -KA        Problem Solving Goals    Patient will improve ability to analyze problems and determine solutions by completing functional math problems  90%:;without cues  -KA     Status: Patient will improve ability to analyze problems and determine solutions by completing functional math problems  Progressing as expected  -KA     Comments: Patient will improve ability to analyze problems and determine solutions by completing functional math problems  100% without cues improvement today with functional math word problems  -KA       User Key  (r) = Recorded By, (t) = Taken By, (c) = Cosigned By    Initials Name Provider Type    Chetan Nicholas MA,CCC-SLP Speech and Language Pathologist                         Time Calculation:   SLP Start Time: 0900  SLP Stop Time: 1000  SLP Time Calculation (min): 60 min    Therapy Charges for Today     Code Description Service Date Service Provider Modifiers Qty    [id number] [facility] TREATMENT SPEECH 4 7/10/2019 Chetan Machuca  MA,CCC-SLP  1                   Chetan Machuca MA,CCC-SLP  7/10/2019

## 2019-07-12 ENCOUNTER — LAB (OUTPATIENT)
Dept: LAB | Facility: HOSPITAL | Age: 79
End: 2019-07-12

## 2019-07-12 ENCOUNTER — TRANSCRIBE ORDERS (OUTPATIENT)
Dept: ADMINISTRATIVE | Facility: HOSPITAL | Age: 79
End: 2019-07-12

## 2019-07-12 DIAGNOSIS — D64.9 ANEMIA, UNSPECIFIED TYPE: Primary | ICD-10-CM

## 2019-07-12 DIAGNOSIS — E78.5 HYPERLIPIDEMIA, UNSPECIFIED HYPERLIPIDEMIA TYPE: ICD-10-CM

## 2019-07-12 DIAGNOSIS — D64.9 ANEMIA, UNSPECIFIED TYPE: ICD-10-CM

## 2019-07-12 LAB
ALBUMIN SERPL-MCNC: 3.5 G/DL (ref 3.5–5.2)
ALBUMIN/GLOB SERPL: 1.1 G/DL
ALP SERPL-CCNC: 159 U/L (ref 39–117)
ALT SERPL W P-5'-P-CCNC: 19 U/L (ref 1–33)
ANION GAP SERPL CALCULATED.3IONS-SCNC: 11 MMOL/L (ref 5–15)
AST SERPL-CCNC: 19 U/L (ref 1–32)
BACTERIA UR QL AUTO: ABNORMAL /HPF
BASOPHILS # BLD AUTO: 0.05 10*3/MM3 (ref 0–0.2)
BASOPHILS NFR BLD AUTO: 0.9 % (ref 0–1.5)
BILIRUB SERPL-MCNC: 0.5 MG/DL (ref 0.2–1.2)
BILIRUB UR QL STRIP: NEGATIVE
BUN BLD-MCNC: 20 MG/DL (ref 8–23)
BUN/CREAT SERPL: 21.1 (ref 7–25)
CALCIUM SPEC-SCNC: 9.6 MG/DL (ref 8.6–10.5)
CHLORIDE SERPL-SCNC: 105 MMOL/L (ref 98–107)
CLARITY UR: ABNORMAL
CO2 SERPL-SCNC: 27 MMOL/L (ref 22–29)
COLOR UR: YELLOW
CREAT BLD-MCNC: 0.95 MG/DL (ref 0.57–1)
DEPRECATED RDW RBC AUTO: 55.5 FL (ref 37–54)
EOSINOPHIL # BLD AUTO: 0.35 10*3/MM3 (ref 0–0.4)
EOSINOPHIL NFR BLD AUTO: 6 % (ref 0.3–6.2)
ERYTHROCYTE [DISTWIDTH] IN BLOOD BY AUTOMATED COUNT: 19.4 % (ref 12.3–15.4)
GFR SERPL CREATININE-BSD FRML MDRD: 57 ML/MIN/1.73
GLOBULIN UR ELPH-MCNC: 3.2 GM/DL
GLUCOSE BLD-MCNC: 103 MG/DL (ref 65–99)
GLUCOSE UR STRIP-MCNC: NEGATIVE MG/DL
HCT VFR BLD AUTO: 36.2 % (ref 34–46.6)
HGB BLD-MCNC: 10.4 G/DL (ref 12–15.9)
HGB UR QL STRIP.AUTO: ABNORMAL
HYALINE CASTS UR QL AUTO: ABNORMAL /LPF
IMM GRANULOCYTES # BLD AUTO: 0.02 10*3/MM3 (ref 0–0.05)
IMM GRANULOCYTES NFR BLD AUTO: 0.3 % (ref 0–0.5)
KETONES UR QL STRIP: NEGATIVE
LEUKOCYTE ESTERASE UR QL STRIP.AUTO: ABNORMAL
LYMPHOCYTES # BLD AUTO: 1.23 10*3/MM3 (ref 0.7–3.1)
LYMPHOCYTES NFR BLD AUTO: 21.1 % (ref 19.6–45.3)
MCH RBC QN AUTO: 22.6 PG (ref 26.6–33)
MCHC RBC AUTO-ENTMCNC: 28.7 G/DL (ref 31.5–35.7)
MCV RBC AUTO: 78.5 FL (ref 79–97)
MONOCYTES # BLD AUTO: 0.44 10*3/MM3 (ref 0.1–0.9)
MONOCYTES NFR BLD AUTO: 7.5 % (ref 5–12)
NEUTROPHILS # BLD AUTO: 3.75 10*3/MM3 (ref 1.7–7)
NEUTROPHILS NFR BLD AUTO: 64.2 % (ref 42.7–76)
NITRITE UR QL STRIP: NEGATIVE
NRBC BLD AUTO-RTO: 0 /100 WBC (ref 0–0.2)
PH UR STRIP.AUTO: 6 [PH] (ref 5–8)
PLATELET # BLD AUTO: 292 10*3/MM3 (ref 140–450)
PMV BLD AUTO: 11 FL (ref 6–12)
POTASSIUM BLD-SCNC: 4.2 MMOL/L (ref 3.5–5.2)
PROT SERPL-MCNC: 6.7 G/DL (ref 6–8.5)
PROT UR QL STRIP: NEGATIVE
RBC # BLD AUTO: 4.61 10*6/MM3 (ref 3.77–5.28)
RBC # UR: ABNORMAL /HPF
REF LAB TEST METHOD: ABNORMAL
SODIUM BLD-SCNC: 143 MMOL/L (ref 136–145)
SP GR UR STRIP: 1.02 (ref 1–1.03)
SQUAMOUS #/AREA URNS HPF: ABNORMAL /HPF
UROBILINOGEN UR QL STRIP: ABNORMAL
WBC NRBC COR # BLD: 5.84 10*3/MM3 (ref 3.4–10.8)
WBC UR QL AUTO: ABNORMAL /HPF

## 2019-07-12 PROCEDURE — 85025 COMPLETE CBC W/AUTO DIFF WBC: CPT | Performed by: INTERNAL MEDICINE

## 2019-07-12 PROCEDURE — 36415 COLL VENOUS BLD VENIPUNCTURE: CPT

## 2019-07-12 PROCEDURE — 81001 URINALYSIS AUTO W/SCOPE: CPT | Performed by: INTERNAL MEDICINE

## 2019-07-12 PROCEDURE — 80053 COMPREHEN METABOLIC PANEL: CPT | Performed by: INTERNAL MEDICINE

## 2019-07-15 ENCOUNTER — HOSPITAL ENCOUNTER (OUTPATIENT)
Dept: SPEECH THERAPY | Facility: HOSPITAL | Age: 79
Setting detail: THERAPIES SERIES
Discharge: HOME | End: 2019-07-15

## 2019-07-15 DIAGNOSIS — R41.841 COGNITIVE COMMUNICATION DEFICIT: ICD-10-CM

## 2019-07-15 DIAGNOSIS — I63.9 ACUTE CVA (CEREBROVASCULAR ACCIDENT) (HCC): Primary | ICD-10-CM

## 2019-07-15 PROCEDURE — 92507 TX SP LANG VOICE COMM INDIV: CPT | Performed by: SPEECH-LANGUAGE PATHOLOGIST

## 2019-07-15 NOTE — THERAPY TREATMENT NOTE
Outpatient Speech Language Pathology   Adult Speech Language Cognitive Treatment Note  [facility]     Patient Name: Ivet Diaz  : 1940  MRN: [id number]  Today's Date: 7/15/2019         Visit Date: 07/15/2019   Patient Active Problem List   Diagnosis   • Acute CVA (cerebrovascular accident) (CMS/HCC)   • Symptomatic anemia   • Hypertension   • Depression   • Reflux esophagitis   • Breast cancer (CMS/HCC)   • Acute on chronic diastolic heart failure (CMS/HCC)   • Hemorrhagic disorder due to extrinsic circulating anticoagulants (CMS/HCC)          Visit Dx:    ICD-10-CM ICD-9-CM   1. Acute CVA (cerebrovascular accident) (CMS/HCC) I63.9 434.91   2. Cognitive communication deficit R41.841 799.52                         SLP OP Goals     Row Name 07/15/19 1200          Subjective Comments    Subjective Comments  Patient is pleasant and cooperative. Patient's  continues to be present for each therapy session.  -KA        Subjective Pain    Able to rate subjective pain?  yes  -KA     Pre-Treatment Pain Level  0  -KA     Post-Treatment Pain Level  0  -KA        Written Language Comprehension Goals    Patient will improve comprehension of written language skills by answering written questions related to home management/social/work tasks (bills, recipes, tv guide, emails, procedures)  90%:;without cues  -KA     Status: Patient will improve comprehension of written language skills by answering written questions related to home management/social/work tasks (bills, recipes, tv guide, emails, procedures)  Progressing as expected  -KA     Comments: Patient will improve comprehension of written language skills by answering written questions related to home management/social/work tasks (bills, recipes, tv guide, emails, procedures)  schedule task determining the order of a schedule=72% without cues and 100% with min to mod cues   -KA        Memory Goals    Patient will demonstrate improved ability to recall  information by immediately recalling a series of words  90%:;without cues  -KA     Status: Patient will demonstrate improved ability to recall information by immediately recalling a series of words  Progressing as expected  -KA     Comments: Patient will demonstrate improved ability to recall information by immediately recalling a series of words  Delayed recall of the same three words (related) throughout sessions, pt recalled 2/3 66% without cues after 5 and 15 minute delays and at the end of the session 66% without cues. Delayed recall of 3 unrelated words after 10 second delay on IPAD task (total of 10 series), 40% without word bank. Pt demonstrated increased difficulty today towards the end of the task and frequently was recalling previous words. With choices/word bank, accuracy increased to 90%.   -KA     Patient’s memory skills will be enhanced as reported by patient by using external memory aides  90%:;without cues  -KA     Status: Patient’s memory skills will be enhanced as reported by patient by using external memory aides  -- ongoing  -KA     Comments: Patient’s memory skills will be enhanced as reported by patient by using external memory aides  Patient has not written a journal entry since last therapy session on 7/10 despite SLP educating . Ongoing education provided on external memory aids.  -KA     Patient will demonstrate improved ability to recall information by listening to paragraph and answering yes/no questions  90%:;without cues  -KA     Status: Patient will demonstrate improved ability to recall information by listening to paragraph and answering yes/no questions  Progressing as expected  -KA     Comments: Patient will demonstrate improved ability to recall information by listening to paragraph and answering yes/no questions  60% without cues or multiple choices and 90% with multiple choices  -KA        Problem Solving Goals    Patient will improve ability to analyze problems and  determine solutions by completing a visual representation/filling in a chart by following  written directions  90%:;without cues  -KA     Status: Patient will improve ability to analyze problems and determine solutions by completing a visual representation/filling in a chart by following  written directions  Progressing as expected  -KA     Comments: Patient will improve ability to analyze problems and determine solutions by completing a visual representation/filling in a chart by following  written directions  Patient completed moderate complex logic task with written directions and performed 72% without cues with process of elimination and deductive reasoning skills to determine the order of a schedule and 100% with min  cues   -KA       User Key  (r) = Recorded By, (t) = Taken By, (c) = Cosigned By    Initials Name Provider Type    Chetan Nicholas MA,CCC-SLP Speech and Language Pathologist                         Time Calculation:   SLP Start Time: 1100  SLP Stop Time: 1200  SLP Time Calculation (min): 60 min    Therapy Charges for Today     Code Description Service Date Service Provider Modifiers Qty    [id number]  ST TREATMENT SPEECH 4 7/15/2019 Chetan Machuca MA,CCC-SLP GN 1                   Chetan Machuca MA,CCC-SLP  7/15/2019

## 2019-07-17 ENCOUNTER — HOSPITAL ENCOUNTER (OUTPATIENT)
Dept: SPEECH THERAPY | Facility: HOSPITAL | Age: 79
Setting detail: THERAPIES SERIES
Discharge: HOME | End: 2019-07-17

## 2019-07-17 DIAGNOSIS — I63.9 ACUTE CVA (CEREBROVASCULAR ACCIDENT) (HCC): Primary | ICD-10-CM

## 2019-07-17 DIAGNOSIS — R41.841 COGNITIVE COMMUNICATION DEFICIT: ICD-10-CM

## 2019-07-17 PROCEDURE — 92507 TX SP LANG VOICE COMM INDIV: CPT | Performed by: SPEECH-LANGUAGE PATHOLOGIST

## 2019-07-17 NOTE — THERAPY PROGRESS REPORT/RE-CERT
Outpatient Speech Language Pathology   Adult Speech Language Cognitive Progress Note  [facility]     Patient Name: Ivet Diaz  : 1940  MRN: [id number]  Today's Date: 2019         Visit Date: 2019   Patient Active Problem List   Diagnosis   • Acute CVA (cerebrovascular accident) (CMS/HCC)   • Symptomatic anemia   • Hypertension   • Depression   • Reflux esophagitis   • Breast cancer (CMS/HCC)   • Acute on chronic diastolic heart failure (CMS/HCC)   • Hemorrhagic disorder due to extrinsic circulating anticoagulants (CMS/HCC)          Visit Dx:    ICD-10-CM ICD-9-CM   1. Acute CVA (cerebrovascular accident) (CMS/HCC) I63.9 434.91   2. Cognitive communication deficit R41.841 799.52                         SLP OP Goals     Row Name 19 1100          Subjective Comments    Subjective Comments  Patient is pleasant and cooperative  -KA        Subjective Pain    Able to rate subjective pain?  yes  -KA     Pre-Treatment Pain Level  0  -KA     Post-Treatment Pain Level  0  -KA        Written Language Comprehension Goals    Patient will be able to comprehend written material in home/home and social  environment  90%:;without cues  -KA     Status: Patient will be able to comprehend written material in home/home and social  environment  Progressing as expected  -KA     Comments: Patient will be able to comprehend written material in home/home and social  environment  Average accuracy with reading comprehension 80% accuracy of short paragraphs  -KA     Patient will improve comprehension of written language skills by reading short paragraphs and correctly answering written Yes/No questions  90%:;without cues  -KA     Status: Patient will improve comprehension of written language skills by reading short paragraphs and correctly answering written Yes/No questions  Progressing as expected  -KA     Comments: Patient will improve comprehension of written language skills by reading short paragraphs and  correctly answering written Yes/No questions  Reading comprehension of short paragraph and answering questions 80% with multiple choice  -KA        Memory Goals    Patient will be able to remember  information needed to participate in activities of daily living  Independently  -KA     Status: Patient will be able to remember  information needed to participate in activities of daily living  Progressing as expected  -KA     Comments: Patient will be able to remember  information needed to participate in activities of daily living  Majority of therapy tasks target memory due to being patient's greatest impairment. Patient has demonstrated ongoing improvement, where he average now to recall three related words after a delay is 66% without cues. Improvement with recall of three unrelated words after 10 seconds with average accuracy of 60%.   -KA     Patient will demonstrate improved ability to recall information by immediately recalling a series of words  90%:;without cues  -KA     Status: Patient will demonstrate improved ability to recall information by immediately recalling a series of words  Progressing as expected  -KA     Comments: Patient will demonstrate improved ability to recall information by immediately recalling a series of words  Delayed recall of three related words (same words), pt was able to initially recall 1/3 words without a review (long term memory from previous sessions). after review of words, recall 5 minutes 2/3, 10 minutes 2/3, 15 minutes 1/3 all without cues and 3/3 with cues. Improvment with recall of three unrelated words with 10 second delay with no word bank. 77% and 90% with word bank. Patient demonstrates improvement retention of the 3 words if the 10 second interruption is not audio.   -KA     Patient’s memory skills will be enhanced as reported by patient by using external memory aides  90%:;without cues  -KA     Status: Patient’s memory skills will be enhanced as reported by patient  by using external memory aides  Progressing as expected  -KA     Comments: Patient’s memory skills will be enhanced as reported by patient by using external memory aides  Reinforced ongoing importance of patient to continue to write in [place] daily  -KA     Patient will demonstrate improved ability to recall information by listening to paragraph and answering yes/no questions  90%:;without cues  -KA     Status: Patient will demonstrate improved ability to recall information by listening to paragraph and answering yes/no questions  Progressing as expected  -KA     Comments: Patient will demonstrate improved ability to recall information by listening to paragraph and answering yes/no questions  Increased complexity today with 50% without word bank and 85% with multiple choice  -KA        Problem Solving Goals    Patient will be able to engage in avocational activities requiring high level cognitive skills  Independently  -KA     Status: Patient will be able to engage in avocational activities requiring high level cognitive skills  Progressing as expected  -KA     Comments: Patient will be able to engage in avocational activities requiring high level cognitive skills  Improvement with more complex reasoning and process of elimination when following written directions in logic puzzle task  -KA     Patient will improve ability to analyze problems and determine solutions by completing a visual representation/filling in a chart by following  written directions  90%:;without cues  -KA     Status: Patient will improve ability to analyze problems and determine solutions by completing a visual representation/filling in a chart by following  written directions  Progressing as expected  -KA     Comments: Patient will improve ability to analyze problems and determine solutions by completing a visual representation/filling in a chart by following  written directions  Patient completed moderate complex logic task with written  directions and performed 72% without cues with process of elimination and deductive reasoning skills to determine the order of a schedule and 100% with min  cues   -KA       User Key  (r) = Recorded By, (t) = Taken By, (c) = Cosigned By    Initials Name Provider Type    Chetan Nicholas MA,[facility]-SLP Speech and Language Pathologist          OP SLP Education     Row Name 07/17/19 1116       Education    Assessed  Learning needs;Learning motivation  -    Learning Motivation  Strong  -MERRITT    Learning Method  Explanation  -KA    Teaching Response  Verbalized understanding  -KA      User Key  (r) = Recorded By, (t) = Taken By, (c) = Cosigned By    Initials Name Effective Dates    Chetan Nicholas MA,CCC-SLP 06/08/18 -           OP SLP Assessment/Plan - 07/17/19 1115        SLP Assessment    Functional Problems  Speech Language- Adult/Cognition   -    Impact on Function: Adult Speech Language/Cognition  Trouble learning or remembering new information;Difficulty participating in avocational activities;Restrictions in personal and social life;Decreased recall of personal information and medical history   -MERRITT    Clinical Impression: Speech Language-Adult/Congnition  Moderate:;Cognitive Communication Impairment   -KA    Prognosis  Good (comment)   -MERRITT    Patient/caregiver participated in establishment of treatment plan and goals  Yes   -KA    Patient would benefit from skilled therapy intervention  Yes   -KA       SLP Plan    Frequency  -- 2x a week     2x a week   -KA    Duration  8 weeks   -KA    Planned CPT's?  SLP INDIVIDUAL SPEECH THERAPY: 17189   -    Expected Duration Therapy Session - minutes  45-60 minutes   -MERRITT      User Key  (r) = Recorded By, (t) = Taken By, (c) = Cosigned By    Initials Name Provider Type    Chetan Nicholas MA,CCC-SLP Speech and Language Pathologist                 Time Calculation:   SLP Start Time: 0900  SLP Stop Time: 1000  SLP Time Calculation (min): 60 min    Therapy Charges for  Today     Code Description Service Date Service Provider Modifiers Qty    [id number]  ST TREATMENT SPEECH 4 7/17/2019 Chetan Machuca MA,CCC-SLP GN 1                   Chetan Machuca MA,CCC-SLP  7/17/2019

## 2019-07-22 ENCOUNTER — HOSPITAL ENCOUNTER (OUTPATIENT)
Dept: SPEECH THERAPY | Facility: HOSPITAL | Age: 79
Setting detail: THERAPIES SERIES
Discharge: HOME | End: 2019-07-22

## 2019-07-22 DIAGNOSIS — I63.9 ACUTE CVA (CEREBROVASCULAR ACCIDENT) (HCC): Primary | ICD-10-CM

## 2019-07-22 DIAGNOSIS — R41.841 COGNITIVE COMMUNICATION DEFICIT: ICD-10-CM

## 2019-07-22 DIAGNOSIS — R47.01 APHASIA: ICD-10-CM

## 2019-07-22 PROCEDURE — 92507 TX SP LANG VOICE COMM INDIV: CPT | Performed by: SPEECH-LANGUAGE PATHOLOGIST

## 2019-07-22 NOTE — THERAPY TREATMENT NOTE
Outpatient Speech Language Pathology   Adult Speech Language Cognitive Treatment Note  [facility]     Patient Name: Ivet Diaz  : 1940  MRN: [id number]  Today's Date: 2019         Visit Date: 2019   Patient Active Problem List   Diagnosis   • Acute CVA (cerebrovascular accident) (CMS/HCC)   • Symptomatic anemia   • Hypertension   • Depression   • Reflux esophagitis   • Breast cancer (CMS/HCC)   • Acute on chronic diastolic heart failure (CMS/HCC)   • Hemorrhagic disorder due to extrinsic circulating anticoagulants (CMS/HCC)          Visit Dx:    ICD-10-CM ICD-9-CM   1. Acute CVA (cerebrovascular accident) (CMS/HCC) I63.9 434.91   2. Cognitive communication deficit R41.841 799.52   3. Aphasia R47.01 784.3                         SLP OP Goals     Row Name 19 1200          Subjective Comments    Subjective Comments  Patient is pleasant and cooperative  -KA        Subjective Pain    Able to rate subjective pain?  yes  -KA     Pre-Treatment Pain Level  0  -KA     Post-Treatment Pain Level  0  -KA        Written Language Comprehension Goals    Patient will improve comprehension of written language skills by reading short paragraphs and correctly answering written Yes/No questions  90%:;without cues  -KA     Status: Patient will improve comprehension of written language skills by reading short paragraphs and correctly answering written Yes/No questions  Progressing as expected  -KA     Comments: Patient will improve comprehension of written language skills by reading short paragraphs and correctly answering written Yes/No questions  Reading comprehension of short paragraph and answering questions 80% with multiple choice  -KA        Memory Goals    Patient will demonstrate improved ability to recall information by immediately recalling a series of words  90%:;without cues  -KA     Status: Patient will demonstrate improved ability to recall information by immediately recalling a series of  words  Progressing as expected  -KA     Comments: Patient will demonstrate improved ability to recall information by immediately recalling a series of words  Delayed recall of three related words 3/3 beginning of the session without cues and 3/3 after 15 min and 0/3 after 25 minutes. and 3/3 with cues. Immediate recall of 3 unrelated words after 10 second delay 70% without word bank and 90% with word bank  -KA     Patient’s memory skills will be enhanced as reported by patient by using external memory aides  90%:;without cues  -KA     Status: Patient’s memory skills will be enhanced as reported by patient by using external memory aides  -- ongoing  -KA     Comments: Patient’s memory skills will be enhanced as reported by patient by using external memory aides  Reinforced ongoing importance of patient to continue to write in [facility] [employer] daily  -KA     Patient will demonstrate improved ability to recall information by listening to paragraph and answering yes/no questions  90%:;without cues  -KA     Status: Patient will demonstrate improved ability to recall information by listening to paragraph and answering yes/no questions  Progressing as expected  -KA     Comments: Patient will demonstrate improved ability to recall information by listening to paragraph and answering yes/no questions  Increased complexity today with 60% without word bank and 85% with multiple choice  -KA       User Key  (r) = Recorded By, (t) = Taken By, (c) = Cosigned By    Initials Name Provider Type    Chetan Nicholas MA,CCC-SLP Speech and Language Pathologist                         Time Calculation:   SLP Start Time: 1000  SLP Stop Time: 1100  SLP Time Calculation (min): 60 min    Therapy Charges for Today     Code Description Service Date Service Provider Modifiers Qty    [id number] [facility] TREATMENT SPEECH 4 7/22/2019 Chetan Machuca MA,CCC-SLP GN 1                   Chetan Machuca MA,CCC-SLP  7/22/2019

## 2019-07-24 ENCOUNTER — HOSPITAL ENCOUNTER (OUTPATIENT)
Dept: SPEECH THERAPY | Facility: HOSPITAL | Age: 79
Setting detail: THERAPIES SERIES
Discharge: HOME | End: 2019-07-24

## 2019-07-24 DIAGNOSIS — R41.841 COGNITIVE COMMUNICATION DEFICIT: ICD-10-CM

## 2019-07-24 DIAGNOSIS — I63.9 ACUTE CVA (CEREBROVASCULAR ACCIDENT) (HCC): Primary | ICD-10-CM

## 2019-07-24 PROCEDURE — 92507 TX SP LANG VOICE COMM INDIV: CPT | Performed by: SPEECH-LANGUAGE PATHOLOGIST

## 2019-07-24 NOTE — THERAPY TREATMENT NOTE
Outpatient Speech Language Pathology   Adult Speech Language Cognitive Treatment Note  [facility]     Patient Name: Ivet Diaz  : 1940  MRN: [id number]  Today's Date: 2019         Visit Date: 2019   Patient Active Problem List   Diagnosis   • Acute CVA (cerebrovascular accident) (CMS/HCC)   • Symptomatic anemia   • Hypertension   • Depression   • Reflux esophagitis   • Breast cancer (CMS/HCC)   • Acute on chronic diastolic heart failure (CMS/HCC)   • Hemorrhagic disorder due to extrinsic circulating anticoagulants (CMS/HCC)          Visit Dx:    ICD-10-CM ICD-9-CM   1. Acute CVA (cerebrovascular accident) (CMS/HCC) I63.9 434.91   2. Cognitive communication deficit R41.841 799.52                         SLP OP Goals     Row Name 19 1400          Subjective Comments    Subjective Comments  Patient is pleasant and cooperative.  continues to be present for therapy.   -KA        Subjective Pain    Able to rate subjective pain?  yes  -KA     Pre-Treatment Pain Level  0  -KA     Post-Treatment Pain Level  0  -KA        Memory Goals    Patient will demonstrate improved ability to recall information by immediately recalling a series of words  90%:;without cues  -KA     Status: Patient will demonstrate improved ability to recall information by immediately recalling a series of words  Progressing as expected  -KA     Comments: Patient will demonstrate improved ability to recall information by immediately recalling a series of words  Recall of the same three related words in the beginning of the session (prior to a review) 3/3 100%, delayed recall of same words after 10-15 min delay 1/3 33% without cues and 25 minutes and end of the session 3/3 100% without cues.  Delayed recall of the 3 unrelated words with 10 second delay (across 10 trials)=78% without word bank/choices and 90% with word bank.   -KA     Patient’s memory skills will be enhanced as reported by patient by using external  memory aides  90%:;without cues  -KA     Status: Patient’s memory skills will be enhanced as reported by patient by using external memory aides  -- ongoing  -KA     Comments: Patient’s memory skills will be enhanced as reported by patient by using external memory aides  Reinforced ongoing importance of patient to continue to write in journal daily  -KA     Patient will demonstrate improved ability to recall information by listening to paragraph and answering yes/no questions  90%:;without cues  -KA     Status: Patient will demonstrate improved ability to recall information by listening to paragraph and answering yes/no questions  Progressing as expected  -KA     Comments: Patient will demonstrate improved ability to recall information by listening to paragraph and answering yes/no questions  70% without cues for recall of detail in short paragraphs and 100% with multiple choice   -KA        Problem Solving Goals    Patient will improve ability to analyze problems and determine solutions by completing a visual representation/filling in a chart by following  written directions  90%:;without cues  -KA     Status: Patient will improve ability to analyze problems and determine solutions by completing a visual representation/filling in a chart by following  written directions  Progressing as expected  -KA     Comments: Patient will improve ability to analyze problems and determine solutions by completing a visual representation/filling in a chart by following  written directions  complete complex logic puzzle requiring process of elimination, following written directions and complex reasoning skills, 100% with min cues to help pt organize and determine correct order  -KA        Attention/Orientation Goals    Patient will improve orientation skills by orienting to time/date  90%:;without cues  -KA     Status: Patient will improve orientation skills by orienting to time/date  Progressing as expected  -KA     Comments:  Patient will improve orientation skills by orienting to time/date  Recall of current month and date without cues or use of calendar 0%, (pt continuously thinks it is September). After review of a calendar pt able to recall date after delay of 45 minutes 3/3 100% without cues   -MERRITT       User Key  (r) = Recorded By, (t) = Taken By, (c) = Cosigned By    Initials Name Provider Type    hCetan Nicholas MA,CCC-SLP Speech and Language Pathologist                         Time Calculation:   SLP Start Time: 0900  SLP Stop Time: 1000  SLP Time Calculation (min): 60 min    Therapy Charges for Today     Code Description Service Date Service Provider Modifiers Qty    [id number]  ST TREATMENT SPEECH 4 7/24/2019 Chetan Machuca MA,CCC-SLP GN 1                   Chetan Machuca MA,CCC-SLP  7/24/2019

## 2019-07-29 ENCOUNTER — HOSPITAL ENCOUNTER (OUTPATIENT)
Dept: SPEECH THERAPY | Facility: HOSPITAL | Age: 79
Setting detail: THERAPIES SERIES
Discharge: HOME | End: 2019-07-29

## 2019-07-29 DIAGNOSIS — R41.841 COGNITIVE COMMUNICATION DEFICIT: ICD-10-CM

## 2019-07-29 DIAGNOSIS — I63.9 ACUTE CVA (CEREBROVASCULAR ACCIDENT) (HCC): Primary | ICD-10-CM

## 2019-07-29 PROCEDURE — 92507 TX SP LANG VOICE COMM INDIV: CPT | Performed by: SPEECH-LANGUAGE PATHOLOGIST

## 2019-07-29 NOTE — THERAPY TREATMENT NOTE
Outpatient Speech Language Pathology   Adult Speech Language Cognitive Treatment Note  [facility]     Patient Name: Ivet Diaz  : 1940  MRN: [id number]  Today's Date: 2019         Visit Date: 2019   Patient Active Problem List   Diagnosis   • Acute CVA (cerebrovascular accident) (CMS/HCC)   • Symptomatic anemia   • Hypertension   • Depression   • Reflux esophagitis   • Breast cancer (CMS/HCC)   • Acute on chronic diastolic heart failure (CMS/HCC)   • Hemorrhagic disorder due to extrinsic circulating anticoagulants (CMS/HCC)          Visit Dx:    ICD-10-CM ICD-9-CM   1. Acute CVA (cerebrovascular accident) (CMS/HCC) I63.9 434.91   2. Cognitive communication deficit R41.841 799.52                         SLP OP Goals     Row Name 19 1300          Subjective Comments    Subjective Comments  Patient is pleasant and cooperative.  -KA        Subjective Pain    Able to rate subjective pain?  yes  -KA     Pre-Treatment Pain Level  0  -KA     Post-Treatment Pain Level  0  -KA        Written Language Comprehension Goals    Patient will improve comprehension of written language skills by reading short paragraphs and correctly answering written Yes/No questions  90%:;without cues  -KA     Status: Patient will improve comprehension of written language skills by reading short paragraphs and correctly answering written Yes/No questions  Progressing as expected  -KA     Comments: Patient will improve comprehension of written language skills by reading short paragraphs and correctly answering written Yes/No questions  Reading comprehension of detailed single paragraph 60% without cues, reading comprehension of multiparagraphs  news article=80% without cues  -KA        Memory Goals    Patient will demonstrate improved ability to recall information by immediately recalling a series of words  90%:;without cues  -KA     Status: Patient will demonstrate improved ability to recall information by  immediately recalling a series of words  Progressing as expected  -KA     Comments: Patient will demonstrate improved ability to recall information by immediately recalling a series of words  Visual memory task with picture matching task 75% without cues. Recall of same three related words=2/3 66% initial in the beginning of th session and after 10  minute delay, after 20  minute delay 1/3 33% without cues and end of the session 2/3 66% without cues, pt accuracy is always 3/3 100% with min cues. Recall of three unrelated words after 10 second delay=75% without word bank or multiple choice and 90% without cues.   -KA     Patient’s memory skills will be enhanced as reported by patient by using external memory aides  90%:;without cues  -KA     Status: Patient’s memory skills will be enhanced as reported by patient by using external memory aides  -- ongoing  -KA     Comments: Patient’s memory skills will be enhanced as reported by patient by using external memory aides  Reinforced ongoing importance of patient to continue to write in journal daily  -KA     Patient will demonstrate improved ability to recall information by listening to paragraph and answering yes/no questions  90%:;without cues  -KA     Status: Patient will demonstrate improved ability to recall information by listening to paragraph and answering yes/no questions  Progressing as expected  -KA     Comments: Patient will demonstrate improved ability to recall information by listening to paragraph and answering yes/no questions  70% without cues for recall of detail in short paragraphs and 100% with multiple choice   -KA       User Key  (r) = Recorded By, (t) = Taken By, (c) = Cosigned By    Initials Name Provider Type    Chetan Nicholas MA,CCC-SLP Speech and Language Pathologist                         Time Calculation:   SLP Start Time: 1100  SLP Stop Time: 1200  SLP Time Calculation (min): 60 min    Therapy Charges for Today     Code Description  Service Date Service Provider Modifiers Qty    [id number] [facility] TREATMENT SPEECH 4 7/29/2019 Chetan Machuca MA,CCC-SLP GN 1                   Chetan Machuca MA,CCC-SLP  7/29/2019

## 2019-07-31 ENCOUNTER — HOSPITAL ENCOUNTER (OUTPATIENT)
Dept: SPEECH THERAPY | Facility: HOSPITAL | Age: 79
Setting detail: THERAPIES SERIES
Discharge: HOME | End: 2019-07-31

## 2019-07-31 DIAGNOSIS — R41.841 COGNITIVE COMMUNICATION DEFICIT: ICD-10-CM

## 2019-07-31 DIAGNOSIS — I63.9 ACUTE CVA (CEREBROVASCULAR ACCIDENT) (HCC): Primary | ICD-10-CM

## 2019-07-31 DIAGNOSIS — R47.01 APHASIA: ICD-10-CM

## 2019-07-31 PROCEDURE — 92507 TX SP LANG VOICE COMM INDIV: CPT | Performed by: SPEECH-LANGUAGE PATHOLOGIST

## 2019-08-05 ENCOUNTER — HOSPITAL ENCOUNTER (OUTPATIENT)
Dept: SPEECH THERAPY | Facility: HOSPITAL | Age: 79
Setting detail: THERAPIES SERIES
Discharge: HOME | End: 2019-08-05

## 2019-08-05 DIAGNOSIS — I63.9 ACUTE CVA (CEREBROVASCULAR ACCIDENT) (HCC): Primary | ICD-10-CM

## 2019-08-05 DIAGNOSIS — R47.01 APHASIA: ICD-10-CM

## 2019-08-05 DIAGNOSIS — R41.841 COGNITIVE COMMUNICATION DEFICIT: ICD-10-CM

## 2019-08-05 PROCEDURE — 92507 TX SP LANG VOICE COMM INDIV: CPT | Performed by: SPEECH-LANGUAGE PATHOLOGIST

## 2019-08-05 NOTE — THERAPY TREATMENT NOTE
Outpatient Speech Language Pathology   Adult Speech Language Cognitive Treatment Note  [facility]     Patient Name: Ivet Diaz  : 1940  MRN: [id number]  Today's Date: 2019         Visit Date: 2019   Patient Active Problem List   Diagnosis   • Acute CVA (cerebrovascular accident) (CMS/HCC)   • Symptomatic anemia   • Hypertension   • Depression   • Reflux esophagitis   • Breast cancer (CMS/HCC)   • Acute on chronic diastolic heart failure (CMS/HCC)   • Hemorrhagic disorder due to extrinsic circulating anticoagulants (CMS/HCC)          Visit Dx:    ICD-10-CM ICD-9-CM   1. Acute CVA (cerebrovascular accident) (CMS/HCC) I63.9 434.91   2. Cognitive communication deficit R41.841 799.52   3. Aphasia R47.01 784.3                         SLP OP Goals     Row Name 19 1200          Subjective Comments    Subjective Comments  Patient is pleasant and cooperative.  -KA        Subjective Pain    Able to rate subjective pain?  yes  -KA     Pre-Treatment Pain Level  0  -KA     Post-Treatment Pain Level  0  -KA        Written Language Comprehension Goals    Patient will improve comprehension of written language skills by following two-step written directions  90%:;without cues  -KA     Status: Patient will improve comprehension of written language skills by following two-step written directions  -- ongoing  -KA     Comments: Patient will improve comprehension of written language skills by following two-step written directions  unable to follow second commands without repetition one or two times 60%  -KA     Patient will improve comprehension of written language skills by reading short paragraphs and correctly answering written Yes/No questions  90%:;without cues  -KA     Status: Patient will improve comprehension of written language skills by reading short paragraphs and correctly answering written Yes/No questions  Progressing as expected  -KA     Comments: Patient will improve comprehension of written  language skills by reading short paragraphs and correctly answering written Yes/No questions  reading comprehension of pertinent information from detail news articule (multiparagraph)=80% without cues   -KA        Memory Goals    Patient will demonstrate improved ability to recall information by immediately recalling a series of words  90%:;without cues  -KA     Status: Patient will demonstrate improved ability to recall information by immediately recalling a series of words  Progressing as expected  -KA     Comments: Patient will demonstrate improved ability to recall information by immediately recalling a series of words  Recall of same three related words, 2/3 beginning of session, 3/3 after 5 minutes, 2/3 after 20 minutes 3/3 100% end of the session. Immediate recall of picture scene task 70% without cues, picture matching task 85%  -KA     Patient will demonstrate improved ability to recall information by listening to paragraph and answering yes/no questions  90%:;without cues  -KA     Status: Patient will demonstrate improved ability to recall information by listening to paragraph and answering yes/no questions  Progressing as expected  -KA     Comments: Patient will demonstrate improved ability to recall information by listening to paragraph and answering yes/no questions  70% without cues for recall of detail in short paragraphs and 100% with multiple choice   -KA       User Key  (r) = Recorded By, (t) = Taken By, (c) = Cosigned By    Initials Name Provider Type    Chetan Nicholas MA,CCC-SLP Speech and Language Pathologist                         Time Calculation:   SLP Start Time: 1100  SLP Stop Time: 1200  SLP Time Calculation (min): 60 min    Therapy Charges for Today     Code Description Service Date Service Provider Modifiers Qty    [id number]  ST TREATMENT SPEECH 4 8/5/2019 Chetan Machuca MA,CCC-SLP GN 1                   Chetan Machuca MA,CCC-SLP  8/5/2019

## 2019-08-07 ENCOUNTER — HOSPITAL ENCOUNTER (OUTPATIENT)
Dept: SPEECH THERAPY | Facility: HOSPITAL | Age: 79
Setting detail: THERAPIES SERIES
Discharge: HOME | End: 2019-08-07

## 2019-08-07 DIAGNOSIS — I63.9 ACUTE CVA (CEREBROVASCULAR ACCIDENT) (HCC): Primary | ICD-10-CM

## 2019-08-07 DIAGNOSIS — R47.01 APHASIA: ICD-10-CM

## 2019-08-07 DIAGNOSIS — R41.841 COGNITIVE COMMUNICATION DEFICIT: ICD-10-CM

## 2019-08-07 PROCEDURE — 92507 TX SP LANG VOICE COMM INDIV: CPT | Performed by: SPEECH-LANGUAGE PATHOLOGIST

## 2019-08-07 NOTE — THERAPY TREATMENT NOTE
Outpatient Speech Language Pathology   Adult Speech Language Cognitive Treatment Note  [facility]     Patient Name: Ivet Diaz  : 1940  MRN: [id number]  Today's Date: 2019         Visit Date: 2019   Patient Active Problem List   Diagnosis   • Acute CVA (cerebrovascular accident) (CMS/HCC)   • Symptomatic anemia   • Hypertension   • Depression   • Reflux esophagitis   • Breast cancer (CMS/HCC)   • Acute on chronic diastolic heart failure (CMS/HCC)   • Hemorrhagic disorder due to extrinsic circulating anticoagulants (CMS/HCC)          Visit Dx:    ICD-10-CM ICD-9-CM   1. Acute CVA (cerebrovascular accident) (CMS/HCC) I63.9 434.91   2. Cognitive communication deficit R41.841 799.52   3. Aphasia R47.01 784.3                         SLP OP Goals     Row Name 19 1200          Subjective Comments    Subjective Comments  Patient is pleasant and cooperative  -KA        Subjective Pain    Able to rate subjective pain?  yes  -KA     Pre-Treatment Pain Level  0  -KA     Post-Treatment Pain Level  0  -KA        Written Language Comprehension Goals    Patient will improve comprehension of written language skills by reading short paragraphs and correctly answering written Yes/No questions  90%:;without cues  -KA     Status: Patient will improve comprehension of written language skills by reading short paragraphs and correctly answering written Yes/No questions  Progressing as expected  -KA     Comments: Patient will improve comprehension of written language skills by reading short paragraphs and correctly answering written Yes/No questions  reading comprehension of pertinent information from detail news articule (multiparagraph)=80% without cues   -KA        Auditory Comprehension Goals    Patient will improve comprehension of spoken language by following 2 step directions  90%:;without cues  -KA     Status: Patient will improve comprehension of spoken language by following 2 step directions  --  ongoing  -KA     Comments: Patient will improve comprehension of spoken language by following 2 step directions  60% without cues   -KA        Memory Goals    Patient will demonstrate improved ability to recall information by immediately recalling a series of words  90%:;without cues  -KA     Status: Patient will demonstrate improved ability to recall information by immediately recalling a series of words  Progressing as expected  -KA     Comments: Patient will demonstrate improved ability to recall information by immediately recalling a series of words  Recall of the same three related words, 3/3 beginning of the session, 3/3 after 10 minutes. 2/3 after 30 and 3/3 end of the session. Immediate recall of picture scene 60%,   -KA     Patient’s memory skills will be enhanced as reported by patient by using external memory aides  90%:;without cues  -KA     Status: Patient’s memory skills will be enhanced as reported by patient by using external memory aides  -- ongoing  -KA     Comments: Patient’s memory skills will be enhanced as reported by patient by using external memory aides  Reinforced ongoing importance of patient to continue to write in journal daily  -KA     Patient will demonstrate improved ability to recall information by listening to paragraph and answering yes/no questions  90%:;without cues  -KA     Status: Patient will demonstrate improved ability to recall information by listening to paragraph and answering yes/no questions  Progressing as expected  -KA     Comments: Patient will demonstrate improved ability to recall information by listening to paragraph and answering yes/no questions  70% without cues for recall of detail in short paragraphs and 100% with multiple choice   -KA        Attention/Orientation Goals    Patient will improve orientation skills by orienting to time/date  90%:;without cues  -KA     Status: Patient will improve orientation skills by orienting to time/date  Progressing as  expected  -KA     Comments: Patient will improve orientation skills by orienting to time/date  oriented to the month and date without cues or use of calendar and oriented to year after use of calendar 2/3 66% and the end of the session 66%  -KA       User Key  (r) = Recorded By, (t) = Taken By, (c) = Cosigned By    Initials Name Provider Type    Chetan Nicholas MA,CCC-SLP Speech and Language Pathologist                         Time Calculation:   SLP Start Time: 0900  SLP Stop Time: 1000  SLP Time Calculation (min): 60 min    Therapy Charges for Today     Code Description Service Date Service Provider Modifiers Qty    [id number]  ST TREATMENT SPEECH 4 8/7/2019 Chetan Machuca MA,CCC-SLP GN 1                   Chetan Machuca MA,CCC-SLP  8/7/2019

## 2019-08-12 ENCOUNTER — HOSPITAL ENCOUNTER (OUTPATIENT)
Dept: SPEECH THERAPY | Facility: HOSPITAL | Age: 79
Setting detail: THERAPIES SERIES
Discharge: HOME | End: 2019-08-12

## 2019-08-12 DIAGNOSIS — R41.841 COGNITIVE COMMUNICATION DEFICIT: ICD-10-CM

## 2019-08-12 DIAGNOSIS — I63.9 ACUTE CVA (CEREBROVASCULAR ACCIDENT) (HCC): Primary | ICD-10-CM

## 2019-08-12 DIAGNOSIS — R47.01 APHASIA: ICD-10-CM

## 2019-08-12 PROCEDURE — 92507 TX SP LANG VOICE COMM INDIV: CPT | Performed by: SPEECH-LANGUAGE PATHOLOGIST

## 2019-08-12 NOTE — THERAPY TREATMENT NOTE
Outpatient Speech Language Pathology   Adult Speech Language Cognitive Treatment Note  [facility]     Patient Name: Ivet Diaz  : 1940  MRN: [id number]  Today's Date: 2019         Visit Date: 2019   Patient Active Problem List   Diagnosis   • Acute CVA (cerebrovascular accident) (CMS/HCC)   • Symptomatic anemia   • Hypertension   • Depression   • Reflux esophagitis   • Breast cancer (CMS/HCC)   • Acute on chronic diastolic heart failure (CMS/HCC)   • Hemorrhagic disorder due to extrinsic circulating anticoagulants (CMS/HCC)          Visit Dx:    ICD-10-CM ICD-9-CM   1. Acute CVA (cerebrovascular accident) (CMS/HCC) I63.9 434.91   2. Cognitive communication deficit R41.841 799.52   3. Aphasia R47.01 784.3                         SLP OP Goals     Row Name 19 1200          Subjective Comments    Subjective Comments  Patient is pleasant and cooperative  -KA        Subjective Pain    Able to rate subjective pain?  yes  -KA     Pre-Treatment Pain Level  0  -KA     Post-Treatment Pain Level  0  -KA        Memory Goals    Patient will demonstrate improved ability to recall information by immediately recalling a series of words  90%:;without cues  -KA     Status: Patient will demonstrate improved ability to recall information by immediately recalling a series of words  Progressing as expected  -KA     Comments: Patient will demonstrate improved ability to recall information by immediately recalling a series of words  slightly increased diffficulty recalling the same three related words in the beginning of the session, (pt did have them written down in her purse along with  date), initial recall after 5 and 10  minutes 1/3 33% without cues and 3/3 with min cues, after 20 min and end of the session 100% without cues. Immediate recall of three pictures (having to recall out of 12 pictures) 100%, immediate recall of three unrelated words with 10 second delay76% without cues,   -KA      Patient will demonstrate improved ability to recall information by listening to paragraph and answering yes/no questions  90%:;without cues  -KA     Status: Patient will demonstrate improved ability to recall information by listening to paragraph and answering yes/no questions  Progressing as expected  -KA     Comments: Patient will demonstrate improved ability to recall information by listening to paragraph and answering yes/no questions  recall of detail in short stories 85% with multiple choices and without  multiple choice=40% average   -KA        Attention/Orientation Goals    Patient will improve orientation skills by orienting to time/date  90%:;without cues  -KA     Status: Patient will improve orientation skills by orienting to time/date  -- ongoing  -KA     Comments: Patient will improve orientation skills by orienting to time/date  recall of date without use of calendar 1/3 33% without use of calendar and after delay and reviewing calendar 2/3 without cues, pt has difficulty recalling year  -KA       User Key  (r) = Recorded By, (t) = Taken By, (c) = Cosigned By    Initials Name Provider Type    Chetan Nicholas MA,CCC-SLP Speech and Language Pathologist                         Time Calculation:   SLP Start Time: 1000  SLP Stop Time: 1100  SLP Time Calculation (min): 60 min    Therapy Charges for Today     Code Description Service Date Service Provider Modifiers Qty    [id number] [facility] ST TREATMENT SPEECH 4 8/12/2019 Chetan Machuca MA,CCC-SLP GN 1                   Chetan Machuca MA,CCC-SLP  8/12/2019

## 2019-08-14 ENCOUNTER — HOSPITAL ENCOUNTER (OUTPATIENT)
Dept: SPEECH THERAPY | Facility: HOSPITAL | Age: 79
Setting detail: THERAPIES SERIES
Discharge: HOME | End: 2019-08-14

## 2019-08-14 DIAGNOSIS — R41.841 COGNITIVE COMMUNICATION DEFICIT: ICD-10-CM

## 2019-08-14 DIAGNOSIS — I63.9 ACUTE CVA (CEREBROVASCULAR ACCIDENT) (HCC): Primary | ICD-10-CM

## 2019-08-14 PROCEDURE — 92507 TX SP LANG VOICE COMM INDIV: CPT | Performed by: SPEECH-LANGUAGE PATHOLOGIST

## 2019-08-14 NOTE — THERAPY TREATMENT NOTE
Outpatient Speech Language Pathology   Adult Speech Language Cognitive Treatment Note  [facility]     Patient Name: Ivet Diaz  : 1940  MRN: [id number]  Today's Date: 2019         Visit Date: 2019   Patient Active Problem List   Diagnosis   • Acute CVA (cerebrovascular accident) (CMS/HCC)   • Symptomatic anemia   • Hypertension   • Depression   • Reflux esophagitis   • Breast cancer (CMS/HCC)   • Acute on chronic diastolic heart failure (CMS/HCC)   • Hemorrhagic disorder due to extrinsic circulating anticoagulants (CMS/HCC)          Visit Dx:    ICD-10-CM ICD-9-CM   1. Acute CVA (cerebrovascular accident) (CMS/HCC) I63.9 434.91   2. Cognitive communication deficit R41.841 799.52                         SLP OP Goals     Row Name 19 1100          Subjective Comments    Subjective Comments  Patient is pleasant and cooperative  -KA        Subjective Pain    Able to rate subjective pain?  yes  -KA     Pre-Treatment Pain Level  0  -KA     Post-Treatment Pain Level  0  -KA        Written Language Comprehension Goals    Patient will improve comprehension of written language skills by reading short paragraphs and correctly answering written Yes/No questions  90%:;without cues  -KA     Status: Patient will improve comprehension of written language skills by reading short paragraphs and correctly answering written Yes/No questions  Progressing as expected  -KA     Comments: Patient will improve comprehension of written language skills by reading short paragraphs and correctly answering written Yes/No questions  reading comprehension of pertinent information from detail news articule (multiparagraph)=80% without cues   -KA        Memory Goals    Patient will demonstrate improved ability to recall information by immediately recalling a series of words  90%:;without cues  -KA     Status: Patient will demonstrate improved ability to recall information by immediately recalling a series of words  --  ongoing  -KA     Comments: Patient will demonstrate improved ability to recall information by immediately recalling a series of words  some increased difficulty with recall of same three related words, 2/3 without cues initially, 1/3 without cues and 3/3 with cues after 10  min, 2/3 without cues after 20 min and 3/3 with cues and 2/3 without cues end of the session and 3/3 with cues (all cues are min). Immediate recall of pictures in picture matching visual memory task 75% without cues.Immediate recall of 3 words unrelated (trial of 10) with 10 second delay 80% without word bank, improvement with this goal today.  -KA     Patient will demonstrate improved ability to recall information by listening to paragraph and answering yes/no questions  90%:;without cues  -KA     Status: Patient will demonstrate improved ability to recall information by listening to paragraph and answering yes/no questions  -- ongoing  -KA     Comments: Patient will demonstrate improved ability to recall information by listening to paragraph and answering yes/no questions  62.5% with multiple choice with recall of detail in short story and without multiple choice=75% without cues  -KA        Problem Solving Goals    Patient will improve ability to analyze problems and determine solutions by completing functional math problems  90%:;without cues  -KA     Status: Patient will improve ability to analyze problems and determine solutions by completing functional math problems  Achieved  -KA     Comments: Patient will improve ability to analyze problems and determine solutions by completing functional math problems  100% without cues improvement today with functional math word problems  -KA        Attention/Orientation Goals    Patient will improve orientation skills by orienting to time/date  90%:;without cues  -KA     Status: Patient will improve orientation skills by orienting to time/date  -- ongoing  -KA     Comments: Patient will improve  orientation skills by orienting to time/date  RECALL of current date without cues or use of calendar 1/3 33% (pt frequently states october 2000 is current date) and delayed recall end of the session after review of calendar 2/3 66%  -MERRITT       User Key  (r) = Recorded By, (t) = Taken By, (c) = Cosigned By    Initials Name Provider Type    Chetan Nicholas MA,CCC-SLP Speech and Language Pathologist                         Time Calculation:   SLP Start Time: 0900  SLP Stop Time: 1000  SLP Time Calculation (min): 60 min    Therapy Charges for Today     Code Description Service Date Service Provider Modifiers Qty    97374455741  ST TREATMENT SPEECH 4 8/14/2019 Chetan Machuca MA,CCC-SLP GN 1                   Chetan Machuca MA,CCC-SLP  8/14/2019

## 2019-08-19 ENCOUNTER — HOSPITAL ENCOUNTER (OUTPATIENT)
Dept: SPEECH THERAPY | Facility: HOSPITAL | Age: 79
Setting detail: THERAPIES SERIES
Discharge: HOME | End: 2019-08-19

## 2019-08-19 DIAGNOSIS — I63.9 ACUTE CVA (CEREBROVASCULAR ACCIDENT) (HCC): Primary | ICD-10-CM

## 2019-08-19 DIAGNOSIS — R41.841 COGNITIVE COMMUNICATION DEFICIT: ICD-10-CM

## 2019-08-19 DIAGNOSIS — R47.01 APHASIA: ICD-10-CM

## 2019-08-19 PROCEDURE — 92507 TX SP LANG VOICE COMM INDIV: CPT | Performed by: SPEECH-LANGUAGE PATHOLOGIST

## 2019-08-19 NOTE — THERAPY EVALUATION
Outpatient Speech Language Pathology   Adult Speech Language Cognitive Treatment Note  [facility]     Patient Name: Ivet Diaz  : 1940  MRN: [id number]  Today's Date: 2019         Visit Date: 2019   Patient Active Problem List   Diagnosis   • Acute CVA (cerebrovascular accident) (CMS/HCC)   • Symptomatic anemia   • Hypertension   • Depression   • Reflux esophagitis   • Breast cancer (CMS/HCC)   • Acute on chronic diastolic heart failure (CMS/HCC)   • Hemorrhagic disorder due to extrinsic circulating anticoagulants (CMS/HCC)          Visit Dx:    ICD-10-CM ICD-9-CM   1. Acute CVA (cerebrovascular accident) (CMS/HCC) I63.9 434.91   2. Cognitive communication deficit R41.841 799.52   3. Aphasia R47.01 784.3                         SLP OP Goals     Row Name 19 1200          Subjective Comments    Subjective Comments  Patient is pleasant and cooperative  -KA        Subjective Pain    Able to rate subjective pain?  yes  -KA     Pre-Treatment Pain Level  0  -KA     Post-Treatment Pain Level  0  -KA        Written Language Comprehension Goals    Patient will improve comprehension of written language skills by reading short paragraphs and correctly answering written Yes/No questions  90%:;without cues  -KA     Status: Patient will improve comprehension of written language skills by reading short paragraphs and correctly answering written Yes/No questions  Progressing as expected  -KA     Comments: Patient will improve comprehension of written language skills by reading short paragraphs and correctly answering written Yes/No questions  reading comprehension of pertinent information from detail news articule (multiparagraph)=80% without cues   -KA        Memory Goals    Patient will demonstrate improved ability to recall information by immediately recalling a series of words  90%:;without cues  -KA     Status: Patient will demonstrate improved ability to recall information by immediately  recalling a series of words  Progressing as expected  -KA     Comments: Patient will demonstrate improved ability to recall information by immediately recalling a series of words  Recall of three unrleated words after 10 second delay=73% without word bank and 95% with word bank. Delayed recall of same three related words beginning of the session=2/3, after 30 min 3/3 100%, end of the session 2/3 66% without cues and 100% with cues. Visual  memory task of picture matching 50% without cues,   -KA     Patient will demonstrate improved ability to recall information by listening to paragraph and answering yes/no questions  90%:;without cues  -KA     Status: Patient will demonstrate improved ability to recall information by listening to paragraph and answering yes/no questions  Progressing as expected  -KA     Comments: Patient will demonstrate improved ability to recall information by listening to paragraph and answering yes/no questions  recall of detail in paragraphs with multple choice 95% however accuracy decreases to 52% without multiple choice`  -KA        Attention/Orientation Goals    Patient will improve orientation skills by orienting to time/date  90%:;without cues  -KA     Status: Patient will improve orientation skills by orienting to time/date  Progressing as expected  -KA     Comments: Patient will improve orientation skills by orienting to time/date  RECALL of current date without cues or use of calendar 2/3 66% (pt frequently states october 2000 is current date) and delayed recall end of the session after review of calendar 2/3 66%  -KA       User Key  (r) = Recorded By, (t) = Taken By, (c) = Cosigned By    Initials Name Provider Type    Chetan Nicholas MA,CCC-SLP Speech and Language Pathologist                         Time Calculation:   SLP Start Time: 1100  SLP Stop Time: 1200  SLP Time Calculation (min): 60 min    Therapy Charges for Today     Code Description Service Date Service Provider  Modifiers Qty    [id number]  ST TREATMENT SPEECH 4 8/19/2019 Chetan Machuca MA,CCC-SLP GN 1                   Chetan Machuca MA,MELO-SLP  8/19/2019

## 2019-08-21 ENCOUNTER — HOSPITAL ENCOUNTER (OUTPATIENT)
Dept: SPEECH THERAPY | Facility: HOSPITAL | Age: 79
Setting detail: THERAPIES SERIES
Discharge: HOME | End: 2019-08-21

## 2019-08-21 DIAGNOSIS — I63.9 ACUTE CVA (CEREBROVASCULAR ACCIDENT) (HCC): Primary | ICD-10-CM

## 2019-08-21 DIAGNOSIS — R41.841 COGNITIVE COMMUNICATION DEFICIT: ICD-10-CM

## 2019-08-21 PROCEDURE — 92507 TX SP LANG VOICE COMM INDIV: CPT | Performed by: SPEECH-LANGUAGE PATHOLOGIST

## 2019-08-21 NOTE — THERAPY TREATMENT NOTE
Outpatient Speech Language Pathology   Adult Speech Language Cognitive Treatment Note  [facility]     Patient Name: Ivet Diaz  : 1940  MRN: [id number]  Today's Date: 2019         Visit Date: 2019   Patient Active Problem List   Diagnosis   • Acute CVA (cerebrovascular accident) (CMS/HCC)   • Symptomatic anemia   • Hypertension   • Depression   • Reflux esophagitis   • Breast cancer (CMS/HCC)   • Acute on chronic diastolic heart failure (CMS/HCC)   • Hemorrhagic disorder due to extrinsic circulating anticoagulants (CMS/HCC)          Visit Dx:    ICD-10-CM ICD-9-CM   1. Acute CVA (cerebrovascular accident) (CMS/HCC) I63.9 434.91   2. Cognitive communication deficit R41.841 799.52                         SLP OP Goals     Row Name 19 1000          Subjective Comments    Subjective Comments  Patient is pleasant and cooperative  -KA        Subjective Pain    Able to rate subjective pain?  yes  -KA     Pre-Treatment Pain Level  0  -KA     Post-Treatment Pain Level  0  -KA        Written Language Comprehension Goals    Patient will improve comprehension of written language skills by reading short paragraphs and correctly answering written Yes/No questions  90%:;without cues  -KA     Status: Patient will improve comprehension of written language skills by reading short paragraphs and correctly answering written Yes/No questions  Progressing as expected  -KA     Comments: Patient will improve comprehension of written language skills by reading short paragraphs and correctly answering written Yes/No questions  reading comprehension of pertinent information from detail news articule (multiparagraph)=80% without cues   -KA        Memory Goals    Patient will demonstrate improved ability to recall information by immediately recalling a series of words  90%:;without cues  -KA     Status: Patient will demonstrate improved ability to recall information by immediately recalling a series of words   Progressing as expected  -MERRITT     Comments: Patient will demonstrate improved ability to recall information by immediately recalling a series of words  Recall of same three related words in the beginning of the session 2/3 66% without cues and end of the session 1/3 33% without cues and 3/3 with min cues. Delayed recall 10 seconds of three unrelated words in IPAD task without word bank 87% with improvement today. New task today with immediate recall of 5 names with name-association task. immedate recall 4/5 80% and after two 3 minute delays 2/5 40% first one and 1/5 20% second one. Immediate recall of picture scene task 30% without cues and visual memory picture matching task 85%.    -MERRITT       User Key  (r) = Recorded By, (t) = Taken By, (c) = Cosigned By    Initials Name Provider Type    Chetan Nicholas MA,CCC-SLP Speech and Language Pathologist                         Time Calculation:   SLP Start Time: 0900  SLP Stop Time: 1000  SLP Time Calculation (min): 60 min    Therapy Charges for Today     Code Description Service Date Service Provider Modifiers Qty    [id number] [facility] TREATMENT SPEECH 4 8/21/2019 Chetan Machuca MA,CCC-SLP  1                   Chetan Machuca MA,CCC-SLP  8/21/2019

## 2019-08-26 ENCOUNTER — HOSPITAL ENCOUNTER (OUTPATIENT)
Dept: SPEECH THERAPY | Facility: HOSPITAL | Age: 79
Setting detail: THERAPIES SERIES
Discharge: HOME | End: 2019-08-26

## 2019-08-26 DIAGNOSIS — I63.9 ACUTE CVA (CEREBROVASCULAR ACCIDENT) (HCC): Primary | ICD-10-CM

## 2019-08-26 DIAGNOSIS — R41.841 COGNITIVE COMMUNICATION DEFICIT: ICD-10-CM

## 2019-08-26 DIAGNOSIS — R47.01 APHASIA: ICD-10-CM

## 2019-08-26 PROCEDURE — 92507 TX SP LANG VOICE COMM INDIV: CPT | Performed by: SPEECH-LANGUAGE PATHOLOGIST

## 2019-08-26 NOTE — THERAPY TREATMENT NOTE
Outpatient Speech Language Pathology   Adult Speech Language Cognitive Treatment Note  [facility]     Patient Name: Ivet Diaz  : 1940  MRN: [id number]  Today's Date: 2019         Visit Date: 2019   Patient Active Problem List   Diagnosis   • Acute CVA (cerebrovascular accident) (CMS/HCC)   • Symptomatic anemia   • Hypertension   • Depression   • Reflux esophagitis   • Breast cancer (CMS/HCC)   • Acute on chronic diastolic heart failure (CMS/HCC)   • Hemorrhagic disorder due to extrinsic circulating anticoagulants (CMS/HCC)          Visit Dx:    ICD-10-CM ICD-9-CM   1. Acute CVA (cerebrovascular accident) (CMS/HCC) I63.9 434.91   2. Cognitive communication deficit R41.841 799.52   3. Aphasia R47.01 784.3                         SLP OP Goals     Row Name 19 1200          Subjective Comments    Subjective Comments  Patient is pleasant and cooperative.   -KA        Subjective Pain    Able to rate subjective pain?  yes  -KA     Pre-Treatment Pain Level  0  -KA     Post-Treatment Pain Level  0  -KA        Written Language Comprehension Goals    Patient will improve comprehension of written language skills by reading short paragraphs and correctly answering written Yes/No questions  90%:;without cues  -KA     Status: Patient will improve comprehension of written language skills by reading short paragraphs and correctly answering written Yes/No questions  Progressing as expected  -KA     Comments: Patient will improve comprehension of written language skills by reading short paragraphs and correctly answering written Yes/No questions  Reading comprehension of pertinent information from detail in news articles=70% without cues  -KA        Memory Goals    Patient will demonstrate improved ability to recall information by immediately recalling a series of words  90%:;without cues;related;after delay;with no delay  -KA     Status: Patient will demonstrate improved ability to recall information  by immediately recalling a series of words  Progressing as expected  -KA     Comments: Patient will demonstrate improved ability to recall information by immediately recalling a series of words  Immediate recall of same three related words in the beginning of the session 3/3 100% at the end of the session same three words 2/3 66% without cues and 3/3 with cues. Patient had to recall 5 names in name-association task (visual memory task with recalling name to face) immediate recall 4/5 80% and after 5 minute delay 2/5 40% without cues and after 15 minte delay 2/5 40% and with cues 3/5 60%. In immediate recall visual memory task with recall of picture scene, pt demonstrted 40% without cues. In IPAD task where patient had to recall three unrelated words with a 10 second delay 70% without cues and 95% with cues. n  -KA        Attention/Orientation Goals    Patient will improve orientation skills by orienting to time/date  90%:;without cues  -KA     Status: Patient will improve orientation skills by orienting to time/date  Progressing as expected  -KA     Comments: Patient will improve orientation skills by orienting to time/date  RECALL of current date without cues or use of calendar 2/3 66% (pt frequently states october 2000 is current date) and delayed recall end of the session after review of calendar 2/3 66%  -KA       User Key  (r) = Recorded By, (t) = Taken By, (c) = Cosigned By    Initials Name Provider Type    Chetan Nicholas MA,CCC-SLP Speech and Language Pathologist                         Time Calculation:   SLP Start Time: 1100  SLP Stop Time: 1200  SLP Time Calculation (min): 60 min    Therapy Charges for Today     Code Description Service Date Service Provider Modifiers Qty    20952276435 [facility] TREATMENT SPEECH 4 8/26/2019 Chetan Machuca MA,CCC-SLP GN 1                   Chetan Machuca MA,CCC-SLP  8/26/2019

## 2019-08-26 NOTE — THERAPY TREATMENT NOTE
Outpatient Speech Language Pathology   Adult Speech Language Cognitive Treatment Note  [facility]     Patient Name: Ivet Diaz  : 1940  MRN: [id number]  Today's Date: 2019         Visit Date: 2019   Patient Active Problem List   Diagnosis   • Acute CVA (cerebrovascular accident) (CMS/HCC)   • Symptomatic anemia   • Hypertension   • Depression   • Reflux esophagitis   • Breast cancer (CMS/HCC)   • Acute on chronic diastolic heart failure (CMS/HCC)   • Hemorrhagic disorder due to extrinsic circulating anticoagulants (CMS/HCC)          Visit Dx:    ICD-10-CM ICD-9-CM   1. Acute CVA (cerebrovascular accident) (CMS/HCC) I63.9 434.91   2. Cognitive communication deficit R41.841 799.52   3. Aphasia R47.01 784.3                         SLP OP Goals     Row Name 19 1200          Subjective Comments    Subjective Comments  Patient is pleasant and cooperative.   -KA        Subjective Pain    Able to rate subjective pain?  yes  -KA     Pre-Treatment Pain Level  0  -KA     Post-Treatment Pain Level  0  -KA        Written Language Comprehension Goals    Patient will improve comprehension of written language skills by reading short paragraphs and correctly answering written Yes/No questions  90%:;without cues  -KA     Status: Patient will improve comprehension of written language skills by reading short paragraphs and correctly answering written Yes/No questions  Progressing as expected  -KA     Comments: Patient will improve comprehension of written language skills by reading short paragraphs and correctly answering written Yes/No questions  Reading comprehension of pertinent information from detail in news articles=70% without cues  -KA        Memory Goals    Patient will demonstrate improved ability to recall information by immediately recalling a series of words  90%:;without cues;related;after delay;with no delay  -KA     Status: Patient will demonstrate improved ability to recall information  by immediately recalling a series of words  Progressing as expected  -KA     Comments: Patient will demonstrate improved ability to recall information by immediately recalling a series of words  Immediate recall of same three related words in the beginning of the session 3/3 100% at the end of the session same three words 2/3 66% without cues and 3/3 with cues. Patient had to recall 5 names in name-association task (visual memory task with recalling name to face) immediate recall 4/5 80% and after 5 minute delay 2/5 40% without cues and after 15 minte delay 2/5 40% and with cues 3/5 60%. In immediate recall visual memory task with recall of picture scene, pt demonstrted 40% without cues. In IPAD task where patient had to recall three unrelated words with a 10 second delay 70% without cues and 95% with cues. n  -KA        Attention/Orientation Goals    Patient will improve orientation skills by orienting to time/date  90%:;without cues  -KA     Status: Patient will improve orientation skills by orienting to time/date  Progressing as expected  -KA     Comments: Patient will improve orientation skills by orienting to time/date  RECALL of current date without cues or use of calendar 2/3 66% (pt frequently states october 2000 is current date) and delayed recall end of the session after review of calendar 2/3 66%  -KA       User Key  (r) = Recorded By, (t) = Taken By, (c) = Cosigned By    Initials Name Provider Type    Chetan Nicholas MA,CCC-SLP Speech and Language Pathologist                         Time Calculation:   SLP Start Time: 1100  SLP Stop Time: 1200  SLP Time Calculation (min): 60 min    Therapy Charges for Today     Code Description Service Date Service Provider Modifiers Qty    [id number] [facility] TREATMENT SPEECH 4 8/26/2019 Chetan Machuca MA,CCC-SLP GN 1                   Chetan Machuca MA,CCC-SLP  8/26/2019

## 2019-08-28 ENCOUNTER — HOSPITAL ENCOUNTER (OUTPATIENT)
Dept: SPEECH THERAPY | Facility: HOSPITAL | Age: 79
Setting detail: THERAPIES SERIES
Discharge: HOME | End: 2019-08-28

## 2019-08-28 DIAGNOSIS — I63.9 ACUTE CVA (CEREBROVASCULAR ACCIDENT) (HCC): Primary | ICD-10-CM

## 2019-08-28 DIAGNOSIS — R47.01 APHASIA: ICD-10-CM

## 2019-08-28 DIAGNOSIS — R41.841 COGNITIVE COMMUNICATION DEFICIT: ICD-10-CM

## 2019-08-28 PROCEDURE — 92507 TX SP LANG VOICE COMM INDIV: CPT

## 2019-08-28 NOTE — THERAPY TREATMENT NOTE
Outpatient Speech Language Pathology   Adult Speech Language Cognitive Treatment Note  [facility]     Patient Name: Ivet Diaz  : 1940  MRN: [id number]  Today's Date: 2019         Visit Date: 2019   Patient Active Problem List   Diagnosis   • Acute CVA (cerebrovascular accident) (CMS/HCC)   • Symptomatic anemia   • Hypertension   • Depression   • Reflux esophagitis   • Breast cancer (CMS/HCC)   • Acute on chronic diastolic heart failure (CMS/HCC)   • Hemorrhagic disorder due to extrinsic circulating anticoagulants (CMS/HCC)          Visit Dx:    ICD-10-CM ICD-9-CM   1. Acute CVA (cerebrovascular accident) (CMS/HCC) I63.9 434.91   2. Cognitive communication deficit R41.841 799.52   3. Aphasia R47.01 784.3                         SLP OP Goals     Row Name 19 0900          Subjective Comments    Subjective Comments  Patient was pleasent and cooperative and demonstrated good effort on tasks.   (Pended)   -AM        Subjective Pain    Able to rate subjective pain?  yes  (Pended)   -AM     Pre-Treatment Pain Level  0  (Pended)   -AM     Post-Treatment Pain Level  0  (Pended)   -AM        Memory Goals    Patient will demonstrate improved ability to recall information by immediately recalling a series of words  90%:;without cues;related;after delay;with no delay  (Pended)   -AM     Status: Patient will demonstrate improved ability to recall information by immediately recalling a series of words  Progressing as expected  (Pended)   -AM     Comments: Patient will demonstrate improved ability to recall information by immediately recalling a series of words  Immediate recall of same 3 related words 2/3 in beginning of session 66% accuracy and end of session pt. achieved 3/3 100% accuracy. In iPad tasks where pt. was to recall 3 unrelated words after 10-second delay the pt. achieved 78% accuracy without a word bank and achieved 90% accuracy when a word bank was provided.   (Pended)   -AM      Patient will demonstrate improved ability to recall information by listening to paragraph and answering yes/no questions  90%:;without cues  (Pended)   -AM     Status: Patient will demonstrate improved ability to recall information by listening to paragraph and answering yes/no questions  Progressing as expected  (Pended)   -AM     Comments: Patient will demonstrate improved ability to recall information by listening to paragraph and answering yes/no questions  Pt. achieved 63% accuracy when recalling details in a paragraph without multiple choices. Pt. Achieved 87% accuracy when a field of 3-choices were provided.  (Pended)   -AM        Problem Solving Goals    Patient will improve ability to analyze problems and determine solutions by completing a visual representation/filling in a chart by following  written directions  90%:;without cues  (Pended)   -AM     Status: Patient will improve ability to analyze problems and determine solutions by completing a visual representation/filling in a chart by following  written directions  --  (Pended)  ongoing  -AM     Comments: Patient will improve ability to analyze problems and determine solutions by completing a visual representation/filling in a chart by following  written directions  Pt. completed a schedule task requiring planning and organizational skills with 60% accuracy without cues and 100% accuracy when cues were provided.   (Pended)   -AM        Attention/Orientation Goals    Patient will improve orientation skills by orienting to time/date  90%:;without cues  (Pended)   -AM     Status: Patient will improve orientation skills by orienting to time/date  --  (Pended)  ongoing  -AM     Comments: Patient will improve orientation skills by orienting to time/date  At the beginning of the session the pt. Recalled the date with 0% accuracy when cues were not provided. The SLP cued the client to think about the weather and season. At the end of the session after review  of the calender client recalled the date with 66% accuracy.   -AM       User Key  (r) = Recorded By, (t) = Taken By, (c) = Cosigned By    Initials Name Provider Type    Deepika Howell, Speech Therapy Student Speech Therapy Student                         Time Calculation:   SLP Start Time: (P) 0900  SLP Stop Time: (P) 1000  SLP Time Calculation (min): (P) 60 min    Therapy Charges for Today     Code Description Service Date Service Provider Modifiers Qty    [id number]  ST TREATMENT SPEECH 4 8/28/2019 Deepika Oreilly, Speech Therapy Student GN 1                   Deepika Oreilly, Speech Therapy Student  8/28/2019

## 2019-09-04 ENCOUNTER — HOSPITAL ENCOUNTER (OUTPATIENT)
Dept: SPEECH THERAPY | Facility: HOSPITAL | Age: 79
Setting detail: THERAPIES SERIES
Discharge: HOME | End: 2019-09-04

## 2019-09-04 DIAGNOSIS — R41.841 COGNITIVE COMMUNICATION DEFICIT: ICD-10-CM

## 2019-09-04 DIAGNOSIS — R47.01 APHASIA: ICD-10-CM

## 2019-09-04 DIAGNOSIS — I63.9 ACUTE CVA (CEREBROVASCULAR ACCIDENT) (HCC): Primary | ICD-10-CM

## 2019-09-04 PROCEDURE — 92507 TX SP LANG VOICE COMM INDIV: CPT | Performed by: SPEECH-LANGUAGE PATHOLOGIST

## 2019-09-04 NOTE — THERAPY PROGRESS REPORT/RE-CERT
Outpatient Speech Language Pathology   Adult Speech Language Cognitive Progress Note  [facility]     Patient Name: Ivet Diaz  : 1940  MRN: [id number]  Today's Date: 2019         Visit Date: 2019   Patient Active Problem List   Diagnosis   • Acute CVA (cerebrovascular accident) (CMS/HCC)   • Symptomatic anemia   • Hypertension   • Depression   • Reflux esophagitis   • Breast cancer (CMS/HCC)   • Acute on chronic diastolic heart failure (CMS/HCC)   • Hemorrhagic disorder due to extrinsic circulating anticoagulants (CMS/HCC)          Visit Dx:    ICD-10-CM ICD-9-CM   1. Acute CVA (cerebrovascular accident) (CMS/HCC) I63.9 434.91   2. Cognitive communication deficit R41.841 799.52   3. Aphasia R47.01 784.3                         SLP OP Goals     Row Name 19 1100          Subjective Comments    Subjective Comments  Patient is pleasant and cooperative,   -KA        Subjective Pain    Able to rate subjective pain?  yes  -KA     Pre-Treatment Pain Level  0  -KA     Post-Treatment Pain Level  0  -KA        Written Language Comprehension Goals    Patient will be able to comprehend written material in home/home and social  environment  90%:;without cues  -KA     Status: Patient will be able to comprehend written material in home/home and social  environment  Progressing as expected  -KA     Comments: Patient will be able to comprehend written material in home/home and social  environment  Improvement of recall for more detail information at the multiparagraph (current events, news article) average of 70% for detailed multiple paragraph.   -KA     Patient will improve comprehension of written language skills by reading short paragraphs and correctly answering written Yes/No questions  90%:;without cues  -KA     Status: Patient will improve comprehension of written language skills by reading short paragraphs and correctly answering written Yes/No questions  Progressing as expected  -KA      Comments: Patient will improve comprehension of written language skills by reading short paragraphs and correctly answering written Yes/No questions  Reading comprehension of pertinent information from detail in news articles=70% without cues  -KA        Memory Goals    Patient will be able to remember  information needed to participate in activities of daily living  Independently  -KA     Status: Patient will be able to remember  information needed to participate in activities of daily living  Progressing as expected  -KA     Comments: Patient will be able to remember  information needed to participate in activities of daily living  Patient continues to demonstrate progress with delayed recall, today able to recall three related words 100% in the beginning of therapy and 100% end of the session without cues. Average ranges from 66 to 100% without cues. Patient averaging 70 to 90% accuracy with recall of three unrelated words after 10 second delay. (today performed 90%). Average recall of facts in paragraph level story 60 to 70% without multiple choice and 90% with min cues. Patient demonstrates ongoing progress with delayed and immediate recall tasks and would benefit ongoing speech therapy to target patient's functional memory and improve safety in home environment and increase ability to manage homemanagement tasks.   -KA     Patient will demonstrate improved ability to recall information by immediately recalling a series of words  90%:;without cues;related;after delay;with no delay  -KA     Status: Patient will demonstrate improved ability to recall information by immediately recalling a series of words  Progressing as expected  -KA     Comments: Patient will demonstrate improved ability to recall information by immediately recalling a series of words  delayed recall of the same three related words end of the therapy session 100%, and beginning was 100% without cues. Delayed recall of 3 unrelated words after 10  second delay without a word bank=90% and 93% without cues. In visualmemory picture matching task patient performed with 40% without cues   -KA     Patient will demonstrate improved ability to recall information by listening to paragraph and answering yes/no questions  90%:;without cues  -KA     Status: Patient will demonstrate improved ability to recall information by listening to paragraph and answering yes/no questions  Progressing as expected  -KA     Comments: Patient will demonstrate improved ability to recall information by listening to paragraph and answering yes/no questions  recall of detail from short paragraphs without cues or multiple choice=67% and with cues 100%.   -KA       User Key  (r) = Recorded By, (t) = Taken By, (c) = Cosigned By    Initials Name Provider Type    Chetan Nicholas MA,CCC-SLP Speech and Language Pathologist          OP SLP Education     Row Name 09/04/19 1125       Education    Barriers to Learning  No barriers identified  -KA    Assessed  Learning needs;Learning motivation  -    Learning Motivation  Strong  -    Learning Method  Explanation  -KA    Teaching Response  Verbalized understanding  -KA      User Key  (r) = Recorded By, (t) = Taken By, (c) = Cosigned By    Initials Name Effective Dates    Chetan Nicholas MA,CCC-SLP 06/08/18 -           OP SLP Assessment/Plan - 09/04/19 1121        SLP Assessment    Functional Problems  Speech Language- Adult/Cognition   -    Impact on Function: Adult Speech Language/Cognition  Difficulty participating in avocational activities;Trouble learning or remembering new information;Decreased recall of personal information and medical history;Restrictions in personal and social life   -MERRITT    Clinical Impression: Speech Language-Adult/Congnition  Mild-Moderate:;Cognitive Communication Impairment   -KA    Prognosis  Good (comment)   -       SLP Plan    Frequency  -- 2x a week     2x a week   -KA    Duration  4 weeks   -KA    Planned  CPT's?  SLP INDIVIDUAL SPEECH THERAPY: 73003   -MERRITT    Expected Duration Therapy Session - minutes  45-60 minutes   -MERRITT    Plan Comments  Patient would benefit from continued speech therapy targeting patient's functional memory skills to improve recall ofpersonal informtion, and improve immediate/delayed recall in order to form new memories, increase safety in home environment, and complete home management tasks (cooking, finances etc). Majority of therapy tasks target patient's memory impairment due to levelof impairment and pt/family overall goals.   -MERRITT      User Key  (r) = Recorded By, (t) = Taken By, (c) = Cosigned By    Initials Name Provider Type    Chetan Nicholas MA,CCC-SLP Speech and Language Pathologist                 Time Calculation:   SLP Start Time: 0900  SLP Stop Time: 1000  SLP Time Calculation (min): 60 min    Therapy Charges for Today     Code Description Service Date Service Provider Modifiers Qty    76997146321 [facility] TREATMENT SPEECH 4 9/4/2019 Chetan Machuca MA,CCC-SLP KX, GN 1                   Chetan Machuca MA,CCC-SLP  9/4/2019

## 2019-09-09 ENCOUNTER — HOSPITAL ENCOUNTER (OUTPATIENT)
Dept: SPEECH THERAPY | Facility: HOSPITAL | Age: 79
Setting detail: THERAPIES SERIES
Discharge: HOME | End: 2019-09-09

## 2019-09-09 DIAGNOSIS — I63.9 ACUTE CVA (CEREBROVASCULAR ACCIDENT) (HCC): Primary | ICD-10-CM

## 2019-09-09 DIAGNOSIS — R41.841 COGNITIVE COMMUNICATION DEFICIT: ICD-10-CM

## 2019-09-09 PROCEDURE — 92507 TX SP LANG VOICE COMM INDIV: CPT | Performed by: SPEECH-LANGUAGE PATHOLOGIST

## 2019-09-09 NOTE — THERAPY TREATMENT NOTE
Outpatient Speech Language Pathology   Adult Speech Language Cognitive Treatment Note  [facility]     Patient Name: Ivet Diaz  : 1940  MRN: [id number]  Today's Date: 2019         Visit Date: 2019   Patient Active Problem List   Diagnosis   • Acute CVA (cerebrovascular accident) (CMS/HCC)   • Symptomatic anemia   • Hypertension   • Depression   • Reflux esophagitis   • Breast cancer (CMS/HCC)   • Acute on chronic diastolic heart failure (CMS/HCC)   • Hemorrhagic disorder due to extrinsic circulating anticoagulants (CMS/HCC)          Visit Dx:    ICD-10-CM ICD-9-CM   1. Acute CVA (cerebrovascular accident) (CMS/HCC) I63.9 434.91   2. Cognitive communication deficit R41.841 799.52                         SLP OP Goals     Row Name 19 1200          Subjective Comments    Subjective Comments  Patient is pleasant and cooperative during therapy.   -KA        Subjective Pain    Able to rate subjective pain?  yes  -KA     Pre-Treatment Pain Level  0  -KA     Post-Treatment Pain Level  0  -KA        Memory Goals    Patient will demonstrate improved ability to recall information by immediately recalling a series of words  90%:;without cues;related;after delay;with no delay  -KA     Status: Patient will demonstrate improved ability to recall information by immediately recalling a series of words  -- ongoing  -KA     Comments: Patient will demonstrate improved ability to recall information by immediately recalling a series of words  Immediate recall task with recall of 5 words and identify which word does not belong with others, 70% without repetitions and 100% with repetitions. SLP presented a new set of three related words today, patient's immediate recall 1/3 33% without cues, after 10 minute delay 0% without cues and 2/3 66% with min cues and another 10 minute delay 0% without cues and 2/3 66% with min to mod cues. Utilized visualization and repetition and writing names down in therapy.   Delayed recall of 3 faces using name-association and visualization=1/3 33% without cues after 10 minute delay and another 10 minute delay=1/333% witthout cues.   -KA        Problem Solving Goals    Patient will improve ability to analyze problems and determine solutions by completing a visual representation/filling in a chart by following  written directions  90%:;without cues  -KA     Status: Patient will improve ability to analyze problems and determine solutions by completing a visual representation/filling in a chart by following  written directions  Progressing as expected  -KA     Comments: Patient will improve ability to analyze problems and determine solutions by completing a visual representation/filling in a chart by following  written directions  75% without cues in complex scheduling tasks determining order of a schedule 75% without cues and 100% with mod cues. In different task where patient had to utilize process of elimination to determine and find answers in logic puzzle=difficulty with eliminating and crossing off items requiring mod to max cues 100% with mod to max cues and 50% without cues   -KA       User Key  (r) = Recorded By, (t) = Taken By, (c) = Cosigned By    Initials Name Provider Type    Chetan Nicholas MA,CCC-SLP Speech and Language Pathologist                         Time Calculation:   SLP Start Time: 1100  SLP Stop Time: 1200  SLP Time Calculation (min): 60 min    Therapy Charges for Today     Code Description Service Date Service Provider Modifiers Qty    [id number] [facility] TREATMENT SPEECH 4 9/9/2019 Chetan Machuca MA,CCC-SLP KX, GN 1                   Chetan Machuca MA,CCC-SLP  9/9/2019

## 2019-09-11 ENCOUNTER — HOSPITAL ENCOUNTER (OUTPATIENT)
Dept: SPEECH THERAPY | Facility: HOSPITAL | Age: 79
Setting detail: THERAPIES SERIES
Discharge: HOME | End: 2019-09-11

## 2019-09-11 DIAGNOSIS — I50.33 ACUTE ON CHRONIC DIASTOLIC HEART FAILURE (HCC): Primary | ICD-10-CM

## 2019-09-11 DIAGNOSIS — R47.01 APHASIA: ICD-10-CM

## 2019-09-11 DIAGNOSIS — R41.841 COGNITIVE COMMUNICATION DEFICIT: ICD-10-CM

## 2019-09-11 PROCEDURE — 92507 TX SP LANG VOICE COMM INDIV: CPT

## 2019-09-11 NOTE — THERAPY TREATMENT NOTE
Outpatient Speech Language Pathology   Adult Speech Language Cognitive Treatment Note  [facility]     Patient Name: Ivet Diaz  : 1940  MRN: [id number]  Today's Date: 2019         Visit Date: 2019   Patient Active Problem List   Diagnosis   • Acute CVA (cerebrovascular accident) (CMS/HCC)   • Symptomatic anemia   • Hypertension   • Depression   • Reflux esophagitis   • Breast cancer (CMS/HCC)   • Acute on chronic diastolic heart failure (CMS/HCC)   • Hemorrhagic disorder due to extrinsic circulating anticoagulants (CMS/HCC)          Visit Dx:    ICD-10-CM ICD-9-CM   1. Acute on chronic diastolic heart failure (CMS/HCC) I50.33 428.33   2. Cognitive communication deficit R41.841 799.52   3. Aphasia R47.01 784.3                         SLP OP Goals     Row Name 19 1000 19 0900       Subjective Comments    Subjective Comments  --  Patient was pleasent and cooperative throught the session and demonstrated good effort on all tasks.   (Pended)   -AM       Subjective Pain    Able to rate subjective pain?  --  yes  (Pended)   -AM    Pre-Treatment Pain Level  --  0  (Pended)   -AM    Post-Treatment Pain Level  --  0  (Pended)   -AM       Written Language Comprehension Goals    Patient will improve comprehension of written language skills by reading short paragraphs and correctly answering written Yes/No questions  90%:  (Pended)   -AM  --    Status: Patient will improve comprehension of written language skills by reading short paragraphs and correctly answering written Yes/No questions  --  (Pended)  ongoing  -AM  --    Comments: Patient will improve comprehension of written language skills by reading short paragraphs and correctly answering written Yes/No questions  Pt. completed a reading comprehension task in which she read a multi paragraph news article and provided 4 key facts from the article and answered open-ended questions with 33% accuracy without cues and with 66% accuracy  when min cueing was provided.   (Pended)   -AM  --       Memory Goals    Patient will demonstrate improved ability to recall information by immediately recalling a series of words  90%:;without cues;related;after delay;with no delay  (Pended)   -AM  90%:;without cues;related;after delay;with no delay  (Pended)   -AM    Status: Patient will demonstrate improved ability to recall information by immediately recalling a series of words  --  (Pended)  ongoing  -AM  --  (Pended)  ongoing  -AM    Comments: Patient will demonstrate improved ability to recall information by immediately recalling a series of words  Pt. completed a visual memory task where pt. recalled detail in a picture scene with 60% accuracy without cues amd 70% accuracy when min cues were provided. Immediate recall task with recall of 5 words and identify which word does not belong with others, 50% without repetitions and 90% with repetitions. Pt. recalled 3 moderately complex related words immedeatly with 0% accuracy without cues and with 66% accuracy when cues were provided. Pt. recalled 3 related words after a 5-minute delay with 0% accuracy without cues and with 66% accruacy when mod to max cues were provided. Pt. recalled 3 related words after a 10-minute delay with 0% accuracy without cues and with 66% accruacy when mod to max cues were provided. Pt. recalled 3 related words afte a 20-minute delay with 0% accuracy without cues and 100% accuracy when mod to max cues were provided. Pt. completed a visual memory task in which pt. recalled the names of 3 people immedeatly, following a 5-minute delay, and a 10-minute delay with 100% accuracy without cues. During visual memory task, pt. was observed utilizing memory techniques, such as acronyms, word association, and visual imagery. Pt. completed an iPad task in which she recalled 3 unrelated words following a 10-second delay with 73% accuracy without cues and with 90% accuracy when a field of 6 choices  was provided.   (Pended)   -AM  Pt. completed an immediate recall task of 5 words and identified which word does not belong with 50% accuracy without repetitions and with 90% accuracy when a repetition of the words was provided. Pt. recalled 3 related more complex related words introduced in the previous session immedeatly with 0% accuracy without cues and with 66% accuracy when cues were provided  (Pended)   -AM       Attention/Orientation Goals    Patient will improve orientation skills by orienting to time/date  90%:;without cues  (Pended)   -AM  --    Status: Patient will improve orientation skills by orienting to time/date  --  (Pended)  ongoing  -AM  --    Comments: Patient will improve orientation skills by orienting to time/date  Pt. recalled the date at beginning of the session with 33% accuracy without cues and with 66% accuracy when mod cues and review of calender was provided. After 40-minute delay, pt. recalled the date with 66% accuracy without cues and with 100% accuracy when min cues were provided. SLP cued the pt. to look outside and think about the weather when recalling the month. Pt. recalled birth year with 50% accuracy without cues and 100% accuracy after multiple attempts.   (Pended)   -AM  --      User Key  (r) = Recorded By, (t) = Taken By, (c) = Cosigned By    Initials Name Provider Type    Deepika Howell Speech Therapy Student Speech Therapy Student                         Time Calculation:   SLP Start Time: (P) 0900  SLP Stop Time: (P) 0955  SLP Time Calculation (min): (P) 55 min    Therapy Charges for Today     Code Description Service Date Service Provider Modifiers Qty    [id number]  ST TREATMENT SPEECH 4 9/11/2019 Deepika Oreilly, Speech Therapy Student GN 1                   Deepika Oreilly Speech Therapy Student  9/11/2019

## 2019-09-16 ENCOUNTER — HOSPITAL ENCOUNTER (OUTPATIENT)
Dept: SPEECH THERAPY | Facility: HOSPITAL | Age: 79
Setting detail: THERAPIES SERIES
Discharge: HOME | End: 2019-09-16

## 2019-09-16 DIAGNOSIS — R41.841 COGNITIVE COMMUNICATION DEFICIT: ICD-10-CM

## 2019-09-16 DIAGNOSIS — I50.33 ACUTE ON CHRONIC DIASTOLIC HEART FAILURE (HCC): Primary | ICD-10-CM

## 2019-09-16 PROCEDURE — 92507 TX SP LANG VOICE COMM INDIV: CPT | Performed by: SPEECH-LANGUAGE PATHOLOGIST

## 2019-09-16 NOTE — THERAPY TREATMENT NOTE
Outpatient Speech Language Pathology   Adult Speech Language Cognitive Treatment Note  [facility]     Patient Name: Ivet Diaz  : 1940  MRN: [id number]  Today's Date: 2019         Visit Date: 2019   Patient Active Problem List   Diagnosis   • Acute CVA (cerebrovascular accident) (CMS/HCC)   • Symptomatic anemia   • Hypertension   • Depression   • Reflux esophagitis   • Breast cancer (CMS/HCC)   • Acute on chronic diastolic heart failure (CMS/HCC)   • Hemorrhagic disorder due to extrinsic circulating anticoagulants (CMS/HCC)          Visit Dx:    ICD-10-CM ICD-9-CM   1. Acute on chronic diastolic heart failure (CMS/HCC) I50.33 428.33   2. Cognitive communication deficit R41.841 799.52                         SLP OP Goals     Row Name 19 1200          Subjective Comments    Subjective Comments  Patient is pleasant and cooperative. Discussed anticipate upcoming d/c in two to three weeks.   -KA        Subjective Pain    Able to rate subjective pain?  yes  -KA     Pre-Treatment Pain Level  0  -KA     Post-Treatment Pain Level  0  -KA        Written Language Comprehension Goals    Patient will improve comprehension of written language skills by reading short paragraphs and correctly answering written Yes/No questions  90%:  -KA     Status: Patient will improve comprehension of written language skills by reading short paragraphs and correctly answering written Yes/No questions  Progressing as expected  -KA     Comments: Patient will improve comprehension of written language skills by reading short paragraphs and correctly answering written Yes/No questions  Reading comprehension of multiparagraph in the news=pt able to recall 8 facts out of 10 80% without cues.   -KA        Memory Goals    Patient will demonstrate improved ability to recall information by immediately recalling a series of words  90%:;without cues;related;after delay;with no delay  -KA     Status: Patient will demonstrate  improved ability to recall information by immediately recalling a series of words  Progressing as expected  -KA     Comments: Patient will demonstrate improved ability to recall information by immediately recalling a series of words  Review of new three related words and recall after 10 minute delay 0% without cues and 100% with mod cues and 15 minute delay 100% with mod cues and 0% without cues. Recall of three names after review (immediate recall 3/3 100%) after 10 min delay name association and repeating/visualizing words=1/3 33% without cues and 2/3 66% with min cues and after 20 minutes 1/3 33% and 3/3 100% with min to mod cues.   -KA     Patient’s memory skills will be enhanced as reported by patient by using external memory aides  90%:;without cues  -KA     Status: Patient’s memory skills will be enhanced as reported by patient by using external memory aides  Progressing as expected  -KA     Comments: Patient’s memory skills will be enhanced as reported by patient by using external memory aides  SLP and pt and  had discussion today on patient's goals and progress with therapy.  reports pt continues to demonstrate progress at home and keeping track more of events, schedules, appointment etc.  reported pt very independent and functional prior to stroke, and he is not used to being the one to keep track of appointments and help her with reminders. While he reports progress at home, he still has to help patient remind pt to  write down events on calendar and help her keep track of the times etc. SLP discussed with pt and  feel this strategy of pt writing down all important information (including calendars, lists, things to do, notebook) will be long term due to patient's continued difficulty with recalling new information. Pt/ demonstrated understanding of information.   -KA        Problem Solving Goals    Patient will improve ability to analyze problems and determine solutions  by completing a visual representation/filling in a chart by following  written directions  90%:;without cues  -KA     Status: Patient will improve ability to analyze problems and determine solutions by completing a visual representation/filling in a chart by following  written directions  Progressing as expected  -KA     Comments: Patient will improve ability to analyze problems and determine solutions by completing a visual representation/filling in a chart by following  written directions  abstract convergent language task 60% without cues and 100% with min to mod cues.   -KA        Attention/Orientation Goals    Patient will improve orientation skills by orienting to time/date  90%:;without cues  -KA     Status: Patient will improve orientation skills by orienting to time/date  Progressing as expected  -KA     Comments: Patient will improve orientation skills by orienting to time/date  recall of date in the beginning of the session 1/3 without cues and end of the session after a review with use of calendar 2/3 and 3/3 with min cues.   -KA       User Key  (r) = Recorded By, (t) = Taken By, (c) = Cosigned By    Initials Name Provider Type    Chetan Nicholas MA,CCC-SLP Speech and Language Pathologist                         Time Calculation:   SLP Start Time: 1100  SLP Stop Time: 1200  SLP Time Calculation (min): 60 min    Therapy Charges for Today     Code Description Service Date Service Provider Modifiers Qty    58681712685 [facility] ST TREATMENT SPEECH 4 9/16/2019 Chetan Machuca MA,CCC-SLP GN 1                   Chetan Machuca MA,CCC-SLP  9/16/2019

## 2019-09-16 NOTE — THERAPY TREATMENT NOTE
Outpatient Speech Language Pathology   Adult Speech Language Cognitive Treatment Note  [facility]     Patient Name: Ivet Diaz  : 1940  MRN: [id number]  Today's Date: 2019         Visit Date: 2019   Patient Active Problem List   Diagnosis   • Acute CVA (cerebrovascular accident) (CMS/HCC)   • Symptomatic anemia   • Hypertension   • Depression   • Reflux esophagitis   • Breast cancer (CMS/HCC)   • Acute on chronic diastolic heart failure (CMS/HCC)   • Hemorrhagic disorder due to extrinsic circulating anticoagulants (CMS/HCC)          Visit Dx:    ICD-10-CM ICD-9-CM   1. Acute on chronic diastolic heart failure (CMS/HCC) I50.33 428.33   2. Cognitive communication deficit R41.841 799.52                         SLP OP Goals     Row Name 19 1200          Subjective Comments    Subjective Comments  Patient is pleasant and cooperative. Discussed anticipate upcoming d/c in two to three weeks.   -KA        Subjective Pain    Able to rate subjective pain?  yes  -KA     Pre-Treatment Pain Level  0  -KA     Post-Treatment Pain Level  0  -KA        Written Language Comprehension Goals    Patient will improve comprehension of written language skills by reading short paragraphs and correctly answering written Yes/No questions  90%:  -KA     Status: Patient will improve comprehension of written language skills by reading short paragraphs and correctly answering written Yes/No questions  Progressing as expected  -KA     Comments: Patient will improve comprehension of written language skills by reading short paragraphs and correctly answering written Yes/No questions  Reading comprehension of multiparagraph in the news=pt able to recall 8 facts out of 10 80% without cues.   -KA        Memory Goals    Patient will demonstrate improved ability to recall information by immediately recalling a series of words  90%:;without cues;related;after delay;with no delay  -KA     Status: Patient will demonstrate  improved ability to recall information by immediately recalling a series of words  Progressing as expected  -KA     Comments: Patient will demonstrate improved ability to recall information by immediately recalling a series of words  Review of new three related words and recall after 10 minute delay 0% without cues and 100% with mod cues and 15 minute delay 100% with mod cues and 0% without cues. Recall of three names after review (immediate recall 3/3 100%) after 10 min delay name association and repeating/visualizing words=1/3 33% without cues and 2/3 66% with min cues and after 20 minutes 1/3 33% and 3/3 100% with min to mod cues.   -KA     Patient’s memory skills will be enhanced as reported by patient by using external memory aides  90%:;without cues  -KA     Status: Patient’s memory skills will be enhanced as reported by patient by using external memory aides  Progressing as expected  -KA     Comments: Patient’s memory skills will be enhanced as reported by patient by using external memory aides  SLP and pt and  had discussion today on patient's goals and progress with therapy.  reports pt continues to demonstrate progress at home and keeping track more of events, schedules, appointment etc.  reported pt very independent and functional prior to stroke, and he is not used to being the one to keep track of appointments and help her with reminders. While he reports progress at home, he still has to help patient remind pt to  write down events on calendar and help her keep track of the times etc. SLP discussed with pt and  feel this strategy of pt writing down all important information (including calendars, lists, things to do, notebook) will be long term due to patient's continued difficulty with recalling new information. Pt/ demonstrated understanding of information.   -KA        Problem Solving Goals    Patient will improve ability to analyze problems and determine solutions  by completing a visual representation/filling in a chart by following  written directions  90%:;without cues  -KA     Status: Patient will improve ability to analyze problems and determine solutions by completing a visual representation/filling in a chart by following  written directions  Progressing as expected  -KA     Comments: Patient will improve ability to analyze problems and determine solutions by completing a visual representation/filling in a chart by following  written directions  abstract convergent language task 60% without cues and 100% with min to mod cues.   -KA        Attention/Orientation Goals    Patient will improve orientation skills by orienting to time/date  90%:;without cues  -KA     Status: Patient will improve orientation skills by orienting to time/date  Progressing as expected  -KA     Comments: Patient will improve orientation skills by orienting to time/date  recall of date in the beginning of the session 1/3 without cues and end of the session after a review with use of calendar 2/3 and 3/3 with min cues.   -KA       User Key  (r) = Recorded By, (t) = Taken By, (c) = Cosigned By    Initials Name Provider Type    Chetan Nicholas MA,CCC-SLP Speech and Language Pathologist                         Time Calculation:   SLP Start Time: 1100  SLP Stop Time: 1200  SLP Time Calculation (min): 60 min    Therapy Charges for Today     Code Description Service Date Service Provider Modifiers Qty    11111178140 HC ST TREATMENT SPEECH 4 9/16/2019 Chetan Machuca MA,CCC-SLP GN 1                   Chetan Machuca MA,CCC-SLP  9/16/2019

## 2019-09-18 ENCOUNTER — HOSPITAL ENCOUNTER (OUTPATIENT)
Dept: SPEECH THERAPY | Facility: HOSPITAL | Age: 79
Setting detail: THERAPIES SERIES
Discharge: HOME | End: 2019-09-18

## 2019-09-18 DIAGNOSIS — R41.841 COGNITIVE COMMUNICATION DEFICIT: ICD-10-CM

## 2019-09-18 DIAGNOSIS — I63.9 ACUTE CVA (CEREBROVASCULAR ACCIDENT) (HCC): Primary | ICD-10-CM

## 2019-09-18 PROCEDURE — 92507 TX SP LANG VOICE COMM INDIV: CPT | Performed by: SPEECH-LANGUAGE PATHOLOGIST

## 2019-09-18 NOTE — THERAPY TREATMENT NOTE
Outpatient Speech Language Pathology   Adult Speech Language Cognitive Treatment Note  [facility]     Patient Name: Ivet Diaz  : 1940  MRN: [id number]  Today's Date: 2019         Visit Date: 2019   Patient Active Problem List   Diagnosis   • Acute CVA (cerebrovascular accident) (CMS/HCC)   • Symptomatic anemia   • Hypertension   • Depression   • Reflux esophagitis   • Breast cancer (CMS/HCC)   • Acute on chronic diastolic heart failure (CMS/HCC)   • Hemorrhagic disorder due to extrinsic circulating anticoagulants (CMS/HCC)          Visit Dx:    ICD-10-CM ICD-9-CM   1. Acute CVA (cerebrovascular accident) (CMS/HCC) I63.9 434.91   2. Cognitive communication deficit R41.841 799.52                         SLP OP Goals     Row Name 19 1000          Subjective Comments    Subjective Comments  Patient is pleasant and cooperative, she demonstrated progress today with delayed recall memory task.  -KA        Subjective Pain    Able to rate subjective pain?  yes  -KA     Pre-Treatment Pain Level  0  -KA     Post-Treatment Pain Level  0  -KA        Written Language Comprehension Goals    Patient will improve comprehension of written language skills by reading short paragraphs and correctly answering written Yes/No questions  90%:  -KA     Status: Patient will improve comprehension of written language skills by reading short paragraphs and correctly answering written Yes/No questions  Progressing as expected  -KA     Comments: Patient will improve comprehension of written language skills by reading short paragraphs and correctly answering written Yes/No questions  Reading multiparagraph news article and answering questions=65% without cues and 80% with cues pt also able to independently summarize story prior to answering questions, lacked some detail.   -KA        Memory Goals    Patient will demonstrate improved ability to recall information by immediately recalling a series of words   90%:;without cues;related;after delay;with no delay  -KA     Status: Patient will demonstrate improved ability to recall information by immediately recalling a series of words  Progressing as expected  -KA     Comments: Patient will demonstrate improved ability to recall information by immediately recalling a series of words  Delayed recallof three names 66% with delay of 2 days from Monday without cues and 2/3 66% after 10 and 30 minute delay and 100% with min cues. SLP introduced a new delayed memory task today (functional) where patient had to recall a errand (going to [employer] to get apples, where pt does go to this grocery and is a common food she picks). Patient wrote errand down and practiced repetition and visualization. Delayed recall after 5 min with no cues 0% and 100% with min cues, delayed recall after 10 and 25 minutes 100% without cues. In IPAD task delayed recallof three unrelated words after 10 second delay, 75% without choices and 85% with word bank. Immediate recallof picture scene task 60% without cues.   -KA     Patient will demonstrate improved ability to recall information by listening to paragraph and answering yes/no questions  90%:;without cues  -KA     Status: Patient will demonstrate improved ability to recall information by listening to paragraph and answering yes/no questions  Progressing as expected  -KA     Comments: Patient will demonstrate improved ability to recall information by listening to paragraph and answering yes/no questions  recall of detail from short paragraphs without cues or multiple choice=67% and with cues 100%.   -KA        Problem Solving Goals    Patient will improve ability to analyze problems and determine solutions by completing a visual representation/filling in a chart by following  written directions  90%:;without cues  -KA     Status: Patient will improve ability to analyze problems and determine solutions by completing a visual representation/filling in a  chart by following  written directions  Progressing as expected  -KA     Comments: Patient will improve ability to analyze problems and determine solutions by completing a visual representation/filling in a chart by following  written directions  abstract convergent language task 70% without cues and 100% with min to mod cues.   -KA     Patient will improve ability to analyze problems and determine solutions by completing functional math problems  90%:;without cues  -KA     Status: Patient will improve ability to analyze problems and determine solutions by completing functional math problems  Progressing as expected  -KA     Comments: Patient will improve ability to analyze problems and determine solutions by completing functional math problems  71% without cues with more complex functional math word problems.  and 100% with min cues.   -KA        Attention/Orientation Goals    Patient will improve orientation skills by orienting to time/date  90%:;without cues  -KA     Status: Patient will improve orientation skills by orienting to time/date  Progressing as expected  -KA     Comments: Patient will improve orientation skills by orienting to time/date  recall of date and day of the week spontaneously 2/4 50% and delayed recall after review on calendar 50% without cues and 100% with min cues  -KA       User Key  (r) = Recorded By, (t) = Taken By, (c) = Cosigned By    Initials Name Provider Type    Chetan Nicholas MA,CCC-SLP Speech and Language Pathologist                         Time Calculation:   SLP Start Time: 0900  SLP Stop Time: 1000  SLP Time Calculation (min): 60 min    Therapy Charges for Today     Code Description Service Date Service Provider Modifiers Qty    70013143853 [facility] TREATMENT SPEECH 4 9/18/2019 Chetan Machuca MA,CCC-SLP GN 1                   Chetan Machuca MA,CCC-SLP  9/18/2019

## 2019-09-23 ENCOUNTER — HOSPITAL ENCOUNTER (OUTPATIENT)
Dept: SPEECH THERAPY | Facility: HOSPITAL | Age: 79
Setting detail: THERAPIES SERIES
Discharge: HOME | End: 2019-09-23

## 2019-09-23 DIAGNOSIS — I63.9 ACUTE CVA (CEREBROVASCULAR ACCIDENT) (HCC): Primary | ICD-10-CM

## 2019-09-23 DIAGNOSIS — R41.841 COGNITIVE COMMUNICATION DEFICIT: ICD-10-CM

## 2019-09-23 PROCEDURE — 92507 TX SP LANG VOICE COMM INDIV: CPT | Performed by: SPEECH-LANGUAGE PATHOLOGIST

## 2019-09-23 NOTE — THERAPY TREATMENT NOTE
Outpatient Speech Language Pathology   Adult Speech Language Cognitive Treatment Note  [facility]     Patient Name: Ivet Diaz  : 1940  MRN: [id number]  Today's Date: 2019         Visit Date: 2019   Patient Active Problem List   Diagnosis   • Acute CVA (cerebrovascular accident) (CMS/HCC)   • Symptomatic anemia   • Hypertension   • Depression   • Reflux esophagitis   • Breast cancer (CMS/HCC)   • Acute on chronic diastolic heart failure (CMS/HCC)   • Hemorrhagic disorder due to extrinsic circulating anticoagulants (CMS/HCC)          Visit Dx:    ICD-10-CM ICD-9-CM   1. Acute CVA (cerebrovascular accident) (CMS/HCC) I63.9 434.91   2. Cognitive communication deficit R41.841 799.52                         SLP OP Goals     Row Name 19 1200          Subjective Comments    Subjective Comments  Patient is pleasant and cooperative, discussed upcoming d/c next Wednesday.   -KA        Subjective Pain    Able to rate subjective pain?  yes  -KA     Pre-Treatment Pain Level  0  -KA     Post-Treatment Pain Level  0  -KA        Written Language Comprehension Goals    Patient will improve comprehension of written language skills by reading short paragraphs and correctly answering written Yes/No questions  90%:  -KA     Status: Patient will improve comprehension of written language skills by reading short paragraphs and correctly answering written Yes/No questions  Progressing as expected  -KA     Comments: Patient will improve comprehension of written language skills by reading short paragraphs and correctly answering written Yes/No questions  reading comprehension of multiple paragraph news article=50% without cues or choices and 90% with cues and choices.   -KA        Memory Goals    Patient will demonstrate improved ability to recall information by immediately recalling a series of words  90%:;without cues;related;after delay;with no delay  -KA     Status: Patient will demonstrate improved  ability to recall information by immediately recalling a series of words  Progressing as expected  -KA     Comments: Patient will demonstrate improved ability to recall information by immediately recalling a series of words  Delayed recall of three names from previous session 100% without cues. DeLayed recall of two new errands, (immediate recall 100%) after 20 minute delay=25% without cues and 100% with mod cues, 50% with another 10 minute delay without cues and 100% with mod cues, and end of the session 25% without cues and 75% with mod cues.   -KA     Patient will demonstrate improved ability to recall information by listening to paragraph and answering yes/no questions  90%:;without cues  -KA     Status: Patient will demonstrate improved ability to recall information by listening to paragraph and answering yes/no questions  -- ongoing  -KA     Comments: Patient will demonstrate improved ability to recall information by listening to paragraph and answering yes/no questions  Answering questions in recall from paragraph task=60% without multiple choice and 88% with multiple choice.   -KA        Problem Solving Goals    Patient will improve ability to analyze problems and determine solutions by completing a visual representation/filling in a chart by following  written directions  90%:;without cues  -KA     Status: Patient will improve ability to analyze problems and determine solutions by completing a visual representation/filling in a chart by following  written directions  -- ongoing  -KA     Comments: Patient will improve ability to analyze problems and determine solutions by completing a visual representation/filling in a chart by following  written directions  In complex logic puzzle where patient had to determine the order of a chart based on following detailed instructions and complex planning and organization skills 50% without cues and 80% with mod to max cues   -KA       User Key  (r) = Recorded By, (t) =  Taken By, (c) = Cosigned By    Initials Name Provider Type    Chetan Nicholas MA,CCC-SLP Speech and Language Pathologist                         Time Calculation:   SLP Start Time: 1102  SLP Stop Time: 1200  SLP Time Calculation (min): 58 min    Therapy Charges for Today     Code Description Service Date Service Provider Modifiers Qty    78609782190  ST TREATMENT SPEECH 4 9/23/2019 Chetan Machuca MA,CCC-SLP KX, GN 1                   Chetan Machuca MA,CCC-SLP  9/23/2019

## 2019-09-23 NOTE — THERAPY TREATMENT NOTE
Outpatient Speech Language Pathology   Adult Speech Language Cognitive Treatment Note  [facility]     Patient Name: Ivet Diaz  : 1940  MRN: [id number]  Today's Date: 2019         Visit Date: 2019   Patient Active Problem List   Diagnosis   • Acute CVA (cerebrovascular accident) (CMS/HCC)   • Symptomatic anemia   • Hypertension   • Depression   • Reflux esophagitis   • Breast cancer (CMS/HCC)   • Acute on chronic diastolic heart failure (CMS/HCC)   • Hemorrhagic disorder due to extrinsic circulating anticoagulants (CMS/HCC)          Visit Dx:    ICD-10-CM ICD-9-CM   1. Acute CVA (cerebrovascular accident) (CMS/HCC) I63.9 434.91   2. Cognitive communication deficit R41.841 799.52                         SLP OP Goals     Row Name 19 1200          Subjective Comments    Subjective Comments  Patient is pleasant and cooperative, discussed upcoming d/c next Wednesday.   -KA        Subjective Pain    Able to rate subjective pain?  yes  -KA     Pre-Treatment Pain Level  0  -KA     Post-Treatment Pain Level  0  -KA        Written Language Comprehension Goals    Patient will improve comprehension of written language skills by reading short paragraphs and correctly answering written Yes/No questions  90%:  -KA     Status: Patient will improve comprehension of written language skills by reading short paragraphs and correctly answering written Yes/No questions  Progressing as expected  -KA     Comments: Patient will improve comprehension of written language skills by reading short paragraphs and correctly answering written Yes/No questions  reading comprehension of multiple paragraph news article=50% without cues or choices and 90% with cues and choices.   -KA        Memory Goals    Patient will demonstrate improved ability to recall information by immediately recalling a series of words  90%:;without cues;related;after delay;with no delay  -KA     Status: Patient will demonstrate improved  ability to recall information by immediately recalling a series of words  Progressing as expected  -KA     Comments: Patient will demonstrate improved ability to recall information by immediately recalling a series of words  Delayed recall of three names from previous session 100% without cues. DeLayed recall of two new errands, (immediate recall 100%) after 20 minute delay=25% without cues and 100% with mod cues, 50% with another 10 minute delay without cues and 100% with mod cues, and end of the session 25% without cues and 75% with mod cues.   -KA     Patient will demonstrate improved ability to recall information by listening to paragraph and answering yes/no questions  90%:;without cues  -KA     Status: Patient will demonstrate improved ability to recall information by listening to paragraph and answering yes/no questions  — ongoing  -KA     Comments: Patient will demonstrate improved ability to recall information by listening to paragraph and answering yes/no questions  Answering questions in recall from paragraph task=60% without multiple choice and 88% with multiple choice.   -KA        Problem Solving Goals    Patient will improve ability to analyze problems and determine solutions by completing a visual representation/filling in a chart by following  written directions  90%:;without cues  -KA     Status: Patient will improve ability to analyze problems and determine solutions by completing a visual representation/filling in a chart by following  written directions  — ongoing  -KA     Comments: Patient will improve ability to analyze problems and determine solutions by completing a visual representation/filling in a chart by following  written directions  In complex logic puzzle where patient had to determine the order of a chart based on following detailed instructions and complex planning and organization skills 50% without cues and 80% with mod to max cues   -KA       User Key  (r) = Recorded By, (t) =  Taken By, (c) = Cosigned By    Initials Name Provider Type    Chetan Nicholas MA,CCC-SLP Speech and Language Pathologist                         Time Calculation:   SLP Start Time: 1102  SLP Stop Time: 1200  SLP Time Calculation (min): 58 min    Therapy Charges for Today     Code Description Service Date Service Provider Modifiers Qty    [id number]  ST TREATMENT SPEECH 4 9/23/2019 Chetan Machuca MA,CCC-SLP GN 1                   Chetan Machuca MA,CCC-SLP  9/23/2019

## 2019-09-25 ENCOUNTER — HOSPITAL ENCOUNTER (OUTPATIENT)
Dept: SPEECH THERAPY | Facility: HOSPITAL | Age: 79
Setting detail: THERAPIES SERIES
Discharge: HOME | End: 2019-09-25

## 2019-09-25 DIAGNOSIS — R41.841 COGNITIVE COMMUNICATION DEFICIT: ICD-10-CM

## 2019-09-25 DIAGNOSIS — I63.9 ACUTE CVA (CEREBROVASCULAR ACCIDENT) (HCC): Primary | ICD-10-CM

## 2019-09-25 PROCEDURE — 92507 TX SP LANG VOICE COMM INDIV: CPT | Performed by: SPEECH-LANGUAGE PATHOLOGIST

## 2019-09-25 NOTE — THERAPY TREATMENT NOTE
Outpatient Speech Language Pathology   Adult Speech Language Cognitive Treatment Note  [facility]     Patient Name: Ivet Diaz  : 1940  MRN: [id number]  Today's Date: 2019         Visit Date: 2019   Patient Active Problem List   Diagnosis   • Acute CVA (cerebrovascular accident) (CMS/HCC)   • Symptomatic anemia   • Hypertension   • Depression   • Reflux esophagitis   • Breast cancer (CMS/HCC)   • Acute on chronic diastolic heart failure (CMS/HCC)   • Hemorrhagic disorder due to extrinsic circulating anticoagulants (CMS/HCC)          Visit Dx:    ICD-10-CM ICD-9-CM   1. Acute CVA (cerebrovascular accident) (CMS/HCC) I63.9 434.91   2. Cognitive communication deficit R41.841 799.52                         SLP OP Goals     Row Name 19 1100          Subjective Comments    Subjective Comments  Patient is pleasant and cooperative, planned d/c next week   -KA        Subjective Pain    Able to rate subjective pain?  yes  -KA     Pre-Treatment Pain Level  0  -KA     Post-Treatment Pain Level  0  -KA        Written Language Comprehension Goals    Patient will improve comprehension of written language skills by reading short paragraphs and correctly answering written Yes/No questions  90%:  -KA     Status: Patient will improve comprehension of written language skills by reading short paragraphs and correctly answering written Yes/No questions  Progressing as expected  -KA     Comments: Patient will improve comprehension of written language skills by reading short paragraphs and correctly answering written Yes/No questions  reading comprehension of multiple paragraph news article=80% without cues or choices and 90% with cues and choices.   -KA        Memory Goals    Patient will demonstrate improved ability to recall information by immediately recalling a series of words  90%:;without cues;related;after delay;with no delay  -KA     Status: Patient will demonstrate improved ability to recall  information by immediately recalling a series of words  Progressing as expected  -KA     Comments: Patient will demonstrate improved ability to recall information by immediately recalling a series of words  delayed recall of two new errands introduced today=75% without cues after 5 min delay and 100% with min cues, patient was asked errands in three 10 minute delays throughout session with 60% accuracy without cues and 80% with min to mod cues. Delayed recall of three names from previous session 2/3 66% without cues and 100% with min cues and recall end of the session 1/3 33% without cues and 100% with min to mod cues. (at times pt able to recall the names but not who belongs to the name). Delayed recall of three unrelated words after 10 seconds in IPAD task=80% without cues or word bank and 95% with cues/word choices   -KA     Patient’s memory skills will be enhanced as reported by patient by using external memory aides  90%:;without cues  -KA     Status: Patient’s memory skills will be enhanced as reported by patient by using external memory aides  -- ongoing  -KA     Comments: Patient’s memory skills will be enhanced as reported by patient by using external memory aides  Continue reinforcement of use of notebook and writing important information down  -KA     Patient will demonstrate improved ability to recall information by listening to paragraph and answering yes/no questions  90%:;without cues  -KA     Status: Patient will demonstrate improved ability to recall information by listening to paragraph and answering yes/no questions  Progressing as expected  -KA     Comments: Patient will demonstrate improved ability to recall information by listening to paragraph and answering yes/no questions  Recall of detail from short paragraphs=70% without cues and 80% with min cues.   -KA        Problem Solving Goals    Patient will improve ability to analyze problems and determine solutions by completing a visual  representation/filling in a chart by following  written directions  90%:;without cues  -MERRITT     Status: Patient will improve ability to analyze problems and determine solutions by completing a visual representation/filling in a chart by following  written directions  Progressing as expected  -MERRITT     Comments: Patient will improve ability to analyze problems and determine solutions by completing a visual representation/filling in a chart by following  written directions  Utilizing process of elimination in separation task where patient had to unscramble words (determine two words and provided with category) in simple complexity task 90% without cues.   -KA       User Key  (r) = Recorded By, (t) = Taken By, (c) = Cosigned By    Initials Name Provider Type    Chetan Nicholas MA,CCC-SLP Speech and Language Pathologist                         Time Calculation:   SLP Start Time: 0900  SLP Stop Time: 1000  SLP Time Calculation (min): 60 min    Therapy Charges for Today     Code Description Service Date Service Provider Modifiers Qty    [id number]  ST TREATMENT SPEECH 4 9/25/2019 Chetan Machuca MA,CCC-SLP GN 1                   Chetan Machuca MA,CCC-SLP  9/25/2019

## 2019-09-30 ENCOUNTER — HOSPITAL ENCOUNTER (OUTPATIENT)
Dept: SPEECH THERAPY | Facility: HOSPITAL | Age: 79
Setting detail: THERAPIES SERIES
Discharge: HOME | End: 2019-09-30

## 2019-09-30 DIAGNOSIS — I63.9 ACUTE CVA (CEREBROVASCULAR ACCIDENT) (HCC): Primary | ICD-10-CM

## 2019-09-30 DIAGNOSIS — R41.841 COGNITIVE COMMUNICATION DEFICIT: ICD-10-CM

## 2019-09-30 PROCEDURE — 92507 TX SP LANG VOICE COMM INDIV: CPT | Performed by: SPEECH-LANGUAGE PATHOLOGIST

## 2019-10-02 ENCOUNTER — HOSPITAL ENCOUNTER (OUTPATIENT)
Dept: SPEECH THERAPY | Facility: HOSPITAL | Age: 79
Setting detail: THERAPIES SERIES
Discharge: HOME | End: 2019-10-02

## 2019-10-02 DIAGNOSIS — I63.9 ACUTE CVA (CEREBROVASCULAR ACCIDENT) (HCC): Primary | ICD-10-CM

## 2019-10-02 DIAGNOSIS — R41.841 COGNITIVE COMMUNICATION DEFICIT: ICD-10-CM

## 2019-10-02 DIAGNOSIS — I50.33 ACUTE ON CHRONIC DIASTOLIC HEART FAILURE (HCC): ICD-10-CM

## 2019-10-02 PROCEDURE — 92507 TX SP LANG VOICE COMM INDIV: CPT | Performed by: SPEECH-LANGUAGE PATHOLOGIST

## 2019-10-02 PROCEDURE — 92507 TX SP LANG VOICE COMM INDIV: CPT

## 2019-10-02 NOTE — THERAPY DISCHARGE NOTE
Outpatient Speech Language Pathology   Adult Speech Language Cognitive Treatment Note/Discharge Summary  [facility]     Patient Name: Ivet Diaz  : 1940  MRN: [id number]  Today's Date: 10/2/2019         Visit Date: 10/02/2019   Patient Active Problem List   Diagnosis   • Acute CVA (cerebrovascular accident) (CMS/HCC)   • Symptomatic anemia   • Hypertension   • Depression   • Reflux esophagitis   • Breast cancer (CMS/HCC)   • Acute on chronic diastolic heart failure (CMS/HCC)   • Hemorrhagic disorder due to extrinsic circulating anticoagulants (CMS/HCC)          Visit Dx:    ICD-10-CM ICD-9-CM   1. Acute CVA (cerebrovascular accident) (CMS/HCC) I63.9 434.91   2. Cognitive communication deficit R41.841 799.52   3. Acute on chronic diastolic heart failure (CMS/HCC) I50.33 428.33                       SLP OP Goals     Row Name 10/02/19 1000          Subjective Comments    Subjective Comments  Pt. was pleasent and cooperative.   (Pended)   -AM        Subjective Pain    Able to rate subjective pain?  yes  (Pended)   -AM     Pre-Treatment Pain Level  0  (Pended)   -AM     Post-Treatment Pain Level  0  (Pended)   -AM        Written Language Comprehension Goals    Written Language Comprehension LTG's  Patient will be able to comprehend written material in home/home and social  environment  (Pended)   -AM     Patient will be able to comprehend written material in home/home and social  environment  90%:;without cues  (Pended)   -AM     Status: Patient will be able to comprehend written material in home/home and social  environment  Achieved  (Pended)   -AM     Patient will improve comprehension of written language skills by scanning appropriately (left to right, top to bottom) to complete functional reading task  90%:;without cues  (Pended)   -AM     Status:Patient will improve comprehension of written language skills by scanning appropriately (left to right, top to bottom) to complete functional reading task   Achieved  (Pended)   -AM     Comments: Patient will improve comprehension of written language skills by scanning appropriately (left to right, top to bottom) to complete functional reading task  Goal has been achieved please refer to previous documentaiton.   (Pended)   -AM     Patient will improve comprehension of written language skills by following two-step written directions  90%:;without cues  (Pended)   -AM     Status: Patient will improve comprehension of written language skills by following two-step written directions  Discontinued  (Pended)   -AM     Patient will improve comprehension of written language skills by reading short paragraphs and correctly answering written Yes/No questions  90%:  (Pended)   -AM     Status: Patient will improve comprehension of written language skills by reading short paragraphs and correctly answering written Yes/No questions  Achieved  (Pended)   -AM     Comments: Patient will improve comprehension of written language skills by reading short paragraphs and correctly answering written Yes/No questions Reading comprehension of multiple paragraph news article=90% accuracy without cues for pertinent information and 100% with cues.  (Pended)   -AM     Patient will improve comprehension of written language skills by answering written questions related to home management/social/work tasks (bills, recipes, tv guide, emails, procedures)  90%:;without cues  (Pended)   -AM     Status: Patient will improve comprehension of written language skills by answering written questions related to home management/social/work tasks (bills, recipes, tv guide, emails, procedures)  --  (Pended)  Partially achieved  -AM     Comments: Patient will improve comprehension of written language skills by answering written questions related to home management/social/work tasks (bills, recipes, tv guide, emails, procedures)  Pt. able to comprehend and complete simple and functional home management tasks with  90% accuracy. Recommend supervision with more complex home management tasks (medication management, finances) following a detailed schedule.   (Pended)   -AM        Verbal Expression Goals    Verbal Expression LTG's  Patient will be able to use verbal expressive language skills to communicate effectively in social/avocational/work setting  (Pended)   -AM     Patient will be able to use verbal expressive language skills to communicate effectively in social/avocational/work setting  90%:;without cues  (Pended)   -AM     Status: Patient will be able to use verbal expressive language skills to communicate effectively in social/avocational/work setting  Achieved  (Pended)   -AM     Comments: Patient will be able to use verbal expressive language skills to communicate effectively in social/avocational/work setting  Pt. demonstrates functional word finding at the conversational level.   (Pended)   -AM     Patient will improve verbal expressive language skills by completing divergent naming tasks  90%:;without cues  (Pended)   -AM     Status: Patient will improve verbal expressive language skills by completing divergent naming tasks  Discontinued  (Pended)   -AM     Comments: Patient will improve verbal expressive language skills by completing divergent naming tasks  Goal has not been targeted recently.   (Pended)   -AM     Patient will improve verbal expressive language skills by describing attributes, function, action and/or uses of an object/item  90%:;without cues  (Pended)   -AM     Status: Patient will improve verbal expressive language skills by describing attributes, function, action and/or uses of an object/item  Discontinued  (Pended)   -AM     Comments: Patient will improve verbal expressive language skills by describing attributes, function, action and/or uses of an object/item  --  (Pended)  Goal has not been targeted recently.  -AM     Patient will improve verbal expressive language skills by completing  convergent naming tasks  90%:;without cues  (Pended)   -AM     Status: Patient will improve verbal expressive language skills by completing convergent naming tasks  Discontinued  (Pended)   -AM        Auditory Comprehension Goals    Auditory Comprehension LTG's  Patient will comprehend abstract and complex information presented in all social, avocational, or work settings  (Pended)   -AM     Patient will comprehend abstract and complex information presented in all social, avocational, or work settings  90%:;without cues  (Pended)   -AM     Status: Patient will comprehend abstract and complex information presented in all social, avocational, or work settings  Achieved  (Pended)   -AM     Comments: Patient will comprehend abstract and complex information presented in all social, avocational, or work settings  Pt. demonstrated funcitonal auditory comprehension skills for spoken language.   (Pended)   -AM     Auditory Comprehension STG's  Patient will improve comprehension of spoken language by following 2 step directions  (Pended)   -AM     Patient will improve comprehension of spoken language by following 2 step directions  90%:;without cues  (Pended)   -AM     Status: Patient will improve comprehension of spoken language by following 2 step directions  Discontinued  (Pended)   -AM     Comments: Patient will improve comprehension of spoken language by following 2 step directions  --  (Pended)  Goal has not been targeted recently.   -AM        Memory Goals    Memory LTG's  Patient will be able to remember  information needed to participate in activities of daily living  (Pended)   -AM     Patient will be able to remember  information needed to participate in activities of daily living  Independently  (Pended)   -AM     Status: Patient will be able to remember  information needed to participate in activities of daily living  --  (Pended)  Partially achieved  -AM     Comments: Patient will be able to remember   information needed to participate in activities of daily living  Pt. has demonstrated significant progress with memory and does not rely on  or family to recall informaiton. Pt. does require use of external memory aids to help recall some informaiton for day to day activities and important dates.   (Pended)   -AM     Patient will demonstrate improved ability to recall information by immediately recalling a series of words  90%:;without cues;related;after delay;with no delay  (Pended)   -AM     Status: Patient will demonstrate improved ability to recall information by immediately recalling a series of words  --  (Pended)  Partially achieved  -AM     Comments: Patient will demonstrate improved ability to recall information by immediately recalling a series of words  Pt. completed delayed recall of 2 functional errands immediately with 100% accuracy without cues, following a 10-minute delay with 50% accuracy without cues and 100% accuracy with cues, following a 15-minute delay with 75% accuracy without cues and 100% accuracy with cues, and following a 20-minute delay with 0% accuracy without cues and 100% accuracy with cues. Pt. completed delayed recall task in which pt. recalled 3 unrelated words following a 10-second delay with 74% accuracy with no choice provided and 96% accuracy when a F:6 was provided.   (Pended)   -AM     Patient’s memory skills will be enhanced as reported by patient by utilizing internal memory strategies to recall up to 3 pieces of information after a 5- minute delay  90%:;without cues  (Pended)   -AM     Status: Patient’s memory skills will be enhanced as reported by patient by utilizing internal memory strategies to recall up to 3 pieces of information after a 5- minute delay  --  (Pended)  Partially achieved  -AM     Comments: Patient’s memory skills will be enhanced as reported by patient by utilizing internal memory strategies to recall up to 3 pieces of information after a 5-  minute delay  see goal above  (Pended)   -AM     Patient’s memory skills will be enhanced as reported by patient by using external memory aides  90%:;without cues  (Pended)   -AM     Status: Patient’s memory skills will be enhanced as reported by patient by using external memory aides  --  (Pended)  Partially achieved  -AM     Comments: Patient’s memory skills will be enhanced as reported by patient by using external memory aides  Pt. reports to use external memory aid strategies in home environmnet (lists, calanders). SLP reinforced and educated the pt. on the importance of using notebook to summarize events throughout the day, write important information, names, and errands in. SLP provided the pt. with an information sheet explaining how to use external memory aids to practice memory in the home environment.   (Pended)   -AM     Patient will demonstrate improved ability to recall information by listening to paragraph and answering yes/no questions  90%:;without cues  (Pended)   -AM     Status: Patient will demonstrate improved ability to recall information by listening to paragraph and answering yes/no questions  --  (Pended)  Partially achieved   -AM     Comments: Patient will demonstrate improved ability to recall information by listening to paragraph and answering yes/no questions  Recall of detail from short paragraphs=70% without cues without multiple choice  and 90% with min cues.   (Pended)   -AM        Problem Solving Goals    Problem Solving LTG's  Patient will be able to engage in avocational activities requiring high level cognitive skills  (Pended)   -AM     Patient will be able to engage in avocational activities requiring high level cognitive skills  Independently  (Pended)   -AM     Status: Patient will be able to engage in avocational activities requiring high level cognitive skills  --  (Pended)  Partially achieved  -AM     Patient will improve ability to analyze problems and determine solutions  by correctly sequencing __ steps to complete an activity  90%:;without cues  (Pended)   -AM     Status: Patient will improve ability to analyze problems and determine solutions by correctly sequencing __ steps to complete an activity  Achieved  (Pended)   -AM     Comments: Patient will improve ability to analyze problems and determine solutions by correctly sequencing __ steps to complete an activity  6 step functional sequencing task=90% without cues and 100% with min cus   (Pended)   -AM     Patient will improve ability to analyze problems and determine solutions by completing a visual representation/filling in a chart by following  written directions  90%:;without cues  (Pended)   -AM     Status: Patient will improve ability to analyze problems and determine solutions by completing a visual representation/filling in a chart by following  written directions  --  (Pended)  Partially achieved  -AM     Comments: Patient will improve ability to analyze problems and determine solutions by completing a visual representation/filling in a chart by following  written directions  Pt. met goal for simple to moderate complex reasoning tasks and required assistance with higher level reasoning tasks.   (Pended)   -AM     Patient will improve ability to analyze problems and determine solutions by completing functional math problems  90%:;without cues  (Pended)   -AM     Status: Patient will improve ability to analyze problems and determine solutions by completing functional math problems  --  (Pended)  Partially achieved  -AM     Comments: Patient will improve ability to analyze problems and determine solutions by completing functional math problems  71% without cues with more complex functional math word problems.  and 100% with min cues.   (Pended)   -AM        Attention/Orientation Goals    Attention/Orientation LTG's  Patient will be able to interact safely in home environment  (Pended)   -AM     Patient will be able to  interact safely in home environment  With Supervision  (Pended)   -AM     Status: Patient will be able to interact safely in home environment  Achieved  (Pended)   -AM     Patient will improve orientation skills by orienting to time/date  90%:;without cues  (Pended)   -AM     Status: Patient will improve orientation skills by orienting to time/date  --  (Pended)  Partially met   -AM     Comments: Patient will improve orientation skills by orienting to time/date  Pt. recalled date with 75% accuracy without cues and with 100% when mod cues were provided.  (Pended)   -AM       User Key  (r) = Recorded By, (t) = Taken By, (c) = Cosigned By    Initials Name Provider Type    Deepika Howell Speech Therapy Student Speech Therapy Student              OP SLP Assessment/Plan - 10/02/19 1312        SLP Assessment    Functional Problems  Speech Language- Adult/Cognition  (Pended)    -AM    Clinical Impression: Speech Language-Adult/Congnition  Mild:;Cognitive Communication Impairment  (Pended)    -AM      User Key  (r) = Recorded By, (t) = Taken By, (c) = Cosigned By    Initials Name Provider Type    Deepika Howell Speech Therapy Student Speech Therapy Student                   Time Calculation:   SLP Start Time: (P) 0900  SLP Stop Time: (P) 0955  SLP Time Calculation (min): (P) 55 min    Therapy Charges for Today     Code Description Service Date Service Provider Modifiers Qty    [id number]  ST TREATMENT SPEECH 4 10/2/2019 Deepika Oreilly, Speech Therapy Student GN, KX 1                 OP SLP Discharge Summary  Date of Discharge: (P) 10/02/19  Reason for Discharge: (P) identified goals partially met  Progress Toward Achieving Short/long Term Goals: (P) goals partially met within established timelines  Discharge Destination: (P) home w/ assist  Discharge Instructions: (P) Provided handout on functional memory tasks and external memory aids.       Deepika Oreilly Speech Therapy Student  10/2/2019

## 2019-10-02 NOTE — THERAPY DISCHARGE NOTE
Speech Language Pathology Discharge Summary  [facility]       Patient Name: Ivet Diaz  : 1940  MRN: [id number]    Today's Date: 10/2/2019      SLP OP Goals     Row Name 10/02/19 1000          Subjective Comments    Subjective Comments  Pt. was pleasent and cooperative.   (Pended)   -AM        Subjective Pain    Able to rate subjective pain?  yes  (Pended)   -AM     Pre-Treatment Pain Level  0  (Pended)   -AM     Post-Treatment Pain Level  0  (Pended)   -AM        Written Language Comprehension Goals    Written Language Comprehension LTG's  Patient will be able to comprehend written material in home/home and social  environment  (Pended)   -AM     Patient will be able to comprehend written material in home/home and social  environment  90%:;without cues  (Pended)   -AM     Status: Patient will be able to comprehend written material in home/home and social  environment  Achieved  (Pended)   -AM     Patient will improve comprehension of written language skills by scanning appropriately (left to right, top to bottom) to complete functional reading task  90%:;without cues  (Pended)   -AM     Status:Patient will improve comprehension of written language skills by scanning appropriately (left to right, top to bottom) to complete functional reading task  Achieved  (Pended)   -AM     Comments: Patient will improve comprehension of written language skills by scanning appropriately (left to right, top to bottom) to complete functional reading task  Goal has been achieved please refer to previous documentaiton.   (Pended)   -AM     Patient will improve comprehension of written language skills by following two-step written directions  90%:;without cues  (Pended)   -AM     Status: Patient will improve comprehension of written language skills by following two-step written directions  Discontinued  (Pended)   -AM     Patient will improve comprehension of written language skills by reading short paragraphs and  correctly answering written Yes/No questions  90%:  (Pended)   -AM     Status: Patient will improve comprehension of written language skills by reading short paragraphs and correctly answering written Yes/No questions  Achieved  (Pended)   -AM     Comments: Patient will improve comprehension of written language skills by reading short paragraphs and correctly answering written Yes/No questions  reading comprehension of multiple paragraph news article=90% without cues for pertinent information and 100% with cues.  (Pended)   -AM     Patient will improve comprehension of written language skills by answering written questions related to home management/social/work tasks (bills, recipes, tv guide, emails, procedures)  90%:;without cues  (Pended)   -AM     Status: Patient will improve comprehension of written language skills by answering written questions related to home management/social/work tasks (bills, recipes, tv guide, emails, procedures)  —  (Pended)  Partially achieved  -AM     Comments: Patient will improve comprehension of written language skills by answering written questions related to home management/social/work tasks (bills, recipes, tv guide, emails, procedures)  Pt. able to comprehend and complete simple and functional home management tasks with 90% accuracy. Recommend supervision with more complex home management tasks (medication management, finances) following a detailed schedule.   (Pended)   -AM        Verbal Expression Goals    Verbal Expression LTG's  Patient will be able to use verbal expressive language skills to communicate effectively in social/avocational/work setting  (Pended)   -AM     Patient will be able to use verbal expressive language skills to communicate effectively in social/avocational/work setting  90%:;without cues  (Pended)   -AM     Status: Patient will be able to use verbal expressive language skills to communicate effectively in social/avocational/work setting  Achieved   (Pended)   -AM     Comments: Patient will be able to use verbal expressive language skills to communicate effectively in social/avocational/work setting  Pt. demonstrates functional word finding at the conversational level.   (Pended)   -AM     Patient will improve verbal expressive language skills by completing divergent naming tasks  90%:;without cues  (Pended)   -AM     Status: Patient will improve verbal expressive language skills by completing divergent naming tasks  Discontinued  (Pended)   -AM     Comments: Patient will improve verbal expressive language skills by completing divergent naming tasks  Goal has not been targeted recently.   (Pended)   -AM     Patient will improve verbal expressive language skills by describing attributes, function, action and/or uses of an object/item  90%:;without cues  (Pended)   -AM     Status: Patient will improve verbal expressive language skills by describing attributes, function, action and/or uses of an object/item  Discontinued  (Pended)   -AM     Comments: Patient will improve verbal expressive language skills by describing attributes, function, action and/or uses of an object/item  —  (Pended)  Goal has not been targeted recently.  -AM     Patient will improve verbal expressive language skills by completing convergent naming tasks  90%:;without cues  (Pended)   -AM     Status: Patient will improve verbal expressive language skills by completing convergent naming tasks  Discontinued  (Pended)   -AM        Auditory Comprehension Goals    Auditory Comprehension LTG's  Patient will comprehend abstract and complex information presented in all social, avocational, or work settings  (Pended)   -AM     Patient will comprehend abstract and complex information presented in all social, avocational, or work settings  90%:;without cues  (Pended)   -AM     Status: Patient will comprehend abstract and complex information presented in all social, avocational, or work settings   Achieved  (Pended)   -AM     Comments: Patient will comprehend abstract and complex information presented in all social, avocational, or work settings  Pt. demonstrated funcitonal auditory comprehension skills for spoken language.   (Pended)   -AM     Auditory Comprehension STG's  Patient will improve comprehension of spoken language by following 2 step directions  (Pended)   -AM     Patient will improve comprehension of spoken language by following 2 step directions  90%:;without cues  (Pended)   -AM     Status: Patient will improve comprehension of spoken language by following 2 step directions  Discontinued  (Pended)   -AM     Comments: Patient will improve comprehension of spoken language by following 2 step directions  —  (Pended)  Goal has not been targeted recently.   -AM        Memory Goals    Memory LTG's  Patient will be able to remember  information needed to participate in activities of daily living  (Pended)   -AM     Patient will be able to remember  information needed to participate in activities of daily living  Independently  (Pended)   -AM     Status: Patient will be able to remember  information needed to participate in activities of daily living  —  (Pended)  Partially achieved  -AM     Comments: Patient will be able to remember  information needed to participate in activities of daily living  Pt. has demonstrated significant progress with memory and does not rely on  or family to recall informaiton. Pt. does require use of external memory aids to help recall some informaiton for day to day activities and important dates.   (Pended)   -AM     Patient will demonstrate improved ability to recall information by immediately recalling a series of words  90%:;without cues;related;after delay;with no delay  (Pended)   -AM     Status: Patient will demonstrate improved ability to recall information by immediately recalling a series of words  —  (Pended)  Partially achieved  -AM     Comments:  Patient will demonstrate improved ability to recall information by immediately recalling a series of words  Pt. completed delayed recall of 2 functional errances immediately with 100% accuracy without cues, following a 10-minute delay with 50% accuracy without cues and 100% accuracy with cues, following a 15-minute delay with 75% accuracy without cues and 100% accuracy with cues, and following a 20-minute delay with 0% accuracy without cues and 100% accuracy with cues. Pt. completed delayed recall task in which pt. recalled 3 unrelated words following a 10-second delay with 74% accuracy with no choice provided and 96% accuracy when a F:6 was provided.   (Pended)   -AM     Patient’s memory skills will be enhanced as reported by patient by utilizing internal memory strategies to recall up to 3 pieces of information after a 5- minute delay  90%:;without cues  (Pended)   -AM     Status: Patient’s memory skills will be enhanced as reported by patient by utilizing internal memory strategies to recall up to 3 pieces of information after a 5- minute delay  —  (Pended)  Partially achieved  -AM     Comments: Patient’s memory skills will be enhanced as reported by patient by utilizing internal memory strategies to recall up to 3 pieces of information after a 5- minute delay  see goal above  (Pended)   -AM     Patient’s memory skills will be enhanced as reported by patient by using external memory aides  90%:;without cues  (Pended)   -AM     Status: Patient’s memory skills will be enhanced as reported by patient by using external memory aides  —  (Pended)  Partially achieved  -AM     Comments: Patient’s memory skills will be enhanced as reported by patient by using external memory aides  Pt. reports to use external memory aid strategies in home environmnet (mayela villalta). SLP reinforced and educated the pt. on the importance of using notebook to summarize events throughout the day, write important information, names, and  errands in. SLP provided the pt. with an information sheet explaining how to use external memory aids to practice memory in the home environment.   (Pended)   -AM     Patient will demonstrate improved ability to recall information by listening to paragraph and answering yes/no questions  90%:;without cues  (Pended)   -AM     Status: Patient will demonstrate improved ability to recall information by listening to paragraph and answering yes/no questions  —  (Pended)  Partially achieved   -AM     Comments: Patient will demonstrate improved ability to recall information by listening to paragraph and answering yes/no questions Pt. recalled detail from short paragraphs with 70% accuracy without cues without multiple choice  and 90% accuracy with min cues.   (Pended)   -AM        Problem Solving Goals    Problem Solving LTG's  Patient will be able to engage in avocational activities requiring high level cognitive skills  (Pended)   -AM     Patient will be able to engage in avocational activities requiring high level cognitive skills  Independently  (Pended)   -AM     Status: Patient will be able to engage in avocational activities requiring high level cognitive skills  —  (Pended)  Partially achieved  -AM     Patient will improve ability to analyze problems and determine solutions by correctly sequencing __ steps to complete an activity  90%:;without cues  (Pended)   -AM     Status: Patient will improve ability to analyze problems and determine solutions by correctly sequencing __ steps to complete an activity  Achieved  (Pended)   -AM     Comments: Patient will improve ability to analyze problems and determine solutions by correctly sequencing __ steps to complete an activity  6 step functional sequencing task=90% without cues and 100% with min cus   (Pended)   -AM     Patient will improve ability to analyze problems and determine solutions by completing a visual representation/filling in a chart by following  written  directions  90%:;without cues  (Pended)   -AM     Status: Patient will improve ability to analyze problems and determine solutions by completing a visual representation/filling in a chart by following  written directions  —  (Pended)  Partially achieved  -AM     Comments: Patient will improve ability to analyze problems and determine solutions by completing a visual representation/filling in a chart by following  written directions  Pt. met goal for simple to moderate complex reasoning tasks and required assistance with higher level reasoning tasks.   (Pended)   -AM     Patient will improve ability to analyze problems and determine solutions by completing functional math problems  90%:;without cues  (Pended)   -AM     Status: Patient will improve ability to analyze problems and determine solutions by completing functional math problems  —  (Pended)  Partially achieved  -AM     Comments: Patient will improve ability to analyze problems and determine solutions by completing functional math problems  71% without cues with more complex functional math word problems.  and 100% with min cues.   (Pended)   -AM        Attention/Orientation Goals    Attention/Orientation LTG's  Patient will be able to interact safely in home environment  (Pended)   -AM     Patient will be able to interact safely in home environment  With Supervision  (Pended)   -AM     Status: Patient will be able to interact safely in home environment  Achieved  (Pended)   -AM     Patient will improve orientation skills by orienting to time/date  90%:;without cues  (Pended)   -AM     Status: Patient will improve orientation skills by orienting to time/date  —  (Pended)  Partially met   -AM     Comments: Patient will improve orientation skills by orienting to time/date  Pt. recalled date with 75% accuracy without cues and with 100% when mod cues were provided.  (Pended)   -AM       User Key  (r) = Recorded By, (t) = Taken By, (c) = Cosigned By    Initials  Name Provider Type    Deepika Howell Speech Therapy Student Speech Therapy Student          OP SLP Discharge Summary  Date of Discharge: (P) 10/02/19  Reason for Discharge: (P) identified goals partially met  Progress Toward Achieving Short/long Term Goals: (P) goals partially met within established timelines  Discharge Destination: (P) home w/ assist  Discharge Instructions: (P) Provided handout on functional memory tasks and external memory aids.       Time Calculation:        Therapy Charges for Today     Code Description Service Date Service Provider Modifiers Qty    [id number]  ST TREATMENT SPEECH 4 10/2/2019 Deepika Oreilly, Speech Therapy Student GN 1                  Deepika Oreilly, Speech Therapy Student  10/2/2019

## 2019-10-02 NOTE — THERAPY TREATMENT NOTE
Outpatient Speech Language Pathology   Adult Speech Language Cognitive Treatment Note  [facility]     Patient Name: Ivet Diaz  : 1940  MRN: [id number]  Today's Date: 10/2/2019         Visit Date: 10/02/2019   Patient Active Problem List   Diagnosis   • Acute CVA (cerebrovascular accident) (CMS/HCC)   • Symptomatic anemia   • Hypertension   • Depression   • Reflux esophagitis   • Breast cancer (CMS/HCC)   • Acute on chronic diastolic heart failure (CMS/HCC)   • Hemorrhagic disorder due to extrinsic circulating anticoagulants (CMS/HCC)          Visit Dx:    ICD-10-CM ICD-9-CM   1. Acute CVA (cerebrovascular accident) (CMS/HCC) I63.9 434.91   2. Cognitive communication deficit R41.841 799.52   3. Acute on chronic diastolic heart failure (CMS/HCC) I50.33 428.33                         SLP OP Goals     Row Name 10/02/19 1000          Subjective Comments    Subjective Comments  Pt. was pleasent and cooperative.   (Pended)   -AM        Subjective Pain    Able to rate subjective pain?  yes  (Pended)   -AM     Pre-Treatment Pain Level  0  (Pended)   -AM     Post-Treatment Pain Level  0  (Pended)   -AM        Written Language Comprehension Goals    Written Language Comprehension LTG's  Patient will be able to comprehend written material in home/home and social  environment  (Pended)   -AM     Patient will be able to comprehend written material in home/home and social  environment  90%:;without cues  (Pended)   -AM     Status: Patient will be able to comprehend written material in home/home and social  environment  Achieved  (Pended)   -AM     Patient will improve comprehension of written language skills by scanning appropriately (left to right, top to bottom) to complete functional reading task  90%:;without cues  (Pended)   -AM     Status:Patient will improve comprehension of written language skills by scanning appropriately (left to right, top to bottom) to complete functional reading task  Achieved   (Pended)   -AM     Comments: Patient will improve comprehension of written language skills by scanning appropriately (left to right, top to bottom) to complete functional reading task  Goal has been achieved please refer to previous documentaiton.   (Pended)   -AM     Patient will improve comprehension of written language skills by following two-step written directions  90%:;without cues  (Pended)   -AM     Status: Patient will improve comprehension of written language skills by following two-step written directions  Discontinued  (Pended)   -AM     Patient will improve comprehension of written language skills by reading short paragraphs and correctly answering written Yes/No questions  90%:  (Pended)   -AM     Status: Patient will improve comprehension of written language skills by reading short paragraphs and correctly answering written Yes/No questions  Achieved  (Pended)   -AM     Comments: Patient will improve comprehension of written language skills by reading short paragraphs and correctly answering written Yes/No questions Reading comprehension of multiple paragraph news article=90% without cues for pertinent information and 100% with cues.  (Pended)   -AM     Patient will improve comprehension of written language skills by answering written questions related to home management/social/work tasks (bills, recipes, tv guide, emails, procedures)  90%:;without cues  (Pended)   -AM     Status: Patient will improve comprehension of written language skills by answering written questions related to home management/social/work tasks (bills, recipes, tv guide, emails, procedures)  —  (Pended)  Partially achieved  -AM     Comments: Patient will improve comprehension of written language skills by answering written questions related to home management/social/work tasks (bills, recipes, tv guide, emails, procedures)  Pt. able to comprehend and complete simple and functional home management tasks with 90% accuracy.  Recommend supervision with more complex home management tasks (medication management, finances) following a detailed schedule.   (Pended)   -AM        Verbal Expression Goals    Verbal Expression LTG's  Patient will be able to use verbal expressive language skills to communicate effectively in social/avocational/work setting  (Pended)   -AM     Patient will be able to use verbal expressive language skills to communicate effectively in social/avocational/work setting  90%:;without cues  (Pended)   -AM     Status: Patient will be able to use verbal expressive language skills to communicate effectively in social/avocational/work setting  Achieved  (Pended)   -AM     Comments: Patient will be able to use verbal expressive language skills to communicate effectively in social/avocational/work setting  Pt. demonstrates functional word finding at the conversational level.   (Pended)   -AM     Patient will improve verbal expressive language skills by completing divergent naming tasks  90%:;without cues  (Pended)   -AM     Status: Patient will improve verbal expressive language skills by completing divergent naming tasks  Discontinued  (Pended)   -AM     Comments: Patient will improve verbal expressive language skills by completing divergent naming tasks  Goal has not been targeted recently.   (Pended)   -AM     Patient will improve verbal expressive language skills by describing attributes, function, action and/or uses of an object/item  90%:;without cues  (Pended)   -AM     Status: Patient will improve verbal expressive language skills by describing attributes, function, action and/or uses of an object/item  Discontinued  (Pended)   -AM     Comments: Patient will improve verbal expressive language skills by describing attributes, function, action and/or uses of an object/item  —  (Pended)  Goal has not been targeted recently.  -AM     Patient will improve verbal expressive language skills by completing convergent naming  tasks  90%:;without cues  (Pended)   -AM     Status: Patient will improve verbal expressive language skills by completing convergent naming tasks  Discontinued  (Pended)   -AM        Auditory Comprehension Goals    Auditory Comprehension LTG's  Patient will comprehend abstract and complex information presented in all social, avocational, or work settings  (Pended)   -AM     Patient will comprehend abstract and complex information presented in all social, avocational, or work settings  90%:;without cues  (Pended)   -AM     Status: Patient will comprehend abstract and complex information presented in all social, avocational, or work settings  Achieved  (Pended)   -AM     Comments: Patient will comprehend abstract and complex information presented in all social, avocational, or work settings  Pt. demonstrated funcitonal auditory comprehension skills for spoken language.   (Pended)   -AM     Auditory Comprehension STG's  Patient will improve comprehension of spoken language by following 2 step directions  (Pended)   -AM     Patient will improve comprehension of spoken language by following 2 step directions  90%:;without cues  (Pended)   -AM     Status: Patient will improve comprehension of spoken language by following 2 step directions  Discontinued  (Pended)   -AM     Comments: Patient will improve comprehension of spoken language by following 2 step directions  —  (Pended)  Goal has not been targeted recently.   -AM        Memory Goals    Memory LTG's  Patient will be able to remember  information needed to participate in activities of daily living  (Pended)   -AM     Patient will be able to remember  information needed to participate in activities of daily living  Independently  (Pended)   -AM     Status: Patient will be able to remember  information needed to participate in activities of daily living  —  (Pended)  Partially achieved  -AM     Comments: Patient will be able to remember  information needed to  participate in activities of daily living  Pt. has demonstrated significant progress with memory and does not rely on  or family to recall informaiton. Pt. does require use of external memory aids to help recall some informaiton for day to day activities and important dates.   (Pended)   -AM     Patient will demonstrate improved ability to recall information by immediately recalling a series of words  90%:;without cues;related;after delay;with no delay  (Pended)   -AM     Status: Patient will demonstrate improved ability to recall information by immediately recalling a series of words  —  (Pended)  Partially achieved  -AM     Comments: Patient will demonstrate improved ability to recall information by immediately recalling a series of words  Pt. completed delayed recall of 2 functional errances immediately with 100% accuracy without cues, following a 10-minute delay with 50% accuracy without cues and 100% accuracy with cues, following a 15-minute delay with 75% accuracy without cues and 100% accuracy with cues, and following a 20-minute delay with 0% accuracy without cues and 100% accuracy with cues. Pt. completed delayed recall task in which pt. recalled 3 unrelated words following a 10-second delay with 74% accuracy with no choices provided and 96% accuracy when a F:6 was provided.   (Pended)   -AM     Patient’s memory skills will be enhanced as reported by patient by utilizing internal memory strategies to recall up to 3 pieces of information after a 5- minute delay  90%:;without cues  (Pended)   -AM     Status: Patient’s memory skills will be enhanced as reported by patient by utilizing internal memory strategies to recall up to 3 pieces of information after a 5- minute delay  —  (Pended)  Partially achieved  -AM     Comments: Patient’s memory skills will be enhanced as reported by patient by utilizing internal memory strategies to recall up to 3 pieces of information after a 5- minute delay  see goal  above  (Pended)   -AM     Patient’s memory skills will be enhanced as reported by patient by using external memory aides  90%:;without cues  (Pended)   -AM     Status: Patient’s memory skills will be enhanced as reported by patient by using external memory aides  —  (Pended)  Partially achieved  -AM     Comments: Patient’s memory skills will be enhanced as reported by patient by using external memory aides  Pt. reports to use external memory aid strategies in home environmnet (lists, calanders). SLP reinforced and educated the pt. on the importance of using notebook to summarize events throughout the day, write important information, names, and errands in. SLP provided the pt. with an information sheet explaining how to use external memory aids to practice memory in the home environment.   (Pended)   -AM     Patient will demonstrate improved ability to recall information by listening to paragraph and answering yes/no questions  90%:;without cues  (Pended)   -AM     Status: Patient will demonstrate improved ability to recall information by listening to paragraph and answering yes/no questions  —  (Pended)  Partially achieved   -AM     Comments: Patient will demonstrate improved ability to recall information by listening to paragraph and answering yes/no questions  Pt. recalled detail from short paragraphs with 70% without cues without multiple choice  and 90% with min cues.   (Pended)   -AM        Problem Solving Goals    Problem Solving LTG's  Patient will be able to engage in avocational activities requiring high level cognitive skills  (Pended)   -AM     Patient will be able to engage in avocational activities requiring high level cognitive skills  Independently  (Pended)   -AM     Status: Patient will be able to engage in avocational activities requiring high level cognitive skills  —  (Pended)  Partially achieved  -AM     Patient will improve ability to analyze problems and determine solutions by correctly  sequencing __ steps to complete an activity  90%:;without cues  (Pended)   -AM     Status: Patient will improve ability to analyze problems and determine solutions by correctly sequencing __ steps to complete an activity  Achieved  (Pended)   -AM     Comments: Patient will improve ability to analyze problems and determine solutions by correctly sequencing __ steps to complete an activity  Pt. Completed a 6 step functional sequencing task with 90% without cues and 100% with min cus   (Pended)   -AM     Patient will improve ability to analyze problems and determine solutions by completing a visual representation/filling in a chart by following  written directions  90%:;without cues  (Pended)   -AM     Status: Patient will improve ability to analyze problems and determine solutions by completing a visual representation/filling in a chart by following  written directions  —  (Pended)  Partially achieved  -AM     Comments: Patient will improve ability to analyze problems and determine solutions by completing a visual representation/filling in a chart by following  written directions  Pt. met goal for simple to moderate complex reasoning tasks and required assistance with higher level reasoning tasks.   (Pended)   -AM     Patient will improve ability to analyze problems and determine solutions by completing functional math problems  90%:;without cues  (Pended)   -AM     Status: Patient will improve ability to analyze problems and determine solutions by completing functional math problems  —  (Pended)  Partially achieved  -AM     Comments: Patient will improve ability to analyze problems and determine solutions by completing functional math problems  71% accuracy without cues with more complex functional math word problems.  and 100% with min cues.   (Pended)   -AM        Attention/Orientation Goals    Attention/Orientation LTG's  Patient will be able to interact safely in home environment  (Pended)   -AM     Patient will  be able to interact safely in home environment  With Supervision  (Pended)   -AM     Status: Patient will be able to interact safely in home environment  Achieved  (Pended)   -AM     Patient will improve orientation skills by orienting to time/date  90%:;without cues  (Pended)   -AM     Status: Patient will improve orientation skills by orienting to time/date  —  (Pended)  Partially met   -AM     Comments: Patient will improve orientation skills by orienting to time/date  Pt. recalled date with 75% accuracy without cues and with 100% when mod cues were provided.  (Pended)   -AM       User Key  (r) = Recorded By, (t) = Taken By, (c) = Cosigned By    Initials Name Provider Type    Deepika Howell Speech Therapy Student Speech Therapy Student              OP SLP Assessment/Plan - 10/02/19 1312        SLP Assessment    Functional Problems  Speech Language- Adult/Cognition  (Pended)    -AM    Clinical Impression: Speech Language-Adult/Congnition  Mild:;Cognitive Communication Impairment  (Pended)    -AM      User Key  (r) = Recorded By, (t) = Taken By, (c) = Cosigned By    Initials Name Provider Type    Deepika Howell Speech Therapy Student Speech Therapy Student                 Time Calculation:   SLP Start Time: (P) 0900  SLP Stop Time: (P) 0955  SLP Time Calculation (min): (P) 55 min    Therapy Charges for Today     Code Description Service Date Service Provider Modifiers Qty    54380417681 HC ST TREATMENT SPEECH 4 10/2/2019 Deepika Oreilly, Speech Therapy Student GN 1                   Deepika Oreilly Speech Therapy Student  10/2/2019

## 2019-10-07 ENCOUNTER — APPOINTMENT (OUTPATIENT)
Dept: SPEECH THERAPY | Facility: HOSPITAL | Age: 79
End: 2019-10-07

## 2019-10-09 ENCOUNTER — APPOINTMENT (OUTPATIENT)
Dept: SPEECH THERAPY | Facility: HOSPITAL | Age: 79
End: 2019-10-09

## 2019-10-14 ENCOUNTER — APPOINTMENT (OUTPATIENT)
Dept: SPEECH THERAPY | Facility: HOSPITAL | Age: 79
End: 2019-10-14

## 2019-10-16 ENCOUNTER — APPOINTMENT (OUTPATIENT)
Dept: SPEECH THERAPY | Facility: HOSPITAL | Age: 79
End: 2019-10-16

## 2019-10-21 ENCOUNTER — LAB (OUTPATIENT)
Dept: LAB | Facility: HOSPITAL | Age: 79
End: 2019-10-21

## 2019-10-21 ENCOUNTER — APPOINTMENT (OUTPATIENT)
Dept: SPEECH THERAPY | Facility: HOSPITAL | Age: 79
End: 2019-10-21

## 2019-10-21 ENCOUNTER — TRANSCRIBE ORDERS (OUTPATIENT)
Dept: ADMINISTRATIVE | Facility: HOSPITAL | Age: 79
End: 2019-10-21

## 2019-10-21 DIAGNOSIS — D64.9 ANEMIA, UNSPECIFIED TYPE: ICD-10-CM

## 2019-10-21 DIAGNOSIS — I10 ESSENTIAL HYPERTENSION, MALIGNANT: ICD-10-CM

## 2019-10-21 DIAGNOSIS — D64.9 ANEMIA, UNSPECIFIED TYPE: Primary | ICD-10-CM

## 2019-10-21 LAB
ALBUMIN SERPL-MCNC: 3.7 G/DL (ref 3.5–5.2)
ALBUMIN/GLOB SERPL: 1.1 G/DL
ALP SERPL-CCNC: 143 U/L (ref 39–117)
ALT SERPL W P-5'-P-CCNC: 15 U/L (ref 1–33)
ANION GAP SERPL CALCULATED.3IONS-SCNC: 10.4 MMOL/L (ref 5–15)
AST SERPL-CCNC: 23 U/L (ref 1–32)
BASOPHILS # BLD AUTO: 0.04 10*3/MM3 (ref 0–0.2)
BASOPHILS NFR BLD AUTO: 0.7 % (ref 0–1.5)
BILIRUB SERPL-MCNC: 0.3 MG/DL (ref 0.2–1.2)
BUN BLD-MCNC: 21 MG/DL (ref 8–23)
BUN/CREAT SERPL: 18.4 (ref 7–25)
CALCIUM SPEC-SCNC: 9.3 MG/DL (ref 8.6–10.5)
CHLORIDE SERPL-SCNC: 103 MMOL/L (ref 98–107)
CO2 SERPL-SCNC: 28.6 MMOL/L (ref 22–29)
CREAT BLD-MCNC: 1.14 MG/DL (ref 0.57–1)
DEPRECATED RDW RBC AUTO: 44.6 FL (ref 37–54)
EOSINOPHIL # BLD AUTO: 0.22 10*3/MM3 (ref 0–0.4)
EOSINOPHIL NFR BLD AUTO: 3.6 % (ref 0.3–6.2)
ERYTHROCYTE [DISTWIDTH] IN BLOOD BY AUTOMATED COUNT: 14.9 % (ref 12.3–15.4)
GFR SERPL CREATININE-BSD FRML MDRD: 46 ML/MIN/1.73
GLOBULIN UR ELPH-MCNC: 3.5 GM/DL
GLUCOSE BLD-MCNC: 91 MG/DL (ref 65–99)
HCT VFR BLD AUTO: 36.9 % (ref 34–46.6)
HGB BLD-MCNC: 12 G/DL (ref 12–15.9)
IMM GRANULOCYTES # BLD AUTO: 0.03 10*3/MM3 (ref 0–0.05)
IMM GRANULOCYTES NFR BLD AUTO: 0.5 % (ref 0–0.5)
LYMPHOCYTES # BLD AUTO: 0.93 10*3/MM3 (ref 0.7–3.1)
LYMPHOCYTES NFR BLD AUTO: 15.4 % (ref 19.6–45.3)
MCH RBC QN AUTO: 26.8 PG (ref 26.6–33)
MCHC RBC AUTO-ENTMCNC: 32.5 G/DL (ref 31.5–35.7)
MCV RBC AUTO: 82.4 FL (ref 79–97)
MONOCYTES # BLD AUTO: 0.49 10*3/MM3 (ref 0.1–0.9)
MONOCYTES NFR BLD AUTO: 8.1 % (ref 5–12)
NEUTROPHILS # BLD AUTO: 4.34 10*3/MM3 (ref 1.7–7)
NEUTROPHILS NFR BLD AUTO: 71.7 % (ref 42.7–76)
NRBC BLD AUTO-RTO: 0 /100 WBC (ref 0–0.2)
PLATELET # BLD AUTO: 307 10*3/MM3 (ref 140–450)
PMV BLD AUTO: 10.6 FL (ref 6–12)
POTASSIUM BLD-SCNC: 3.9 MMOL/L (ref 3.5–5.2)
PROT SERPL-MCNC: 7.2 G/DL (ref 6–8.5)
RBC # BLD AUTO: 4.48 10*6/MM3 (ref 3.77–5.28)
SODIUM BLD-SCNC: 142 MMOL/L (ref 136–145)
WBC NRBC COR # BLD: 6.05 10*3/MM3 (ref 3.4–10.8)

## 2019-10-21 PROCEDURE — 80053 COMPREHEN METABOLIC PANEL: CPT | Performed by: INTERNAL MEDICINE

## 2019-10-21 PROCEDURE — 85025 COMPLETE CBC W/AUTO DIFF WBC: CPT | Performed by: INTERNAL MEDICINE

## 2019-10-21 PROCEDURE — 36415 COLL VENOUS BLD VENIPUNCTURE: CPT

## 2019-10-21 PROCEDURE — 83915 ASSAY OF NUCLEOTIDASE: CPT | Performed by: INTERNAL MEDICINE

## 2019-10-23 ENCOUNTER — APPOINTMENT (OUTPATIENT)
Dept: SPEECH THERAPY | Facility: HOSPITAL | Age: 79
End: 2019-10-23

## 2019-10-23 LAB — 5NT SERPL-CCNC: 5 IU/L (ref 0–10)

## 2019-10-28 ENCOUNTER — APPOINTMENT (OUTPATIENT)
Dept: SPEECH THERAPY | Facility: HOSPITAL | Age: 79
End: 2019-10-28

## 2019-10-30 ENCOUNTER — APPOINTMENT (OUTPATIENT)
Dept: SPEECH THERAPY | Facility: HOSPITAL | Age: 79
End: 2019-10-30

## 2019-11-04 ENCOUNTER — APPOINTMENT (OUTPATIENT)
Dept: SPEECH THERAPY | Facility: HOSPITAL | Age: 79
End: 2019-11-04

## 2020-01-23 ENCOUNTER — LAB (OUTPATIENT)
Dept: LAB | Facility: HOSPITAL | Age: 80
End: 2020-01-23

## 2020-01-23 ENCOUNTER — TRANSCRIBE ORDERS (OUTPATIENT)
Dept: ADMINISTRATIVE | Facility: HOSPITAL | Age: 80
End: 2020-01-23

## 2020-01-23 DIAGNOSIS — I10 ESSENTIAL HYPERTENSION, MALIGNANT: Primary | ICD-10-CM

## 2020-01-23 DIAGNOSIS — E78.5 HYPERLIPIDEMIA, UNSPECIFIED HYPERLIPIDEMIA TYPE: ICD-10-CM

## 2020-01-23 DIAGNOSIS — D64.9 ANEMIA, UNSPECIFIED TYPE: ICD-10-CM

## 2020-01-23 DIAGNOSIS — I10 ESSENTIAL HYPERTENSION, MALIGNANT: ICD-10-CM

## 2020-01-23 LAB
ALBUMIN SERPL-MCNC: 3.9 G/DL (ref 3.5–5.2)
ALBUMIN/GLOB SERPL: 1.3 G/DL
ALP SERPL-CCNC: 132 U/L (ref 39–117)
ALT SERPL W P-5'-P-CCNC: 9 U/L (ref 1–33)
ANION GAP SERPL CALCULATED.3IONS-SCNC: 13.2 MMOL/L (ref 5–15)
AST SERPL-CCNC: 16 U/L (ref 1–32)
BASOPHILS # BLD AUTO: 0.06 10*3/MM3 (ref 0–0.2)
BASOPHILS NFR BLD AUTO: 0.9 % (ref 0–1.5)
BILIRUB SERPL-MCNC: 0.4 MG/DL (ref 0.2–1.2)
BUN BLD-MCNC: 15 MG/DL (ref 8–23)
BUN/CREAT SERPL: 16.3 (ref 7–25)
CALCIUM SPEC-SCNC: 9.4 MG/DL (ref 8.6–10.5)
CHLORIDE SERPL-SCNC: 105 MMOL/L (ref 98–107)
CHOLEST SERPL-MCNC: 98 MG/DL (ref 0–200)
CO2 SERPL-SCNC: 24.8 MMOL/L (ref 22–29)
CREAT BLD-MCNC: 0.92 MG/DL (ref 0.57–1)
DEPRECATED RDW RBC AUTO: 36.9 FL (ref 37–54)
EOSINOPHIL # BLD AUTO: 0.24 10*3/MM3 (ref 0–0.4)
EOSINOPHIL NFR BLD AUTO: 3.8 % (ref 0.3–6.2)
ERYTHROCYTE [DISTWIDTH] IN BLOOD BY AUTOMATED COUNT: 12.8 % (ref 12.3–15.4)
GFR SERPL CREATININE-BSD FRML MDRD: 59 ML/MIN/1.73
GLOBULIN UR ELPH-MCNC: 3.1 GM/DL
GLUCOSE BLD-MCNC: 103 MG/DL (ref 65–99)
HCT VFR BLD AUTO: 29.7 % (ref 34–46.6)
HDLC SERPL-MCNC: 54 MG/DL (ref 40–60)
HGB BLD-MCNC: 8.8 G/DL (ref 12–15.9)
IMM GRANULOCYTES # BLD AUTO: 0.02 10*3/MM3 (ref 0–0.05)
IMM GRANULOCYTES NFR BLD AUTO: 0.3 % (ref 0–0.5)
LDLC SERPL CALC-MCNC: 29 MG/DL (ref 0–100)
LDLC/HDLC SERPL: 0.54 {RATIO}
LYMPHOCYTES # BLD AUTO: 1.27 10*3/MM3 (ref 0.7–3.1)
LYMPHOCYTES NFR BLD AUTO: 19.9 % (ref 19.6–45.3)
MCH RBC QN AUTO: 23.5 PG (ref 26.6–33)
MCHC RBC AUTO-ENTMCNC: 29.6 G/DL (ref 31.5–35.7)
MCV RBC AUTO: 79.2 FL (ref 79–97)
MONOCYTES # BLD AUTO: 0.59 10*3/MM3 (ref 0.1–0.9)
MONOCYTES NFR BLD AUTO: 9.3 % (ref 5–12)
NEUTROPHILS # BLD AUTO: 4.19 10*3/MM3 (ref 1.7–7)
NEUTROPHILS NFR BLD AUTO: 65.8 % (ref 42.7–76)
NRBC BLD AUTO-RTO: 0 /100 WBC (ref 0–0.2)
PLATELET # BLD AUTO: 391 10*3/MM3 (ref 140–450)
PMV BLD AUTO: 10.6 FL (ref 6–12)
POTASSIUM BLD-SCNC: 3.8 MMOL/L (ref 3.5–5.2)
PROT SERPL-MCNC: 7 G/DL (ref 6–8.5)
RBC # BLD AUTO: 3.75 10*6/MM3 (ref 3.77–5.28)
SODIUM BLD-SCNC: 143 MMOL/L (ref 136–145)
TRIGL SERPL-MCNC: 74 MG/DL (ref 0–150)
VLDLC SERPL-MCNC: 14.8 MG/DL (ref 5–40)
WBC NRBC COR # BLD: 6.37 10*3/MM3 (ref 3.4–10.8)

## 2020-01-23 PROCEDURE — 85025 COMPLETE CBC W/AUTO DIFF WBC: CPT | Performed by: INTERNAL MEDICINE

## 2020-01-23 PROCEDURE — 80053 COMPREHEN METABOLIC PANEL: CPT | Performed by: INTERNAL MEDICINE

## 2020-01-23 PROCEDURE — 36415 COLL VENOUS BLD VENIPUNCTURE: CPT

## 2020-01-23 PROCEDURE — 80061 LIPID PANEL: CPT | Performed by: INTERNAL MEDICINE

## 2020-01-27 ENCOUNTER — LAB (OUTPATIENT)
Dept: LAB | Facility: HOSPITAL | Age: 80
End: 2020-01-27

## 2020-01-27 ENCOUNTER — TRANSCRIBE ORDERS (OUTPATIENT)
Dept: ADMINISTRATIVE | Facility: HOSPITAL | Age: 80
End: 2020-01-27

## 2020-01-27 DIAGNOSIS — D64.9 ANEMIA, UNSPECIFIED TYPE: ICD-10-CM

## 2020-01-27 DIAGNOSIS — D64.9 ANEMIA, UNSPECIFIED TYPE: Primary | ICD-10-CM

## 2020-01-27 LAB
FERRITIN SERPL-MCNC: 5.09 NG/ML (ref 13–150)
FOLATE SERPL-MCNC: 17 NG/ML (ref 4.78–24.2)
IRON 24H UR-MRATE: 13 MCG/DL (ref 37–145)
IRON SATN MFR SERPL: 3 % (ref 20–50)
RETICS # AUTO: 0.05 10*6/MM3 (ref 0.02–0.13)
RETICS/RBC NFR AUTO: 1.44 % (ref 0.7–1.9)
TIBC SERPL-MCNC: 508 MCG/DL (ref 298–536)
TRANSFERRIN SERPL-MCNC: 341 MG/DL (ref 200–360)
VIT B12 BLD-MCNC: 510 PG/ML (ref 211–946)

## 2020-01-27 PROCEDURE — 85045 AUTOMATED RETICULOCYTE COUNT: CPT | Performed by: INTERNAL MEDICINE

## 2020-01-27 PROCEDURE — 82607 VITAMIN B-12: CPT | Performed by: INTERNAL MEDICINE

## 2020-01-27 PROCEDURE — 36415 COLL VENOUS BLD VENIPUNCTURE: CPT

## 2020-01-27 PROCEDURE — 84466 ASSAY OF TRANSFERRIN: CPT | Performed by: INTERNAL MEDICINE

## 2020-01-27 PROCEDURE — 82746 ASSAY OF FOLIC ACID SERUM: CPT | Performed by: INTERNAL MEDICINE

## 2020-01-27 PROCEDURE — 83540 ASSAY OF IRON: CPT | Performed by: INTERNAL MEDICINE

## 2020-01-27 PROCEDURE — 82728 ASSAY OF FERRITIN: CPT | Performed by: INTERNAL MEDICINE

## 2020-03-05 ENCOUNTER — TRANSCRIBE ORDERS (OUTPATIENT)
Dept: ADMINISTRATIVE | Facility: HOSPITAL | Age: 80
End: 2020-03-05

## 2020-03-05 ENCOUNTER — LAB (OUTPATIENT)
Dept: LAB | Facility: HOSPITAL | Age: 80
End: 2020-03-05

## 2020-03-05 DIAGNOSIS — D64.9 ANEMIA, UNSPECIFIED TYPE: Primary | ICD-10-CM

## 2020-03-05 DIAGNOSIS — D64.9 ANEMIA, UNSPECIFIED TYPE: ICD-10-CM

## 2020-03-05 LAB
BASOPHILS # BLD AUTO: 0.05 10*3/MM3 (ref 0–0.2)
BASOPHILS NFR BLD AUTO: 0.8 % (ref 0–1.5)
DEPRECATED RDW RBC AUTO: 54.8 FL (ref 37–54)
EOSINOPHIL # BLD AUTO: 0.18 10*3/MM3 (ref 0–0.4)
EOSINOPHIL NFR BLD AUTO: 3 % (ref 0.3–6.2)
ERYTHROCYTE [DISTWIDTH] IN BLOOD BY AUTOMATED COUNT: 18.3 % (ref 12.3–15.4)
HCT VFR BLD AUTO: 33.9 % (ref 34–46.6)
HGB BLD-MCNC: 10.4 G/DL (ref 12–15.9)
IMM GRANULOCYTES # BLD AUTO: 0.02 10*3/MM3 (ref 0–0.05)
IMM GRANULOCYTES NFR BLD AUTO: 0.3 % (ref 0–0.5)
LYMPHOCYTES # BLD AUTO: 1.24 10*3/MM3 (ref 0.7–3.1)
LYMPHOCYTES NFR BLD AUTO: 20.3 % (ref 19.6–45.3)
MCH RBC QN AUTO: 25.5 PG (ref 26.6–33)
MCHC RBC AUTO-ENTMCNC: 30.7 G/DL (ref 31.5–35.7)
MCV RBC AUTO: 83.1 FL (ref 79–97)
MONOCYTES # BLD AUTO: 0.55 10*3/MM3 (ref 0.1–0.9)
MONOCYTES NFR BLD AUTO: 9 % (ref 5–12)
NEUTROPHILS # BLD AUTO: 4.06 10*3/MM3 (ref 1.7–7)
NEUTROPHILS NFR BLD AUTO: 66.6 % (ref 42.7–76)
NRBC BLD AUTO-RTO: 0 /100 WBC (ref 0–0.2)
PLATELET # BLD AUTO: 298 10*3/MM3 (ref 140–450)
PMV BLD AUTO: 10.9 FL (ref 6–12)
RBC # BLD AUTO: 4.08 10*6/MM3 (ref 3.77–5.28)
WBC NRBC COR # BLD: 6.1 10*3/MM3 (ref 3.4–10.8)

## 2020-03-05 PROCEDURE — 85025 COMPLETE CBC W/AUTO DIFF WBC: CPT | Performed by: INTERNAL MEDICINE

## 2020-03-05 PROCEDURE — 36415 COLL VENOUS BLD VENIPUNCTURE: CPT

## 2020-04-17 ENCOUNTER — TRANSCRIBE ORDERS (OUTPATIENT)
Dept: ADMINISTRATIVE | Facility: HOSPITAL | Age: 80
End: 2020-04-17

## 2020-04-17 ENCOUNTER — LAB (OUTPATIENT)
Dept: LAB | Facility: HOSPITAL | Age: 80
End: 2020-04-17

## 2020-04-17 DIAGNOSIS — E78.5 HYPERLIPIDEMIA, UNSPECIFIED HYPERLIPIDEMIA TYPE: ICD-10-CM

## 2020-04-17 DIAGNOSIS — I10 ESSENTIAL HYPERTENSION, MALIGNANT: ICD-10-CM

## 2020-04-17 DIAGNOSIS — D64.9 ANEMIA, UNSPECIFIED TYPE: ICD-10-CM

## 2020-04-17 DIAGNOSIS — D64.9 ANEMIA, UNSPECIFIED TYPE: Primary | ICD-10-CM

## 2020-04-17 LAB
ALBUMIN SERPL-MCNC: 3.9 G/DL (ref 3.5–5.2)
ALBUMIN/GLOB SERPL: 1.1 G/DL
ALP SERPL-CCNC: 134 U/L (ref 39–117)
ALT SERPL W P-5'-P-CCNC: 15 U/L (ref 1–33)
ANION GAP SERPL CALCULATED.3IONS-SCNC: 11.8 MMOL/L (ref 5–15)
AST SERPL-CCNC: 20 U/L (ref 1–32)
BASOPHILS # BLD AUTO: 0.03 10*3/MM3 (ref 0–0.2)
BASOPHILS NFR BLD AUTO: 0.5 % (ref 0–1.5)
BILIRUB SERPL-MCNC: 0.3 MG/DL (ref 0.2–1.2)
BUN BLD-MCNC: 17 MG/DL (ref 8–23)
BUN/CREAT SERPL: 18.3 (ref 7–25)
CALCIUM SPEC-SCNC: 9.4 MG/DL (ref 8.6–10.5)
CHLORIDE SERPL-SCNC: 102 MMOL/L (ref 98–107)
CHOLEST SERPL-MCNC: 111 MG/DL (ref 0–200)
CO2 SERPL-SCNC: 26.2 MMOL/L (ref 22–29)
CREAT BLD-MCNC: 0.93 MG/DL (ref 0.57–1)
DEPRECATED RDW RBC AUTO: 45.5 FL (ref 37–54)
EOSINOPHIL # BLD AUTO: 0.25 10*3/MM3 (ref 0–0.4)
EOSINOPHIL NFR BLD AUTO: 3.9 % (ref 0.3–6.2)
ERYTHROCYTE [DISTWIDTH] IN BLOOD BY AUTOMATED COUNT: 14.9 % (ref 12.3–15.4)
FERRITIN SERPL-MCNC: 27 NG/ML (ref 13–150)
GFR SERPL CREATININE-BSD FRML MDRD: 58 ML/MIN/1.73
GLOBULIN UR ELPH-MCNC: 3.4 GM/DL
GLUCOSE BLD-MCNC: 109 MG/DL (ref 65–99)
HCT VFR BLD AUTO: 40.6 % (ref 34–46.6)
HDLC SERPL-MCNC: 49 MG/DL (ref 40–60)
HGB BLD-MCNC: 12.9 G/DL (ref 12–15.9)
IMM GRANULOCYTES # BLD AUTO: 0.01 10*3/MM3 (ref 0–0.05)
IMM GRANULOCYTES NFR BLD AUTO: 0.2 % (ref 0–0.5)
IRON 24H UR-MRATE: 33 MCG/DL (ref 37–145)
IRON SATN MFR SERPL: 9 % (ref 20–50)
LDLC SERPL CALC-MCNC: 42 MG/DL (ref 0–100)
LDLC/HDLC SERPL: 0.86 {RATIO}
LYMPHOCYTES # BLD AUTO: 1.09 10*3/MM3 (ref 0.7–3.1)
LYMPHOCYTES NFR BLD AUTO: 17.2 % (ref 19.6–45.3)
MCH RBC QN AUTO: 26.7 PG (ref 26.6–33)
MCHC RBC AUTO-ENTMCNC: 31.8 G/DL (ref 31.5–35.7)
MCV RBC AUTO: 84.1 FL (ref 79–97)
MONOCYTES # BLD AUTO: 0.44 10*3/MM3 (ref 0.1–0.9)
MONOCYTES NFR BLD AUTO: 7 % (ref 5–12)
NEUTROPHILS # BLD AUTO: 4.51 10*3/MM3 (ref 1.7–7)
NEUTROPHILS NFR BLD AUTO: 71.2 % (ref 42.7–76)
NRBC BLD AUTO-RTO: 0 /100 WBC (ref 0–0.2)
PLATELET # BLD AUTO: 291 10*3/MM3 (ref 140–450)
PMV BLD AUTO: 11 FL (ref 6–12)
POTASSIUM BLD-SCNC: 4.5 MMOL/L (ref 3.5–5.2)
PROT SERPL-MCNC: 7.3 G/DL (ref 6–8.5)
RBC # BLD AUTO: 4.83 10*6/MM3 (ref 3.77–5.28)
SODIUM BLD-SCNC: 140 MMOL/L (ref 136–145)
TIBC SERPL-MCNC: 365 MCG/DL (ref 298–536)
TRANSFERRIN SERPL-MCNC: 245 MG/DL (ref 200–360)
TRIGL SERPL-MCNC: 100 MG/DL (ref 0–150)
VLDLC SERPL-MCNC: 20 MG/DL (ref 5–40)
WBC NRBC COR # BLD: 6.33 10*3/MM3 (ref 3.4–10.8)

## 2020-04-17 PROCEDURE — 80061 LIPID PANEL: CPT | Performed by: INTERNAL MEDICINE

## 2020-04-17 PROCEDURE — 36415 COLL VENOUS BLD VENIPUNCTURE: CPT

## 2020-04-17 PROCEDURE — 83540 ASSAY OF IRON: CPT | Performed by: INTERNAL MEDICINE

## 2020-04-17 PROCEDURE — 84466 ASSAY OF TRANSFERRIN: CPT | Performed by: INTERNAL MEDICINE

## 2020-04-17 PROCEDURE — 85025 COMPLETE CBC W/AUTO DIFF WBC: CPT | Performed by: INTERNAL MEDICINE

## 2020-04-17 PROCEDURE — 80053 COMPREHEN METABOLIC PANEL: CPT | Performed by: INTERNAL MEDICINE

## 2020-04-17 PROCEDURE — 82728 ASSAY OF FERRITIN: CPT | Performed by: INTERNAL MEDICINE

## 2020-07-30 ENCOUNTER — TRANSCRIBE ORDERS (OUTPATIENT)
Dept: ADMINISTRATIVE | Facility: HOSPITAL | Age: 80
End: 2020-07-30

## 2020-07-30 ENCOUNTER — LAB (OUTPATIENT)
Dept: LAB | Facility: HOSPITAL | Age: 80
End: 2020-07-30

## 2020-07-30 DIAGNOSIS — I10 ESSENTIAL HYPERTENSION, MALIGNANT: ICD-10-CM

## 2020-07-30 DIAGNOSIS — D64.9 ANEMIA, UNSPECIFIED TYPE: ICD-10-CM

## 2020-07-30 DIAGNOSIS — D64.9 ANEMIA, UNSPECIFIED TYPE: Primary | ICD-10-CM

## 2020-07-30 DIAGNOSIS — E78.5 HYPERLIPIDEMIA, UNSPECIFIED HYPERLIPIDEMIA TYPE: ICD-10-CM

## 2020-07-30 LAB
ALBUMIN SERPL-MCNC: 3.8 G/DL (ref 3.5–5.2)
ALBUMIN/GLOB SERPL: 1.3 G/DL
ALP SERPL-CCNC: 129 U/L (ref 39–117)
ALT SERPL W P-5'-P-CCNC: 11 U/L (ref 1–33)
ANION GAP SERPL CALCULATED.3IONS-SCNC: 8.9 MMOL/L (ref 5–15)
AST SERPL-CCNC: 17 U/L (ref 1–32)
BILIRUB SERPL-MCNC: 0.3 MG/DL (ref 0–1.2)
BUN SERPL-MCNC: 20 MG/DL (ref 8–23)
BUN/CREAT SERPL: 24.7 (ref 7–25)
CALCIUM SPEC-SCNC: 9.6 MG/DL (ref 8.6–10.5)
CHLORIDE SERPL-SCNC: 103 MMOL/L (ref 98–107)
CHOLEST SERPL-MCNC: 113 MG/DL (ref 0–200)
CO2 SERPL-SCNC: 29.1 MMOL/L (ref 22–29)
CREAT SERPL-MCNC: 0.81 MG/DL (ref 0.57–1)
FERRITIN SERPL-MCNC: 26.8 NG/ML (ref 13–150)
GFR SERPL CREATININE-BSD FRML MDRD: 68 ML/MIN/1.73
GLOBULIN UR ELPH-MCNC: 2.9 GM/DL
GLUCOSE SERPL-MCNC: 94 MG/DL (ref 65–99)
HDLC SERPL-MCNC: 52 MG/DL (ref 40–60)
IRON 24H UR-MRATE: 66 MCG/DL (ref 37–145)
IRON SATN MFR SERPL: 18 % (ref 20–50)
LDLC SERPL CALC-MCNC: 35 MG/DL (ref 0–100)
LDLC/HDLC SERPL: 0.68 {RATIO}
POTASSIUM SERPL-SCNC: 3.8 MMOL/L (ref 3.5–5.2)
PROT SERPL-MCNC: 6.7 G/DL (ref 6–8.5)
SODIUM SERPL-SCNC: 141 MMOL/L (ref 136–145)
TIBC SERPL-MCNC: 374 MCG/DL (ref 298–536)
TRANSFERRIN SERPL-MCNC: 251 MG/DL (ref 200–360)
TRIGL SERPL-MCNC: 128 MG/DL (ref 0–150)
VLDLC SERPL-MCNC: 25.6 MG/DL (ref 5–40)

## 2020-07-30 PROCEDURE — 80061 LIPID PANEL: CPT | Performed by: INTERNAL MEDICINE

## 2020-07-30 PROCEDURE — 82728 ASSAY OF FERRITIN: CPT | Performed by: INTERNAL MEDICINE

## 2020-07-30 PROCEDURE — 84466 ASSAY OF TRANSFERRIN: CPT | Performed by: INTERNAL MEDICINE

## 2020-07-30 PROCEDURE — 36415 COLL VENOUS BLD VENIPUNCTURE: CPT

## 2020-07-30 PROCEDURE — 80053 COMPREHEN METABOLIC PANEL: CPT | Performed by: INTERNAL MEDICINE

## 2020-07-30 PROCEDURE — 83540 ASSAY OF IRON: CPT | Performed by: INTERNAL MEDICINE

## 2020-11-06 ENCOUNTER — LAB (OUTPATIENT)
Dept: LAB | Facility: HOSPITAL | Age: 80
End: 2020-11-06

## 2020-11-06 ENCOUNTER — TRANSCRIBE ORDERS (OUTPATIENT)
Dept: ADMINISTRATIVE | Facility: HOSPITAL | Age: 80
End: 2020-11-06

## 2020-11-06 DIAGNOSIS — D64.9 ANEMIA, UNSPECIFIED TYPE: ICD-10-CM

## 2020-11-06 DIAGNOSIS — D64.9 ANEMIA, UNSPECIFIED TYPE: Primary | ICD-10-CM

## 2020-11-06 LAB
BASOPHILS # BLD AUTO: 0.05 10*3/MM3 (ref 0–0.2)
BASOPHILS NFR BLD AUTO: 0.8 % (ref 0–1.5)
DEPRECATED RDW RBC AUTO: 38.1 FL (ref 37–54)
EOSINOPHIL # BLD AUTO: 0.26 10*3/MM3 (ref 0–0.4)
EOSINOPHIL NFR BLD AUTO: 4.3 % (ref 0.3–6.2)
ERYTHROCYTE [DISTWIDTH] IN BLOOD BY AUTOMATED COUNT: 11.8 % (ref 12.3–15.4)
FERRITIN SERPL-MCNC: 29.7 NG/ML (ref 13–150)
HCT VFR BLD AUTO: 37.9 % (ref 34–46.6)
HGB BLD-MCNC: 11.9 G/DL (ref 12–15.9)
IMM GRANULOCYTES # BLD AUTO: 0.02 10*3/MM3 (ref 0–0.05)
IMM GRANULOCYTES NFR BLD AUTO: 0.3 % (ref 0–0.5)
IRON 24H UR-MRATE: 39 MCG/DL (ref 37–145)
LYMPHOCYTES # BLD AUTO: 1.09 10*3/MM3 (ref 0.7–3.1)
LYMPHOCYTES NFR BLD AUTO: 18.2 % (ref 19.6–45.3)
MCH RBC QN AUTO: 28.3 PG (ref 26.6–33)
MCHC RBC AUTO-ENTMCNC: 31.4 G/DL (ref 31.5–35.7)
MCV RBC AUTO: 90 FL (ref 79–97)
MONOCYTES # BLD AUTO: 0.47 10*3/MM3 (ref 0.1–0.9)
MONOCYTES NFR BLD AUTO: 7.8 % (ref 5–12)
NEUTROPHILS NFR BLD AUTO: 4.11 10*3/MM3 (ref 1.7–7)
NEUTROPHILS NFR BLD AUTO: 68.6 % (ref 42.7–76)
NRBC BLD AUTO-RTO: 0 /100 WBC (ref 0–0.2)
PLATELET # BLD AUTO: 277 10*3/MM3 (ref 140–450)
PMV BLD AUTO: 10.8 FL (ref 6–12)
RBC # BLD AUTO: 4.21 10*6/MM3 (ref 3.77–5.28)
WBC # BLD AUTO: 6 10*3/MM3 (ref 3.4–10.8)

## 2020-11-06 PROCEDURE — 83540 ASSAY OF IRON: CPT | Performed by: INTERNAL MEDICINE

## 2020-11-06 PROCEDURE — 82728 ASSAY OF FERRITIN: CPT | Performed by: INTERNAL MEDICINE

## 2020-11-06 PROCEDURE — 85025 COMPLETE CBC W/AUTO DIFF WBC: CPT | Performed by: INTERNAL MEDICINE

## 2020-11-06 PROCEDURE — 36415 COLL VENOUS BLD VENIPUNCTURE: CPT

## 2021-02-15 ENCOUNTER — LAB (OUTPATIENT)
Dept: LAB | Facility: HOSPITAL | Age: 81
End: 2021-02-15

## 2021-02-15 ENCOUNTER — TRANSCRIBE ORDERS (OUTPATIENT)
Dept: ADMINISTRATIVE | Facility: HOSPITAL | Age: 81
End: 2021-02-15

## 2021-02-15 DIAGNOSIS — E78.5 HYPERLIPIDEMIA, UNSPECIFIED HYPERLIPIDEMIA TYPE: ICD-10-CM

## 2021-02-15 DIAGNOSIS — I10 ESSENTIAL HYPERTENSION, MALIGNANT: ICD-10-CM

## 2021-02-15 DIAGNOSIS — D64.9 ANEMIA, UNSPECIFIED TYPE: ICD-10-CM

## 2021-02-15 DIAGNOSIS — I10 ESSENTIAL HYPERTENSION, MALIGNANT: Primary | ICD-10-CM

## 2021-02-15 LAB
ALBUMIN SERPL-MCNC: 3.7 G/DL (ref 3.5–5.2)
ALBUMIN/GLOB SERPL: 1.2 G/DL
ALP SERPL-CCNC: 157 U/L (ref 39–117)
ALT SERPL W P-5'-P-CCNC: 14 U/L (ref 1–33)
ANION GAP SERPL CALCULATED.3IONS-SCNC: 9.1 MMOL/L (ref 5–15)
AST SERPL-CCNC: 15 U/L (ref 1–32)
BASOPHILS # BLD AUTO: 0.06 10*3/MM3 (ref 0–0.2)
BASOPHILS NFR BLD AUTO: 0.9 % (ref 0–1.5)
BILIRUB SERPL-MCNC: 0.3 MG/DL (ref 0–1.2)
BUN SERPL-MCNC: 20 MG/DL (ref 8–23)
BUN/CREAT SERPL: 23.5 (ref 7–25)
CALCIUM SPEC-SCNC: 9.4 MG/DL (ref 8.6–10.5)
CHLORIDE SERPL-SCNC: 106 MMOL/L (ref 98–107)
CHOLEST SERPL-MCNC: 107 MG/DL (ref 0–200)
CO2 SERPL-SCNC: 27.9 MMOL/L (ref 22–29)
CREAT SERPL-MCNC: 0.85 MG/DL (ref 0.57–1)
DEPRECATED RDW RBC AUTO: 37.4 FL (ref 37–54)
EOSINOPHIL # BLD AUTO: 0.3 10*3/MM3 (ref 0–0.4)
EOSINOPHIL NFR BLD AUTO: 4.4 % (ref 0.3–6.2)
ERYTHROCYTE [DISTWIDTH] IN BLOOD BY AUTOMATED COUNT: 11.8 % (ref 12.3–15.4)
FERRITIN SERPL-MCNC: 16.3 NG/ML (ref 13–150)
GFR SERPL CREATININE-BSD FRML MDRD: 65 ML/MIN/1.73
GLOBULIN UR ELPH-MCNC: 3.1 GM/DL
GLUCOSE SERPL-MCNC: 110 MG/DL (ref 65–99)
HCT VFR BLD AUTO: 39.9 % (ref 34–46.6)
HDLC SERPL-MCNC: 53 MG/DL (ref 40–60)
HGB BLD-MCNC: 12.5 G/DL (ref 12–15.9)
IMM GRANULOCYTES # BLD AUTO: 0.03 10*3/MM3 (ref 0–0.05)
IMM GRANULOCYTES NFR BLD AUTO: 0.4 % (ref 0–0.5)
LDLC SERPL CALC-MCNC: 38 MG/DL (ref 0–100)
LDLC/HDLC SERPL: 0.72 {RATIO}
LYMPHOCYTES # BLD AUTO: 1.61 10*3/MM3 (ref 0.7–3.1)
LYMPHOCYTES NFR BLD AUTO: 23.4 % (ref 19.6–45.3)
MCH RBC QN AUTO: 27.9 PG (ref 26.6–33)
MCHC RBC AUTO-ENTMCNC: 31.3 G/DL (ref 31.5–35.7)
MCV RBC AUTO: 89.1 FL (ref 79–97)
MONOCYTES # BLD AUTO: 0.6 10*3/MM3 (ref 0.1–0.9)
MONOCYTES NFR BLD AUTO: 8.7 % (ref 5–12)
NEUTROPHILS NFR BLD AUTO: 4.29 10*3/MM3 (ref 1.7–7)
NEUTROPHILS NFR BLD AUTO: 62.2 % (ref 42.7–76)
NRBC BLD AUTO-RTO: 0 /100 WBC (ref 0–0.2)
PLATELET # BLD AUTO: 295 10*3/MM3 (ref 140–450)
PMV BLD AUTO: 11.1 FL (ref 6–12)
POTASSIUM SERPL-SCNC: 4.4 MMOL/L (ref 3.5–5.2)
PROT SERPL-MCNC: 6.8 G/DL (ref 6–8.5)
RBC # BLD AUTO: 4.48 10*6/MM3 (ref 3.77–5.28)
SODIUM SERPL-SCNC: 143 MMOL/L (ref 136–145)
TRIGL SERPL-MCNC: 78 MG/DL (ref 0–150)
VLDLC SERPL-MCNC: 16 MG/DL (ref 5–40)
WBC # BLD AUTO: 6.89 10*3/MM3 (ref 3.4–10.8)

## 2021-02-15 PROCEDURE — 82728 ASSAY OF FERRITIN: CPT

## 2021-02-15 PROCEDURE — 36415 COLL VENOUS BLD VENIPUNCTURE: CPT

## 2021-02-15 PROCEDURE — 85025 COMPLETE CBC W/AUTO DIFF WBC: CPT

## 2021-02-15 PROCEDURE — 80053 COMPREHEN METABOLIC PANEL: CPT

## 2021-02-15 PROCEDURE — 80061 LIPID PANEL: CPT

## 2021-05-17 ENCOUNTER — LAB (OUTPATIENT)
Dept: LAB | Facility: HOSPITAL | Age: 81
End: 2021-05-17

## 2021-05-17 ENCOUNTER — TRANSCRIBE ORDERS (OUTPATIENT)
Dept: ADMINISTRATIVE | Facility: HOSPITAL | Age: 81
End: 2021-05-17

## 2021-05-17 DIAGNOSIS — D64.9 ANEMIA, UNSPECIFIED TYPE: ICD-10-CM

## 2021-05-17 DIAGNOSIS — D64.9 ANEMIA, UNSPECIFIED TYPE: Primary | ICD-10-CM

## 2021-05-17 LAB
BASOPHILS # BLD AUTO: 0.04 10*3/MM3 (ref 0–0.2)
BASOPHILS NFR BLD AUTO: 0.7 % (ref 0–1.5)
DEPRECATED RDW RBC AUTO: 39.8 FL (ref 37–54)
EOSINOPHIL # BLD AUTO: 0.22 10*3/MM3 (ref 0–0.4)
EOSINOPHIL NFR BLD AUTO: 3.7 % (ref 0.3–6.2)
ERYTHROCYTE [DISTWIDTH] IN BLOOD BY AUTOMATED COUNT: 13 % (ref 12.3–15.4)
HCT VFR BLD AUTO: 38.1 % (ref 34–46.6)
HGB BLD-MCNC: 11.8 G/DL (ref 12–15.9)
IMM GRANULOCYTES # BLD AUTO: 0.02 10*3/MM3 (ref 0–0.05)
IMM GRANULOCYTES NFR BLD AUTO: 0.3 % (ref 0–0.5)
LYMPHOCYTES # BLD AUTO: 1.06 10*3/MM3 (ref 0.7–3.1)
LYMPHOCYTES NFR BLD AUTO: 17.7 % (ref 19.6–45.3)
MCH RBC QN AUTO: 26.8 PG (ref 26.6–33)
MCHC RBC AUTO-ENTMCNC: 31 G/DL (ref 31.5–35.7)
MCV RBC AUTO: 86.4 FL (ref 79–97)
MONOCYTES # BLD AUTO: 0.47 10*3/MM3 (ref 0.1–0.9)
MONOCYTES NFR BLD AUTO: 7.8 % (ref 5–12)
NEUTROPHILS NFR BLD AUTO: 4.19 10*3/MM3 (ref 1.7–7)
NEUTROPHILS NFR BLD AUTO: 69.8 % (ref 42.7–76)
NRBC BLD AUTO-RTO: 0 /100 WBC (ref 0–0.2)
PLATELET # BLD AUTO: 288 10*3/MM3 (ref 140–450)
PMV BLD AUTO: 10.9 FL (ref 6–12)
RBC # BLD AUTO: 4.41 10*6/MM3 (ref 3.77–5.28)
WBC # BLD AUTO: 6 10*3/MM3 (ref 3.4–10.8)

## 2021-05-17 PROCEDURE — 36415 COLL VENOUS BLD VENIPUNCTURE: CPT

## 2021-05-17 PROCEDURE — 85025 COMPLETE CBC W/AUTO DIFF WBC: CPT

## 2021-08-23 ENCOUNTER — TRANSCRIBE ORDERS (OUTPATIENT)
Dept: LAB | Facility: HOSPITAL | Age: 81
End: 2021-08-23

## 2021-08-23 ENCOUNTER — LAB (OUTPATIENT)
Dept: LAB | Facility: HOSPITAL | Age: 81
End: 2021-08-23

## 2021-08-23 DIAGNOSIS — D64.9 ANEMIA, UNSPECIFIED TYPE: ICD-10-CM

## 2021-08-23 DIAGNOSIS — E78.5 HYPERLIPIDEMIA, UNSPECIFIED HYPERLIPIDEMIA TYPE: ICD-10-CM

## 2021-08-23 DIAGNOSIS — I10 BENIGN HYPERTENSION: ICD-10-CM

## 2021-08-23 DIAGNOSIS — D64.9 ANEMIA, UNSPECIFIED TYPE: Primary | ICD-10-CM

## 2021-08-23 LAB
ALBUMIN SERPL-MCNC: 4.1 G/DL (ref 3.5–5.2)
ALBUMIN/GLOB SERPL: 1.4 G/DL
ALP SERPL-CCNC: 123 U/L (ref 39–117)
ALT SERPL W P-5'-P-CCNC: 15 U/L (ref 1–33)
ANION GAP SERPL CALCULATED.3IONS-SCNC: 8.9 MMOL/L (ref 5–15)
AST SERPL-CCNC: 22 U/L (ref 1–32)
BASOPHILS # BLD AUTO: 0.04 10*3/MM3 (ref 0–0.2)
BASOPHILS NFR BLD AUTO: 0.7 % (ref 0–1.5)
BILIRUB SERPL-MCNC: 0.4 MG/DL (ref 0–1.2)
BUN SERPL-MCNC: 18 MG/DL (ref 8–23)
BUN/CREAT SERPL: 21.4 (ref 7–25)
CALCIUM SPEC-SCNC: 8.9 MG/DL (ref 8.6–10.5)
CHLORIDE SERPL-SCNC: 106 MMOL/L (ref 98–107)
CHOLEST SERPL-MCNC: 100 MG/DL (ref 0–200)
CO2 SERPL-SCNC: 26.1 MMOL/L (ref 22–29)
CREAT SERPL-MCNC: 0.84 MG/DL (ref 0.57–1)
DEPRECATED RDW RBC AUTO: 41.3 FL (ref 37–54)
EOSINOPHIL # BLD AUTO: 0.27 10*3/MM3 (ref 0–0.4)
EOSINOPHIL NFR BLD AUTO: 4.6 % (ref 0.3–6.2)
ERYTHROCYTE [DISTWIDTH] IN BLOOD BY AUTOMATED COUNT: 13.8 % (ref 12.3–15.4)
GFR SERPL CREATININE-BSD FRML MDRD: 65 ML/MIN/1.73
GLOBULIN UR ELPH-MCNC: 2.9 GM/DL
GLUCOSE SERPL-MCNC: 111 MG/DL (ref 65–99)
HCT VFR BLD AUTO: 40.2 % (ref 34–46.6)
HDLC SERPL-MCNC: 61 MG/DL (ref 40–60)
HGB BLD-MCNC: 12.8 G/DL (ref 12–15.9)
IMM GRANULOCYTES # BLD AUTO: 0.02 10*3/MM3 (ref 0–0.05)
IMM GRANULOCYTES NFR BLD AUTO: 0.3 % (ref 0–0.5)
LDLC SERPL CALC-MCNC: 24 MG/DL (ref 0–100)
LDLC/HDLC SERPL: 0.4 {RATIO}
LYMPHOCYTES # BLD AUTO: 1.16 10*3/MM3 (ref 0.7–3.1)
LYMPHOCYTES NFR BLD AUTO: 19.7 % (ref 19.6–45.3)
MCH RBC QN AUTO: 26.8 PG (ref 26.6–33)
MCHC RBC AUTO-ENTMCNC: 31.8 G/DL (ref 31.5–35.7)
MCV RBC AUTO: 84.1 FL (ref 79–97)
MONOCYTES # BLD AUTO: 0.46 10*3/MM3 (ref 0.1–0.9)
MONOCYTES NFR BLD AUTO: 7.8 % (ref 5–12)
NEUTROPHILS NFR BLD AUTO: 3.94 10*3/MM3 (ref 1.7–7)
NEUTROPHILS NFR BLD AUTO: 66.9 % (ref 42.7–76)
NRBC BLD AUTO-RTO: 0 /100 WBC (ref 0–0.2)
PLATELET # BLD AUTO: 275 10*3/MM3 (ref 140–450)
PMV BLD AUTO: 11 FL (ref 6–12)
POTASSIUM SERPL-SCNC: 4 MMOL/L (ref 3.5–5.2)
PROT SERPL-MCNC: 7 G/DL (ref 6–8.5)
RBC # BLD AUTO: 4.78 10*6/MM3 (ref 3.77–5.28)
SODIUM SERPL-SCNC: 141 MMOL/L (ref 136–145)
TRIGL SERPL-MCNC: 73 MG/DL (ref 0–150)
VLDLC SERPL-MCNC: 15 MG/DL (ref 5–40)
WBC # BLD AUTO: 5.89 10*3/MM3 (ref 3.4–10.8)

## 2021-08-23 PROCEDURE — 85025 COMPLETE CBC W/AUTO DIFF WBC: CPT

## 2021-08-23 PROCEDURE — 80061 LIPID PANEL: CPT

## 2021-08-23 PROCEDURE — 36415 COLL VENOUS BLD VENIPUNCTURE: CPT

## 2021-08-23 PROCEDURE — 80053 COMPREHEN METABOLIC PANEL: CPT

## 2021-11-15 ENCOUNTER — LAB (OUTPATIENT)
Dept: LAB | Facility: HOSPITAL | Age: 81
End: 2021-11-15

## 2021-11-15 ENCOUNTER — TRANSCRIBE ORDERS (OUTPATIENT)
Dept: ADMINISTRATIVE | Facility: HOSPITAL | Age: 81
End: 2021-11-15

## 2021-11-15 DIAGNOSIS — E78.5 HYPERLIPIDEMIA, UNSPECIFIED HYPERLIPIDEMIA TYPE: ICD-10-CM

## 2021-11-15 DIAGNOSIS — Z00.00 ROUTINE GENERAL MEDICAL EXAMINATION AT A HEALTH CARE FACILITY: ICD-10-CM

## 2021-11-15 DIAGNOSIS — I10 ESSENTIAL HYPERTENSION, MALIGNANT: ICD-10-CM

## 2021-11-15 DIAGNOSIS — D64.9 ANEMIA, UNSPECIFIED TYPE: ICD-10-CM

## 2021-11-15 DIAGNOSIS — E03.9 MYXEDEMA HEART DISEASE: ICD-10-CM

## 2021-11-15 DIAGNOSIS — I51.9 MYXEDEMA HEART DISEASE: ICD-10-CM

## 2021-11-15 DIAGNOSIS — D64.9 ANEMIA, UNSPECIFIED TYPE: Primary | ICD-10-CM

## 2021-11-15 LAB
ALBUMIN SERPL-MCNC: 4 G/DL (ref 3.5–5.2)
ALBUMIN/GLOB SERPL: 1.3 G/DL
ALP SERPL-CCNC: 117 U/L (ref 39–117)
ALT SERPL W P-5'-P-CCNC: 15 U/L (ref 1–33)
ANION GAP SERPL CALCULATED.3IONS-SCNC: 8.4 MMOL/L (ref 5–15)
AST SERPL-CCNC: 23 U/L (ref 1–32)
BASOPHILS # BLD AUTO: 0.03 10*3/MM3 (ref 0–0.2)
BASOPHILS NFR BLD AUTO: 0.6 % (ref 0–1.5)
BILIRUB SERPL-MCNC: 0.5 MG/DL (ref 0–1.2)
BUN SERPL-MCNC: 16 MG/DL (ref 8–23)
BUN/CREAT SERPL: 19 (ref 7–25)
CALCIUM SPEC-SCNC: 9.2 MG/DL (ref 8.6–10.5)
CHLORIDE SERPL-SCNC: 105 MMOL/L (ref 98–107)
CHOLEST SERPL-MCNC: 96 MG/DL (ref 0–200)
CO2 SERPL-SCNC: 26.6 MMOL/L (ref 22–29)
CREAT SERPL-MCNC: 0.84 MG/DL (ref 0.57–1)
DEPRECATED RDW RBC AUTO: 36.2 FL (ref 37–54)
EOSINOPHIL # BLD AUTO: 0.25 10*3/MM3 (ref 0–0.4)
EOSINOPHIL NFR BLD AUTO: 4.7 % (ref 0.3–6.2)
ERYTHROCYTE [DISTWIDTH] IN BLOOD BY AUTOMATED COUNT: 12.2 % (ref 12.3–15.4)
GFR SERPL CREATININE-BSD FRML MDRD: 65 ML/MIN/1.73
GLOBULIN UR ELPH-MCNC: 3 GM/DL
GLUCOSE SERPL-MCNC: 104 MG/DL (ref 65–99)
HCT VFR BLD AUTO: 34.6 % (ref 34–46.6)
HDLC SERPL-MCNC: 54 MG/DL (ref 40–60)
HGB BLD-MCNC: 11.4 G/DL (ref 12–15.9)
IMM GRANULOCYTES # BLD AUTO: 0.02 10*3/MM3 (ref 0–0.05)
IMM GRANULOCYTES NFR BLD AUTO: 0.4 % (ref 0–0.5)
LDLC SERPL CALC-MCNC: 24 MG/DL (ref 0–100)
LDLC/HDLC SERPL: 0.44 {RATIO}
LYMPHOCYTES # BLD AUTO: 1.04 10*3/MM3 (ref 0.7–3.1)
LYMPHOCYTES NFR BLD AUTO: 19.7 % (ref 19.6–45.3)
MCH RBC QN AUTO: 27.6 PG (ref 26.6–33)
MCHC RBC AUTO-ENTMCNC: 32.9 G/DL (ref 31.5–35.7)
MCV RBC AUTO: 83.8 FL (ref 79–97)
MONOCYTES # BLD AUTO: 0.49 10*3/MM3 (ref 0.1–0.9)
MONOCYTES NFR BLD AUTO: 9.3 % (ref 5–12)
NEUTROPHILS NFR BLD AUTO: 3.46 10*3/MM3 (ref 1.7–7)
NEUTROPHILS NFR BLD AUTO: 65.3 % (ref 42.7–76)
NRBC BLD AUTO-RTO: 0 /100 WBC (ref 0–0.2)
PLATELET # BLD AUTO: 278 10*3/MM3 (ref 140–450)
PMV BLD AUTO: 11 FL (ref 6–12)
POTASSIUM SERPL-SCNC: 3.6 MMOL/L (ref 3.5–5.2)
PROT SERPL-MCNC: 7 G/DL (ref 6–8.5)
RBC # BLD AUTO: 4.13 10*6/MM3 (ref 3.77–5.28)
SODIUM SERPL-SCNC: 140 MMOL/L (ref 136–145)
TRIGL SERPL-MCNC: 92 MG/DL (ref 0–150)
TSH SERPL DL<=0.05 MIU/L-ACNC: 1.63 UIU/ML (ref 0.27–4.2)
VLDLC SERPL-MCNC: 18 MG/DL (ref 5–40)
WBC # BLD AUTO: 5.29 10*3/MM3 (ref 3.4–10.8)

## 2021-11-15 PROCEDURE — 80053 COMPREHEN METABOLIC PANEL: CPT

## 2021-11-15 PROCEDURE — 84443 ASSAY THYROID STIM HORMONE: CPT

## 2021-11-15 PROCEDURE — 36415 COLL VENOUS BLD VENIPUNCTURE: CPT

## 2021-11-15 PROCEDURE — 85025 COMPLETE CBC W/AUTO DIFF WBC: CPT

## 2021-11-15 PROCEDURE — 80061 LIPID PANEL: CPT

## 2022-04-23 ENCOUNTER — APPOINTMENT (OUTPATIENT)
Dept: GENERAL RADIOLOGY | Facility: HOSPITAL | Age: 82
End: 2022-04-23

## 2022-04-23 ENCOUNTER — HOSPITAL ENCOUNTER (OUTPATIENT)
Facility: HOSPITAL | Age: 82
Setting detail: OBSERVATION
LOS: 2 days | Discharge: HOME | End: 2022-04-25
Attending: EMERGENCY MEDICINE | Admitting: INTERNAL MEDICINE

## 2022-04-23 DIAGNOSIS — D64.9 SYMPTOMATIC ANEMIA: Primary | ICD-10-CM

## 2022-04-23 LAB
ABO GROUP BLD: NORMAL
ALBUMIN SERPL-MCNC: 3.8 G/DL (ref 3.5–5.2)
ALBUMIN/GLOB SERPL: 1.5 G/DL
ALP SERPL-CCNC: 134 U/L (ref 39–117)
ALT SERPL W P-5'-P-CCNC: 21 U/L (ref 1–33)
ANION GAP SERPL CALCULATED.3IONS-SCNC: 13.2 MMOL/L (ref 5–15)
ANISOCYTOSIS BLD QL: ABNORMAL
AST SERPL-CCNC: 22 U/L (ref 1–32)
B PARAPERT DNA SPEC QL NAA+PROBE: NOT DETECTED
B PERT DNA SPEC QL NAA+PROBE: NOT DETECTED
BACTERIA UR QL AUTO: ABNORMAL /HPF
BILIRUB SERPL-MCNC: 0.4 MG/DL (ref 0–1.2)
BILIRUB UR QL STRIP: NEGATIVE
BLD GP AB SCN SERPL QL: NEGATIVE
BUN SERPL-MCNC: 19 MG/DL (ref 8–23)
BUN/CREAT SERPL: 22.4 (ref 7–25)
C PNEUM DNA NPH QL NAA+NON-PROBE: NOT DETECTED
CALCIUM SPEC-SCNC: 8.5 MG/DL (ref 8.6–10.5)
CHLORIDE SERPL-SCNC: 107 MMOL/L (ref 98–107)
CLARITY UR: CLEAR
CO2 SERPL-SCNC: 22.8 MMOL/L (ref 22–29)
COLOR UR: YELLOW
CREAT SERPL-MCNC: 0.85 MG/DL (ref 0.57–1)
D-LACTATE SERPL-SCNC: 1.4 MMOL/L (ref 0.5–2)
DEPRECATED RDW RBC AUTO: 38.5 FL (ref 37–54)
EGFRCR SERPLBLD CKD-EPI 2021: 69.4 ML/MIN/1.73
EOSINOPHIL # BLD MANUAL: 0.13 10*3/MM3 (ref 0–0.4)
EOSINOPHIL NFR BLD MANUAL: 2.1 % (ref 0.3–6.2)
ERYTHROCYTE [DISTWIDTH] IN BLOOD BY AUTOMATED COUNT: 15.7 % (ref 12.3–15.4)
FERRITIN SERPL-MCNC: 6.23 NG/ML (ref 13–150)
FLUAV SUBTYP SPEC NAA+PROBE: NOT DETECTED
FLUBV RNA ISLT QL NAA+PROBE: NOT DETECTED
GLOBULIN UR ELPH-MCNC: 2.5 GM/DL
GLUCOSE SERPL-MCNC: 117 MG/DL (ref 65–99)
GLUCOSE UR STRIP-MCNC: NEGATIVE MG/DL
HADV DNA SPEC NAA+PROBE: NOT DETECTED
HCOV 229E RNA SPEC QL NAA+PROBE: NOT DETECTED
HCOV HKU1 RNA SPEC QL NAA+PROBE: NOT DETECTED
HCOV NL63 RNA SPEC QL NAA+PROBE: NOT DETECTED
HCOV OC43 RNA SPEC QL NAA+PROBE: NOT DETECTED
HCT VFR BLD AUTO: 23.6 % (ref 34–46.6)
HGB BLD-MCNC: 6.4 G/DL (ref 12–15.9)
HGB UR QL STRIP.AUTO: NEGATIVE
HMPV RNA NPH QL NAA+NON-PROBE: NOT DETECTED
HPIV1 RNA ISLT QL NAA+PROBE: NOT DETECTED
HPIV2 RNA SPEC QL NAA+PROBE: NOT DETECTED
HPIV3 RNA NPH QL NAA+PROBE: NOT DETECTED
HPIV4 P GENE NPH QL NAA+PROBE: NOT DETECTED
HYALINE CASTS UR QL AUTO: ABNORMAL /LPF
HYPOCHROMIA BLD QL: ABNORMAL
IRON 24H UR-MRATE: 11 MCG/DL (ref 37–145)
IRON SATN MFR SERPL: 2 % (ref 20–50)
KETONES UR QL STRIP: NEGATIVE
LEUKOCYTE ESTERASE UR QL STRIP.AUTO: ABNORMAL
LYMPHOCYTES # BLD MANUAL: 0.8 10*3/MM3 (ref 0.7–3.1)
LYMPHOCYTES NFR BLD MANUAL: 2.1 % (ref 5–12)
M PNEUMO IGG SER IA-ACNC: NOT DETECTED
MCH RBC QN AUTO: 18.4 PG (ref 26.6–33)
MCHC RBC AUTO-ENTMCNC: 27.1 G/DL (ref 31.5–35.7)
MCV RBC AUTO: 67.8 FL (ref 79–97)
MICROCYTES BLD QL: ABNORMAL
MONOCYTES # BLD: 0.13 10*3/MM3 (ref 0.1–0.9)
NEUTROPHILS # BLD AUTO: 5.27 10*3/MM3 (ref 1.7–7)
NEUTROPHILS NFR BLD MANUAL: 83.2 % (ref 42.7–76)
NITRITE UR QL STRIP: NEGATIVE
NT-PROBNP SERPL-MCNC: 758 PG/ML (ref 0–1800)
OVALOCYTES BLD QL SMEAR: ABNORMAL
PH UR STRIP.AUTO: 6 [PH] (ref 5–8)
PLAT MORPH BLD: NORMAL
PLATELET # BLD AUTO: 331 10*3/MM3 (ref 140–450)
PMV BLD AUTO: 10.9 FL (ref 6–12)
POIKILOCYTOSIS BLD QL SMEAR: ABNORMAL
POTASSIUM SERPL-SCNC: 3.3 MMOL/L (ref 3.5–5.2)
PROCALCITONIN SERPL-MCNC: 0.05 NG/ML (ref 0–0.25)
PROT SERPL-MCNC: 6.3 G/DL (ref 6–8.5)
PROT UR QL STRIP: ABNORMAL
QT INTERVAL: 473 MS
RBC # BLD AUTO: 3.48 10*6/MM3 (ref 3.77–5.28)
RBC # UR STRIP: ABNORMAL /HPF
REF LAB TEST METHOD: ABNORMAL
RH BLD: POSITIVE
RHINOVIRUS RNA SPEC NAA+PROBE: NOT DETECTED
RSV RNA NPH QL NAA+NON-PROBE: NOT DETECTED
SARS-COV-2 RNA NPH QL NAA+NON-PROBE: NOT DETECTED
SCHISTOCYTES BLD QL SMEAR: ABNORMAL
SODIUM SERPL-SCNC: 143 MMOL/L (ref 136–145)
SP GR UR STRIP: 1.02 (ref 1–1.03)
SQUAMOUS #/AREA URNS HPF: ABNORMAL /HPF
T&S EXPIRATION DATE: NORMAL
TIBC SERPL-MCNC: 460 MCG/DL (ref 298–536)
TRANSFERRIN SERPL-MCNC: 309 MG/DL (ref 200–360)
TROPONIN T SERPL-MCNC: <0.01 NG/ML (ref 0–0.03)
TROPONIN T SERPL-MCNC: <0.01 NG/ML (ref 0–0.03)
UROBILINOGEN UR QL STRIP: ABNORMAL
VARIANT LYMPHS NFR BLD MANUAL: 12.6 % (ref 19.6–45.3)
WBC # UR STRIP: ABNORMAL /HPF
WBC MORPH BLD: NORMAL
WBC NRBC COR # BLD: 6.34 10*3/MM3 (ref 3.4–10.8)

## 2022-04-23 PROCEDURE — 99284 EMERGENCY DEPT VISIT MOD MDM: CPT

## 2022-04-23 PROCEDURE — 83605 ASSAY OF LACTIC ACID: CPT | Performed by: EMERGENCY MEDICINE

## 2022-04-23 PROCEDURE — G0378 HOSPITAL OBSERVATION PER HR: HCPCS

## 2022-04-23 PROCEDURE — 87040 BLOOD CULTURE FOR BACTERIA: CPT | Performed by: EMERGENCY MEDICINE

## 2022-04-23 PROCEDURE — 96361 HYDRATE IV INFUSION ADD-ON: CPT

## 2022-04-23 PROCEDURE — 86850 RBC ANTIBODY SCREEN: CPT | Performed by: EMERGENCY MEDICINE

## 2022-04-23 PROCEDURE — 84484 ASSAY OF TROPONIN QUANT: CPT | Performed by: INTERNAL MEDICINE

## 2022-04-23 PROCEDURE — 84484 ASSAY OF TROPONIN QUANT: CPT | Performed by: EMERGENCY MEDICINE

## 2022-04-23 PROCEDURE — 83540 ASSAY OF IRON: CPT | Performed by: INTERNAL MEDICINE

## 2022-04-23 PROCEDURE — 85007 BL SMEAR W/DIFF WBC COUNT: CPT | Performed by: EMERGENCY MEDICINE

## 2022-04-23 PROCEDURE — 82728 ASSAY OF FERRITIN: CPT | Performed by: INTERNAL MEDICINE

## 2022-04-23 PROCEDURE — 83880 ASSAY OF NATRIURETIC PEPTIDE: CPT | Performed by: EMERGENCY MEDICINE

## 2022-04-23 PROCEDURE — 84466 ASSAY OF TRANSFERRIN: CPT | Performed by: INTERNAL MEDICINE

## 2022-04-23 PROCEDURE — 71045 X-RAY EXAM CHEST 1 VIEW: CPT

## 2022-04-23 PROCEDURE — 0202U NFCT DS 22 TRGT SARS-COV-2: CPT | Performed by: EMERGENCY MEDICINE

## 2022-04-23 PROCEDURE — 96375 TX/PRO/DX INJ NEW DRUG ADDON: CPT

## 2022-04-23 PROCEDURE — 84145 PROCALCITONIN (PCT): CPT | Performed by: EMERGENCY MEDICINE

## 2022-04-23 PROCEDURE — 93005 ELECTROCARDIOGRAM TRACING: CPT | Performed by: EMERGENCY MEDICINE

## 2022-04-23 PROCEDURE — 93010 ELECTROCARDIOGRAM REPORT: CPT | Performed by: INTERNAL MEDICINE

## 2022-04-23 PROCEDURE — 86900 BLOOD TYPING SEROLOGIC ABO: CPT | Performed by: EMERGENCY MEDICINE

## 2022-04-23 PROCEDURE — 85025 COMPLETE CBC W/AUTO DIFF WBC: CPT | Performed by: EMERGENCY MEDICINE

## 2022-04-23 PROCEDURE — 81001 URINALYSIS AUTO W/SCOPE: CPT | Performed by: EMERGENCY MEDICINE

## 2022-04-23 PROCEDURE — 80053 COMPREHEN METABOLIC PANEL: CPT | Performed by: EMERGENCY MEDICINE

## 2022-04-23 PROCEDURE — 86923 COMPATIBILITY TEST ELECTRIC: CPT

## 2022-04-23 PROCEDURE — 86901 BLOOD TYPING SEROLOGIC RH(D): CPT | Performed by: EMERGENCY MEDICINE

## 2022-04-23 PROCEDURE — C9803 HOPD COVID-19 SPEC COLLECT: HCPCS

## 2022-04-23 RX ORDER — SODIUM CHLORIDE 0.9 % (FLUSH) 0.9 %
10 SYRINGE (ML) INJECTION AS NEEDED
Status: DISCONTINUED | OUTPATIENT
Start: 2022-04-23 | End: 2022-04-25 | Stop reason: HOSPADM

## 2022-04-23 RX ORDER — ACETAMINOPHEN 325 MG/1
650 TABLET ORAL EVERY 4 HOURS PRN
Status: DISCONTINUED | OUTPATIENT
Start: 2022-04-23 | End: 2022-04-25 | Stop reason: HOSPADM

## 2022-04-23 RX ORDER — OMEGA-3S/DHA/EPA/FISH OIL/D3 300MG-1000
800 CAPSULE ORAL DAILY
Status: DISCONTINUED | OUTPATIENT
Start: 2022-04-23 | End: 2022-04-25 | Stop reason: HOSPADM

## 2022-04-23 RX ORDER — NITROGLYCERIN 0.4 MG/1
0.4 TABLET SUBLINGUAL
Status: DISCONTINUED | OUTPATIENT
Start: 2022-04-23 | End: 2022-04-25 | Stop reason: HOSPADM

## 2022-04-23 RX ORDER — ONDANSETRON 4 MG/1
4 TABLET, FILM COATED ORAL EVERY 6 HOURS PRN
Status: DISCONTINUED | OUTPATIENT
Start: 2022-04-23 | End: 2022-04-25 | Stop reason: HOSPADM

## 2022-04-23 RX ORDER — METOPROLOL TARTRATE 50 MG/1
50 TABLET, FILM COATED ORAL DAILY
Status: DISCONTINUED | OUTPATIENT
Start: 2022-04-23 | End: 2022-04-25 | Stop reason: HOSPADM

## 2022-04-23 RX ORDER — ATORVASTATIN CALCIUM 80 MG/1
80 TABLET, FILM COATED ORAL NIGHTLY
Status: DISCONTINUED | OUTPATIENT
Start: 2022-04-23 | End: 2022-04-25 | Stop reason: HOSPADM

## 2022-04-23 RX ORDER — PANTOPRAZOLE SODIUM 40 MG/10ML
40 INJECTION, POWDER, LYOPHILIZED, FOR SOLUTION INTRAVENOUS
Status: DISCONTINUED | OUTPATIENT
Start: 2022-04-23 | End: 2022-04-25 | Stop reason: HOSPADM

## 2022-04-23 RX ORDER — SODIUM CHLORIDE 9 MG/ML
150 INJECTION, SOLUTION INTRAVENOUS CONTINUOUS
Status: DISCONTINUED | OUTPATIENT
Start: 2022-04-23 | End: 2022-04-24

## 2022-04-23 RX ORDER — ACETAMINOPHEN 500 MG
1000 TABLET ORAL ONCE
Status: DISCONTINUED | OUTPATIENT
Start: 2022-04-23 | End: 2022-04-25 | Stop reason: HOSPADM

## 2022-04-23 RX ORDER — AMLODIPINE BESYLATE 10 MG/1
10 TABLET ORAL DAILY
Status: DISCONTINUED | OUTPATIENT
Start: 2022-04-23 | End: 2022-04-25 | Stop reason: HOSPADM

## 2022-04-23 RX ORDER — ERGOCALCIFEROL (VITAMIN D2) 10 MCG
800 TABLET ORAL DAILY
COMMUNITY

## 2022-04-23 RX ORDER — UREA 10 %
3 LOTION (ML) TOPICAL NIGHTLY PRN
Status: DISCONTINUED | OUTPATIENT
Start: 2022-04-23 | End: 2022-04-25 | Stop reason: HOSPADM

## 2022-04-23 RX ORDER — CHOLECALCIFEROL (VITAMIN D3) 125 MCG
1000 CAPSULE ORAL DAILY
Status: DISCONTINUED | OUTPATIENT
Start: 2022-04-23 | End: 2022-04-25 | Stop reason: HOSPADM

## 2022-04-23 RX ORDER — ONDANSETRON 2 MG/ML
4 INJECTION INTRAMUSCULAR; INTRAVENOUS EVERY 6 HOURS PRN
Status: DISCONTINUED | OUTPATIENT
Start: 2022-04-23 | End: 2022-04-25 | Stop reason: HOSPADM

## 2022-04-23 RX ADMIN — Medication 1000 MCG: at 21:02

## 2022-04-23 RX ADMIN — SODIUM CHLORIDE 150 ML/HR: 9 INJECTION, SOLUTION INTRAVENOUS at 18:47

## 2022-04-23 RX ADMIN — METOPROLOL TARTRATE 50 MG: 50 TABLET, FILM COATED ORAL at 21:02

## 2022-04-23 RX ADMIN — SERTRALINE HYDROCHLORIDE 50 MG: 50 TABLET, FILM COATED ORAL at 21:01

## 2022-04-23 RX ADMIN — Medication 10 ML: at 21:02

## 2022-04-23 RX ADMIN — ATORVASTATIN CALCIUM 80 MG: 80 TABLET, FILM COATED ORAL at 21:02

## 2022-04-23 RX ADMIN — AMLODIPINE BESYLATE 10 MG: 10 TABLET ORAL at 21:01

## 2022-04-23 RX ADMIN — PANTOPRAZOLE SODIUM 40 MG: 40 INJECTION, POWDER, FOR SOLUTION INTRAVENOUS at 21:04

## 2022-04-23 RX ADMIN — CHOLECALCIFEROL TAB 10 MCG (400 UNIT) 800 UNITS: 10 TAB at 21:01

## 2022-04-23 NOTE — ED TRIAGE NOTES
.Pt masked on arrival, staff masked    Pt unable to clearly state her symptoms but says thinks its a heart issue, does reports soa

## 2022-04-23 NOTE — ED NOTES
"Nursing report ED to floor  Ivet Diaz  81 y.o.  female    HPI :   Chief Complaint   Patient presents with   • Shortness of Breath   • Weakness - Generalized       Admitting doctor:   Jaclyn Costello MD    Admitting diagnosis:   The encounter diagnosis was Symptomatic anemia.    Code status:   Current Code Status     Date Active Code Status Order ID Comments User Context       Prior    Advance Care Planning Activity          Allergies:   Codeine    Intake and Output  No intake or output data in the 24 hours ending 04/23/22 1615    Weight:       04/23/22  1434   Weight: 78.8 kg (173 lb 11.6 oz)       Most recent vitals:   Vitals:    04/23/22 1418 04/23/22 1434 04/23/22 1435   BP:   135/57   Pulse: 73     Resp: 20     Temp: 99.8 °F (37.7 °C)     SpO2: 95%     Weight:  78.8 kg (173 lb 11.6 oz)    Height:  162.6 cm (64\")        Active LDAs/IV Access:   Lines, Drains & Airways     Active LDAs     Name Placement date Placement time Site Days    Peripheral IV 04/23/22 1444 Right Antecubital 04/23/22  1444  Antecubital  less than 1                Labs (abnormal labs have a star):   Labs Reviewed   COMPREHENSIVE METABOLIC PANEL - Abnormal; Notable for the following components:       Result Value    Glucose 117 (*)     Potassium 3.3 (*)     Calcium 8.5 (*)     Alkaline Phosphatase 134 (*)     All other components within normal limits    Narrative:     GFR Normal >60  Chronic Kidney Disease <60  Kidney Failure <15     CBC WITH AUTO DIFFERENTIAL - Abnormal; Notable for the following components:    RBC 3.48 (*)     Hemoglobin 6.4 (*)     Hematocrit 23.6 (*)     MCV 67.8 (*)     MCH 18.4 (*)     MCHC 27.1 (*)     RDW 15.7 (*)     All other components within normal limits   MANUAL DIFFERENTIAL - Abnormal; Notable for the following components:    Neutrophil % 83.2 (*)     Lymphocyte % 12.6 (*)     Monocyte % 2.1 (*)     All other components within normal limits   RESPIRATORY PANEL PCR W/ COVID-19 (SARS-COV-2) " [facility] IN-HOUSE, NP SWAB IN UTM/VTP, 3-4 HR TAT - Normal    Narrative:     In the setting of a positive respiratory panel with a viral infection PLUS a negative procalcitonin without other underlying concern for bacterial infection, consider observing off antibiotics or discontinuation of antibiotics and continue supportive care. If the respiratory panel is positive for atypical bacterial infection (Bordetella pertussis, Chlamydophila pneumoniae, or Mycoplasma pneumoniae), consider antibiotic de-escalation to target atypical bacterial infection.   BNP (IN-HOUSE) - Normal    Narrative:     Among patients with dyspnea, NT-proBNP is highly sensitive for the detection of acute congestive heart failure. In addition NT-proBNP of <300 pg/ml effectively rules out acute congestive heart failure with 99% negative predictive value.    Results may be falsely decreased if patient taking Biotin.     TROPONIN (IN-HOUSE) - Normal    Narrative:     Troponin T Reference Range:  <= 0.03 ng/mL-   Negative for AMI  >0.03 ng/mL-     Abnormal for myocardial necrosis.  Clinicians would have to utilize clinical acumen, EKG, Troponin and serial changes to determine if it is an Acute Myocardial Infarction or myocardial injury due to an underlying chronic condition.       Results may be falsely decreased if patient taking Biotin.     PROCALCITONIN - Normal    Narrative:     As a Marker for Sepsis (Non-Neonates):    1. <0.5 ng/mL represents a low risk of severe sepsis and/or septic shock.  2. >2 ng/mL represents a high risk of severe sepsis and/or septic shock.    As a Marker for Lower Respiratory Tract Infections that require antibiotic therapy:    PCT on Admission    Antibiotic Therapy       6-12 Hrs later    >0.5                Strongly Recommended  >0.25 - <0.5        Recommended   0.1 - 0.25          Discouraged              Remeasure/reassess PCT  <0.1                Strongly Discouraged     Remeasure/reassess  "PCT    As 28 day mortality risk marker: \"Change in Procalcitonin Result\" (>80% or <=80%) if Day 0 (or Day 1) and Day 4 values are available. Refer to http://[email or web address]-pct-[email or web address]    Change in PCT <=80%  A decrease of PCT levels below or equal to 80% defines a positive change in PCT test result representing a higher risk for 28-day all-cause mortality of patients diagnosed with severe sepsis for septic shock.    Change in PCT >80%  A decrease of PCT levels of more than 80% defines a negative change in PCT result representing a lower risk for 28-day all-cause mortality of patients diagnosed with severe sepsis or septic shock.      LACTIC ACID, PLASMA - Normal   BLOOD CULTURE   BLOOD CULTURE   URINALYSIS W/ MICROSCOPIC IF INDICATED (NO CULTURE)   TYPE AND SCREEN   PREPARE RBC   CBC AND DIFFERENTIAL    Narrative:     The following orders were created for panel order CBC & Differential.  Procedure                               Abnormality         Status                     ---------                               -----------         ------                     CBC Auto Differential[[id number]]        Abnormal            Final result                 Please view results for these tests on the individual orders.       EKG:   ECG 12 Lead   Preliminary Result   HEART RATE= 64  bpm   RR Interval= 944  ms   MI Interval= 207  ms   P Horizontal Axis= -18  deg   P Front Axis= 73  deg   QRSD Interval= 88  ms   QT Interval= 473  ms   QRS Axis= 82  deg   T Wave Axis= 77  deg   - ABNORMAL ECG -   Sinus rhythm   Borderline right axis deviation   Consider left ventricular hypertrophy   Nonspecific T abnrm, anterolateral leads   Borderline prolonged QT interval   Electronically Signed By:    Date and Time of Study: 2022-04-23 14:39:28          Meds given in ED:   Medications   sodium chloride 0.9 % flush 10 mL (has no administration in time range)   acetaminophen (TYLENOL) tablet 1,000 mg (1,000 mg Oral Not Given 4/23/22 8243) "       Imaging results:  XR Chest 1 View    Result Date: 4/23/2022  No focal pulmonary consolidation. Cardiomegaly. Tortuous aorta. Follow-up as clinically indicated.  This report was finalized on 4/23/2022 3:20 PM by Dr. Preston Restrepo M.D.        Ambulatory status:   - up with assistance     Social issues:   Social History     Socioeconomic History   • Marital status:    Tobacco Use   • Smoking status: Never Smoker   • Smokeless tobacco: Never Used   Substance and Sexual Activity   • Alcohol use: Yes     Comment: 1 or 2 on the weekend   • Drug use: No   • Sexual activity: Defer       NIH Stroke Scale:        Nursing report ED to floor:

## 2022-04-23 NOTE — ED PROVIDER NOTES
EMERGENCY DEPARTMENT ENCOUNTER    Room Number:  34/34  Date of encounter:  4/23/2022  PCP: Skinny Contreras MD  Historian: Patient and  at the bedside      HPI:  Chief Complaint: Generalized weakness  A complete HPI/ROS/PMH/PSH/SH/FH are unobtainable due to: Nothing    Context: Ivet Diaz is a 81 y.o. female who presents to the ED c/o generalized weakness and fatigue along with dyspnea on exertion which is been progressive over the last 4 to 5 days.  She has had no chest pain or fever that she is aware of.  She has no abdominal pain, vomiting or diarrhea.  She has no blood in her stools and no black or tarry stools.    Patient has a history of stroke with minimal residual symptoms if any, and is chronically anticoagulated.  She also has hypertension, chronic diastolic congestive heart failure and reflux esophagitis.    Her symptoms do get worse with any amount of exertion, but at rest she just feels tired      PAST MEDICAL HISTORY  Active Ambulatory Problems     Diagnosis Date Noted   • Acute CVA (cerebrovascular accident) (HCC) 03/25/2019   • Symptomatic anemia 04/25/2019   • Hypertension 04/25/2019   • Depression 04/25/2019   • Reflux esophagitis 04/25/2019   • Breast cancer (HCC) 04/25/2019   • Acute on chronic diastolic heart failure (HCC) 04/26/2019   • Hemorrhagic disorder due to extrinsic circulating anticoagulants (HCC) 04/26/2019     Resolved Ambulatory Problems     Diagnosis Date Noted   • No Resolved Ambulatory Problems     Past Medical History:   Diagnosis Date   • Cancer (HCC)    • Stroke (HCC)          PAST SURGICAL HISTORY  Past Surgical History:   Procedure Laterality Date   • BREAST BIOPSY     • BREAST SURGERY  1998    Left masectomy   • COLONOSCOPY N/A 4/27/2019    Procedure: COLONOSCOPY TO CECUM AND TO TI WITH COLD BX POLYPECTOMY;  Surgeon: Peter Jimenez MD;  Location: [facility] ENDOSCOPY;  Service: Gastroenterology   • EMBOLECTOMY Left 3/25/2019    Procedure: MECHANICAL THROMBECTOMY;   Surgeon: Truong Chambers MD;  Location: [facility] HYBRID OR 18/19;  Service: Interventional Radiology   • ENDOSCOPY N/A 7/9/2016    Procedure: ESOPHAGOGASTRODUODENOSCOPY  WITH BIOPSY;  Surgeon: Peter Corrigan MD;  Location: [facility] ENDOSCOPY;  Service:    • ENDOSCOPY N/A 4/27/2019    Procedure: ESOPHAGOGASTRODUODENOSCOPY WITH BIOPSIES;  Surgeon: Peter Jimenez MD;  Location: [facility] ENDOSCOPY;  Service: Gastroenterology   • MASTECTOMY Left          FAMILY HISTORY  Family History   Problem Relation Age of Onset   • Breast cancer Sister    • Breast cancer Sister          SOCIAL HISTORY  Social History     Socioeconomic History   • Marital status:    Tobacco Use   • Smoking status: Never Smoker   • Smokeless tobacco: Never Used   Substance and Sexual Activity   • Alcohol use: Yes     Comment: 1 or 2 on the weekend   • Drug use: No   • Sexual activity: Defer         ALLERGIES  Codeine        REVIEW OF SYSTEMS  Review of Systems     All systems reviewed and negative except for those discussed in HPI.       PHYSICAL EXAM    I have reviewed the triage vital signs and nursing notes.    ED Triage Vitals [04/23/22 1418]   Temp Heart Rate Resp BP SpO2   99.8 °F (37.7 °C) 73 20 -- 95 %      Temp src Heart Rate Source Patient Position BP Location FiO2 (%)   -- -- -- -- --       Physical Exam  GENERAL: not distressed  HENT: nares patent  EYES: no scleral icterus  CV: regular rhythm, regular rate  RESPIRATORY: normal effort  ABDOMEN: soft, nontender to palpation and bowel sounds are present  MUSCULOSKELETAL: no deformity  NEURO: alert, moves all extremities, follows commands  SKIN: warm, dry        LAB RESULTS  Recent Results (from the past 24 hour(s))   ECG 12 Lead    Collection Time: 04/23/22  2:39 PM   Result Value Ref Range    QT Interval 473 ms   Respiratory Panel PCR w/COVID-19(SARS-CoV-2) [facility]IVAS/MELANIE/PARISH/PAD/[facility]SS[facility] In-House, NP Swab in UTM/VTM, 3-4 HR TAT - Swab, Nasopharynx    Collection Time: 04/23/22  2:41 PM     Specimen: Nasopharynx; Swab   Result Value Ref Range    ADENOVIRUS, PCR Not Detected Not Detected    Coronavirus 229E Not Detected Not Detected    Coronavirus HKU1 Not Detected Not Detected    Coronavirus NL63 Not Detected Not Detected    Coronavirus OC43 Not Detected Not Detected    COVID19 Not Detected Not Detected - Ref. Range    Human Metapneumovirus Not Detected Not Detected    Human Rhinovirus/Enterovirus Not Detected Not Detected    Influenza A PCR Not Detected Not Detected    Influenza B PCR Not Detected Not Detected    Parainfluenza Virus 1 Not Detected Not Detected    Parainfluenza Virus 2 Not Detected Not Detected    Parainfluenza Virus 3 Not Detected Not Detected    Parainfluenza Virus 4 Not Detected Not Detected    RSV, PCR Not Detected Not Detected    Bordetella pertussis pcr Not Detected Not Detected    Bordetella parapertussis PCR Not Detected Not Detected    Chlamydophila pneumoniae PCR Not Detected Not Detected    Mycoplasma pneumo by PCR Not Detected Not Detected   Comprehensive Metabolic Panel    Collection Time: 04/23/22  2:41 PM    Specimen: Blood   Result Value Ref Range    Glucose 117 (H) 65 - 99 mg/dL    BUN 19 8 - 23 mg/dL    Creatinine 0.85 0.57 - 1.00 mg/dL    Sodium 143 136 - 145 mmol/L    Potassium 3.3 (L) 3.5 - 5.2 mmol/L    Chloride 107 98 - 107 mmol/L    CO2 22.8 22.0 - 29.0 mmol/L    Calcium 8.5 (L) 8.6 - 10.5 mg/dL    Total Protein 6.3 6.0 - 8.5 g/dL    Albumin 3.80 3.50 - 5.20 g/dL    ALT (SGPT) 21 1 - 33 U/L    AST (SGOT) 22 1 - 32 U/L    Alkaline Phosphatase 134 (H) 39 - 117 U/L    Total Bilirubin 0.4 0.0 - 1.2 mg/dL    Globulin 2.5 gm/dL    A/G Ratio 1.5 g/dL    BUN/Creatinine Ratio 22.4 7.0 - 25.0    Anion Gap 13.2 5.0 - 15.0 mmol/L    eGFR 69.4 >60.0 mL/min/1.73   BNP    Collection Time: 04/23/22  2:41 PM    Specimen: Blood   Result Value Ref Range    proBNP 758.0 0.0 - 1,800.0 pg/mL   Troponin    Collection Time: 04/23/22  2:41 PM    Specimen: Blood   Result Value Ref Range     Troponin T <0.010 0.000 - 0.030 ng/mL   Procalcitonin    Collection Time: 04/23/22  2:41 PM    Specimen: Blood   Result Value Ref Range    Procalcitonin 0.05 0.00 - 0.25 ng/mL   CBC Auto Differential    Collection Time: 04/23/22  2:41 PM    Specimen: Blood   Result Value Ref Range    WBC 6.34 3.40 - 10.80 10*3/mm3    RBC 3.48 (L) 3.77 - 5.28 10*6/mm3    Hemoglobin 6.4 (C) 12.0 - 15.9 g/dL    Hematocrit 23.6 (L) 34.0 - 46.6 %    MCV 67.8 (L) 79.0 - 97.0 fL    MCH 18.4 (L) 26.6 - 33.0 pg    MCHC 27.1 (L) 31.5 - 35.7 g/dL    RDW 15.7 (H) 12.3 - 15.4 %    RDW-SD 38.5 37.0 - 54.0 fl    MPV 10.9 6.0 - 12.0 fL    Platelets 331 140 - 450 10*3/mm3   Lactic Acid, Plasma    Collection Time: 04/23/22  2:41 PM    Specimen: Blood   Result Value Ref Range    Lactate 1.4 0.5 - 2.0 mmol/L   Manual Differential    Collection Time: 04/23/22  2:41 PM    Specimen: Blood   Result Value Ref Range    Neutrophil % 83.2 (H) 42.7 - 76.0 %    Lymphocyte % 12.6 (L) 19.6 - 45.3 %    Monocyte % 2.1 (L) 5.0 - 12.0 %    Eosinophil % 2.1 0.3 - 6.2 %    Neutrophils Absolute 5.27 1.70 - 7.00 10*3/mm3    Lymphocytes Absolute 0.80 0.70 - 3.10 10*3/mm3    Monocytes Absolute 0.13 0.10 - 0.90 10*3/mm3    Eosinophils Absolute 0.13 0.00 - 0.40 10*3/mm3    Anisocytosis Mod/2+ None Seen    Hypochromia Mod/2+ None Seen    Microcytes Mod/2+ None Seen    Ovalocytes Slight/1+ None Seen    Poikilocytes Mod/2+ None Seen    Schistocytes Slight/1+ None Seen    WBC Morphology Normal Normal    Platelet Morphology Normal Normal       Ordered the above labs and independently reviewed the results.        RADIOLOGY  XR Chest 1 View    Result Date: 4/23/2022  XR CHEST 1 VW-  HISTORY: Female who is 81 years-old, short of breath  TECHNIQUE: Frontal view of the chest  COMPARISON: 4/26/2019,  image from CT from 04/19/2019  FINDINGS: The heart is enlarged. Aorta is tortuous, calcified. Pulmonary vasculature is unremarkable. No focal pulmonary consolidation, pleural  effusion, or pneumothorax. Paraesophageal hiatal hernia, with stomach largely intrathoracic in location, demonstrated on the prior CT exam is also apparent on this exam. No acute osseous process.      No focal pulmonary consolidation. Cardiomegaly. Tortuous aorta. Follow-up as clinically indicated.  This report was finalized on 4/23/2022 3:20 PM by Dr. Preston Restrepo M.D.        I ordered the above noted radiological studies. Reviewed by me and discussed with radiologist.  See dictation for official radiology interpretation.      PROCEDURES    Procedures      MEDICATIONS GIVEN IN ER    Medications   sodium chloride 0.9 % flush 10 mL (has no administration in time range)   acetaminophen (TYLENOL) tablet 1,000 mg (1,000 mg Oral Not Given 4/23/22 1448)         PROGRESS, DATA ANALYSIS, CONSULTS, AND MEDICAL DECISION MAKING    All labs have been independently reviewed by me.  All radiology studies have been reviewed by me and discussed with radiologist dictating the report.   EKG's independently viewed and interpreted by me.  Discussion below represents my analysis of pertinent findings related to patient's condition, differential diagnosis, treatment plan and final disposition.        ED Course as of 04/23/22 1618   Sat Apr 23, 2022   1617 CBC shows a normal white blood cell count but hemoglobin of 6.4 and low MCV. [DP]   1617 Chemistry is unremarkable except for slight hypokalemia. [DP]   1617 Troponin and proBNP are normal [DP]   1617 Procalcitonin and lactate are normal [DP]   1617 Respiratory viral panel negative [DP]   1617 EKG on arrival  Sinus rhythm at 64  Normal KS QRS  No acute ST segment changes are present to suggest ischemia, and there is no change when compared EKG from March 28, 2019 [DP]   1617 Chest x-ray is negative [DP]   1617 I do believe the patient's symptoms are due to symptomatic anemia and she is chronically anticoagulated.  Packed red cells were ordered, and I spoke with Dr. Brady who  agrees to admit the patient. [DP]   1618 This does appear to be microcytic anemia which could be from a GI source, but if it is it appears to be fairly slow and insidious. [DP]      ED Course User Index  [DP] Mukund Penny MD           PPE: The patient wore a surgical mask throughout the entire patient encounter. I wore an N95.    AS OF 16:18 EDT VITALS:    BP - 135/57  HR - 73  TEMP - 99.8 °F (37.7 °C)  O2 SATS - 95%        DIAGNOSIS  Final diagnoses:   Symptomatic anemia         DISPOSITION  Admit to telemetry           Mukund Penny MD  04/23/22 1280

## 2022-04-23 NOTE — H&P
HISTORY AND PHYSICAL   [facility]        Date of Admission: 2022  Patient Identification:  Name: Ivet Diaz  Age: 81 y.o.  Sex: female  :  1940  MRN: [id number]                     Primary Care Physician: Skinny Contreras MD    Chief Complaint:  81 year old female who presented to the emergency room with weakness, fatigue and shortness of breath worse with exertion for the last few days; she denies chest pain; she denies nausea or vomiting; no black or bloody bowel movements; no chest pain; no sick contacts; she has had anemia in the past and was on iron infusions but has not needed any for two years due to having adequate hemoglobin levels;     History of Present Illness:   As above    Past Medical History:  Past Medical History:   Diagnosis Date   • Breast cancer (HCC)    • Cancer (HCC)     Left breast   • Depression    • Hypertension    • Reflux esophagitis    • Stroke (HCC)      Past Surgical History:  Past Surgical History:   Procedure Laterality Date   • BREAST BIOPSY     • BREAST SURGERY      Left masectomy   • COLONOSCOPY N/A 2019    Procedure: COLONOSCOPY TO CECUM AND TO TI WITH COLD BX POLYPECTOMY;  Surgeon: Peter Jimenez MD;  Location: [facility] ENDOSCOPY;  Service: Gastroenterology   • EMBOLECTOMY Left 3/25/2019    Procedure: MECHANICAL THROMBECTOMY;  Surgeon: Truong Chambers MD;  Location: [facility] OR ;  Service: Interventional Radiology   • ENDOSCOPY N/A 2016    Procedure: ESOPHAGOGASTRODUODENOSCOPY  WITH BIOPSY;  Surgeon: Peter Corrigan MD;  Location: [facility] ENDOSCOPY;  Service:    • ENDOSCOPY N/A 2019    Procedure: ESOPHAGOGASTRODUODENOSCOPY WITH BIOPSIES;  Surgeon: Peter Jimenez MD;  Location: [facility] ENDOSCOPY;  Service: Gastroenterology   • MASTECTOMY Left       Home Meds:  Medications Prior to Admission   Medication Sig Dispense Refill Last Dose   • amLODIPine (NORVASC) 10 MG tablet Take 1 tablet by mouth Daily. 30 tablet 1    •  atorvastatin (LIPITOR) 80 MG tablet Take 1 tablet by mouth Every Night. 30 tablet 0    • esomeprazole (NexIUM) 20 MG capsule Take 20 mg by mouth Daily As Needed.      • metoprolol tartrate (LOPRESSOR) 50 MG tablet Take 1 tablet by mouth 2 (Two) Times a Day. (Patient taking differently: Take 50 mg by mouth Daily.) 60 tablet 0    • rivaroxaban (XARELTO) 20 MG tablet Take  by mouth.      • sertraline (ZOLOFT) 50 MG tablet Take 50 mg by mouth daily.      • Vitamin D, Cholecalciferol, (CHOLECALCIFEROL) 10 MCG (400 UNIT) tablet Take 800 Units by mouth Daily.      • vitamin B-12 (VITAMIN B-12) 1000 MCG tablet Take 1 tablet by mouth Daily. 30 tablet 0        Allergies:  Allergies   Allergen Reactions   • Codeine Other (See Comments)     HEADACHE  States it gives her a headache     Immunizations:  Immunization History   Administered Date(s) Administered   • FLUAD TRI 65YR+ 10/08/2019   • Fluzone High Dose =>65 Years ([employer] ONLY) 10/13/2016, 10/02/2017, 10/26/2018     Social History:   Social History     Social History Narrative   • Not on file     Social History     Socioeconomic History   • Marital status:    Tobacco Use   • Smoking status: Never Smoker   • Smokeless tobacco: Never Used   Substance and Sexual Activity   • Alcohol use: Yes     Comment: 1 or 2 on the weekend   • Drug use: No   • Sexual activity: Defer       Family History:  Family History   Problem Relation Age of Onset   • Breast cancer Sister    • Breast cancer Sister         Review of Systems  See history of present illness and past medical history.  Patient denies headache, dizziness, syncope, falls, trauma, change in vision, change in hearing, change in taste, changes in weight, changes in appetite, focal weakness, numbness, or paresthesia.  Patient denies chest pain, palpitations, dyspnea, orthopnea, PND, cough, sinus pressure, rhinorrhea, epistaxis, hemoptysis, nausea, vomiting,hematemesis, diarrhea, constipation or hematchezia.  Denies cold  "or heat intolerance, polydipsia, polyuria, polyphagia. Denies hematuria, pyuria, dysuria, hesitancy, frequency or urgency. Denies consumption of raw and under cooked meats foods or change in water source.  Denies fever, chills, sweats, night sweats.  Denies missing any routine medications. Remainder of ROS is negative.    Objective:  T Max 24 hrs: Temp (24hrs), Av.8 °F (37.7 °C), Min:99.8 °F (37.7 °C), Max:99.8 °F (37.7 °C)    Vitals Ranges:   Temp:  [99.8 °F (37.7 °C)] 99.8 °F (37.7 °C)  Heart Rate:  [63-73] 73  Resp:  [19-22] 22  BP: (135-158)/(56-73) 158/73      Exam:  /73 (BP Location: Right arm, Patient Position: Lying)   Pulse 73   Temp 99.8 °F (37.7 °C)   Resp 22   Ht 167.6 cm (66\")   Wt 77.2 kg (170 lb 1.6 oz)   SpO2 (!) 89%   BMI 27.45 kg/m²     General Appearance:    Alert, cooperative, no distress, appears stated age   Head:    Normocephalic, without obvious abnormality, atraumatic   Eyes:    PERRL, conjunctivae/corneas clear, EOM's intact, both eyes   Ears:    Normal external ear canals, both ears   Nose:   Nares normal, septum midline, mucosa normal, no drainage    or sinus tenderness   Throat:   Lips, mucosa, and tongue normal   Neck:   Supple, symmetrical, trachea midline, no adenopathy;     thyroid:  no enlargement/tenderness/nodules; no carotid    bruit or JVD   Back:     Symmetric, no curvature, ROM normal, no CVA tenderness   Lungs:     Decreased breath sounds bilaterally, respirations unlabored   Chest Wall:    No tenderness or deformity    Heart:    Regular rate and rhythm, S1 and S2 normal, no murmur, rub   or gallop   Abdomen:     Soft, nontender, bowel sounds active all four quadrants,     no masses, no hepatomegaly, no splenomegaly   Extremities:   Extremities normal, atraumatic, no cyanosis or edema   Pulses:   2+ and symmetric all extremities   Skin:   Skin color, texture, turgor normal, no rashes or lesions   Lymph nodes:   Cervical, supraclavicular, and axillary nodes " normal   Neurologic:   CNII-XII intact, normal strength, sensation intact throughout      .    Data Review:  Labs in chart were reviewed.  WBC   Date Value Ref Range Status   04/23/2022 6.34 3.40 - 10.80 10*3/mm3 Final     Hemoglobin   Date Value Ref Range Status   04/23/2022 6.4 (C) 12.0 - 15.9 g/dL Final     Hematocrit   Date Value Ref Range Status   04/23/2022 23.6 (L) 34.0 - 46.6 % Final     Platelets   Date Value Ref Range Status   04/23/2022 331 140 - 450 10*3/mm3 Final     Sodium   Date Value Ref Range Status   04/23/2022 143 136 - 145 mmol/L Final     Potassium   Date Value Ref Range Status   04/23/2022 3.3 (L) 3.5 - 5.2 mmol/L Final     Chloride   Date Value Ref Range Status   04/23/2022 107 98 - 107 mmol/L Final     CO2   Date Value Ref Range Status   04/23/2022 22.8 22.0 - 29.0 mmol/L Final     BUN   Date Value Ref Range Status   04/23/2022 19 8 - 23 mg/dL Final     Creatinine   Date Value Ref Range Status   04/23/2022 0.85 0.57 - 1.00 mg/dL Final     Glucose   Date Value Ref Range Status   04/23/2022 117 (H) 65 - 99 mg/dL Final     Calcium   Date Value Ref Range Status   04/23/2022 8.5 (L) 8.6 - 10.5 mg/dL Final     AST (SGOT)   Date Value Ref Range Status   04/23/2022 22 1 - 32 U/L Final     ALT (SGPT)   Date Value Ref Range Status   04/23/2022 21 1 - 33 U/L Final     Alkaline Phosphatase   Date Value Ref Range Status   04/23/2022 134 (H) 39 - 117 U/L Final                Imaging Results (All)     Procedure Component Value Units Date/Time    XR Chest 1 View [[id number]] Collected: 04/23/22 1517     Updated: 04/23/22 1523    Narrative:      XR CHEST 1 VW-     HISTORY: Female who is 81 years-old, short of breath     TECHNIQUE: Frontal view of the chest     COMPARISON: 4/26/2019,  image from CT from 04/19/2019     FINDINGS: The heart is enlarged. Aorta is tortuous, calcified. Pulmonary  vasculature is unremarkable. No focal pulmonary consolidation, pleural  effusion, or pneumothorax. Paraesophageal  hiatal hernia, with stomach  largely intrathoracic in location, demonstrated on the prior CT exam is  also apparent on this exam. No acute osseous process.       Impression:      No focal pulmonary consolidation. Cardiomegaly. Tortuous  aorta. Follow-up as clinically indicated.     This report was finalized on 4/23/2022 3:20 PM by Dr. Preston Restrepo M.D.               Assessment:  Active Hospital Problems    Diagnosis  POA   • Symptomatic anemia [D64.9]  Yes      Resolved Hospital Problems   No resolved problems to display.   Iron deficiency   Chronic diastolic chf  Hypertension  Hypokalemia  Hyperglycemia      Plan:  Ask hematology to see her regarding iron infusions  Gi to see regarding blood loss anemia  Replace potassium  Monitor on telemetry  Monitor blood sugar  D.w patient,  and ED provider    Jaclyn Costello MD  4/23/2022  18:30 EDT

## 2022-04-24 PROBLEM — D50.9 IRON DEFICIENCY ANEMIA: Status: ACTIVE | Noted: 2022-04-24

## 2022-04-24 PROBLEM — T45.4X5A ADVERSE EFFECT OF IRON: Status: ACTIVE | Noted: 2022-04-24

## 2022-04-24 LAB
ANION GAP SERPL CALCULATED.3IONS-SCNC: 10 MMOL/L (ref 5–15)
APTT PPP: 27.1 SECONDS (ref 22.7–35.4)
BH BB BLOOD EXPIRATION DATE: NORMAL
BH BB BLOOD TYPE BARCODE: 5100
BH BB DISPENSE STATUS: NORMAL
BH BB PRODUCT CODE: NORMAL
BH BB UNIT NUMBER: NORMAL
BUN SERPL-MCNC: 14 MG/DL (ref 8–23)
BUN/CREAT SERPL: 21.5 (ref 7–25)
CALCIUM SPEC-SCNC: 8.4 MG/DL (ref 8.6–10.5)
CHLORIDE SERPL-SCNC: 108 MMOL/L (ref 98–107)
CO2 SERPL-SCNC: 21 MMOL/L (ref 22–29)
CREAT SERPL-MCNC: 0.65 MG/DL (ref 0.57–1)
CROSSMATCH INTERPRETATION: NORMAL
DEPRECATED RDW RBC AUTO: 42.2 FL (ref 37–54)
EGFRCR SERPLBLD CKD-EPI 2021: 89.1 ML/MIN/1.73
ERYTHROCYTE [DISTWIDTH] IN BLOOD BY AUTOMATED COUNT: 17.2 % (ref 12.3–15.4)
FIBRINOGEN PPP-MCNC: 307 MG/DL (ref 219–464)
GLUCOSE SERPL-MCNC: 142 MG/DL (ref 65–99)
HBA1C MFR BLD: 5.8 % (ref 4.8–5.6)
HCT VFR BLD AUTO: 27.6 % (ref 34–46.6)
HEMOCCULT STL QL: NEGATIVE
HGB BLD-MCNC: 7.7 G/DL (ref 12–15.9)
INR PPP: 1.5 (ref 0.9–1.1)
MCH RBC QN AUTO: 19.4 PG (ref 26.6–33)
MCHC RBC AUTO-ENTMCNC: 27.9 G/DL (ref 31.5–35.7)
MCV RBC AUTO: 69.5 FL (ref 79–97)
PLATELET # BLD AUTO: 307 10*3/MM3 (ref 140–450)
PMV BLD AUTO: 10.2 FL (ref 6–12)
POTASSIUM SERPL-SCNC: 3.4 MMOL/L (ref 3.5–5.2)
POTASSIUM SERPL-SCNC: 3.8 MMOL/L (ref 3.5–5.2)
PROTHROMBIN TIME: 18 SECONDS (ref 11.7–14.2)
RBC # BLD AUTO: 3.97 10*6/MM3 (ref 3.77–5.28)
SODIUM SERPL-SCNC: 139 MMOL/L (ref 136–145)
UNIT  ABO: NORMAL
UNIT  RH: NORMAL
WBC NRBC COR # BLD: 7.18 10*3/MM3 (ref 3.4–10.8)

## 2022-04-24 PROCEDURE — 84132 ASSAY OF SERUM POTASSIUM: CPT | Performed by: INTERNAL MEDICINE

## 2022-04-24 PROCEDURE — 85610 PROTHROMBIN TIME: CPT | Performed by: INTERNAL MEDICINE

## 2022-04-24 PROCEDURE — 80048 BASIC METABOLIC PNL TOTAL CA: CPT | Performed by: INTERNAL MEDICINE

## 2022-04-24 PROCEDURE — G0378 HOSPITAL OBSERVATION PER HR: HCPCS

## 2022-04-24 PROCEDURE — 96375 TX/PRO/DX INJ NEW DRUG ADDON: CPT

## 2022-04-24 PROCEDURE — 85730 THROMBOPLASTIN TIME PARTIAL: CPT | Performed by: INTERNAL MEDICINE

## 2022-04-24 PROCEDURE — 96376 TX/PRO/DX INJ SAME DRUG ADON: CPT

## 2022-04-24 PROCEDURE — 85027 COMPLETE CBC AUTOMATED: CPT | Performed by: INTERNAL MEDICINE

## 2022-04-24 PROCEDURE — 82272 OCCULT BLD FECES 1-3 TESTS: CPT | Performed by: INTERNAL MEDICINE

## 2022-04-24 PROCEDURE — 99223 1ST HOSP IP/OBS HIGH 75: CPT | Performed by: INTERNAL MEDICINE

## 2022-04-24 PROCEDURE — 36415 COLL VENOUS BLD VENIPUNCTURE: CPT | Performed by: INTERNAL MEDICINE

## 2022-04-24 PROCEDURE — 86900 BLOOD TYPING SEROLOGIC ABO: CPT

## 2022-04-24 PROCEDURE — P9016 RBC LEUKOCYTES REDUCED: HCPCS

## 2022-04-24 PROCEDURE — 85384 FIBRINOGEN ACTIVITY: CPT | Performed by: INTERNAL MEDICINE

## 2022-04-24 PROCEDURE — 99214 OFFICE O/P EST MOD 30 MIN: CPT | Performed by: INTERNAL MEDICINE

## 2022-04-24 PROCEDURE — 36430 TRANSFUSION BLD/BLD COMPNT: CPT

## 2022-04-24 PROCEDURE — 25010000002 NA FERRIC GLUC CPLX PER 12.5 MG: Performed by: INTERNAL MEDICINE

## 2022-04-24 PROCEDURE — 83036 HEMOGLOBIN GLYCOSYLATED A1C: CPT | Performed by: INTERNAL MEDICINE

## 2022-04-24 RX ORDER — MAGNESIUM SULFATE HEPTAHYDRATE 40 MG/ML
4 INJECTION, SOLUTION INTRAVENOUS AS NEEDED
Status: DISCONTINUED | OUTPATIENT
Start: 2022-04-24 | End: 2022-04-25 | Stop reason: HOSPADM

## 2022-04-24 RX ORDER — FAMOTIDINE 10 MG/ML
20 INJECTION, SOLUTION INTRAVENOUS DAILY
Status: DISCONTINUED | OUTPATIENT
Start: 2022-04-24 | End: 2022-04-25 | Stop reason: HOSPADM

## 2022-04-24 RX ORDER — POTASSIUM CHLORIDE 1.5 G/1.77G
40 POWDER, FOR SOLUTION ORAL AS NEEDED
Status: DISCONTINUED | OUTPATIENT
Start: 2022-04-24 | End: 2022-04-25 | Stop reason: HOSPADM

## 2022-04-24 RX ORDER — MAGNESIUM SULFATE HEPTAHYDRATE 40 MG/ML
2 INJECTION, SOLUTION INTRAVENOUS AS NEEDED
Status: DISCONTINUED | OUTPATIENT
Start: 2022-04-24 | End: 2022-04-25 | Stop reason: HOSPADM

## 2022-04-24 RX ORDER — POTASSIUM CHLORIDE 750 MG/1
40 TABLET, FILM COATED, EXTENDED RELEASE ORAL AS NEEDED
Status: DISCONTINUED | OUTPATIENT
Start: 2022-04-24 | End: 2022-04-25 | Stop reason: HOSPADM

## 2022-04-24 RX ADMIN — SERTRALINE HYDROCHLORIDE 50 MG: 50 TABLET, FILM COATED ORAL at 08:00

## 2022-04-24 RX ADMIN — AMLODIPINE BESYLATE 10 MG: 10 TABLET ORAL at 08:00

## 2022-04-24 RX ADMIN — POTASSIUM CHLORIDE 40 MEQ: 10 TABLET, EXTENDED RELEASE ORAL at 12:37

## 2022-04-24 RX ADMIN — PANTOPRAZOLE SODIUM 40 MG: 40 INJECTION, POWDER, FOR SOLUTION INTRAVENOUS at 16:51

## 2022-04-24 RX ADMIN — METOPROLOL TARTRATE 50 MG: 50 TABLET, FILM COATED ORAL at 08:00

## 2022-04-24 RX ADMIN — FAMOTIDINE 20 MG: 10 INJECTION INTRAVENOUS at 13:28

## 2022-04-24 RX ADMIN — PANTOPRAZOLE SODIUM 40 MG: 40 INJECTION, POWDER, FOR SOLUTION INTRAVENOUS at 07:57

## 2022-04-24 RX ADMIN — ATORVASTATIN CALCIUM 80 MG: 80 TABLET, FILM COATED ORAL at 20:30

## 2022-04-24 RX ADMIN — SODIUM CHLORIDE 150 ML/HR: 9 INJECTION, SOLUTION INTRAVENOUS at 00:24

## 2022-04-24 RX ADMIN — SODIUM CHLORIDE 250 MG: 9 INJECTION, SOLUTION INTRAVENOUS at 13:28

## 2022-04-24 RX ADMIN — POTASSIUM CHLORIDE 40 MEQ: 10 TABLET, EXTENDED RELEASE ORAL at 16:51

## 2022-04-24 RX ADMIN — CHOLECALCIFEROL TAB 10 MCG (400 UNIT) 800 UNITS: 10 TAB at 08:00

## 2022-04-24 RX ADMIN — Medication 1000 MCG: at 08:00

## 2022-04-24 NOTE — CONSULTS
[facility]  Initial Inpatient Consult Note    Referring Provider: Dr Street    Reason for Consultation: Anemia    Subjective     History of present illness:    81 y.o. female presented to the hospital complaining of weakness and fatigue.  She was found to be anemic with a hemoglobin of 6.  She denies any evidence of overt GI bleeding.  She has no complaints of abdominal pain nausea or vomiting.  She has had intermittent episodes of anemia over many years.  She was last seen by her service 3 years ago for similar presentation she underwent bidirectional endoscopy which was essentially unremarkable.  She is noted to be on Xarelto for history of stroke.  She was supposed to be on iron supplementation in the outpatient setting but is stopped taking this about 5 to 6 months ago due to some GI upset.  ? Remote hx of PUD.     Past Medical History:  Past Medical History:   Diagnosis Date   • Breast cancer (HCC)    • Cancer (HCC)     Left breast   • Depression    • Hypertension    • Reflux esophagitis    • Stroke (HCC)      Past Surgical History:  Past Surgical History:   Procedure Laterality Date   • BREAST BIOPSY     • BREAST SURGERY  1998    Left masectomy   • COLONOSCOPY N/A 4/27/2019    Procedure: COLONOSCOPY TO CECUM AND TO TI WITH COLD BX POLYPECTOMY;  Surgeon: Peter Jimenez MD;  Location: [facility] ENDOSCOPY;  Service: Gastroenterology   • EMBOLECTOMY Left 3/25/2019    Procedure: MECHANICAL THROMBECTOMY;  Surgeon: Truong Chambers MD;  Location: [facility] HYBRID OR 18/19;  Service: Interventional Radiology   • ENDOSCOPY N/A 7/9/2016    Procedure: ESOPHAGOGASTRODUODENOSCOPY  WITH BIOPSY;  Surgeon: Peter Corrigan MD;  Location: [facility] ENDOSCOPY;  Service:    • ENDOSCOPY N/A 4/27/2019    Procedure: ESOPHAGOGASTRODUODENOSCOPY WITH BIOPSIES;  Surgeon: Peter Jimenez MD;  Location: [facility] ENDOSCOPY;  Service: Gastroenterology   • MASTECTOMY Left       Social History:   Social History     Tobacco Use    • Smoking status: Never Smoker   • Smokeless tobacco: Never Used   Substance Use Topics   • Alcohol use: Yes     Comment: 1 or 2 on the weekend      Family History:  Family History   Problem Relation Age of Onset   • Breast cancer Sister    • Breast cancer Sister        Home Meds:  Medications Prior to Admission   Medication Sig Dispense Refill Last Dose   • amLODIPine (NORVASC) 10 MG tablet Take 1 tablet by mouth Daily. 30 tablet 1    • atorvastatin (LIPITOR) 80 MG tablet Take 1 tablet by mouth Every Night. 30 tablet 0    • esomeprazole (NexIUM) 20 MG capsule Take 20 mg by mouth Daily As Needed.      • metoprolol tartrate (LOPRESSOR) 50 MG tablet Take 1 tablet by mouth 2 (Two) Times a Day. (Patient taking differently: Take 50 mg by mouth Daily.) 60 tablet 0    • rivaroxaban (XARELTO) 20 MG tablet Take  by mouth.      • sertraline (ZOLOFT) 50 MG tablet Take 50 mg by mouth daily.      • Vitamin D, Cholecalciferol, (CHOLECALCIFEROL) 10 MCG (400 UNIT) tablet Take 800 Units by mouth Daily.      • vitamin B-12 (VITAMIN B-12) 1000 MCG tablet Take 1 tablet by mouth Daily. 30 tablet 0      Current Meds:   acetaminophen, 1,000 mg, Oral, Once  amLODIPine, 10 mg, Oral, Daily  atorvastatin, 80 mg, Oral, Nightly  cholecalciferol, 800 Units, Oral, Daily  famotidine, 20 mg, Intravenous, Daily  ferric gluconate, 250 mg, Intravenous, Daily  metoprolol tartrate, 50 mg, Oral, Daily  pantoprazole, 40 mg, Intravenous, BID AC  sertraline, 50 mg, Oral, Daily  cyanocobalamin, 1,000 mcg, Oral, Daily      Allergies:  Allergies   Allergen Reactions   • Codeine Other (See Comments)     HEADACHE  States it gives her a headache     Review of Systems  All systems were reviewed and negative except for:  Constitution:  positive for fatigue     Objective     Vital Signs  Temp:  [95.3 °F (35.2 °C)-99.8 °F (37.7 °C)] 98 °F (36.7 °C)  Heart Rate:  [59-73] 61  Resp:  [16-22] 16  BP: (135-169)/(56-98) 137/98  Physical Exam:  General Appearance:      Alert, cooperative, in no acute distress   Head:    Normocephalic, without obvious abnormality, atraumatic   Eyes:            Lids and lashes normal, conjunctivae and sclerae normal, no icterus   Abdomen:     Normal bowel sounds, no masses, no organomegaly, soft     nontender, nondistended, no guarding, no rebound                 tenderness   Rectal:     Deferred   Extremities:   no edema, no cyanosis, no redness   Skin:   No bleeding, bruising or rash   Lymph nodes:   No palpable adenopathy   Psychiatric:  Judgement and insight: normal   Orientation to person place and time: normal   Mood and affect: normal   Results Review:   I reviewed the patient's new clinical results.    Results from last 7 days   Lab Units 04/24/22  0518 04/23/22  1441   WBC 10*3/mm3 7.18 6.34   HEMOGLOBIN g/dL 7.7* 6.4*   HEMATOCRIT % 27.6* 23.6*   PLATELETS 10*3/mm3 307 331     Results from last 7 days   Lab Units 04/24/22  0518 04/23/22  1441   SODIUM mmol/L 139 143   POTASSIUM mmol/L 3.4* 3.3*   CHLORIDE mmol/L 108* 107   CO2 mmol/L 21.0* 22.8   BUN mg/dL 14 19   CREATININE mg/dL 0.65 0.85   CALCIUM mg/dL 8.4* 8.5*   BILIRUBIN mg/dL  --  0.4   ALK PHOS U/L  --  134*   ALT (SGPT) U/L  --  21   AST (SGOT) U/L  --  22   GLUCOSE mg/dL 142* 117*     Results from last 7 days   Lab Units 04/24/22  0832   INR  1.50*     Lab Results   Lab Value Date/Time    LIPASE 20 04/25/2019 1519    LIPASE 17 12/27/2018 1526       Radiology:  XR Chest 1 View   Final Result   No focal pulmonary consolidation. Cardiomegaly. Tortuous   aorta. Follow-up as clinically indicated.       This report was finalized on 4/23/2022 3:20 PM by Dr. Preston Restrepo M.D.              Assessment/Plan   Assessment:   1.  Iron deficiency anemia, acute on chronic  2.  CVA on NOAC, last dose yesterday      Plan:   This is a pleasant 81-year-old lady admitted with symptomatic anemia.  She has low MCV and low iron indices consistent with an element of iron deficiency anemia.  She  has undergone scopic evaluation for similar presentation 3 years ago with negative EGD and colonoscopy as well as a negative small bowel follow-through.  She not been taking her oral iron supplementation at home.  I had a long discussion today with the patient and family at bedside.  Could consider repeating endoscopic evaluation given its been 3 years although given her longstanding intermittent issues with anemia and prior negative endoscopy the likelihood of significant pathology would be relatively low.  We could also proceed conservatively with watchful waiting and assess her response to transfusion and iron supplementation.  I suspect she is going to need to continue to have ongoing iron transfusions in the outpatient setting given her intolerance to oral iron.  For now the family would like to discuss further amongst themselves but are leaning towards conservative management and probably avoiding further endoscopy for now.  They may also wish to consider just doing and EGD and avoiding colonoscoopy.  We will revisit with the patient tomorrow.      I discussed the patients findings and my recommendations with patient.         Hank Gill M.D.  [facility] operated [facility]  [address] [place] [address]  Office: [phone]

## 2022-04-24 NOTE — PROGRESS NOTES
[facility]  PROGRESS NOTE    Patient Name: Ivet Diaz  : 1940  MRN: [id number]    Date of Admission: 2022  Primary Care Physician: Skinny Contreras MD    Subjective   Subjective     CC:  Follow-up for recent weakness    HPI:  No bleeding reported.  Weakness improved.  Denies blood in stools.  Reports she has not had any recent endoscopies.    Review of Systems  No current fevers or chills  No current shortness of breath or cough  No current nausea, vomiting, or diarrhea  No current chest pain or palpitations    Objective   Objective     Vital Signs:   Temp:  [95.3 °F (35.2 °C)-99.8 °F (37.7 °C)] 98 °F (36.7 °C)  Heart Rate:  [59-73] 61  Resp:  [16-22] 16  BP: (135-169)/(56-98) 137/98        Physical Exam:  Constitutional:Awake, alert  HENT: NCAT, mucous membranes moist, neck supple  Respiratory: Clear to auscultation bilaterally, respiratory effort normal, nonlabored breathing   Cardiovascular: RRR, normal radial pulses  Gastrointestinal: Positive bowel sounds, soft, nontender, nondistended  Musculoskeletal: Elderly but relatively normal musculature for age, minimal lower extremity edema, BMI is 27  Psychiatric: Appropriate affect, cooperative, conversational  Neurologic: No slurred speech or facial droop, follows commands  Skin: No rashes or jaundice, warm      Results Reviewed:  Results from last 7 days   Lab Units 22  0832 22  0518 22  1441   WBC 10*3/mm3  --  7.18 6.34   HEMOGLOBIN g/dL  --  7.7* 6.4*   HEMATOCRIT %  --  27.6* 23.6*   PLATELETS 10*3/mm3  --  307 331   INR  1.50*  --   --    PROCALCITONIN ng/mL  --   --  0.05     Results from last 7 days   Lab Units 22  0518 22  2104 22  1441   SODIUM mmol/L 139  --  143   POTASSIUM mmol/L 3.4*  --  3.3*   CHLORIDE mmol/L 108*  --  107   CO2 mmol/L 21.0*  --  22.8   BUN mg/dL 14  --  19   CREATININE mg/dL 0.65  --  0.85   GLUCOSE mg/dL 142*  --  117*   CALCIUM mg/dL 8.4*  --  8.5*   ALT  (SGPT) U/L  --   --  21   AST (SGOT) U/L  --   --  22   TROPONIN T ng/mL  --  <0.010 <0.010   PROBNP pg/mL  --   --  758.0     Estimated Creatinine Clearance: 72.5 mL/min (by C-G formula based on SCr of 0.65 mg/dL).    Microbiology Results Abnormal     Procedure Component Value - Date/Time    Respiratory Panel PCR w/COVID-19(SARS-CoV-2) [facility] In-House, NP Swab in UTM/VTM, 3-4 HR TAT - Swab, Nasopharynx [[id number]]  (Normal) Collected: 04/23/22 1441    Lab Status: Final result Specimen: Swab from Nasopharynx Updated: 04/23/22 9299     ADENOVIRUS, PCR Not Detected     Coronavirus 229E Not Detected     Coronavirus HKU1 Not Detected     Coronavirus NL63 Not Detected     Coronavirus OC43 Not Detected     COVID19 Not Detected     Human Metapneumovirus Not Detected     Human Rhinovirus/Enterovirus Not Detected     Influenza A PCR Not Detected     Influenza B PCR Not Detected     Parainfluenza Virus 1 Not Detected     Parainfluenza Virus 2 Not Detected     Parainfluenza Virus 3 Not Detected     Parainfluenza Virus 4 Not Detected     RSV, PCR Not Detected     Bordetella pertussis pcr Not Detected     Bordetella parapertussis PCR Not Detected     Chlamydophila pneumoniae PCR Not Detected     Mycoplasma pneumo by PCR Not Detected    Narrative:      In the setting of a positive respiratory panel with a viral infection PLUS a negative procalcitonin without other underlying concern for bacterial infection, consider observing off antibiotics or discontinuation of antibiotics and continue supportive care. If the respiratory panel is positive for atypical bacterial infection (Bordetella pertussis, Chlamydophila pneumoniae, or Mycoplasma pneumoniae), consider antibiotic de-escalation to target atypical bacterial infection.          Imaging Results (Last 24 Hours)     Procedure Component Value Units Date/Time    XR Chest 1 View [[id number]] Collected: 04/23/22 1517     Updated: 04/23/22 1523    Narrative:      XR  CHEST 1 VW-     HISTORY: Female who is 81 years-old, short of breath     TECHNIQUE: Frontal view of the chest     COMPARISON: 4/26/2019,  image from CT from 04/19/2019     FINDINGS: The heart is enlarged. Aorta is tortuous, calcified. Pulmonary  vasculature is unremarkable. No focal pulmonary consolidation, pleural  effusion, or pneumothorax. Paraesophageal hiatal hernia, with stomach  largely intrathoracic in location, demonstrated on the prior CT exam is  also apparent on this exam. No acute osseous process.       Impression:      No focal pulmonary consolidation. Cardiomegaly. Tortuous  aorta. Follow-up as clinically indicated.     This report was finalized on 4/23/2022 3:20 PM by Dr. Preston Restrepo M.D.             Results for orders placed during the hospital encounter of 03/25/19    Adult Transthoracic Echo Complete W/ Cont if Necessary Per Protocol (With Agitated Saline)    Interpretation Summary  · Estimated EF = 65%.  · Left ventricular systolic function is normal.  · Mild aortic valve regurgitation is present.      I have reviewed the medications:  Scheduled Meds:acetaminophen, 1,000 mg, Oral, Once  amLODIPine, 10 mg, Oral, Daily  atorvastatin, 80 mg, Oral, Nightly  cholecalciferol, 800 Units, Oral, Daily  famotidine, 20 mg, Intravenous, Daily  ferric gluconate, 250 mg, Intravenous, Daily  metoprolol tartrate, 50 mg, Oral, Daily  pantoprazole, 40 mg, Intravenous, BID AC  sertraline, 50 mg, Oral, Daily  cyanocobalamin, 1,000 mcg, Oral, Daily      Continuous Infusions:   PRN Meds:.acetaminophen  •  magnesium sulfate **OR** magnesium sulfate **OR** magnesium sulfate  •  melatonin  •  nitroglycerin  •  ondansetron **OR** ondansetron  •  potassium chloride  •  potassium chloride  •  [COMPLETED] Insert peripheral IV **AND** sodium chloride    Assessment/Plan   Assessment & Plan     Active Hospital Problems    Diagnosis  POA   • Symptomatic anemia [D64.9]  Yes      Resolved Hospital Problems   No  resolved problems to display.        Brief Hospital Course to date:  Ivet Diaz is a 81 y.o. female presents to the hospital with severe symptomatic iron deficiency anemia possibly from acute or chronic blood loss.    Patient adequately hydrated.  Stop IV fluid.  Telemetry monitor.  Discussed plans as per below with patient and family at the bedside and they are in agreement.    Anemia:  Iron deficiency concern for acute or chronic blood loss.  Plan for IV iron, reportedly intolerant of oral iron.  Check occult stools.  Consult gastroenterology.  Monitor for any signs of bleeding.  Trend anemia.  Symptomatically improved after transfusion.  PPI.    Hypokalemia:  Replace per protocol and monitor    Hypertension:  Continue blood pressure medications.  Monitor blood pressure and adjust as needed.    Mixed hyperlipidemia: Statin stable.    Impaired glucose tolerance:  Check A1c as last 1 noted was in 2019.    DVT Prophylaxis: Mechanical*      Disposition: Pending    CODE STATUS:   Code Status and Medical Interventions:   Ordered at: 04/23/22 1625     Code Status (Patient has no pulse and is not breathing):    CPR (Attempt to Resuscitate)     Medical Interventions (Patient has pulse or is breathing):    Full       Colt Street MD  04/24/22

## 2022-04-24 NOTE — CONSULTS
.     REASON FOR CONSULTATION:     Provide an opinion on any further workup or treatment iron deficiency anemia.                              REQUESTING PHYSICIAN: Jaclyn Costello MD     RECORDS OBTAINED:  Records of the patient's history including those obtained from the referring provider were reviewed and summarized in detail.    HISTORY OF PRESENT ILLNESS:  The patient is a 81 y.o. year old female was recurrent iron deficient anemia, hypertension, history of stroke on Xarelto anticoagulation, history of left breast cancer status status post mastectomy, who was admitted for symptomatic severe iron deficiency anemia.  History mostly provided by her  who is at bedside.    Patient and her  report that she had significant fatigue, lightheadedness, and exertional dyspnea.  Family member also noticed patient become very pale.  She had profound fatigue.  She had no pica for ice chips or any other things.  According to , patient was taking oral iron once a day prescribed by her primary care physician Dr. Skinny Contreras, together with daily vitamin B12, however patient developed significant constipation, so she stopped oral iron.  Vitamin B12 was also stopped at the same time a couple months earlier.     Her  reports patient had similar episodes 3 times in the past 10 years.  She previously had gastric ulcer, discovered by EGD examination performed by Dr. Peter Corrigan about 5 to 6 years ago.  That was in July 2016 per documents I reviewed.  Patient also had a scope 3 years ago by Dr. Peter Jimenez.  I reviewed that procedure report dated 4/27/2019 of both EGD and the colonoscopy examination.  EGD examination reported a large hiatal hernia, and large area of erythematous changes in the stomach.  Biopsy was taken.  Colonoscopy examination reported diverticulum in the sigmoid colon, and also a small 3 mm polyp in the rectum which was resected.  Otherwise unremarkable.  Pathology evaluation  reported a benign gastric mucosa, no active gastritis, no H. pylori, no metaplastic or dysplastic changes.  The rectum polyp was adenomatous colonic polyp.  No dysplastic changes.    Nevertheless when patient presented to the ER on 4/23/2022, she was found to having severe anemia with Hb 6.4, MCV 67.8, MCHC 27.1.  Normal platelets 331,000 and a WBC 6340 including neutrophils 5270 lymphocytes 800.  Iron study reported ferritin 6.23 ng/mL, free iron 11 TIBC 460 and iron saturation 2%.    Patient was given 1 unit PRBC transfusion after admission.    Repeat laboratory study this morning on 4/24/2022 reported improved hemoglobin 7.7.  Maintains normal WBC and platelets.      Past Medical History:   Diagnosis Date   • Breast cancer (HCC)    • Cancer (HCC)     Left breast   • Depression    • Hypertension    • Reflux esophagitis    • Stroke (HCC)      Past Surgical History:   Procedure Laterality Date   • BREAST BIOPSY     • BREAST SURGERY  1998    Left masectomy   • COLONOSCOPY N/A 4/27/2019    Procedure: COLONOSCOPY TO CECUM AND TO TI WITH COLD BX POLYPECTOMY;  Surgeon: Peter Jimenez MD;  Location: [facility] ENDOSCOPY;  Service: Gastroenterology   • EMBOLECTOMY Left 3/25/2019    Procedure: MECHANICAL THROMBECTOMY;  Surgeon: Truong Chambers MD;  Location: [facility] HYBRID OR 18/19;  Service: Interventional Radiology   • ENDOSCOPY N/A 7/9/2016    Procedure: ESOPHAGOGASTRODUODENOSCOPY  WITH BIOPSY;  Surgeon: Peter Corrigan MD;  Location: [facility] ENDOSCOPY;  Service:    • ENDOSCOPY N/A 4/27/2019    Procedure: ESOPHAGOGASTRODUODENOSCOPY WITH BIOPSIES;  Surgeon: Peter Jimenez MD;  Location: [facility] ENDOSCOPY;  Service: Gastroenterology   • MASTECTOMY Left        MEDICATIONS    Current Facility-Administered Medications:   •  acetaminophen (TYLENOL) tablet 1,000 mg, 1,000 mg, Oral, Once, Mukund Penny MD  •  acetaminophen (TYLENOL) tablet 650 mg, 650 mg, Oral, Q4H PRN, Jaclyn Costello MD  •  amLODIPine (NORVASC) tablet 10  mg, 10 mg, Oral, Daily, Jaclyn Costello MD, 10 mg at 04/23/22 2101  •  atorvastatin (LIPITOR) tablet 80 mg, 80 mg, Oral, Nightly, Jaclyn Costello MD, 80 mg at 04/23/22 2102  •  cholecalciferol (VITAMIN D3) tablet 800 Units, 800 Units, Oral, Daily, Jaclyn Costello MD, 800 Units at 04/23/22 2101  •  melatonin tablet 3 mg, 3 mg, Oral, Nightly PRN, Jaclyn Costello MD  •  metoprolol tartrate (LOPRESSOR) tablet 50 mg, 50 mg, Oral, Daily, Jaclyn Costello MD, 50 mg at 04/23/22 2102  •  nitroglycerin (NITROSTAT) SL tablet 0.4 mg, 0.4 mg, Sublingual, Q5 Min PRN, Jaclyn Costello MD  •  ondansetron (ZOFRAN) tablet 4 mg, 4 mg, Oral, Q6H PRN **OR** ondansetron (ZOFRAN) injection 4 mg, 4 mg, Intravenous, Q6H PRN, Jaclyn Costello MD  •  pantoprazole (PROTONIX) injection 40 mg, 40 mg, Intravenous, BID AC, Jaclyn Costello MD, 40 mg at 04/24/22 0757  •  sertraline (ZOLOFT) tablet 50 mg, 50 mg, Oral, Daily, Jaclyn Costello MD, 50 mg at 04/23/22 2101  •  [COMPLETED] Insert peripheral IV, , , Once **AND** sodium chloride 0.9 % flush 10 mL, 10 mL, Intravenous, PRN, Mukund Penny MD, 10 mL at 04/23/22 2102  •  sodium chloride 0.9 % infusion, 150 mL/hr, Intravenous, Continuous, Jaclyn Costlelo MD, Last Rate: 150 mL/hr at 04/24/22 0024, 150 mL/hr at 04/24/22 0024  •  vitamin B-12 (CYANOCOBALAMIN) tablet 1,000 mcg, 1,000 mcg, Oral, Daily, Jaclyn Costello MD, 1,000 mcg at 04/23/22 2102    ALLERGIES:     Allergies   Allergen Reactions   • Codeine Other (See Comments)     HEADACHE  States it gives her a headache       SOCIAL HISTORY:       Social History     Socioeconomic History   • Marital status:    Tobacco Use   • Smoking status: Never Smoker   • Smokeless tobacco: Never Used   Substance and Sexual Activity   • Alcohol use: Yes     Comment: 1 or 2 on the weekend   • Drug use: No   • Sexual activity: Defer         FAMILY HISTORY:  Family History   Problem  Relation Age of Onset   • Breast cancer Sister    • Breast cancer Sister        REVIEW OF SYSTEMS:  Review of Systems   Constitutional: Positive for fatigue. Negative for appetite change, fever and unexpected weight change.   HENT: Negative for mouth sores, sore throat and trouble swallowing.    Eyes: Negative for visual disturbance.   Respiratory: Positive for shortness of breath. Negative for cough.    Cardiovascular: Negative for chest pain and leg swelling.   Gastrointestinal: Negative for abdominal pain, anal bleeding, blood in stool, constipation (Resolved after stopping oral iron) and diarrhea.   Endocrine: Positive for cold intolerance.   Genitourinary: Negative for dysuria and hematuria.   Musculoskeletal: Negative for joint swelling.   Skin: Positive for pallor.   Allergic/Immunologic: Negative for immunocompromised state.   Neurological: Positive for light-headedness. Negative for numbness.   Hematological: Negative for adenopathy. Does not bruise/bleed easily.   Psychiatric/Behavioral: Negative for confusion.              Vitals:    04/24/22 0156 04/24/22 0313 04/24/22 0415 04/24/22 0740   BP:  169/68 165/77 137/98   BP Location:  Right arm  Right arm   Patient Position:  Lying  Lying   Pulse: 61 66 61 61   Resp: 16 18 16 16   Temp: 95.3 °F (35.2 °C) 98.2 °F (36.8 °C) 98.3 °F (36.8 °C) 98 °F (36.7 °C)   TempSrc: Oral Oral Oral Oral   SpO2:  97% 97%    Weight:       Height:         No flowsheet data found.   PHYSICAL EXAM:      CONSTITUTIONAL:  Vital signs reviewed.  Well-developed well-nourished elder  female.  No distress, looks comfortable.  EYES:  Conjunctivae and lids unremarkable.  EARS,NOSE,MOUTH,THROAT:  Ears and nose appear unremarkable.  Lips appear unremarkable.  RESPIRATORY:  Normal respiratory effort.  Lungs clear to auscultation bilaterally.  CARDIOVASCULAR: Regular rhythm and rate.  Normal S1, S2.  No murmurs.  No significant lower extremity edema.  GASTROINTESTINAL: Abdomen  appears unremarkable.  Nontender.  No hepatomegaly.  No splenomegaly.  LYMPHATIC:  No cervical, supraclavicular lymphadenopathy.  MUSCULOSKELETAL:  No cyanosis or clubbing.  No leg edema.  SKIN:  Warm.  No rashes.  PSYCHIATRIC:  Normal judgment and insight.  Normal mood and affect.      RECENT LABS:  Results from last 7 days   Lab Units 04/24/22  0518 04/23/22  1441   WBC 10*3/mm3 7.18 6.34   HEMOGLOBIN g/dL 7.7* 6.4*   HEMATOCRIT % 27.6* 23.6*   PLATELETS 10*3/mm3 307 331   MONOCYTES % %  --  2.1*     Lab Results   Component Value Date    NEUTROABS 5.27 04/23/2022     Results from last 7 days   Lab Units 04/24/22  0518 04/23/22  1441   SODIUM mmol/L 139 143   POTASSIUM mmol/L 3.4* 3.3*   CHLORIDE mmol/L 108* 107   CO2 mmol/L 21.0* 22.8   BUN mg/dL 14 19   CREATININE mg/dL 0.65 0.85   CALCIUM mg/dL 8.4* 8.5*   BILIRUBIN mg/dL  --  0.4   ALK PHOS U/L  --  134*   ALT (SGPT) U/L  --  21   AST (SGOT) U/L  --  22   GLUCOSE mg/dL 142* 117*       Lab Results   Component Value Date    IRON 11 (L) 04/23/2022    TIBC 460 04/23/2022    FERRITIN 6.23 (L) 04/23/2022     Lab Results   Component Value Date    FOLATE 17.00 01/27/2020     Lab Results   Component Value Date    [id number] 510 01/27/2020     Component      Latest Ref Rng & Units 4/24/2022   Protime      11.7 - 14.2 Seconds 18.0 (H)   INR      0.90 - 1.10 1.50 (H)   PTT      22.7 - 35.4 seconds 27.1   Fibrinogen      219 - 464 mg/dL 307         Assessment/Plan   *Severe symptomatic iron deficiency anemia Hb 6.4 with exertional dyspnea, fatigue, and paleness.  Patient was not able to tolerate oral iron treatment even once a day because of significant constipation.    · Patient was given 1 unit of PRBC transfusion since admission yesterday, and has improved hemoglobin 7.7 today.   · Laboratory study yesterday also reported severe iron deficiency with iron saturation 2% and ferritin 6.2 ng/mL.  She has microcytosis and hypochromia.  · I recommended to have intravenous iron  therapy with ferric gluconate 250 mg daily for 3 days.  · Patient waiting for GI evaluation.  She had both EGD and the colonoscopy examination in April 2019 at which time showed large hiatal hernia, in the stomach biopsies showed a normal gastric mucosa.  No evidence for gastric ulcer. (She had gastric ulcer in 2016).   · This patient previously was started on oral vitamin B12 by her primary care physician Dr. Skinny Contreras however she discontinued together with oral iron.  I am in agreement for this patient to continue oral vitamin B12 to help with erythropoiesis.  Her labs in January 2020 only showed a mediocre B12 level at 510 pg/mL.  There is no side effects for oral vitamin B12 treatment.      PLAN:  1. Start intravenous iron therapy with ferric gluconate 250 mg daily for 3 days.  2. Patient will be given IV Pepcid as a premedication for IV iron treatment.  3. Continue oral vitamin B12 at 1000 mcg daily.   4. Hold her Xarelto, expecting will have GI scope examination.  5. Agree with checking stool occult blood.  6. Patient is waiting for GI evaluation.  7. I will arrange patient come back to see me in 4 weeks on 5/20/2022 for reassessment.  We will repeat CBC and iron studies to gorge her response.      Discussed with the patient and her  at bedside.    I discussed with primary team [facility] Dr. Street further management plan.      Will sign off.  Please call if any questions.      AD LAND M.D., Ph.D.     4/24/2022

## 2022-04-25 ENCOUNTER — READMISSION MANAGEMENT (OUTPATIENT)
Dept: CALL CENTER | Facility: HOSPITAL | Age: 82
End: 2022-04-25

## 2022-04-25 VITALS
RESPIRATION RATE: 19 BRPM | WEIGHT: 170.1 LBS | HEART RATE: 93 BPM | BODY MASS INDEX: 27.34 KG/M2 | DIASTOLIC BLOOD PRESSURE: 72 MMHG | TEMPERATURE: 98.7 F | SYSTOLIC BLOOD PRESSURE: 125 MMHG | OXYGEN SATURATION: 90 % | HEIGHT: 66 IN

## 2022-04-25 DIAGNOSIS — C91.10 CLL (CHRONIC LYMPHOCYTIC LEUKEMIA): Primary | ICD-10-CM

## 2022-04-25 DIAGNOSIS — T45.4X5S ADVERSE EFFECT OF IRON, SEQUELA: ICD-10-CM

## 2022-04-25 DIAGNOSIS — D50.9 IRON DEFICIENCY ANEMIA, UNSPECIFIED IRON DEFICIENCY ANEMIA TYPE: ICD-10-CM

## 2022-04-25 PROBLEM — I50.32 CHRONIC DIASTOLIC HEART FAILURE: Status: ACTIVE | Noted: 2019-04-26

## 2022-04-25 PROBLEM — Z87.19 HISTORY OF ESOPHAGITIS: Status: ACTIVE | Noted: 2022-04-25

## 2022-04-25 PROBLEM — D62 ACUTE BLOOD LOSS ANEMIA: Status: ACTIVE | Noted: 2022-04-25

## 2022-04-25 PROBLEM — Z86.73 HISTORY OF STROKE: Status: ACTIVE | Noted: 2022-04-25

## 2022-04-25 PROBLEM — Z79.01 CHRONIC ANTICOAGULATION: Status: ACTIVE | Noted: 2022-04-25

## 2022-04-25 PROBLEM — I48.0 PAROXYSMAL ATRIAL FIBRILLATION (HCC): Status: ACTIVE | Noted: 2022-04-25

## 2022-04-25 LAB
BASOPHILS # BLD AUTO: 0.05 10*3/MM3 (ref 0–0.2)
BASOPHILS NFR BLD AUTO: 0.6 % (ref 0–1.5)
DEPRECATED RDW RBC AUTO: 43.9 FL (ref 37–54)
EOSINOPHIL # BLD AUTO: 0.09 10*3/MM3 (ref 0–0.4)
EOSINOPHIL NFR BLD AUTO: 1.1 % (ref 0.3–6.2)
ERYTHROCYTE [DISTWIDTH] IN BLOOD BY AUTOMATED COUNT: 17.5 % (ref 12.3–15.4)
HCT VFR BLD AUTO: 29.1 % (ref 34–46.6)
HGB BLD-MCNC: 7.9 G/DL (ref 12–15.9)
LYMPHOCYTES # BLD AUTO: 0.67 10*3/MM3 (ref 0.7–3.1)
LYMPHOCYTES NFR BLD AUTO: 8.5 % (ref 19.6–45.3)
MCH RBC QN AUTO: 19.4 PG (ref 26.6–33)
MCHC RBC AUTO-ENTMCNC: 27.1 G/DL (ref 31.5–35.7)
MCV RBC AUTO: 71.5 FL (ref 79–97)
MONOCYTES # BLD AUTO: 0.49 10*3/MM3 (ref 0.1–0.9)
MONOCYTES NFR BLD AUTO: 6.2 % (ref 5–12)
NEUTROPHILS NFR BLD AUTO: 6.48 10*3/MM3 (ref 1.7–7)
NEUTROPHILS NFR BLD AUTO: 82 % (ref 42.7–76)
PLATELET # BLD AUTO: 321 10*3/MM3 (ref 140–450)
PMV BLD AUTO: 10.2 FL (ref 6–12)
QT INTERVAL: 357 MS
RBC # BLD AUTO: 4.07 10*6/MM3 (ref 3.77–5.28)
WBC NRBC COR # BLD: 7.91 10*3/MM3 (ref 3.4–10.8)

## 2022-04-25 PROCEDURE — G0378 HOSPITAL OBSERVATION PER HR: HCPCS

## 2022-04-25 PROCEDURE — 96365 THER/PROPH/DIAG IV INF INIT: CPT

## 2022-04-25 PROCEDURE — 93005 ELECTROCARDIOGRAM TRACING: CPT | Performed by: INTERNAL MEDICINE

## 2022-04-25 PROCEDURE — 25010000002 NA FERRIC GLUC CPLX PER 12.5 MG: Performed by: INTERNAL MEDICINE

## 2022-04-25 PROCEDURE — 96376 TX/PRO/DX INJ SAME DRUG ADON: CPT

## 2022-04-25 PROCEDURE — 99214 OFFICE O/P EST MOD 30 MIN: CPT | Performed by: PHYSICIAN ASSISTANT

## 2022-04-25 PROCEDURE — 93010 ELECTROCARDIOGRAM REPORT: CPT | Performed by: INTERNAL MEDICINE

## 2022-04-25 PROCEDURE — 85025 COMPLETE CBC W/AUTO DIFF WBC: CPT | Performed by: INTERNAL MEDICINE

## 2022-04-25 PROCEDURE — 96366 THER/PROPH/DIAG IV INF ADDON: CPT

## 2022-04-25 RX ORDER — ACETAMINOPHEN 325 MG/1
650 TABLET ORAL EVERY 6 HOURS PRN
Start: 2022-04-25

## 2022-04-25 RX ORDER — PANTOPRAZOLE SODIUM 40 MG/1
40 TABLET, DELAYED RELEASE ORAL DAILY
Qty: 30 TABLET | Refills: 0 | Status: SHIPPED | OUTPATIENT
Start: 2022-04-25

## 2022-04-25 RX ADMIN — METOPROLOL TARTRATE 50 MG: 50 TABLET, FILM COATED ORAL at 08:44

## 2022-04-25 RX ADMIN — SODIUM CHLORIDE 250 MG: 9 INJECTION, SOLUTION INTRAVENOUS at 08:45

## 2022-04-25 RX ADMIN — Medication 1000 MCG: at 08:44

## 2022-04-25 RX ADMIN — SERTRALINE HYDROCHLORIDE 50 MG: 50 TABLET, FILM COATED ORAL at 08:44

## 2022-04-25 RX ADMIN — PANTOPRAZOLE SODIUM 40 MG: 40 INJECTION, POWDER, FOR SOLUTION INTRAVENOUS at 06:52

## 2022-04-25 RX ADMIN — CHOLECALCIFEROL TAB 10 MCG (400 UNIT) 800 UNITS: 10 TAB at 08:44

## 2022-04-25 RX ADMIN — FAMOTIDINE 20 MG: 10 INJECTION INTRAVENOUS at 08:45

## 2022-04-25 RX ADMIN — AMLODIPINE BESYLATE 10 MG: 10 TABLET ORAL at 08:44

## 2022-04-25 NOTE — DISCHARGE SUMMARY
[facility] Medicine Services  DISCHARGE SUMMARY    Patient Name: Ivet Diaz  : 1940  MRN: [id number]    Date of Admission: 2022  2:19 PM  Date of Discharge: 2022  Primary Care Physician: Skinny Contreras MD    Consults     Date and Time Order Name Status Description    2022 11:18 AM Inpatient Gastroenterology Consult Completed     2022  6:36 PM Inpatient Hematology & Oncology Consult      2022  4:25 PM Inpatient Gastroenterology Consult      2022  3:53 PM A (on-call MD unless specified) Details            Hospital Course       Active Hospital Problems    Diagnosis  POA   • Acute blood loss anemia [D62]  Yes   • History of stroke [Z86.73]  Not Applicable   • Paroxysmal atrial fibrillation (HCC) [I48.0]  Yes   • Chronic anticoagulation [Z79.01]  Not Applicable   • History of esophagitis [Z87.19]  Not Applicable   • Iron deficiency anemia [D50.9]  Yes   • Adverse effect of iron [T45.4X5A]  Yes   • Chronic diastolic heart failure (HCC) [I50.32]  Yes   • Symptomatic anemia [D64.9]  Yes   • Hypertension [I10]  Yes      Resolved Hospital Problems   No resolved problems to display.          Hospital Course:  Ivet Diaz is a 81 y.o. female with past medical history of paroxysmal atrial fibrillation reported previous history of stroke presents to the hospital with severe symptomatic anemia.  She received blood transfusion and her hemoglobin improved.  She was seen by hematology for her iron deficiency anemia and received IV iron infusion.  She plans to follow-up in their clinic for continued management of iron deficiency anemia.  She was seen by gastroenterology who offered endoscopy.  Patient and her  are declining inpatient endoscopy but are agreeable to following up in GI clinic and discussing potential outpatient endoscopy.  Case discussed with gastroenterology today.  They recommended to hold anticoagulation for 4 to 5 days until patient can follow-up in  primary care provider office and have repeat CBC.  If at that point hemoglobin is reasonably stable patient can restart anticoagulation after a risk versus benefit discussion with her primary care provider.  I had risk versus benefit discussion with patient and her  regarding holding the anticoagulation for a few days.  They understand that temporarily she will have an increased risk of stroke but they agree with the plan as she does not want further worsening of anemia.  Plan for PPI per my discussion with GI at discharge.        Day of Discharge     HPI:   Patient feels well today.  Her pulse was currently 105 while entering the room.  She has notable history of atrial fibrillation.  She denies any lightheadedness.  She and her  are adamant that she be discharged home today.  They agree with GI plan to hold anticoagulation until they can see primary care physician in a few days and have repeat laboratory studies.  They agree to follow-up with hematology.  Patient says she does not want inpatient endoscopy.    ROS:  No current fevers or chills  No current shortness of breath or cough  No current nausea, vomiting, or diarrhea  No current chest pain or palpitations    Vital Signs:   Temp:  [98.1 °F (36.7 °C)-98.7 °F (37.1 °C)] 98.7 °F (37.1 °C)  Heart Rate:  [] 116  Resp:  [16-18] 18  BP: (129-167)/(55-82) 129/82     Physical Exam:    Constitutional:Awake, alert  HENT: NCAT, mucous membranes moist, neck supple  Respiratory: Clear to auscultation bilaterally, respiratory effort normal, nonlabored breathing   Cardiovascular: RRR, normal radial pulses  Gastrointestinal: Positive bowel sounds, soft, nontender, nondistended  Musculoskeletal: Elderly but relatively normal musculature for age, minimal lower extremity edema, BMI is 27  Psychiatric: Appropriate affect, cooperative, conversational  Neurologic: No slurred speech or facial droop, follows commands  Skin: No rashes or jaundice,  warm    Pertinent  and/or Most Recent Results     Results from last 7 days   Lab Units 04/25/22  0838 04/24/22  2116 04/24/22  0518 04/23/22  1441   WBC 10*3/mm3 7.91  --  7.18 6.34   HEMOGLOBIN g/dL 7.9*  --  7.7* 6.4*   HEMATOCRIT % 29.1*  --  27.6* 23.6*   PLATELETS 10*3/mm3 321  --  307 331   SODIUM mmol/L  --   --  139 143   POTASSIUM mmol/L  --  3.8 3.4* 3.3*   CHLORIDE mmol/L  --   --  108* 107   CO2 mmol/L  --   --  21.0* 22.8   BUN mg/dL  --   --  14 19   CREATININE mg/dL  --   --  0.65 0.85   GLUCOSE mg/dL  --   --  142* 117*   CALCIUM mg/dL  --   --  8.4* 8.5*     Results from last 7 days   Lab Units 04/24/22  0832 04/23/22  1441   BILIRUBIN mg/dL  --  0.4   ALK PHOS U/L  --  134*   ALT (SGPT) U/L  --  21   AST (SGOT) U/L  --  22   PROTIME Seconds 18.0*  --    INR  1.50*  --    APTT seconds 27.1  --            Invalid input(s): TG, LDLCALC, LDLREALC  Results from last 7 days   Lab Units 04/24/22  0518 04/23/22  2104 04/23/22  1441   HEMOGLOBIN A1C % 5.80*  --   --    PROBNP pg/mL  --   --  758.0   TROPONIN T ng/mL  --  <0.010 <0.010   PROCALCITONIN ng/mL  --   --  0.05   LACTATE mmol/L  --   --  1.4       Brief Urine Lab Results  (Last result in the past 365 days)      Color   Clarity   Blood   Leuk Est   Nitrite   Protein   CREAT   Urine HCG        04/23/22 1624 Yellow   Clear   Negative   Small (1+)   Negative   30 mg/dL (1+)                 Microbiology Results Abnormal     Procedure Component Value - Date/Time    Blood Culture - Blood, Arm, Right [[id number]]  (Normal) Collected: 04/23/22 1510    Lab Status: Preliminary result Specimen: Blood from Arm, Right Updated: 04/24/22 1534     Blood Culture No growth at 24 hours    Blood Culture - Blood, Arm, Right [[id number]]  (Normal) Collected: 04/23/22 1441    Lab Status: Preliminary result Specimen: Blood from Arm, Right Updated: 04/24/22 1503     Blood Culture No growth at 24 hours    Respiratory Panel PCR w/COVID-19(SARS-CoV-2)  [facility] In-House, NP Swab in UTM/VTM, 3-4 HR TAT - Swab, Nasopharynx [[id number]]  (Normal) Collected: 04/23/22 1441    Lab Status: Final result Specimen: Swab from Nasopharynx Updated: 04/23/22 1549     ADENOVIRUS, PCR Not Detected     Coronavirus 229E Not Detected     Coronavirus HKU1 Not Detected     Coronavirus NL63 Not Detected     Coronavirus OC43 Not Detected     COVID19 Not Detected     Human Metapneumovirus Not Detected     Human Rhinovirus/Enterovirus Not Detected     Influenza A PCR Not Detected     Influenza B PCR Not Detected     Parainfluenza Virus 1 Not Detected     Parainfluenza Virus 2 Not Detected     Parainfluenza Virus 3 Not Detected     Parainfluenza Virus 4 Not Detected     RSV, PCR Not Detected     Bordetella pertussis pcr Not Detected     Bordetella parapertussis PCR Not Detected     Chlamydophila pneumoniae PCR Not Detected     Mycoplasma pneumo by PCR Not Detected    Narrative:      In the setting of a positive respiratory panel with a viral infection PLUS a negative procalcitonin without other underlying concern for bacterial infection, consider observing off antibiotics or discontinuation of antibiotics and continue supportive care. If the respiratory panel is positive for atypical bacterial infection (Bordetella pertussis, Chlamydophila pneumoniae, or Mycoplasma pneumoniae), consider antibiotic de-escalation to target atypical bacterial infection.          Imaging Results (All)     Procedure Component Value Units Date/Time    XR Chest 1 View [[id number]] Collected: 04/23/22 1517     Updated: 04/23/22 1523    Narrative:      XR CHEST 1 VW-     HISTORY: Female who is 81 years-old, short of breath     TECHNIQUE: Frontal view of the chest     COMPARISON: 4/26/2019,  image from CT from 04/19/2019     FINDINGS: The heart is enlarged. Aorta is tortuous, calcified. Pulmonary  vasculature is unremarkable. No focal pulmonary consolidation, pleural  effusion, or pneumothorax.  Paraesophageal hiatal hernia, with stomach  largely intrathoracic in location, demonstrated on the prior CT exam is  also apparent on this exam. No acute osseous process.       Impression:      No focal pulmonary consolidation. Cardiomegaly. Tortuous  aorta. Follow-up as clinically indicated.     This report was finalized on 4/23/2022 3:20 PM by Dr. Preston Restrepo M.D.             Results for orders placed during the hospital encounter of 04/19/19    Duplex venous lower extremity bilateral CAR    Interpretation Summary  · Normal bilateral lower extremity venous duplex scan.      Results for orders placed during the hospital encounter of 04/19/19    Duplex venous lower extremity bilateral CAR    Interpretation Summary  · Normal bilateral lower extremity venous duplex scan.      Results for orders placed during the hospital encounter of 03/25/19    Adult Transthoracic Echo Complete W/ Cont if Necessary Per Protocol (With Agitated Saline)    Interpretation Summary  · Estimated EF = 65%.  · Left ventricular systolic function is normal.  · Mild aortic valve regurgitation is present.        Discharge Details        Discharge Medications      New Medications      Instructions Start Date   acetaminophen 325 MG tablet  Commonly known as: TYLENOL   650 mg, Oral, Every 6 Hours PRN      pantoprazole 40 MG EC tablet  Commonly known as: Protonix   40 mg, Oral, Daily         Changes to Medications      Instructions Start Date   metoprolol tartrate 50 MG tablet  Commonly known as: LOPRESSOR  What changed: when to take this   50 mg, Oral, 2 Times Daily         Continue These Medications      Instructions Start Date   amLODIPine 10 MG tablet  Commonly known as: Norvasc   10 mg, Oral, Daily      atorvastatin 80 MG tablet  Commonly known as: LIPITOR   80 mg, Oral, Nightly      cyanocobalamin 1000 MCG tablet  Commonly known as: VITAMIN B-12   1,000 mcg, Oral, Daily      sertraline 50 MG tablet  Commonly known as: ZOLOFT   50 mg,  Oral, Daily      Vitamin D (Cholecalciferol) 10 MCG (400 UNIT) tablet  Commonly known as: CHOLECALCIFEROL   800 Units, Oral, Daily         Stop These Medications    esomeprazole 20 MG capsule  Commonly known as: nexIUM     rivaroxaban 20 MG tablet  Commonly known as: XARELTO            Allergies   Allergen Reactions   • Codeine Other (See Comments)     HEADACHE  States it gives her a headache         Discharge Disposition:  Home or Self Care    Diet:  Hospital:  Diet Order   Procedures   • Diet Regular; Cardiac       Activity:  Activity Instructions     Activity as Tolerated                 CODE STATUS:    Code Status and Medical Interventions:   Ordered at: 04/23/22 1625     Code Status (Patient has no pulse and is not breathing):    CPR (Attempt to Resuscitate)     Medical Interventions (Patient has pulse or is breathing):    Full       Future Appointments   Date Time Provider Department Center   5/5/2022  8:30 AM Twila Aguilar PA [facility]   5/20/2022  3:40 PM LAB CHAIR [address] [facility]   5/20/2022  4:20 PM Richi eSgura MD PhD [facility]         Additional Instructions for the Follow-ups that You Need to Schedule     Discharge Follow-up with PCP   As directed       Currently Documented PCP:    Skinny Contreras MD    PCP Phone Number:    [phone]     Follow Up Details: Primary care follow-up within 4 to 7 days.  Recommend CBC with appointment.  Discuss restarting anticoagulation            Follow-up Information     Skinny Contreras MD .    Specialty: Internal Medicine  Contact information:  [address] [facility] [address]  [facility] [id number] [phone]             Twila Aguilar PA Follow up on 5/5/2022.    Specialties: Gastroenterology, Emergency Medicine  Why: at 8:30am. Please arrive 15 minuts prior to your scheduled appointment to fill out any needed paperwork.  Contact information:  [address]  [facility] [id number] [phone]              Skinny Contreras MD .    Specialty: Internal Medicine  Why: Primary care follow-up within 4 to 7 days.  Recommend CBC with appointment.  Discuss restarting anticoagulation  Contact information:  [address] [id number] [phone]                             Colt Street MD  04/25/22      Time Spent on Discharge:  I spent greater than 30 minutes on this discharge activity which included: face-to-face encounter with the patient, reviewing the data in the system, coordination of the care with the nursing staff as well as consultants, documentation, and entering orders.

## 2022-04-25 NOTE — NURSING NOTE
Noted pt converted to Afib around 5am this morning. Confirmed via ECG. [facility] on call NP notified. Remains asymptomatic. Currently taking Lopressor, and Norvasc. Brentontm.

## 2022-04-25 NOTE — SIGNIFICANT NOTE
Patient has been evaluated twice this morning.  Initially at 8 AM patient stated she was adamant she wanted to be discharged home today and did not want to stay in the hospital for endoscopy.  She wanted to get her IV iron and then follow-up outpatient with hematology and consider whether she ever want to get endoscopy.  I had a second conversation with them regarding blood thinner and increased stroke risk off the blood thinner.  I explained to them we could restart blood thinner at discharge but she may bleed again and if we do not restart blood thinner she will be at higher risk of stroke.  Family said they wish to discuss little further about their decision on whether to have inpatient endoscopy or discharge.  She otherwise remains hemodynamically stable.  Morning hemoglobin improved at 7.9.    I will allow patient and her  to have further discussion regarding their desire on whether or not to have endoscopy versus discharge home and risk of restarting blood thinner.  Patient's  reports he is a retired physician and wishes to discuss and think about this further with his wife.    Full note to follow    Electronically signed by Colt Street MD, 04/25/22, 9:20 AM EDT.

## 2022-04-25 NOTE — PROGRESS NOTES
[facility]  Inpatient Progress Note    Reason for Follow-up:  Anemia    Subjective     Interval History:   Sitting up at bedside with  present.  Bowels moved yesterday without luis blood or melena.  She would like to follow-up in outpatient setting to discuss possible endoscopy.  Hemoglobin stable at 7.9 g/dL.    Current Facility-Administered Medications:   •  acetaminophen (TYLENOL) tablet 1,000 mg, 1,000 mg, Oral, Once, Mukund Penny MD  •  acetaminophen (TYLENOL) tablet 650 mg, 650 mg, Oral, Q4H PRN, Jaclyn Costello MD  •  amLODIPine (NORVASC) tablet 10 mg, 10 mg, Oral, Daily, Jaclyn Costello MD, 10 mg at 04/25/22 0844  •  atorvastatin (LIPITOR) tablet 80 mg, 80 mg, Oral, Nightly, Jaclyn Costello MD, 80 mg at 04/24/22 2030  •  cholecalciferol (VITAMIN D3) tablet 800 Units, 800 Units, Oral, Daily, Jaclyn Costello MD, 800 Units at 04/25/22 0844  •  famotidine (PEPCID) injection 20 mg, 20 mg, Intravenous, Daily, Richi Segura MD PhD, 20 mg at 04/25/22 0845  •  ferric gluconate (FERRLECIT) 250 mg in sodium chloride 0.9 % 120 mL IVPB, 250 mg, Intravenous, Daily, Richi Segura MD PhD, Last Rate: 60 mL/hr at 04/25/22 0845, 250 mg at 04/25/22 0845  •  Magnesium Sulfate 2 gram Bolus, followed by 8 gram infusion (total Mg dose 10 grams)- Mg less than or equal to 1mg/dL, 2 g, Intravenous, PRN **OR** Magnesium Sulfate 2 gram / 50mL Infusion (GIVE X 3 BAGS TO EQUAL 6GM TOTAL DOSE) - Mg 1.1 - 1.5 mg/dl, 2 g, Intravenous, PRN **OR** Magnesium Sulfate 4 gram infusion- Mg 1.6-1.9 mg/dL, 4 g, Intravenous, PRN, Colt Street MD  •  melatonin tablet 3 mg, 3 mg, Oral, Nightly PRN, Jaclyn Costello MD  •  metoprolol tartrate (LOPRESSOR) tablet 50 mg, 50 mg, Oral, Daily, Jaclyn Costello MD, 50 mg at 04/25/22 0844  •  nitroglycerin (NITROSTAT) SL tablet 0.4 mg, 0.4 mg, Sublingual, Q5 Min PRN, Jaclyn Costello MD  •  ondansetron (ZOFRAN)  tablet 4 mg, 4 mg, Oral, Q6H PRN **OR** ondansetron (ZOFRAN) injection 4 mg, 4 mg, Intravenous, Q6H PRN, Jaclyn Costello MD  •  pantoprazole (PROTONIX) injection 40 mg, 40 mg, Intravenous, BID AC, Jaclyn Costello MD, 40 mg at 04/25/22 0652  •  potassium chloride (K-DUR,KLOR-CON) ER tablet 40 mEq, 40 mEq, Oral, PRN, Colt Street MD, 40 mEq at 04/24/22 1651  •  potassium chloride (KLOR-CON) packet 40 mEq, 40 mEq, Oral, PRN, Colt Street MD  •  sertraline (ZOLOFT) tablet 50 mg, 50 mg, Oral, Daily, Jaclyn Costello MD, 50 mg at 04/25/22 0844  •  [COMPLETED] Insert peripheral IV, , , Once **AND** sodium chloride 0.9 % flush 10 mL, 10 mL, Intravenous, PRN, Mukund Penny MD, 10 mL at 04/23/22 2102  •  vitamin B-12 (CYANOCOBALAMIN) tablet 1,000 mcg, 1,000 mcg, Oral, Daily, Jaclyn Costello MD, 1,000 mcg at 04/25/22 0844  Review of Systems:    Constitutional: No fevers, chills, sweats   Respiratory: + Baseline shortness of breath  Cardiovascular: No Chest pain, palpitations   Gastrointestinal: No nausea, vomiting, diarrhea   Genitourinary: No hematuria, dysuria    Objective     Vital Signs  Temp:  [98.1 °F (36.7 °C)-98.7 °F (37.1 °C)] 98.7 °F (37.1 °C)  Heart Rate:  [] 116  Resp:  [16-18] 18  BP: (129-167)/(55-82) 129/82  Body mass index is 27.45 kg/m².    Intake/Output Summary (Last 24 hours) at 4/25/2022 1120  Last data filed at 4/24/2022 1800  Gross per 24 hour   Intake 460 ml   Output --   Net 460 ml     No intake/output data recorded.     Physical Exam:   General: Awake, alert and conversive. No acute distress.   Eyes: Normal lids and lashes, no scleral icterus.   Neck: Supple and symmetric. Trachea midline.    Skin: Warm and dry, not jaundiced.    Cardiovascular: Tachycardic   Pulm: Quiet, even, nonlabored breathing.    Abdomen: Soft, nondistended, nontender. No rebound or guarding.    Extremities: No rashes or edema.   Psychiatric: Appropriate mood and affect.  Cooperative.     Results Review:     I reviewed the patient's new clinical results.    Results from last 7 days   Lab Units 04/25/22  0838 04/24/22  0518 04/23/22  1441   WBC 10*3/mm3 7.91 7.18 6.34   HEMOGLOBIN g/dL 7.9* 7.7* 6.4*   HEMATOCRIT % 29.1* 27.6* 23.6*   PLATELETS 10*3/mm3 321 307 331     Results from last 7 days   Lab Units 04/24/22  2116 04/24/22  0518 04/23/22  1441   SODIUM mmol/L  --  139 143   POTASSIUM mmol/L 3.8 3.4* 3.3*   CHLORIDE mmol/L  --  108* 107   CO2 mmol/L  --  21.0* 22.8   BUN mg/dL  --  14 19   CREATININE mg/dL  --  0.65 0.85   CALCIUM mg/dL  --  8.4* 8.5*   BILIRUBIN mg/dL  --   --  0.4   ALK PHOS U/L  --   --  134*   ALT (SGPT) U/L  --   --  21   AST (SGOT) U/L  --   --  22   GLUCOSE mg/dL  --  142* 117*     Results from last 7 days   Lab Units 04/24/22  0832   INR  1.50*     Lab Results   Lab Value Date/Time    LIPASE 20 04/25/2019 1519    LIPASE 17 12/27/2018 1526       Radiology:  XR Chest 1 View   Final Result   No focal pulmonary consolidation. Cardiomegaly. Tortuous   aorta. Follow-up as clinically indicated.       This report was finalized on 4/23/2022 3:20 PM by Dr. Preston Restrepo M.D.              Assessment/Plan     Patient Active Problem List   Diagnosis   • Acute CVA (cerebrovascular accident) (HCC)   • Symptomatic anemia   • Hypertension   • Depression   • Reflux esophagitis   • Breast cancer (HCC)   • Acute on chronic diastolic heart failure (HCC)   • Hemorrhagic disorder due to extrinsic circulating anticoagulants (HCC)   • Iron deficiency anemia   • Adverse effect of iron       Assessment:  1. Iron deficiency anemia  2. Atrial fibrillation  3. Chronic anticoagulation: Currently held  4. History of stroke      Plan:  · Okay for discharge from a GI perspective.  Follow-up appointment has been arranged in outpatient setting at which time patient will decide if she wants to proceed with endoscopic intervention.  · She has had multiple episodes of rebleeding while  on anticoagulation and is at a high risk for recurrence. Recommend holding Eliquis then repeating hemoglobin with PCP in a few days.   · Continue 40 mg pantoprazole.    I discussed the patient's findings and my recommendations with patient, family, consulting provider and Dr. Denise.    Dragon dictation used throughout this note.            ROSALBA Choe  [facility]  [address] [place] [address]  Office: [phone]

## 2022-04-26 NOTE — CASE MANAGEMENT/SOCIAL WORK
Case Management Discharge Note      Final Note: home no additional dc orders noted for ccp. yfn olmos/ccp         Selected Continued Care - Discharged on 4/25/2022 Admission date: 4/23/2022 - Discharge disposition: Home or Self Care    Destination    No services have been selected for the patient.              Durable Medical Equipment    No services have been selected for the patient.              Dialysis/Infusion    No services have been selected for the patient.              Home Medical Care    No services have been selected for the patient.              Therapy    No services have been selected for the patient.              Community Resources    No services have been selected for the patient.              Community & DME    No services have been selected for the patient.                  Transportation Services  Private: Car    Final Discharge Disposition Code: 01 - home or self-care

## 2022-04-26 NOTE — OUTREACH NOTE
Prep Survey    Flowsheet Row Responses   [facility] patient discharged from? [facility]   Is LACE score < 7 ? No   Emergency Room discharge w/ pulse ox? No   Eligibility Readm Mgmt   Discharge diagnosis symptomatic anemia   Does the patient have one of the following disease processes/diagnoses(primary or secondary)? Other   Does the patient have Home health ordered? No   Is there a DME ordered? No   Prep survey completed? Yes          FELIX MARINO - Registered Nurse

## 2022-04-28 ENCOUNTER — READMISSION MANAGEMENT (OUTPATIENT)
Dept: CALL CENTER | Facility: HOSPITAL | Age: 82
End: 2022-04-28

## 2022-04-28 LAB
BACTERIA SPEC AEROBE CULT: NORMAL
BACTERIA SPEC AEROBE CULT: NORMAL

## 2022-04-28 NOTE — OUTREACH NOTE
Medical Week 1 Survey    Flowsheet Row Responses   [facility] patient discharged from? [place]   Does the patient have one of the following disease processes/diagnoses(primary or secondary)? Other   Week 1 attempt successful? No   Unsuccessful attempts Attempt 1          RAMONITA Isidro Registered Nurse

## 2022-04-29 ENCOUNTER — LAB (OUTPATIENT)
Dept: LAB | Facility: HOSPITAL | Age: 82
End: 2022-04-29

## 2022-04-29 ENCOUNTER — TRANSCRIBE ORDERS (OUTPATIENT)
Dept: ADMINISTRATIVE | Facility: HOSPITAL | Age: 82
End: 2022-04-29

## 2022-04-29 DIAGNOSIS — D64.9 ANEMIA, UNSPECIFIED TYPE: ICD-10-CM

## 2022-04-29 DIAGNOSIS — D64.9 ANEMIA, UNSPECIFIED TYPE: Primary | ICD-10-CM

## 2022-04-29 LAB
BASOPHILS # BLD AUTO: 0.05 10*3/MM3 (ref 0–0.2)
BASOPHILS NFR BLD AUTO: 0.8 % (ref 0–1.5)
DEPRECATED RDW RBC AUTO: 51.6 FL (ref 37–54)
EOSINOPHIL # BLD AUTO: 0.21 10*3/MM3 (ref 0–0.4)
EOSINOPHIL NFR BLD AUTO: 3.2 % (ref 0.3–6.2)
ERYTHROCYTE [DISTWIDTH] IN BLOOD BY AUTOMATED COUNT: 21.4 % (ref 12.3–15.4)
HCT VFR BLD AUTO: 32.5 % (ref 34–46.6)
HGB BLD-MCNC: 9.2 G/DL (ref 12–15.9)
LYMPHOCYTES # BLD AUTO: 0.96 10*3/MM3 (ref 0.7–3.1)
LYMPHOCYTES NFR BLD AUTO: 14.7 % (ref 19.6–45.3)
MCH RBC QN AUTO: 20.8 PG (ref 26.6–33)
MCHC RBC AUTO-ENTMCNC: 28.3 G/DL (ref 31.5–35.7)
MCV RBC AUTO: 73.4 FL (ref 79–97)
MONOCYTES # BLD AUTO: 0.56 10*3/MM3 (ref 0.1–0.9)
MONOCYTES NFR BLD AUTO: 8.6 % (ref 5–12)
NEUTROPHILS NFR BLD AUTO: 4.7 10*3/MM3 (ref 1.7–7)
NEUTROPHILS NFR BLD AUTO: 71.9 % (ref 42.7–76)
PLATELET # BLD AUTO: 347 10*3/MM3 (ref 140–450)
PMV BLD AUTO: 10.8 FL (ref 6–12)
RBC # BLD AUTO: 4.43 10*6/MM3 (ref 3.77–5.28)
WBC NRBC COR # BLD: 6.53 10*3/MM3 (ref 3.4–10.8)

## 2022-04-29 PROCEDURE — 85025 COMPLETE CBC W/AUTO DIFF WBC: CPT

## 2022-04-29 PROCEDURE — 36415 COLL VENOUS BLD VENIPUNCTURE: CPT

## 2022-05-04 ENCOUNTER — READMISSION MANAGEMENT (OUTPATIENT)
Dept: CALL CENTER | Facility: HOSPITAL | Age: 82
End: 2022-05-04

## 2022-05-04 NOTE — OUTREACH NOTE
Medical Week 1 Survey    Flowsheet Row Responses   [facility] patient discharged from? [place]   Does the patient have one of the following disease processes/diagnoses(primary or secondary)? Other   Week 1 attempt successful? Yes   Call start time 1108   Call end time 1115   Discharge diagnosis symptomatic anemia   Is patient permission given to speak with other caregiver? Yes   List who call center can speak with Dr Diaz, spouse   Person spoke with today (if not patient) and relationship patient and spouse   Meds reviewed with patient/caregiver? Yes   Does the patient have all medications ordered at discharge? Yes   Is the patient taking all medications as directed (includes completed medication regime)? Yes   Does the patient have a primary care provider?  Yes   Does the patient have an appointment with their PCP within 7 days of discharge? Yes   Comments regarding PCP PCP Dr Contreras,  Patient has seen PCP since discharge.    Has the patient kept scheduled appointments due by today? Yes   Comments  reports patient will be following up with hematology. Declines need for GI f/u at this time.    Has home health visited the patient within 72 hours of discharge? N/A   Psychosocial issues? No   Did the patient receive a copy of their discharge instructions? Yes   Nursing interventions Reviewed instructions with patient   What is the patient's perception of their health status since discharge? Improving   Is the patient/caregiver able to teach back signs and symptoms related to disease process for when to call PCP? Yes   Is the patient/caregiver able to teach back signs and symptoms related to disease process for when to call 911? Yes   Is the patient/caregiver able to teach back the hierarchy of who to call/visit for symptoms/problems? PCP, Specialist, Home health nurse, Urgent Care, ED, 911 Yes   If the patient is a current smoker, are they able to teach back resources for cessation? Not a smoker    Week 1 call completed? Yes   Is the patient interested in additional calls from an ambulatory ?  NOTE:  applies to high risk patients requiring additional follow-up. 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           FRAN BAIRD - Registered Nurse

## 2022-05-19 ENCOUNTER — TELEPHONE (OUTPATIENT)
Dept: ONCOLOGY | Facility: CLINIC | Age: 82
End: 2022-05-19

## 2022-05-19 NOTE — TELEPHONE ENCOUNTER
Caller: Dr Tylor Diaz    Relationship: Emergency Contact            What was the call regarding:     HAS A  HAS TO GO TO, NEEDING TO RESCHEDULE APPOINTMENT LAB AND F/U WITH DR LAND       WARM TRANSFERRED DR DIAZ TO [facility] AT THE  TO FURTHER ASSIST.

## 2022-05-20 ENCOUNTER — APPOINTMENT (OUTPATIENT)
Dept: LAB | Facility: HOSPITAL | Age: 82
End: 2022-05-20

## 2022-06-13 ENCOUNTER — LAB (OUTPATIENT)
Dept: LAB | Facility: HOSPITAL | Age: 82
End: 2022-06-13

## 2022-06-13 ENCOUNTER — OFFICE VISIT (OUTPATIENT)
Dept: ONCOLOGY | Facility: CLINIC | Age: 82
End: 2022-06-13

## 2022-06-13 VITALS
WEIGHT: 164.4 LBS | OXYGEN SATURATION: 99 % | DIASTOLIC BLOOD PRESSURE: 56 MMHG | RESPIRATION RATE: 16 BRPM | SYSTOLIC BLOOD PRESSURE: 129 MMHG | BODY MASS INDEX: 30.25 KG/M2 | TEMPERATURE: 96.9 F | HEIGHT: 62 IN | HEART RATE: 62 BPM

## 2022-06-13 DIAGNOSIS — I63.9 ACUTE CVA (CEREBROVASCULAR ACCIDENT): ICD-10-CM

## 2022-06-13 DIAGNOSIS — C91.10 CLL (CHRONIC LYMPHOCYTIC LEUKEMIA): ICD-10-CM

## 2022-06-13 DIAGNOSIS — T45.4X5S ADVERSE EFFECT OF IRON, SEQUELA: ICD-10-CM

## 2022-06-13 DIAGNOSIS — D50.9 IRON DEFICIENCY ANEMIA, UNSPECIFIED IRON DEFICIENCY ANEMIA TYPE: ICD-10-CM

## 2022-06-13 DIAGNOSIS — D50.9 IRON DEFICIENCY ANEMIA, UNSPECIFIED IRON DEFICIENCY ANEMIA TYPE: Primary | ICD-10-CM

## 2022-06-13 LAB
BASOPHILS # BLD AUTO: 0.04 10*3/MM3 (ref 0–0.2)
BASOPHILS NFR BLD AUTO: 0.6 % (ref 0–1.5)
DEPRECATED RDW RBC AUTO: 71 FL (ref 37–54)
EOSINOPHIL # BLD AUTO: 0.23 10*3/MM3 (ref 0–0.4)
EOSINOPHIL NFR BLD AUTO: 3.2 % (ref 0.3–6.2)
ERYTHROCYTE [DISTWIDTH] IN BLOOD BY AUTOMATED COUNT: 25.3 % (ref 12.3–15.4)
FERRITIN SERPL-MCNC: 27.2 NG/ML (ref 13–150)
HCT VFR BLD AUTO: 38.5 % (ref 34–46.6)
HGB BLD-MCNC: 11.3 G/DL (ref 12–15.9)
IMM GRANULOCYTES # BLD AUTO: 0.02 10*3/MM3 (ref 0–0.05)
IMM GRANULOCYTES NFR BLD AUTO: 0.3 % (ref 0–0.5)
IRON 24H UR-MRATE: 216 MCG/DL (ref 37–145)
IRON SATN MFR SERPL: 56 % (ref 14–48)
LYMPHOCYTES # BLD AUTO: 1.15 10*3/MM3 (ref 0.7–3.1)
LYMPHOCYTES NFR BLD AUTO: 16.1 % (ref 19.6–45.3)
MCH RBC QN AUTO: 23.4 PG (ref 26.6–33)
MCHC RBC AUTO-ENTMCNC: 29.4 G/DL (ref 31.5–35.7)
MCV RBC AUTO: 79.9 FL (ref 79–97)
MONOCYTES # BLD AUTO: 0.49 10*3/MM3 (ref 0.1–0.9)
MONOCYTES NFR BLD AUTO: 6.9 % (ref 5–12)
NEUTROPHILS NFR BLD AUTO: 5.21 10*3/MM3 (ref 1.7–7)
NEUTROPHILS NFR BLD AUTO: 72.9 % (ref 42.7–76)
NRBC BLD AUTO-RTO: 0 /100 WBC (ref 0–0.2)
PLATELET # BLD AUTO: 320 10*3/MM3 (ref 140–450)
PMV BLD AUTO: 9.7 FL (ref 6–12)
RBC # BLD AUTO: 4.82 10*6/MM3 (ref 3.77–5.28)
TIBC SERPL-MCNC: 384 MCG/DL (ref 249–505)
TRANSFERRIN SERPL-MCNC: 274 MG/DL (ref 200–360)
WBC NRBC COR # BLD: 7.14 10*3/MM3 (ref 3.4–10.8)

## 2022-06-13 PROCEDURE — 99214 OFFICE O/P EST MOD 30 MIN: CPT | Performed by: INTERNAL MEDICINE

## 2022-06-13 PROCEDURE — 36415 COLL VENOUS BLD VENIPUNCTURE: CPT

## 2022-06-13 PROCEDURE — 83540 ASSAY OF IRON: CPT

## 2022-06-13 PROCEDURE — 82728 ASSAY OF FERRITIN: CPT

## 2022-06-13 PROCEDURE — 85025 COMPLETE CBC W/AUTO DIFF WBC: CPT

## 2022-06-13 PROCEDURE — 84466 ASSAY OF TRANSFERRIN: CPT

## 2022-06-13 RX ORDER — RIVAROXABAN 20 MG/1
20 TABLET, FILM COATED ORAL DAILY
COMMUNITY
Start: 2022-05-16

## 2022-06-13 RX ORDER — IRON, FOLIC ACID, CYANOCOBALAMIN, ASCORBIC ACID, AND DOCUSATE SODIUM 90; 1; 12; 120; 50 MG/1; MG/1; UG/1; MG/1; MG/1
1 TABLET, FILM COATED ORAL DAILY
COMMUNITY
Start: 2022-06-09

## 2022-06-13 RX ORDER — METOPROLOL SUCCINATE 25 MG/1
TABLET, EXTENDED RELEASE ORAL
COMMUNITY
Start: 2022-04-06

## 2022-06-13 RX ORDER — MAGNESIUM 200 MG
1000 TABLET ORAL DAILY
Qty: 90 TABLET | Refills: 3 | Status: SHIPPED | OUTPATIENT
Start: 2022-06-13 | End: 2022-09-11

## 2022-06-13 NOTE — PROGRESS NOTES
.     REASON FOR FOLLOWUP:   Iron deficiency anemia.      HISTORY OF PRESENT ILLNESS:  The patient is a 81 y.o. year old female with iron deficiency anemia during recent hospitalization in April 2022 required PRBC transfusion and also intravenous iron therapy, who presents today for reevaluation.    I saw her originally on 4/23/2022 for consultation because of iron deficiency anemia.  She was already given PRBC transfusion due to severe symptomatic anemia.  Patient had a severe iron deficiency and we treated her with 2 doses of ferric gluconate total of 500 mg prior to her discharge on 4/25/2022.    Patient reports no melena hematochezia or any other bleeding.  Patient reports she has been taking a combo tablet contains B12, folic acid and iron and he takes once a day with reasonable tolerance.    Laboratory studies today on 6/13/2022 reported much improved hemoglobin 11.3, and the improved MCV 79.9.  Patient has normal CBC and platelets.  Iron study reported significant improved iron saturation 56% with a free iron 2016 TIBC 384, however ferritin is only mildly improved at 27.2 ng/mL.          Past Medical History:   Diagnosis Date   • Atrial fibrillation (HCC)    • Breast cancer (HCC)     left   • Cancer (HCC)     Left breast   • Depression    • Gastrointestinal bleed     ulcer   • History of anemia    • History of blood transfusion    • Hypertension    • Reflux esophagitis     GERD   • Stroke (HCC) 2019     Past Surgical History:   Procedure Laterality Date   • BREAST BIOPSY     • BREAST SURGERY  1998    Left masectomy   • COLONOSCOPY N/A 4/27/2019    Procedure: COLONOSCOPY TO CECUM AND TO TI WITH COLD BX POLYPECTOMY;  Surgeon: Peter Jimenez MD;  Location: [facility] ENDOSCOPY;  Service: Gastroenterology   • EMBOLECTOMY Left 3/25/2019    Procedure: MECHANICAL THROMBECTOMY;  Surgeon: Truong Chambers MD;  Location: [facility] HYBRID OR 18/19;  Service: Interventional Radiology   • ENDOSCOPY N/A 7/9/2016    Procedure:  ESOPHAGOGASTRODUODENOSCOPY  WITH BIOPSY;  Surgeon: Peter Corrigan MD;  Location: [facility] ENDOSCOPY;  Service:    • ENDOSCOPY N/A 4/27/2019    Procedure: ESOPHAGOGASTRODUODENOSCOPY WITH BIOPSIES;  Surgeon: Peter Jimenez MD;  Location: [facility] ENDOSCOPY;  Service: Gastroenterology   • MASTECTOMY Left      HEMATOLOGY HISTORY:  The patient is a 81 y.o. year old female was recurrent iron deficient anemia, hypertension, history of stroke on Xarelto anticoagulation, history of left breast cancer status status post mastectomy, who was admitted for symptomatic severe iron deficiency anemia.  History mostly provided by her  who is at bedside.     Patient and her  report that she had significant fatigue, lightheadedness, and exertional dyspnea.  Family member also noticed patient become very pale.  She had profound fatigue.  She had no pica for ice chips or any other things.  According to , patient was taking oral iron once a day prescribed by her primary care physician Dr. Skinny Contreras, together with daily vitamin B12, however patient developed significant constipation, so she stopped oral iron.  Vitamin B12 was also stopped at the same time a couple months earlier.      Her  reports patient had similar episodes 3 times in the past 10 years.  She previously had gastric ulcer, discovered by EGD examination performed by Dr. Peter Corrigan about 5 to 6 years ago.  That was in July 2016 per documents I reviewed.  Patient also had a scope 3 years ago by Dr. Peter Jimenez.  I reviewed that procedure report dated 4/27/2019 of both EGD and the colonoscopy examination.  EGD examination reported a large hiatal hernia, and large area of erythematous changes in the stomach.  Biopsy was taken.  Colonoscopy examination reported diverticulum in the sigmoid colon, and also a small 3 mm polyp in the rectum which was resected.  Otherwise unremarkable.  Pathology evaluation reported a benign gastric mucosa, no active  gastritis, no H. pylori, no metaplastic or dysplastic changes.  The rectum polyp was adenomatous colonic polyp.  No dysplastic changes.     Nevertheless when patient presented to the ER on 4/23/2022, she was found to having severe anemia with Hb 6.4, MCV 67.8, MCHC 27.1.  Normal platelets 331,000 and a WBC 6340 including neutrophils 5270 lymphocytes 800.  Iron study reported ferritin 6.23 ng/mL, free iron 11 TIBC 460 and iron saturation 2%.     Patient was given 1 unit PRBC transfusion after admission.     Repeat laboratory study this morning on 4/24/2022 reported improved hemoglobin 7.7.  Maintains normal WBC and platelets.        MEDICATIONS    Current Outpatient Medications:   •  acetaminophen (TYLENOL) 325 MG tablet, Take 2 tablets by mouth Every 6 (Six) Hours As Needed for Mild Pain ., Disp: , Rfl:   •  amLODIPine (NORVASC) 10 MG tablet, Take 1 tablet by mouth Daily., Disp: 30 tablet, Rfl: 1  •  atorvastatin (LIPITOR) 80 MG tablet, Take 1 tablet by mouth Every Night., Disp: 30 tablet, Rfl: 0  •  Fe Cbn-Fe Gluc-FA-B12-C-DSS (Ferralet 90) 90-1 MG tablet, Take 1 tablet by mouth Daily., Disp: , Rfl:   •  metoprolol succinate XL (TOPROL-XL) 25 MG 24 hr tablet, , Disp: , Rfl:   •  pantoprazole (Protonix) 40 MG EC tablet, Take 1 tablet by mouth Daily., Disp: 30 tablet, Rfl: 0  •  sertraline (ZOLOFT) 50 MG tablet, Take 50 mg by mouth daily., Disp: , Rfl:   •  Vitamin D, Cholecalciferol, (CHOLECALCIFEROL) 10 MCG (400 UNIT) tablet, Take 800 Units by mouth Daily., Disp: , Rfl:   •  Xarelto 20 MG tablet, Take 20 mg by mouth Daily., Disp: , Rfl:   •  vitamin B-12 (VITAMIN B-12) 1000 MCG tablet, Take 1 tablet by mouth Daily., Disp: 30 tablet, Rfl: 0    ALLERGIES:     Allergies   Allergen Reactions   • Codeine Other (See Comments)     HEADACHE  States it gives her a headache       SOCIAL HISTORY:       Social History     Socioeconomic History   • Marital status:    Tobacco Use   • Smoking status: Never Smoker   •  "Smokeless tobacco: Never Used   Substance and Sexual Activity   • Alcohol use: Yes     Comment: 1 or 2 on the weekend   • Drug use: No   • Sexual activity: Defer         FAMILY HISTORY:  Family History   Problem Relation Age of Onset   • Heart disease Mother    • Clotting disorder Father    • Breast cancer Sister    • Breast cancer Sister    • Heart disease Paternal Uncle        REVIEW OF SYSTEMS:  Review of Systems see HPI.           Vitals:    06/13/22 1052   BP: 129/56   Pulse: 62   Resp: 16   Temp: 96.9 °F (36.1 °C)   TempSrc: Temporal   SpO2: 99%   Weight: 74.6 kg (164 lb 6.4 oz)   Height: 158.6 cm (62.44\")  Comment: New Ht No Shoes   PainSc: 0-No pain     Current Status 6/13/2022   ECOG score 1        PHYSICAL EXAM:      CONSTITUTIONAL:  Vital signs reviewed.  Well-developed, elderly  female.  No distress, looks comfortable.  EYES:  Conjunctivae and lids unremarkable.  EARS,NOSE,MOUTH,THROAT:  Ears and nose appear unremarkable.  Lips appear unremarkable.  RESPIRATORY:  Normal respiratory effort.  Lungs clear to auscultation bilaterally.  CARDIOVASCULAR:  Normal S1, S2.  No murmurs rubs or gallops.  No significant lower extremity edema.  GASTROINTESTINAL: Abdomen appears unremarkable.  Nontender.  No hepatomegaly.  No splenomegaly.  LYMPHATIC:  No cervical, supraclavicular, axillary lymphadenopathy.  MUSCULOSKELETAL:  Unremarkable gait and station.  No cyanosis or clubbing.  SKIN:  Warm.  No rashes.  PSYCHIATRIC:  Normal judgment and insight.  Normal mood and affect.      RECENT LABS:        WBC   Date Value Ref Range Status   06/13/2022 7.14 3.40 - 10.80 10*3/mm3 Final   04/29/2022 6.53 3.40 - 10.80 10*3/mm3 Final   04/25/2022 7.91 3.40 - 10.80 10*3/mm3 Final     Hemoglobin   Date Value Ref Range Status   06/13/2022 11.3 (L) 12.0 - 15.9 g/dL Final   04/29/2022 9.2 (L) 12.0 - 15.9 g/dL Final   04/25/2022 7.9 (L) 12.0 - 15.9 g/dL Final   04/24/2022 7.7 (L) 12.0 - 15.9 g/dL Final   04/23/2022 6.4 (C) " 12.0 - 15.9 g/dL Final   11/15/2021 11.4 (L) 12.0 - 15.9 g/dL Final   08/23/2021 12.8 12.0 - 15.9 g/dL Final   05/17/2021 11.8 (L) 12.0 - 15.9 g/dL Final   02/15/2021 12.5 12.0 - 15.9 g/dL Final   11/06/2020 11.9 (L) 12.0 - 15.9 g/dL Final   04/17/2020 12.9 12.0 - 15.9 g/dL Final   03/05/2020 10.4 (L) 12.0 - 15.9 g/dL Final   01/23/2020 8.8 (L) 12.0 - 15.9 g/dL Final   10/21/2019 12.0 12.0 - 15.9 g/dL Final   07/12/2019 10.4 (L) 12.0 - 15.9 g/dL Final   06/10/2019 10.6 (L) 12.0 - 15.9 g/dL Final   05/07/2019 10.2 (L) 11.1 - 15.9 g/dL Final   04/27/2019 11.6 (L) 12.0 - 15.9 g/dL Final   04/27/2019 10.9 (L) 12.0 - 15.9 g/dL Final   04/26/2019 10.8 (L) 12.0 - 15.9 g/dL Final   04/26/2019 11.1 (L) 12.0 - 15.9 g/dL Final   04/25/2019 7.2 (L) 12.0 - 15.9 g/dL Final   04/23/2019 7.6 (L) 12.0 - 16.0 g/dL Final   03/28/2019 9.5 (L) 12.0 - 15.9 g/dL Final   03/25/2019 10.9 (L) 12.0 - 15.9 g/dL Final   01/04/2019 12.1 11.9 - 15.5 g/dL Final   12/27/2018 12.4 11.9 - 15.5 g/dL Final   10/02/2018 11.0 (L) 11.9 - 15.5 g/dL Final   06/07/2018 11.3 (L) 11.9 - 15.5 g/dL Final   12/18/2017 13.2 11.9 - 15.5 g/dL Final   09/26/2017 11.6 (L) 11.9 - 15.5 g/dL Final   09/22/2017 11.7 (L) 11.9 - 15.5 g/dL Final   09/20/2016 13.9 11.9 - 15.5 g/dL Final   08/16/2016 13.3 11.9 - 15.5 g/dL Final   07/09/2016 9.3 (L) 11.9 - 15.5 g/dL Final   03/15/2016 12.7 11.9 - 15.5 g/dL Final   09/17/2014 10.3 (L) 11.9 - 15.5 g/dL Final   09/13/2014 9.7 (L) 11.9 - 15.5 g/dL Final   09/12/2014 7.7 (L) 11.9 - 15.5 g/dL Final   09/12/2014 8.2 (L) 11.9 - 15.5 g/dL Final   09/11/2014 7.7 (L) 11.9 - 15.5 g/dL Final     Platelets   Date Value Ref Range Status   06/13/2022 320 140 - 450 10*3/mm3 Final   04/29/2022 347 140 - 450 10*3/mm3 Final   04/25/2022 321 140 - 450 10*3/mm3 Final   04/24/2022 307 140 - 450 10*3/mm3 Final   04/23/2022 331 140 - 450 10*3/mm3 Final     Lab Results   Component Value Date    IRON 11 (L) 04/23/2022    TIBC 460 04/23/2022    FERRITIN  6.23 (L) 04/23/2022   Iron saturation 2% on 4/23/2022.    Lab Results   Component Value Date    IRON 216 (H) 06/13/2022    TIBC 384 06/13/2022    FERRITIN 27.20 06/13/2022   Iron saturation 56% on 6/13/2022.      Lab Results   Component Value Date    FOLATE 17.00 01/27/2020     Lab Results   Component Value Date    HPHVUBKS37 510 01/27/2020         Assessment & Plan     *Severe symptomatic iron deficiency anemia Hb 6.4 with exertional dyspnea, fatigue, and paleness.  Patient was not able to tolerate oral iron treatment even once a day because of significant constipation.    · Patient was given 1 unit of PRBC transfusion on 4/23/2022, and has improved hemoglobin 7.7 on 4/24/2022.   · Laboratory study on 4/23/2022 also reported severe iron deficiency with iron saturation 2% and ferritin 6.2 ng/mL.  She has microcytosis and hypochromia.  Patient was not tolerating oral iron treatment.  · I recommended to have intravenous iron therapy with ferric gluconate 250 mg daily for 3 days.  · She had both EGD and the colonoscopy examination in April 2019 at which time showed large hiatal hernia, in the stomach biopsies showed a normal gastric mucosa.  No evidence for gastric ulcer. (She had gastric ulcer in 2016).   · This patient previously was started on oral vitamin B12 by her primary care physician Dr. Skinny Contreras however she discontinued together with oral iron.  I am in agreement for this patient to continue oral vitamin B12 to help with erythropoiesis.  Her labs in January 2020 only showed a mediocre B12 level at 510 pg/mL.  There is no side effects for oral vitamin B12 treatment.   · Patient received 2 doses of ferric gluconate in April 2022 before her discharge on 4/25/2022.  · Lab study today on 6/13/2022 reported hemoglobin 11.3, MCV 79.9, and normal platelets 320,000 WBC 7140 including ANC 5210.  Iron study reported significant improvement of free iron 216, iron saturation 56% and the TIBC 384.  Ferritin was 27.2  ng/mL.  · I recommended no IV iron therapy for now.  Repeat iron study.  With CBC and BMP in 3 months.          PLAN:  1. No further intravenous iron therapy for now.  2. Start oral vitamin B12 at 1000 mcg daily.  I E scribed to her pharmacy.  3. Continue Ferralet 90 mg tablets daily.  Contains iron, folic acid, vitamin B12, vitamin C.  4. Patient will come back for lab studies every 3 months with CBC ferritin iron profile and vitamin B12 level.  5. Patient will come back to see me in 6 months with lab studies CBC and iron studies.    Reviewed laboratory results with the patient and the further management plan.  Patient was understanding.       AD LAND M.D., Ph.D.         CC:  Skinny Contreras MD

## 2022-06-24 ENCOUNTER — TRANSCRIBE ORDERS (OUTPATIENT)
Dept: ADMINISTRATIVE | Facility: HOSPITAL | Age: 82
End: 2022-06-24

## 2022-06-24 ENCOUNTER — LAB (OUTPATIENT)
Dept: LAB | Facility: HOSPITAL | Age: 82
End: 2022-06-24

## 2022-06-24 DIAGNOSIS — E03.9 ACQUIRED HYPOTHYROIDISM: ICD-10-CM

## 2022-06-24 DIAGNOSIS — E78.5 HYPERLIPIDEMIA, UNSPECIFIED HYPERLIPIDEMIA TYPE: ICD-10-CM

## 2022-06-24 DIAGNOSIS — E78.5 HYPERLIPIDEMIA, UNSPECIFIED HYPERLIPIDEMIA TYPE: Primary | ICD-10-CM

## 2022-06-24 DIAGNOSIS — I10 ESSENTIAL HYPERTENSION, MALIGNANT: ICD-10-CM

## 2022-06-24 DIAGNOSIS — D64.9 ANEMIA, UNSPECIFIED TYPE: ICD-10-CM

## 2022-06-24 LAB
ALBUMIN SERPL-MCNC: 3.9 G/DL (ref 3.5–5.2)
ALBUMIN/GLOB SERPL: 1.3 G/DL
ALP SERPL-CCNC: 120 U/L (ref 39–117)
ALT SERPL W P-5'-P-CCNC: 13 U/L (ref 1–33)
ANION GAP SERPL CALCULATED.3IONS-SCNC: 11 MMOL/L (ref 5–15)
AST SERPL-CCNC: 21 U/L (ref 1–32)
BASOPHILS # BLD AUTO: 0.04 10*3/MM3 (ref 0–0.2)
BASOPHILS NFR BLD AUTO: 0.7 % (ref 0–1.5)
BILIRUB SERPL-MCNC: 0.3 MG/DL (ref 0–1.2)
BUN SERPL-MCNC: 15 MG/DL (ref 8–23)
BUN/CREAT SERPL: 19 (ref 7–25)
CALCIUM SPEC-SCNC: 9.1 MG/DL (ref 8.6–10.5)
CHLORIDE SERPL-SCNC: 105 MMOL/L (ref 98–107)
CHOLEST SERPL-MCNC: 101 MG/DL (ref 0–200)
CO2 SERPL-SCNC: 26 MMOL/L (ref 22–29)
CREAT SERPL-MCNC: 0.79 MG/DL (ref 0.57–1)
DEPRECATED RDW RBC AUTO: 58.2 FL (ref 37–54)
EGFRCR SERPLBLD CKD-EPI 2021: 75.7 ML/MIN/1.73
EOSINOPHIL # BLD AUTO: 0.26 10*3/MM3 (ref 0–0.4)
EOSINOPHIL NFR BLD AUTO: 4.5 % (ref 0.3–6.2)
ERYTHROCYTE [DISTWIDTH] IN BLOOD BY AUTOMATED COUNT: 22.4 % (ref 12.3–15.4)
GLOBULIN UR ELPH-MCNC: 3 GM/DL
GLUCOSE SERPL-MCNC: 95 MG/DL (ref 65–99)
HCT VFR BLD AUTO: 34.8 % (ref 34–46.6)
HDLC SERPL-MCNC: 58 MG/DL (ref 40–60)
HGB BLD-MCNC: 11.2 G/DL (ref 12–15.9)
LDLC SERPL CALC-MCNC: 30 MG/DL (ref 0–100)
LDLC/HDLC SERPL: 0.54 {RATIO}
LYMPHOCYTES # BLD AUTO: 1.16 10*3/MM3 (ref 0.7–3.1)
LYMPHOCYTES NFR BLD AUTO: 20 % (ref 19.6–45.3)
MCH RBC QN AUTO: 24.6 PG (ref 26.6–33)
MCHC RBC AUTO-ENTMCNC: 32.2 G/DL (ref 31.5–35.7)
MCV RBC AUTO: 76.3 FL (ref 79–97)
MONOCYTES # BLD AUTO: 0.48 10*3/MM3 (ref 0.1–0.9)
MONOCYTES NFR BLD AUTO: 8.3 % (ref 5–12)
NEUTROPHILS NFR BLD AUTO: 3.86 10*3/MM3 (ref 1.7–7)
NEUTROPHILS NFR BLD AUTO: 66.3 % (ref 42.7–76)
PLATELET # BLD AUTO: 292 10*3/MM3 (ref 140–450)
PMV BLD AUTO: 11.6 FL (ref 6–12)
POTASSIUM SERPL-SCNC: 4.3 MMOL/L (ref 3.5–5.2)
PROT SERPL-MCNC: 6.9 G/DL (ref 6–8.5)
RBC # BLD AUTO: 4.56 10*6/MM3 (ref 3.77–5.28)
SODIUM SERPL-SCNC: 142 MMOL/L (ref 136–145)
TRIGL SERPL-MCNC: 59 MG/DL (ref 0–150)
TSH SERPL DL<=0.05 MIU/L-ACNC: 2.04 UIU/ML (ref 0.27–4.2)
VLDLC SERPL-MCNC: 13 MG/DL (ref 5–40)
WBC NRBC COR # BLD: 5.81 10*3/MM3 (ref 3.4–10.8)

## 2022-06-24 PROCEDURE — 84443 ASSAY THYROID STIM HORMONE: CPT

## 2022-06-24 PROCEDURE — 80053 COMPREHEN METABOLIC PANEL: CPT

## 2022-06-24 PROCEDURE — 85025 COMPLETE CBC W/AUTO DIFF WBC: CPT

## 2022-06-24 PROCEDURE — 36415 COLL VENOUS BLD VENIPUNCTURE: CPT

## 2022-06-24 PROCEDURE — 80061 LIPID PANEL: CPT

## 2022-07-21 ENCOUNTER — TELEPHONE (OUTPATIENT)
Dept: ONCOLOGY | Facility: CLINIC | Age: 82
End: 2022-07-21

## 2022-07-21 NOTE — TELEPHONE ENCOUNTER
----- Message from Lisandra Fink RN sent at 7/21/2022 10:45 AM EDT -----  Regarding: F/u [place] and labs  Patient needs a appointment to follow up with Dr Segura with CBC STAT Ferritin and Iron panel. Patient cannot tolerate oral iron and may need IV Iron.  Thanks

## 2022-07-21 NOTE — TELEPHONE ENCOUNTER
Patient's  states he thinks patient is anemic and needs IV Iron. Dr Segura started patient on oral Ferralet but patient's  states patient stopped taking Ferralet due to constipation. Explained to patient's  patient will need to follow up with Dr Segura and have CBC, Ferritin and Iron panel drawn and Dr Segura will have to dictate patient cannot tolerate oral iron. If patient's Ferritin and/or Iron panel are low then a request will be made for insurance approval for IV Iron. Message sent to appointment desk to schedule.  Patient's  v/u

## 2022-07-21 NOTE — TELEPHONE ENCOUNTER
CALLED PT AND LVM FOR THE PT TO CALL FRAN PRICE AT [place] THAT DR LAND WANTED TO SEE HERE ON Monday

## 2022-07-25 ENCOUNTER — OFFICE VISIT (OUTPATIENT)
Dept: ONCOLOGY | Facility: CLINIC | Age: 82
End: 2022-07-25

## 2022-07-25 ENCOUNTER — LAB (OUTPATIENT)
Dept: LAB | Facility: HOSPITAL | Age: 82
End: 2022-07-25

## 2022-07-25 VITALS
SYSTOLIC BLOOD PRESSURE: 110 MMHG | HEART RATE: 143 BPM | DIASTOLIC BLOOD PRESSURE: 77 MMHG | WEIGHT: 162.2 LBS | TEMPERATURE: 97 F | OXYGEN SATURATION: 96 % | RESPIRATION RATE: 18 BRPM | BODY MASS INDEX: 29.85 KG/M2 | HEIGHT: 62 IN

## 2022-07-25 DIAGNOSIS — T45.4X5S ADVERSE EFFECT OF IRON, SEQUELA: ICD-10-CM

## 2022-07-25 DIAGNOSIS — D50.9 IRON DEFICIENCY ANEMIA, UNSPECIFIED IRON DEFICIENCY ANEMIA TYPE: Primary | ICD-10-CM

## 2022-07-25 DIAGNOSIS — D50.9 IRON DEFICIENCY ANEMIA, UNSPECIFIED IRON DEFICIENCY ANEMIA TYPE: ICD-10-CM

## 2022-07-25 PROBLEM — T45.4X5A ADVERSE EFFECT OF IRON AND ITS COMPOUNDS, INITIAL ENCOUNTER: Status: ACTIVE | Noted: 2022-07-25

## 2022-07-25 LAB
BASOPHILS # BLD AUTO: 0.03 10*3/MM3 (ref 0–0.2)
BASOPHILS NFR BLD AUTO: 0.4 % (ref 0–1.5)
DEPRECATED RDW RBC AUTO: 48.7 FL (ref 37–54)
EOSINOPHIL # BLD AUTO: 0.27 10*3/MM3 (ref 0–0.4)
EOSINOPHIL NFR BLD AUTO: 3.8 % (ref 0.3–6.2)
ERYTHROCYTE [DISTWIDTH] IN BLOOD BY AUTOMATED COUNT: 17.2 % (ref 12.3–15.4)
FERRITIN SERPL-MCNC: 21.4 NG/ML (ref 13–150)
FOLATE SERPL-MCNC: 17.5 NG/ML (ref 4.78–24.2)
HCT VFR BLD AUTO: 34.2 % (ref 34–46.6)
HGB BLD-MCNC: 10.1 G/DL (ref 12–15.9)
IMM GRANULOCYTES # BLD AUTO: 0.02 10*3/MM3 (ref 0–0.05)
IMM GRANULOCYTES NFR BLD AUTO: 0.3 % (ref 0–0.5)
IRON 24H UR-MRATE: 17 MCG/DL (ref 37–145)
IRON SATN MFR SERPL: 5 % (ref 14–48)
LYMPHOCYTES # BLD AUTO: 1.06 10*3/MM3 (ref 0.7–3.1)
LYMPHOCYTES NFR BLD AUTO: 15 % (ref 19.6–45.3)
MCH RBC QN AUTO: 23.8 PG (ref 26.6–33)
MCHC RBC AUTO-ENTMCNC: 29.5 G/DL (ref 31.5–35.7)
MCV RBC AUTO: 80.5 FL (ref 79–97)
MONOCYTES # BLD AUTO: 0.6 10*3/MM3 (ref 0.1–0.9)
MONOCYTES NFR BLD AUTO: 8.5 % (ref 5–12)
NEUTROPHILS NFR BLD AUTO: 5.09 10*3/MM3 (ref 1.7–7)
NEUTROPHILS NFR BLD AUTO: 72 % (ref 42.7–76)
NRBC BLD AUTO-RTO: 0 /100 WBC (ref 0–0.2)
PLATELET # BLD AUTO: 315 10*3/MM3 (ref 140–450)
PMV BLD AUTO: 9.3 FL (ref 6–12)
RBC # BLD AUTO: 4.25 10*6/MM3 (ref 3.77–5.28)
TIBC SERPL-MCNC: 339 MCG/DL (ref 249–505)
TRANSFERRIN SERPL-MCNC: 242 MG/DL (ref 200–360)
VIT B12 BLD-MCNC: 528 PG/ML (ref 211–946)
WBC NRBC COR # BLD: 7.07 10*3/MM3 (ref 3.4–10.8)

## 2022-07-25 PROCEDURE — 82607 VITAMIN B-12: CPT | Performed by: INTERNAL MEDICINE

## 2022-07-25 PROCEDURE — 82746 ASSAY OF FOLIC ACID SERUM: CPT | Performed by: INTERNAL MEDICINE

## 2022-07-25 PROCEDURE — 83540 ASSAY OF IRON: CPT

## 2022-07-25 PROCEDURE — 82728 ASSAY OF FERRITIN: CPT

## 2022-07-25 PROCEDURE — 99214 OFFICE O/P EST MOD 30 MIN: CPT | Performed by: INTERNAL MEDICINE

## 2022-07-25 PROCEDURE — 85025 COMPLETE CBC W/AUTO DIFF WBC: CPT

## 2022-07-25 PROCEDURE — 36415 COLL VENOUS BLD VENIPUNCTURE: CPT

## 2022-07-25 PROCEDURE — 84466 ASSAY OF TRANSFERRIN: CPT

## 2022-07-25 RX ORDER — ACETAMINOPHEN 325 MG/1
650 TABLET ORAL ONCE
Status: CANCELLED | OUTPATIENT
Start: 2022-08-10

## 2022-07-25 RX ORDER — ACETAMINOPHEN 325 MG/1
650 TABLET ORAL ONCE
Status: CANCELLED | OUTPATIENT
Start: 2022-08-03

## 2022-07-25 RX ORDER — DIPHENHYDRAMINE HCL 25 MG
25 CAPSULE ORAL ONCE
Status: CANCELLED | OUTPATIENT
Start: 2022-08-31

## 2022-07-25 RX ORDER — DIPHENHYDRAMINE HCL 25 MG
25 CAPSULE ORAL ONCE
Status: CANCELLED | OUTPATIENT
Start: 2022-08-24

## 2022-07-25 RX ORDER — ACETAMINOPHEN 325 MG/1
650 TABLET ORAL ONCE
Status: CANCELLED | OUTPATIENT
Start: 2022-08-31

## 2022-07-25 RX ORDER — SODIUM CHLORIDE 9 MG/ML
250 INJECTION, SOLUTION INTRAVENOUS ONCE
Status: CANCELLED | OUTPATIENT
Start: 2022-08-24

## 2022-07-25 RX ORDER — ACETAMINOPHEN 325 MG/1
650 TABLET ORAL ONCE
Status: CANCELLED | OUTPATIENT
Start: 2022-08-24

## 2022-07-25 RX ORDER — SODIUM CHLORIDE 9 MG/ML
250 INJECTION, SOLUTION INTRAVENOUS ONCE
Status: CANCELLED | OUTPATIENT
Start: 2022-08-03

## 2022-07-25 RX ORDER — SODIUM CHLORIDE 9 MG/ML
250 INJECTION, SOLUTION INTRAVENOUS ONCE
Status: CANCELLED | OUTPATIENT
Start: 2022-08-31

## 2022-07-25 RX ORDER — ACETAMINOPHEN 325 MG/1
650 TABLET ORAL ONCE
Status: CANCELLED | OUTPATIENT
Start: 2022-08-17

## 2022-07-25 RX ORDER — DIPHENHYDRAMINE HCL 25 MG
25 CAPSULE ORAL ONCE
Status: CANCELLED | OUTPATIENT
Start: 2022-08-10

## 2022-07-25 RX ORDER — SODIUM CHLORIDE 9 MG/ML
250 INJECTION, SOLUTION INTRAVENOUS ONCE
Status: CANCELLED | OUTPATIENT
Start: 2022-08-10

## 2022-07-25 RX ORDER — SODIUM CHLORIDE 9 MG/ML
250 INJECTION, SOLUTION INTRAVENOUS ONCE
Status: CANCELLED | OUTPATIENT
Start: 2022-08-17

## 2022-07-25 RX ORDER — DIPHENHYDRAMINE HCL 25 MG
25 CAPSULE ORAL ONCE
Status: CANCELLED | OUTPATIENT
Start: 2022-08-17

## 2022-07-25 RX ORDER — DIPHENHYDRAMINE HCL 25 MG
25 CAPSULE ORAL ONCE
Status: CANCELLED | OUTPATIENT
Start: 2022-08-03

## 2022-07-25 NOTE — PROGRESS NOTES
.     REASON FOR FOLLOWUP:     * Iron deficiency anemia.   · Symptomatic anemia, required PRBC transfusion in April 2022.  She was given ferric gluconate  mg.  · Patient not able to tolerate oral iron treatment with constipation and nausea.      HISTORY OF PRESENT ILLNESS:  The patient is a 81 y.o. year old female with iron deficiency anemia who returns today for reevaluation, as her  called us reporting patient has poor tolerance to oral iron treatment with constipation and nausea, in the meantime she has worsening fatigue.  Patient is here today for reevaluation with stat laboratory studies.  Her  helped with history.    I saw her originally on 4/23/2022 for consultation because of iron deficiency anemia.  She was already given PRBC transfusion due to severe symptomatic anemia.  Patient had a severe iron deficiency and we treated her with 2 doses of ferric gluconate total of 500 mg prior to her discharge on 4/25/2022.    Laboratory studies on 6/13/2022 reported much improved hemoglobin 11.3, and the improved MCV 79.9.  Patient has normal CBC and platelets.  Iron study reported significant improved iron saturation 56% with  free iron 216 TIBC 384, however ferritin is only mildly improved at 27.2 ng/mL.    We recommended to continue oral iron ferrous gluconate.     Today on 7/25/2022, patient reports no melena, hematochezia.  She denies other bleeding needed.  She denies fainting syncope.  She has worsening fatigue.    Laboratory study today on 7/25/2022 reported worsening anemia with Hb 10.1, and iron study showed significant worsening free iron 17, iron saturation 5% with TIBC 339, and also trending down ferritin 21.4 ng/mL.         Past Medical History:   Diagnosis Date   • Atrial fibrillation (HCC)    • Breast cancer (HCC)     left   • Cancer (HCC)     Left breast   • Depression    • Gastrointestinal bleed     ulcer   • History of anemia    • History of blood transfusion    • Hypertension     • Reflux esophagitis     GERD   • Stroke (HCC) 2019     Past Surgical History:   Procedure Laterality Date   • BREAST BIOPSY     • BREAST SURGERY  1998    Left masectomy   • COLONOSCOPY N/A 4/27/2019    Procedure: COLONOSCOPY TO CECUM AND TO TI WITH COLD BX POLYPECTOMY;  Surgeon: Peter Jimenez MD;  Location: [facility] ENDOSCOPY;  Service: Gastroenterology   • EMBOLECTOMY Left 3/25/2019    Procedure: MECHANICAL THROMBECTOMY;  Surgeon: Truong Chambers MD;  Location: [facility] HYBRID OR 18/19;  Service: Interventional Radiology   • ENDOSCOPY N/A 7/9/2016    Procedure: ESOPHAGOGASTRODUODENOSCOPY  WITH BIOPSY;  Surgeon: Peter Corrigan MD;  Location: [facility] ENDOSCOPY;  Service:    • ENDOSCOPY N/A 4/27/2019    Procedure: ESOPHAGOGASTRODUODENOSCOPY WITH BIOPSIES;  Surgeon: Peter Jimenez MD;  Location: [facility] ENDOSCOPY;  Service: Gastroenterology   • MASTECTOMY Left      HEMATOLOGY HISTORY:  The patient is a 81 y.o. year old female was recurrent iron deficient anemia, hypertension, history of stroke on Xarelto anticoagulation, history of left breast cancer status status post mastectomy, who was admitted for symptomatic severe iron deficiency anemia.  History mostly provided by her  who is at bedside.     Patient and her  report that she had significant fatigue, lightheadedness, and exertional dyspnea.  Family member also noticed patient become very pale.  She had profound fatigue.  She had no pica for ice chips or any other things.  According to , patient was taking oral iron once a day prescribed by her primary care physician Dr. Skinny Contreras, together with daily vitamin B12, however patient developed significant constipation, so she stopped oral iron.  Vitamin B12 was also stopped at the same time a couple months earlier.      Her  reports patient had similar episodes 3 times in the past 10 years.  She previously had gastric ulcer, discovered by EGD examination performed by Dr. Peter Corrigan  about 5 to 6 years ago.  That was in July 2016 per documents I reviewed.  Patient also had a scope 3 years ago by Dr. Peter Jimenez.  I reviewed that procedure report dated 4/27/2019 of both EGD and the colonoscopy examination.  EGD examination reported a large hiatal hernia, and large area of erythematous changes in the stomach.  Biopsy was taken.  Colonoscopy examination reported diverticulum in the sigmoid colon, and also a small 3 mm polyp in the rectum which was resected.  Otherwise unremarkable.  Pathology evaluation reported a benign gastric mucosa, no active gastritis, no H. pylori, no metaplastic or dysplastic changes.  The rectum polyp was adenomatous colonic polyp.  No dysplastic changes.     Nevertheless when patient presented to the ER on 4/23/2022, she was found to having severe anemia with Hb 6.4, MCV 67.8, MCHC 27.1.  Normal platelets 331,000 and a WBC 6340 including neutrophils 5270 lymphocytes 800.  Iron study reported ferritin 6.23 ng/mL, free iron 11 TIBC 460 and iron saturation 2%.     Patient was given 1 unit PRBC transfusion after admission.     Repeat laboratory study this morning on 4/24/2022 reported improved hemoglobin 7.7.  Maintains normal WBC and platelets.        MEDICATIONS    Current Outpatient Medications:   •  acetaminophen (TYLENOL) 325 MG tablet, Take 2 tablets by mouth Every 6 (Six) Hours As Needed for Mild Pain ., Disp: , Rfl:   •  amLODIPine (NORVASC) 10 MG tablet, Take 1 tablet by mouth Daily., Disp: 30 tablet, Rfl: 1  •  atorvastatin (LIPITOR) 80 MG tablet, Take 1 tablet by mouth Every Night., Disp: 30 tablet, Rfl: 0  •  metoprolol succinate XL (TOPROL-XL) 25 MG 24 hr tablet, , Disp: , Rfl:   •  pantoprazole (Protonix) 40 MG EC tablet, Take 1 tablet by mouth Daily., Disp: 30 tablet, Rfl: 0  •  sertraline (ZOLOFT) 50 MG tablet, Take 50 mg by mouth daily., Disp: , Rfl:   •  Xarelto 20 MG tablet, Take 20 mg by mouth Daily., Disp: , Rfl:   •  Cyanocobalamin (Vitamin B-12) 1000 MCG  "sublingual tablet, Place 1 tablet under the tongue Daily for 90 days., Disp: 90 tablet, Rfl: 3  •  Fe Cbn-Fe Gluc-FA-B12-C-DSS (Ferralet 90) 90-1 MG tablet, Take 1 tablet by mouth Daily., Disp: , Rfl:   •  Vitamin D, Cholecalciferol, (CHOLECALCIFEROL) 10 MCG (400 UNIT) tablet, Take 800 Units by mouth Daily., Disp: , Rfl:     ALLERGIES:     Allergies   Allergen Reactions   • Codeine Other (See Comments)     HEADACHE  States it gives her a headache       SOCIAL HISTORY:       Social History     Socioeconomic History   • Marital status:    Tobacco Use   • Smoking status: Never Smoker   • Smokeless tobacco: Never Used   Substance and Sexual Activity   • Alcohol use: Yes     Comment: 1 or 2 on the weekend   • Drug use: No   • Sexual activity: Defer         FAMILY HISTORY:  Family History   Problem Relation Age of Onset   • Heart disease Mother    • Clotting disorder Father    • Breast cancer Sister    • Breast cancer Sister    • Heart disease Paternal Uncle        REVIEW OF SYSTEMS:  Review of Systems see HPI.           Vitals:    07/25/22 0907   BP: 110/77   Pulse: (!) 143  Comment: Hx of A-fib, pt is also excited right now   Resp: 18   Temp: 97 °F (36.1 °C)   TempSrc: Temporal   SpO2: 96%   Weight: 73.6 kg (162 lb 3.2 oz)   Height: 158.6 cm (62.44\")   PainSc: 0-No pain     Current Status 7/25/2022   ECOG score 1        PHYSICAL EXAM:    GENERAL:  Well-developed, well-nourished elder  female, looks pale, in no acute distress.  Orientated to time place and the people.  She uses a cane.  SKIN:  Warm, dry without rashes, purpura or petechiae.  HEENT:  Normocephalic.  Wearing mask.   LYMPHATICS:  No cervical, supraclavicular adenopathy.  CHEST: Normal respiratory effort.  Lungs clear to auscultation. Good airflow.  CARDIAC: Irregular rhythm, tachycardic.  Normal S1,S2.  ABDOMEN:  Soft, nontender with no organomegaly or masses.  Bowel sounds normal.  EXTREMITIES:  No clubbing, cyanosis or " edema.  NEUROLOGICAL:  Grossly intact.   PSYCHIATRIC:  Normal affect and mood.          RECENT LABS:  Lab Results   Component Value Date    WBC 7.07 07/25/2022    HGB 10.1 (L) 07/25/2022    HCT 34.2 07/25/2022    MCV 80.5 07/25/2022     07/25/2022     Lab Results   Component Value Date    NEUTROABS 5.09 07/25/2022       Lab Results   Component Value Date    IRON 17 (L) 07/25/2022    TIBC 339 07/25/2022    FERRITIN 21.40 07/25/2022   Iron saturation 5% on 7/25/2022.         Component Value Date     IRON 216 (H) 06/13/2022     TIBC 384 06/13/2022     FERRITIN 27.20 06/13/2022   Iron saturation 56% on 6/13/2022.      Lab Results   Component Value Date    FOLATE 17.00 01/27/2020     Lab Results   Component Value Date    VQODFTHM27 510 01/27/2020         Assessment & Plan     *Severe symptomatic iron deficiency anemia Hb 6.4 with exertional dyspnea, fatigue, and paleness.  Patient was not able to tolerate oral iron treatment even once a day because of significant constipation.    · Patient was given 1 unit of PRBC transfusion on 4/23/2022, and has improved hemoglobin 7.7 on 4/24/2022.   · Laboratory study on 4/23/2022 also reported severe iron deficiency with iron saturation 2% and ferritin 6.2 ng/mL.  She has microcytosis and hypochromia.  Patient was not tolerating oral iron treatment.  · I recommended to have intravenous iron therapy with ferric gluconate 250 mg daily for 3 days.  · She had both EGD and the colonoscopy examination in April 2019 at which time showed large hiatal hernia, in the stomach biopsies showed a normal gastric mucosa.  No evidence for gastric ulcer. (She had gastric ulcer in 2016).   · This patient previously was started on oral vitamin B12 by her primary care physician Dr. Skinny Contreras however she discontinued together with oral iron.  I am in agreement for this patient to continue oral vitamin B12 to help with erythropoiesis.  Her labs in January 2020 only showed a mediocre B12 level at  510 pg/mL.  There is no side effects for oral vitamin B12 treatment.   · Patient received 2 doses of ferric gluconate total 500 mg in April 2022 before her discharge on 4/25/2022.  · Lab study on 6/13/2022 reported improved hemoglobin 11.3, MCV 79.9, and normal platelets 320,000 WBC 7140 including ANC 5210.  Iron study reported significant improvement of free iron 216, iron saturation 56% and the TIBC 384.  Ferritin was 27.2 ng/mL.  · Patient  called reporting patient is not able to tolerate oral iron treatment.  However she continues to have deteriorating fatigue.  We brought patient today for reevaluation.  Laboratory study today on 7/25/2022 reported worsening anemia Hb 10.1, and also recurrent iron deficiency with ferritin 21.4 ng/mL, and iron saturation 5% free iron 17 TIBC 339.  We will arrange patient to have intravenous iron therapy with Injectafer versus Venofer pending her insurance approval.        PLAN:  1. Schedule patient for IV iron therapy, obtain insurance approval, with Injectafer 750 mg weekly for 2 doses, or Venofer 300 mg weekly for 5 doses, for total of 1500 mg.  2. We will check vitamin B12 and folate level today.  3. I asked patient to try over the sublingual vitamin B12 at 1000 mcg daily..  4. Keep appointment for laboratory studies on 9/12/2022 with CBC ferritin iron profile.   5. Patient will keep appoint with me on 12/5/2022 for reassessment.  We will get lab studies CBC and iron studies.    Reviewed laboratory results with the patient and her  today, and we discussed the management plan.  They voiced understanding.         AD LAND M.D., Ph.D.     7/25/2022.    CC:  Skinny Contreras MD

## 2022-08-03 ENCOUNTER — INFUSION (OUTPATIENT)
Dept: ONCOLOGY | Facility: HOSPITAL | Age: 82
End: 2022-08-03

## 2022-08-03 ENCOUNTER — TELEPHONE (OUTPATIENT)
Dept: ONCOLOGY | Facility: HOSPITAL | Age: 82
End: 2022-08-03

## 2022-08-03 VITALS
TEMPERATURE: 97 F | BODY MASS INDEX: 29.5 KG/M2 | SYSTOLIC BLOOD PRESSURE: 136 MMHG | HEART RATE: 78 BPM | OXYGEN SATURATION: 96 % | RESPIRATION RATE: 18 BRPM | WEIGHT: 163.6 LBS | DIASTOLIC BLOOD PRESSURE: 78 MMHG

## 2022-08-03 DIAGNOSIS — T45.4X5A ADVERSE EFFECT OF IRON AND ITS COMPOUNDS, INITIAL ENCOUNTER: Primary | ICD-10-CM

## 2022-08-03 DIAGNOSIS — D50.9 IRON DEFICIENCY ANEMIA, UNSPECIFIED IRON DEFICIENCY ANEMIA TYPE: ICD-10-CM

## 2022-08-03 PROCEDURE — 25010000002 IRON SUCROSE PER 1 MG: Performed by: INTERNAL MEDICINE

## 2022-08-03 PROCEDURE — 63710000001 DIPHENHYDRAMINE PER 50 MG: Performed by: INTERNAL MEDICINE

## 2022-08-03 PROCEDURE — 96365 THER/PROPH/DIAG IV INF INIT: CPT

## 2022-08-03 RX ORDER — SODIUM CHLORIDE 9 MG/ML
250 INJECTION, SOLUTION INTRAVENOUS ONCE
Status: COMPLETED | OUTPATIENT
Start: 2022-08-03 | End: 2022-08-03

## 2022-08-03 RX ORDER — ACETAMINOPHEN 325 MG/1
650 TABLET ORAL ONCE
Status: COMPLETED | OUTPATIENT
Start: 2022-08-03 | End: 2022-08-03

## 2022-08-03 RX ORDER — DIPHENHYDRAMINE HCL 25 MG
25 CAPSULE ORAL ONCE
Status: COMPLETED | OUTPATIENT
Start: 2022-08-03 | End: 2022-08-03

## 2022-08-03 RX ADMIN — IRON SUCROSE 300 MG: 20 INJECTION, SOLUTION INTRAVENOUS at 14:23

## 2022-08-03 RX ADMIN — SODIUM CHLORIDE 250 ML: 9 INJECTION, SOLUTION INTRAVENOUS at 13:57

## 2022-08-03 RX ADMIN — DIPHENHYDRAMINE HYDROCHLORIDE 25 MG: 25 CAPSULE ORAL at 13:58

## 2022-08-03 RX ADMIN — ACETAMINOPHEN 650 MG: 325 TABLET ORAL at 13:57

## 2022-08-10 ENCOUNTER — INFUSION (OUTPATIENT)
Dept: ONCOLOGY | Facility: HOSPITAL | Age: 82
End: 2022-08-10

## 2022-08-10 VITALS
TEMPERATURE: 98.2 F | OXYGEN SATURATION: 94 % | RESPIRATION RATE: 18 BRPM | WEIGHT: 163.2 LBS | DIASTOLIC BLOOD PRESSURE: 78 MMHG | BODY MASS INDEX: 29.43 KG/M2 | SYSTOLIC BLOOD PRESSURE: 147 MMHG | HEART RATE: 64 BPM

## 2022-08-10 DIAGNOSIS — T45.4X5A ADVERSE EFFECT OF IRON AND ITS COMPOUNDS, INITIAL ENCOUNTER: Primary | ICD-10-CM

## 2022-08-10 DIAGNOSIS — D50.9 IRON DEFICIENCY ANEMIA, UNSPECIFIED IRON DEFICIENCY ANEMIA TYPE: ICD-10-CM

## 2022-08-10 PROCEDURE — 96365 THER/PROPH/DIAG IV INF INIT: CPT

## 2022-08-10 PROCEDURE — 96366 THER/PROPH/DIAG IV INF ADDON: CPT

## 2022-08-10 PROCEDURE — 25010000002 IRON SUCROSE PER 1 MG: Performed by: INTERNAL MEDICINE

## 2022-08-10 PROCEDURE — 63710000001 DIPHENHYDRAMINE PER 50 MG: Performed by: INTERNAL MEDICINE

## 2022-08-10 RX ORDER — SODIUM CHLORIDE 9 MG/ML
250 INJECTION, SOLUTION INTRAVENOUS ONCE
Status: COMPLETED | OUTPATIENT
Start: 2022-08-10 | End: 2022-08-10

## 2022-08-10 RX ORDER — DIPHENHYDRAMINE HCL 25 MG
25 CAPSULE ORAL ONCE
Status: COMPLETED | OUTPATIENT
Start: 2022-08-10 | End: 2022-08-10

## 2022-08-10 RX ORDER — ACETAMINOPHEN 325 MG/1
650 TABLET ORAL ONCE
Status: COMPLETED | OUTPATIENT
Start: 2022-08-10 | End: 2022-08-10

## 2022-08-10 RX ADMIN — ACETAMINOPHEN 650 MG: 325 TABLET ORAL at 13:05

## 2022-08-10 RX ADMIN — SODIUM CHLORIDE 250 ML: 9 INJECTION, SOLUTION INTRAVENOUS at 13:24

## 2022-08-10 RX ADMIN — DIPHENHYDRAMINE HYDROCHLORIDE 25 MG: 25 CAPSULE ORAL at 13:05

## 2022-08-10 RX ADMIN — IRON SUCROSE 300 MG: 20 INJECTION, SOLUTION INTRAVENOUS at 13:24

## 2022-08-17 ENCOUNTER — INFUSION (OUTPATIENT)
Dept: ONCOLOGY | Facility: HOSPITAL | Age: 82
End: 2022-08-17

## 2022-08-17 VITALS
WEIGHT: 164 LBS | RESPIRATION RATE: 16 BRPM | SYSTOLIC BLOOD PRESSURE: 130 MMHG | HEART RATE: 75 BPM | BODY MASS INDEX: 29.57 KG/M2 | DIASTOLIC BLOOD PRESSURE: 57 MMHG | TEMPERATURE: 98 F | OXYGEN SATURATION: 93 %

## 2022-08-17 DIAGNOSIS — T45.4X5A ADVERSE EFFECT OF IRON AND ITS COMPOUNDS, INITIAL ENCOUNTER: Primary | ICD-10-CM

## 2022-08-17 DIAGNOSIS — D50.9 IRON DEFICIENCY ANEMIA, UNSPECIFIED IRON DEFICIENCY ANEMIA TYPE: ICD-10-CM

## 2022-08-17 PROCEDURE — 96365 THER/PROPH/DIAG IV INF INIT: CPT

## 2022-08-17 PROCEDURE — 25010000002 IRON SUCROSE PER 1 MG: Performed by: INTERNAL MEDICINE

## 2022-08-17 PROCEDURE — 63710000001 DIPHENHYDRAMINE PER 50 MG: Performed by: INTERNAL MEDICINE

## 2022-08-17 RX ORDER — ACETAMINOPHEN 325 MG/1
650 TABLET ORAL ONCE
Status: COMPLETED | OUTPATIENT
Start: 2022-08-17 | End: 2022-08-17

## 2022-08-17 RX ORDER — SODIUM CHLORIDE 9 MG/ML
250 INJECTION, SOLUTION INTRAVENOUS ONCE
Status: COMPLETED | OUTPATIENT
Start: 2022-08-17 | End: 2022-08-17

## 2022-08-17 RX ORDER — DIPHENHYDRAMINE HCL 25 MG
25 CAPSULE ORAL ONCE
Status: COMPLETED | OUTPATIENT
Start: 2022-08-17 | End: 2022-08-17

## 2022-08-17 RX ADMIN — ACETAMINOPHEN 325MG 650 MG: 325 TABLET ORAL at 13:14

## 2022-08-17 RX ADMIN — DIPHENHYDRAMINE HYDROCHLORIDE 25 MG: 25 CAPSULE ORAL at 13:14

## 2022-08-17 RX ADMIN — IRON SUCROSE 300 MG: 20 INJECTION, SOLUTION INTRAVENOUS at 13:37

## 2022-08-17 RX ADMIN — SODIUM CHLORIDE 250 ML: 9 INJECTION, SOLUTION INTRAVENOUS at 13:37

## 2022-08-24 ENCOUNTER — INFUSION (OUTPATIENT)
Dept: ONCOLOGY | Facility: HOSPITAL | Age: 82
End: 2022-08-24

## 2022-08-24 VITALS
BODY MASS INDEX: 29.21 KG/M2 | OXYGEN SATURATION: 93 % | HEART RATE: 60 BPM | SYSTOLIC BLOOD PRESSURE: 117 MMHG | WEIGHT: 162 LBS | DIASTOLIC BLOOD PRESSURE: 69 MMHG | TEMPERATURE: 97 F | RESPIRATION RATE: 16 BRPM

## 2022-08-24 DIAGNOSIS — T45.4X5A ADVERSE EFFECT OF IRON AND ITS COMPOUNDS, INITIAL ENCOUNTER: Primary | ICD-10-CM

## 2022-08-24 DIAGNOSIS — D50.9 IRON DEFICIENCY ANEMIA, UNSPECIFIED IRON DEFICIENCY ANEMIA TYPE: ICD-10-CM

## 2022-08-24 PROCEDURE — 96365 THER/PROPH/DIAG IV INF INIT: CPT

## 2022-08-24 PROCEDURE — 25010000002 IRON SUCROSE PER 1 MG: Performed by: INTERNAL MEDICINE

## 2022-08-24 PROCEDURE — 96366 THER/PROPH/DIAG IV INF ADDON: CPT

## 2022-08-24 PROCEDURE — 63710000001 DIPHENHYDRAMINE PER 50 MG: Performed by: INTERNAL MEDICINE

## 2022-08-24 RX ORDER — DIPHENHYDRAMINE HCL 25 MG
25 CAPSULE ORAL ONCE
Status: COMPLETED | OUTPATIENT
Start: 2022-08-24 | End: 2022-08-24

## 2022-08-24 RX ORDER — SODIUM CHLORIDE 9 MG/ML
250 INJECTION, SOLUTION INTRAVENOUS ONCE
Status: COMPLETED | OUTPATIENT
Start: 2022-08-24 | End: 2022-08-24

## 2022-08-24 RX ORDER — ACETAMINOPHEN 325 MG/1
650 TABLET ORAL ONCE
Status: COMPLETED | OUTPATIENT
Start: 2022-08-24 | End: 2022-08-24

## 2022-08-24 RX ADMIN — IRON SUCROSE 300 MG: 20 INJECTION, SOLUTION INTRAVENOUS at 13:26

## 2022-08-24 RX ADMIN — DIPHENHYDRAMINE HYDROCHLORIDE 25 MG: 25 CAPSULE ORAL at 13:06

## 2022-08-24 RX ADMIN — SODIUM CHLORIDE 250 ML: 9 INJECTION, SOLUTION INTRAVENOUS at 13:26

## 2022-08-24 RX ADMIN — ACETAMINOPHEN 325MG 650 MG: 325 TABLET ORAL at 13:06

## 2022-08-31 ENCOUNTER — INFUSION (OUTPATIENT)
Dept: ONCOLOGY | Facility: HOSPITAL | Age: 82
End: 2022-08-31

## 2022-08-31 VITALS
BODY MASS INDEX: 29.14 KG/M2 | WEIGHT: 161.6 LBS | TEMPERATURE: 98.2 F | HEART RATE: 80 BPM | DIASTOLIC BLOOD PRESSURE: 76 MMHG | SYSTOLIC BLOOD PRESSURE: 136 MMHG | OXYGEN SATURATION: 92 % | RESPIRATION RATE: 18 BRPM

## 2022-08-31 DIAGNOSIS — D50.9 IRON DEFICIENCY ANEMIA, UNSPECIFIED IRON DEFICIENCY ANEMIA TYPE: ICD-10-CM

## 2022-08-31 DIAGNOSIS — T45.4X5A ADVERSE EFFECT OF IRON AND ITS COMPOUNDS, INITIAL ENCOUNTER: Primary | ICD-10-CM

## 2022-08-31 PROCEDURE — 96365 THER/PROPH/DIAG IV INF INIT: CPT

## 2022-08-31 PROCEDURE — 25010000002 IRON SUCROSE PER 1 MG: Performed by: INTERNAL MEDICINE

## 2022-08-31 PROCEDURE — 63710000001 DIPHENHYDRAMINE PER 50 MG: Performed by: INTERNAL MEDICINE

## 2022-08-31 RX ORDER — DIPHENHYDRAMINE HCL 25 MG
25 CAPSULE ORAL ONCE
Status: COMPLETED | OUTPATIENT
Start: 2022-08-31 | End: 2022-08-31

## 2022-08-31 RX ORDER — SODIUM CHLORIDE 9 MG/ML
250 INJECTION, SOLUTION INTRAVENOUS ONCE
Status: COMPLETED | OUTPATIENT
Start: 2022-08-31 | End: 2022-08-31

## 2022-08-31 RX ORDER — ACETAMINOPHEN 325 MG/1
650 TABLET ORAL ONCE
Status: COMPLETED | OUTPATIENT
Start: 2022-08-31 | End: 2022-08-31

## 2022-08-31 RX ADMIN — SODIUM CHLORIDE 250 ML: 9 INJECTION, SOLUTION INTRAVENOUS at 13:15

## 2022-08-31 RX ADMIN — IRON SUCROSE 300 MG: 20 INJECTION, SOLUTION INTRAVENOUS at 13:15

## 2022-08-31 RX ADMIN — DIPHENHYDRAMINE HYDROCHLORIDE 25 MG: 25 CAPSULE ORAL at 12:56

## 2022-08-31 RX ADMIN — ACETAMINOPHEN 650 MG: 325 TABLET ORAL at 12:56

## 2022-09-12 ENCOUNTER — LAB (OUTPATIENT)
Dept: LAB | Facility: HOSPITAL | Age: 82
End: 2022-09-12

## 2022-09-12 ENCOUNTER — CLINICAL SUPPORT (OUTPATIENT)
Dept: ONCOLOGY | Facility: HOSPITAL | Age: 82
End: 2022-09-12

## 2022-09-12 DIAGNOSIS — D50.9 IRON DEFICIENCY ANEMIA, UNSPECIFIED IRON DEFICIENCY ANEMIA TYPE: ICD-10-CM

## 2022-09-12 DIAGNOSIS — T45.4X5S ADVERSE EFFECT OF IRON, SEQUELA: ICD-10-CM

## 2022-09-12 LAB
BASOPHILS # BLD AUTO: 0.04 10*3/MM3 (ref 0–0.2)
BASOPHILS NFR BLD AUTO: 0.6 % (ref 0–1.5)
DEPRECATED RDW RBC AUTO: 56.3 FL (ref 37–54)
EOSINOPHIL # BLD AUTO: 0.23 10*3/MM3 (ref 0–0.4)
EOSINOPHIL NFR BLD AUTO: 3.5 % (ref 0.3–6.2)
ERYTHROCYTE [DISTWIDTH] IN BLOOD BY AUTOMATED COUNT: 18.1 % (ref 12.3–15.4)
FERRITIN SERPL-MCNC: 255.4 NG/ML (ref 13–150)
HCT VFR BLD AUTO: 44.2 % (ref 34–46.6)
HGB BLD-MCNC: 13.8 G/DL (ref 12–15.9)
IMM GRANULOCYTES # BLD AUTO: 0.03 10*3/MM3 (ref 0–0.05)
IMM GRANULOCYTES NFR BLD AUTO: 0.5 % (ref 0–0.5)
IRON 24H UR-MRATE: 72 MCG/DL (ref 37–145)
IRON SATN MFR SERPL: 24 % (ref 14–48)
LYMPHOCYTES # BLD AUTO: 1.17 10*3/MM3 (ref 0.7–3.1)
LYMPHOCYTES NFR BLD AUTO: 17.8 % (ref 19.6–45.3)
MCH RBC QN AUTO: 26.6 PG (ref 26.6–33)
MCHC RBC AUTO-ENTMCNC: 31.2 G/DL (ref 31.5–35.7)
MCV RBC AUTO: 85.3 FL (ref 79–97)
MONOCYTES # BLD AUTO: 0.5 10*3/MM3 (ref 0.1–0.9)
MONOCYTES NFR BLD AUTO: 7.6 % (ref 5–12)
NEUTROPHILS NFR BLD AUTO: 4.62 10*3/MM3 (ref 1.7–7)
NEUTROPHILS NFR BLD AUTO: 70 % (ref 42.7–76)
NRBC BLD AUTO-RTO: 0 /100 WBC (ref 0–0.2)
PLATELET # BLD AUTO: 272 10*3/MM3 (ref 140–450)
PMV BLD AUTO: 9.3 FL (ref 6–12)
RBC # BLD AUTO: 5.18 10*6/MM3 (ref 3.77–5.28)
TIBC SERPL-MCNC: 297 MCG/DL (ref 249–505)
TRANSFERRIN SERPL-MCNC: 212 MG/DL (ref 200–360)
WBC NRBC COR # BLD: 6.59 10*3/MM3 (ref 3.4–10.8)

## 2022-09-12 PROCEDURE — 85025 COMPLETE CBC W/AUTO DIFF WBC: CPT

## 2022-09-12 PROCEDURE — G0463 HOSPITAL OUTPT CLINIC VISIT: HCPCS

## 2022-09-12 PROCEDURE — 84466 ASSAY OF TRANSFERRIN: CPT

## 2022-09-12 PROCEDURE — 82728 ASSAY OF FERRITIN: CPT

## 2022-09-12 PROCEDURE — 83540 ASSAY OF IRON: CPT

## 2022-09-12 PROCEDURE — 36415 COLL VENOUS BLD VENIPUNCTURE: CPT

## 2022-09-12 NOTE — PROGRESS NOTES
Pt present for visit. Pt reports doing well and states she has more energy since receiving iron infusions. Hgb improved. Pt is not taking vitamin b 12. Encouraged pt to pick this up and begin 1000 mcg OTC. Pt v/u and given copy of labs. Iron results pending.    Lab Results   Component Value Date    WBC 6.59 09/12/2022    HGB 13.8 09/12/2022    HCT 44.2 09/12/2022    MCV 85.3 09/12/2022     09/12/2022     The pt does not require IV iron at this time.

## 2022-10-03 ENCOUNTER — TRANSCRIBE ORDERS (OUTPATIENT)
Dept: ADMINISTRATIVE | Facility: HOSPITAL | Age: 82
End: 2022-10-03

## 2022-10-03 ENCOUNTER — LAB (OUTPATIENT)
Dept: LAB | Facility: HOSPITAL | Age: 82
End: 2022-10-03

## 2022-10-03 DIAGNOSIS — I10 ESSENTIAL HYPERTENSION, MALIGNANT: ICD-10-CM

## 2022-10-03 DIAGNOSIS — E78.5 HYPERLIPIDEMIA, UNSPECIFIED HYPERLIPIDEMIA TYPE: ICD-10-CM

## 2022-10-03 DIAGNOSIS — Z00.00 ROUTINE GENERAL MEDICAL EXAMINATION AT A HEALTH CARE FACILITY: Primary | ICD-10-CM

## 2022-10-03 DIAGNOSIS — Z00.00 ROUTINE GENERAL MEDICAL EXAMINATION AT A HEALTH CARE FACILITY: ICD-10-CM

## 2022-10-03 LAB
ALBUMIN SERPL-MCNC: 4.1 G/DL (ref 3.5–5.2)
ALBUMIN/GLOB SERPL: 1.6 G/DL
ALP SERPL-CCNC: 118 U/L (ref 39–117)
ALT SERPL W P-5'-P-CCNC: 13 U/L (ref 1–33)
ANION GAP SERPL CALCULATED.3IONS-SCNC: 9 MMOL/L (ref 5–15)
AST SERPL-CCNC: 17 U/L (ref 1–32)
BASOPHILS # BLD AUTO: 0.03 10*3/MM3 (ref 0–0.2)
BASOPHILS NFR BLD AUTO: 0.5 % (ref 0–1.5)
BILIRUB SERPL-MCNC: 0.5 MG/DL (ref 0–1.2)
BUN SERPL-MCNC: 11 MG/DL (ref 8–23)
BUN/CREAT SERPL: 14.5 (ref 7–25)
CALCIUM SPEC-SCNC: 9.3 MG/DL (ref 8.6–10.5)
CHLORIDE SERPL-SCNC: 104 MMOL/L (ref 98–107)
CHOLEST SERPL-MCNC: 97 MG/DL (ref 0–200)
CO2 SERPL-SCNC: 29 MMOL/L (ref 22–29)
CREAT SERPL-MCNC: 0.76 MG/DL (ref 0.57–1)
DEPRECATED RDW RBC AUTO: 53.7 FL (ref 37–54)
EGFRCR SERPLBLD CKD-EPI 2021: 78.8 ML/MIN/1.73
EOSINOPHIL # BLD AUTO: 0.05 10*3/MM3 (ref 0–0.4)
EOSINOPHIL NFR BLD AUTO: 0.8 % (ref 0.3–6.2)
ERYTHROCYTE [DISTWIDTH] IN BLOOD BY AUTOMATED COUNT: 18 % (ref 12.3–15.4)
GLOBULIN UR ELPH-MCNC: 2.5 GM/DL
GLUCOSE SERPL-MCNC: 118 MG/DL (ref 65–99)
HCT VFR BLD AUTO: 39.6 % (ref 34–46.6)
HDLC SERPL-MCNC: 51 MG/DL (ref 40–60)
HGB BLD-MCNC: 12.9 G/DL (ref 12–15.9)
IMM GRANULOCYTES # BLD AUTO: 0.02 10*3/MM3 (ref 0–0.05)
IMM GRANULOCYTES NFR BLD AUTO: 0.3 % (ref 0–0.5)
LDLC SERPL CALC-MCNC: 30 MG/DL (ref 0–100)
LDLC/HDLC SERPL: 0.59 {RATIO}
LYMPHOCYTES # BLD AUTO: 0.46 10*3/MM3 (ref 0.7–3.1)
LYMPHOCYTES NFR BLD AUTO: 7.1 % (ref 19.6–45.3)
MCH RBC QN AUTO: 27.5 PG (ref 26.6–33)
MCHC RBC AUTO-ENTMCNC: 32.6 G/DL (ref 31.5–35.7)
MCV RBC AUTO: 84.4 FL (ref 79–97)
MONOCYTES # BLD AUTO: 0.6 10*3/MM3 (ref 0.1–0.9)
MONOCYTES NFR BLD AUTO: 9.3 % (ref 5–12)
NEUTROPHILS NFR BLD AUTO: 5.28 10*3/MM3 (ref 1.7–7)
NEUTROPHILS NFR BLD AUTO: 82 % (ref 42.7–76)
NRBC BLD AUTO-RTO: 0 /100 WBC (ref 0–0.2)
PLATELET # BLD AUTO: 219 10*3/MM3 (ref 140–450)
PMV BLD AUTO: 11.6 FL (ref 6–12)
POTASSIUM SERPL-SCNC: 3.5 MMOL/L (ref 3.5–5.2)
PROT SERPL-MCNC: 6.6 G/DL (ref 6–8.5)
RBC # BLD AUTO: 4.69 10*6/MM3 (ref 3.77–5.28)
SODIUM SERPL-SCNC: 142 MMOL/L (ref 136–145)
TRIGL SERPL-MCNC: 79 MG/DL (ref 0–150)
VLDLC SERPL-MCNC: 16 MG/DL (ref 5–40)
WBC NRBC COR # BLD: 6.44 10*3/MM3 (ref 3.4–10.8)

## 2022-10-03 PROCEDURE — 80061 LIPID PANEL: CPT

## 2022-10-03 PROCEDURE — 80053 COMPREHEN METABOLIC PANEL: CPT

## 2022-10-03 PROCEDURE — 85025 COMPLETE CBC W/AUTO DIFF WBC: CPT

## 2022-10-03 PROCEDURE — 36415 COLL VENOUS BLD VENIPUNCTURE: CPT

## 2022-10-08 ENCOUNTER — APPOINTMENT (OUTPATIENT)
Dept: LAB | Facility: HOSPITAL | Age: 82
End: 2022-10-08

## 2022-10-08 PROCEDURE — 87209 SMEAR COMPLEX STAIN: CPT

## 2022-10-08 PROCEDURE — 82272 OCCULT BLD FECES 1-3 TESTS: CPT

## 2022-10-08 PROCEDURE — 87324 CLOSTRIDIUM AG IA: CPT

## 2022-10-08 PROCEDURE — 87046 STOOL CULTR AEROBIC BACT EA: CPT

## 2022-10-08 PROCEDURE — 87177 OVA AND PARASITES SMEARS: CPT

## 2022-10-08 PROCEDURE — 87045 FECES CULTURE AEROBIC BACT: CPT

## 2022-10-08 PROCEDURE — 87427 SHIGA-LIKE TOXIN AG IA: CPT

## 2022-10-08 PROCEDURE — 83630 LACTOFERRIN FECAL (QUAL): CPT

## 2022-10-11 ENCOUNTER — LAB (OUTPATIENT)
Dept: LAB | Facility: HOSPITAL | Age: 82
End: 2022-10-11

## 2022-10-11 ENCOUNTER — TRANSCRIBE ORDERS (OUTPATIENT)
Dept: ADMINISTRATIVE | Facility: HOSPITAL | Age: 82
End: 2022-10-11

## 2022-10-11 DIAGNOSIS — R19.7 DIARRHEA OF PRESUMED INFECTIOUS ORIGIN: ICD-10-CM

## 2022-10-11 DIAGNOSIS — R19.7 DIARRHEA OF PRESUMED INFECTIOUS ORIGIN: Primary | ICD-10-CM

## 2022-10-11 LAB
HEMOCCULT STL QL: NEGATIVE
LACTOFERRIN STL QL LA: NEGATIVE

## 2022-10-13 LAB — C DIFF TOX A+B STL QL IA: NEGATIVE

## 2022-10-16 LAB
BACTERIA SPEC CULT: NORMAL
BACTERIA SPEC CULT: NORMAL
CAMPYLOBACTER STL CULT: NORMAL
E COLI SXT STL QL IA: NEGATIVE
SALM + SHIG STL CULT: NORMAL

## 2022-10-18 LAB
O+P SPEC MICRO: NORMAL
O+P STL CONC: NORMAL

## 2022-10-26 ENCOUNTER — TRANSCRIBE ORDERS (OUTPATIENT)
Dept: LAB | Facility: HOSPITAL | Age: 82
End: 2022-10-26

## 2022-10-26 DIAGNOSIS — R19.7 DIARRHEA OF PRESUMED INFECTIOUS ORIGIN: Primary | ICD-10-CM

## 2022-12-05 ENCOUNTER — TELEPHONE (OUTPATIENT)
Dept: ONCOLOGY | Facility: CLINIC | Age: 82
End: 2022-12-05

## 2022-12-05 ENCOUNTER — LAB (OUTPATIENT)
Dept: LAB | Facility: HOSPITAL | Age: 82
End: 2022-12-05

## 2022-12-05 ENCOUNTER — OFFICE VISIT (OUTPATIENT)
Dept: ONCOLOGY | Facility: CLINIC | Age: 82
End: 2022-12-05

## 2022-12-05 VITALS
BODY MASS INDEX: 27.42 KG/M2 | WEIGHT: 160.6 LBS | TEMPERATURE: 96.6 F | RESPIRATION RATE: 18 BRPM | SYSTOLIC BLOOD PRESSURE: 168 MMHG | HEIGHT: 64 IN | OXYGEN SATURATION: 96 % | HEART RATE: 61 BPM | DIASTOLIC BLOOD PRESSURE: 74 MMHG

## 2022-12-05 DIAGNOSIS — T45.4X5S ADVERSE EFFECT OF IRON AND ITS COMPOUNDS, SEQUELA: ICD-10-CM

## 2022-12-05 DIAGNOSIS — T45.4X5S ADVERSE EFFECT OF IRON, SEQUELA: ICD-10-CM

## 2022-12-05 DIAGNOSIS — D50.9 IRON DEFICIENCY ANEMIA, UNSPECIFIED IRON DEFICIENCY ANEMIA TYPE: ICD-10-CM

## 2022-12-05 DIAGNOSIS — D50.9 IRON DEFICIENCY ANEMIA, UNSPECIFIED IRON DEFICIENCY ANEMIA TYPE: Primary | ICD-10-CM

## 2022-12-05 LAB
BASOPHILS # BLD AUTO: 0.03 10*3/MM3 (ref 0–0.2)
BASOPHILS NFR BLD AUTO: 0.6 % (ref 0–1.5)
DEPRECATED RDW RBC AUTO: 44.6 FL (ref 37–54)
EOSINOPHIL # BLD AUTO: 0.21 10*3/MM3 (ref 0–0.4)
EOSINOPHIL NFR BLD AUTO: 3.9 % (ref 0.3–6.2)
ERYTHROCYTE [DISTWIDTH] IN BLOOD BY AUTOMATED COUNT: 13.4 % (ref 12.3–15.4)
FERRITIN SERPL-MCNC: 110.3 NG/ML (ref 13–150)
HCT VFR BLD AUTO: 45.1 % (ref 34–46.6)
HGB BLD-MCNC: 14.6 G/DL (ref 12–15.9)
IMM GRANULOCYTES # BLD AUTO: 0.02 10*3/MM3 (ref 0–0.05)
IMM GRANULOCYTES NFR BLD AUTO: 0.4 % (ref 0–0.5)
IRON 24H UR-MRATE: 72 MCG/DL (ref 37–145)
IRON SATN MFR SERPL: 23 % (ref 14–48)
LYMPHOCYTES # BLD AUTO: 0.94 10*3/MM3 (ref 0.7–3.1)
LYMPHOCYTES NFR BLD AUTO: 17.6 % (ref 19.6–45.3)
MCH RBC QN AUTO: 29.6 PG (ref 26.6–33)
MCHC RBC AUTO-ENTMCNC: 32.4 G/DL (ref 31.5–35.7)
MCV RBC AUTO: 91.3 FL (ref 79–97)
MONOCYTES # BLD AUTO: 0.38 10*3/MM3 (ref 0.1–0.9)
MONOCYTES NFR BLD AUTO: 7.1 % (ref 5–12)
NEUTROPHILS NFR BLD AUTO: 3.76 10*3/MM3 (ref 1.7–7)
NEUTROPHILS NFR BLD AUTO: 70.4 % (ref 42.7–76)
NRBC BLD AUTO-RTO: 0 /100 WBC (ref 0–0.2)
PLATELET # BLD AUTO: 235 10*3/MM3 (ref 140–450)
PMV BLD AUTO: 9.2 FL (ref 6–12)
RBC # BLD AUTO: 4.94 10*6/MM3 (ref 3.77–5.28)
TIBC SERPL-MCNC: 314 MCG/DL (ref 249–505)
TRANSFERRIN SERPL-MCNC: 224 MG/DL (ref 200–360)
WBC NRBC COR # BLD: 5.34 10*3/MM3 (ref 3.4–10.8)

## 2022-12-05 PROCEDURE — 84466 ASSAY OF TRANSFERRIN: CPT

## 2022-12-05 PROCEDURE — 85025 COMPLETE CBC W/AUTO DIFF WBC: CPT

## 2022-12-05 PROCEDURE — 83540 ASSAY OF IRON: CPT

## 2022-12-05 PROCEDURE — 82728 ASSAY OF FERRITIN: CPT

## 2022-12-05 PROCEDURE — 36415 COLL VENOUS BLD VENIPUNCTURE: CPT

## 2022-12-05 PROCEDURE — 99213 OFFICE O/P EST LOW 20 MIN: CPT | Performed by: INTERNAL MEDICINE

## 2022-12-05 NOTE — PROGRESS NOTES
.     REASON FOR FOLLOWUP:     * Iron deficiency anemia.   · Symptomatic anemia, required PRBC transfusion in April 2022.  She was given ferric gluconate  mg.  · Patient not able to tolerate oral iron treatment with constipation and nausea.      HISTORY OF PRESENT ILLNESS:  The patient is a 82 y.o. year old female with iron deficiency anemia, atrial fibrillation, and history of stroke with chronic anticoagulation, who returns today on 12/05/2022 for 4-month follow-up evaluation.  Patient is accompanied by her wife.    This patient received total five dose of Venofer treatment in 08/2022, total of 1500 mg.     She reports she has been feeling more energetic after the Venofer treatments.  Patient reports improved energy level after intravenous iron therapy in 08/2022. She reports no bleeding, bruising.     She continues taking Xarelto, full dose 20 mg daily for atrial fibrillation. She has atrial fibrillation, and also history of stroke previously.  She reports no melena or hematochezia.    Laboratory studies today reported normal hemoglobin 14.6 g/dL, platelets 235,000 mcL and WBC 5340 mL.         Past Medical History:   Diagnosis Date   • Atrial fibrillation (HCC)    • Breast cancer (HCC)     left   • Cancer (HCC)     Left breast   • Depression    • Gastrointestinal bleed     ulcer   • History of anemia    • History of blood transfusion    • Hypertension    • Reflux esophagitis     GERD   • Stroke (HCC) 2019     Past Surgical History:   Procedure Laterality Date   • BREAST BIOPSY     • BREAST SURGERY  1998    Left masectomy   • COLONOSCOPY N/A 4/27/2019    Procedure: COLONOSCOPY TO CECUM AND TO TI WITH COLD BX POLYPECTOMY;  Surgeon: Peter Jimenez MD;  Location: [facility] ENDOSCOPY;  Service: Gastroenterology   • EMBOLECTOMY Left 3/25/2019    Procedure: MECHANICAL THROMBECTOMY;  Surgeon: Truong Chambers MD;  Location: [facility] HYBRID OR 18/19;  Service: Interventional Radiology   • ENDOSCOPY N/A 7/9/2016     Procedure: ESOPHAGOGASTRODUODENOSCOPY  WITH BIOPSY;  Surgeon: Peter Corrigan MD;  Location: [facility] ENDOSCOPY;  Service:    • ENDOSCOPY N/A 4/27/2019    Procedure: ESOPHAGOGASTRODUODENOSCOPY WITH BIOPSIES;  Surgeon: Peter Jimenez MD;  Location: [facility] ENDOSCOPY;  Service: Gastroenterology   • MASTECTOMY Left          HEMATOLOGY HISTORY:  The patient is a 81 y.o. year old female was recurrent iron deficient anemia, hypertension, history of stroke on Xarelto anticoagulation, history of left breast cancer status status post mastectomy, who was admitted for symptomatic severe iron deficiency anemia.  History mostly provided by her  who is at bedside.     Patient and her  report that she had significant fatigue, lightheadedness, and exertional dyspnea.  Family member also noticed patient become very pale.  She had profound fatigue.  She had no pica for ice chips or any other things.  According to , patient was taking oral iron once a day prescribed by her primary care physician Dr. Skinny Contreras, together with daily vitamin B12, however patient developed significant constipation, so she stopped oral iron.  Vitamin B12 was also stopped at the same time a couple months earlier.      Her  reports patient had similar episodes 3 times in the past 10 years.  She previously had gastric ulcer, discovered by EGD examination performed by Dr. Peter Corrigan about 5 to 6 years ago.  That was in July 2016 per documents I reviewed.  Patient also had a scope 3 years ago by Dr. Peter Jimenez.  I reviewed that procedure report dated 4/27/2019 of both EGD and the colonoscopy examination.  EGD examination reported a large hiatal hernia, and large area of erythematous changes in the stomach.  Biopsy was taken.  Colonoscopy examination reported diverticulum in the sigmoid colon, and also a small 3 mm polyp in the rectum which was resected.  Otherwise unremarkable.  Pathology evaluation reported a benign gastric mucosa,  no active gastritis, no H. pylori, no metaplastic or dysplastic changes.  The rectum polyp was adenomatous colonic polyp.  No dysplastic changes.     Nevertheless when patient presented to the ER on 4/23/2022, she was found to having severe anemia with Hb 6.4, MCV 67.8, MCHC 27.1.  Normal platelets 331,000 and a WBC 6340 including neutrophils 5270 lymphocytes 800.  Iron study reported ferritin 6.23 ng/mL, free iron 11 TIBC 460 and iron saturation 2%.     Patient was given 1 unit PRBC transfusion after admission.     Repeat laboratory study this morning on 4/24/2022 reported improved hemoglobin 7.7.  Maintains normal WBC and platelets.    I saw her originally on 4/23/2022 for consultation because of iron deficiency anemia.  She was already given PRBC transfusion due to severe symptomatic anemia.  Patient had a severe iron deficiency and we treated her with 2 doses of ferric gluconate total of 500 mg prior to her discharge on 4/25/2022.    Laboratory studies on 6/13/2022 reported much improved hemoglobin 11.3, and the improved MCV 79.9.  Patient has normal CBC and platelets.  Iron study reported significant improved iron saturation 56% with  free iron 216 TIBC 384, however ferritin is only mildly improved at 27.2 ng/mL.    We recommended to continue oral iron ferrous gluconate.     On 7/25/2022, patient reports no melena, hematochezia.  She denies other bleeding needed.  She denies fainting syncope.  She has worsening fatigue.  Laboratory study on 7/25/2022 reported worsening anemia with Hb 10.1, and iron study showed significant worsening free iron 17, iron saturation 5% with TIBC 339, and also trending down ferritin 21.4 ng/mL.     Patient was given Venofer total 1500 mg in August 2022.      MEDICATIONS    Current Outpatient Medications:   •  acetaminophen (TYLENOL) 325 MG tablet, Take 2 tablets by mouth Every 6 (Six) Hours As Needed for Mild Pain ., Disp: , Rfl:   •  amLODIPine (NORVASC) 10 MG tablet, Take 1 tablet  "by mouth Daily., Disp: 30 tablet, Rfl: 1  •  atorvastatin (LIPITOR) 80 MG tablet, Take 1 tablet by mouth Every Night., Disp: 30 tablet, Rfl: 0  •  Fe Cbn-Fe Gluc-FA-B12-C-DSS (Ferralet 90) 90-1 MG tablet, Take 1 tablet by mouth Daily., Disp: , Rfl:   •  metoprolol succinate XL (TOPROL-XL) 25 MG 24 hr tablet, , Disp: , Rfl:   •  pantoprazole (Protonix) 40 MG EC tablet, Take 1 tablet by mouth Daily., Disp: 30 tablet, Rfl: 0  •  sertraline (ZOLOFT) 50 MG tablet, Take 50 mg by mouth daily., Disp: , Rfl:   •  Vitamin D, Cholecalciferol, (CHOLECALCIFEROL) 10 MCG (400 UNIT) tablet, Take 800 Units by mouth Daily., Disp: , Rfl:   •  Xarelto 20 MG tablet, Take 20 mg by mouth Daily., Disp: , Rfl:     ALLERGIES:     Allergies   Allergen Reactions   • Codeine Other (See Comments)     HEADACHE  States it gives her a headache       SOCIAL HISTORY:       Social History     Socioeconomic History   • Marital status:    Tobacco Use   • Smoking status: Never   • Smokeless tobacco: Never   Substance and Sexual Activity   • Alcohol use: Yes     Comment: 1 or 2 on the weekend   • Drug use: No   • Sexual activity: Defer         FAMILY HISTORY:  Family History   Problem Relation Age of Onset   • Heart disease Mother    • Clotting disorder Father    • Breast cancer Sister    • Breast cancer Sister    • Heart disease Paternal Uncle             Vitals:    12/05/22 1056   BP: 168/74   Pulse: 61   Resp: 18   Temp: 96.6 °F (35.9 °C)   TempSrc: Temporal   SpO2: 96%   Weight: 72.8 kg (160 lb 9.6 oz)   Height: 162.6 cm (64\")  Comment: Pt states this is her height   PainSc: 0-No pain     Current Status 12/5/2022   ECOG score 1        PHYSICAL EXAM:    GENERAL:  Well-developed, well-nourished elderly  female, in no acute distress.  Orientated to time place and the people.  SKIN:  Warm, dry without rashes, purpura or petechiae.  HEENT:  Normocephalic.  Wearing mask.   LYMPHATICS:  No cervical, supraclavicular adenopathy.  CHEST: Normal " respiratory effort.  Lungs clear to auscultation. Good airflow.  CARDIAC:  Regular rate and rhythm without murmurs. (No arrhythmia at today from her atrial fibrillation ).  Normal S1,S2.   ABDOMEN:  Soft, nontender with no organomegaly or masses.  Bowel sounds normal.  EXTREMITIES:  No clubbing, cyanosis or edema.  NEUROLOGICAL:  Grossly intact.  No focal neurological deficits.  PSYCHIATRIC:  Normal affect and mood.        RECENT LABS:  Lab Results   Component Value Date    WBC 5.34 12/05/2022    HGB 14.6 12/05/2022    HCT 45.1 12/05/2022    MCV 91.3 12/05/2022     12/05/2022     Lab Results   Component Value Date    NEUTROABS 3.76 12/05/2022     Lab Results   Component Value Date    IRON 72 09/12/2022    TIBC 297 09/12/2022    FERRITIN 255.40 (H) 09/12/2022   Iron saturation 24% on 9/12/2022 .      Lab Results   Component Value Date    IRON 72 12/05/2022    TIBC 314 12/05/2022    FERRITIN 110.30 12/05/2022   Iron saturation 23% on 12/5/2022.     Lab Results   Component Value Date    FOLATE 17.50 07/25/2022     Lab Results   Component Value Date    PDTYVNCE51 528 07/25/2022         Assessment & Plan     *Severe symptomatic iron deficiency anemia Hb 6.4 with exertional dyspnea, fatigue, and paleness.  Patient was not able to tolerate oral iron treatment even once a day because of significant constipation.    · Patient was given 1 unit of PRBC transfusion on 4/23/2022, and has improved hemoglobin 7.7 on 4/24/2022.   · Laboratory study on 4/23/2022 also reported severe iron deficiency with iron saturation 2% and ferritin 6.2 ng/mL.  She has microcytosis and hypochromia.  Patient was not tolerating oral iron treatment.  · I recommended to have intravenous iron therapy with ferric gluconate 250 mg daily for 3 days.  · She had both EGD and the colonoscopy examination in April 2019 at which time showed large hiatal hernia, in the stomach biopsies showed a normal gastric mucosa.  No evidence for gastric ulcer. (She  had gastric ulcer in 2016).   · This patient previously was started on oral vitamin B12 by her primary care physician Dr. Skinny Contreras however she discontinued together with oral iron.  I am in agreement for this patient to continue oral vitamin B12 to help with erythropoiesis.  Her labs in January 2020 only showed a mediocre B12 level at 510 pg/mL.  There is no side effects for oral vitamin B12 treatment.   · Patient received 2 doses of ferric gluconate total 500 mg in April 2022 before her discharge on 4/25/2022.  · Lab study on 6/13/2022 reported improved hemoglobin 11.3, MCV 79.9, and normal platelets 320,000 WBC 7140 including ANC 5210.  Iron study reported significant improvement of free iron 216, iron saturation 56% and the TIBC 384.  Ferritin was 27.2 ng/mL.  · Patient  called reporting patient is not able to tolerate oral iron treatment.  However she continues to have deteriorating fatigue.  We brought patient today for reevaluation.  Laboratory study today on 7/25/2022 reported worsening anemia Hb 10.1, and also recurrent iron deficiency with ferritin 21.4 ng/mL, and iron saturation 5% free iron 17 TIBC 339.  Folate 17.5 ng/mL and B12 level 528 mcg/mL.  We will arrange patient to have intravenous iron therapy with Injectafer versus Venofer pending her insurance approval.  · The patient was given Venofer five doses, 300 mg x 5, total of 1500 mg in 08/2022. She reported significantly improved energy level. Posttreatment labs on 09/12/2022, reported normalized the hemoglobin 13.8 g/dL, and normalized ferritin 255 mL and iron saturation 24% with a free 172 mL, TIBC 297 mL.   · Today on 12/05/2022, patient has further improved hemoglobin 14.6 g/dL, MCV 91.3, normal WBC 5340  and platelets 235,000. Iron study reported ferritin 110 and iron saturation 23%.  No need for IV iron treatment.  I recommended to monitor labs every 6 months.  ·         PLAN:  1. Monitor CBC, ferritin, iron profile every 6 months.   Possible IV iron if meeting criteria.   2. I will see patient in 12 months for reassessment, we will repeat the same labs.    I discussed with the patient and her  about laboratory results and further management plan.  They voiced understanding and agreeable.      AD LAND M.D., Ph.D.         CC:  Skinny Contreras MD           Transcribed from ambient dictation for Ad Land MD PhD by Isha Junior.  12/05/22   12:35 EST    Patient or patient representative verbalized consent to the visit recording.  I have personally performed the services described in this document as transcribed by the above individual, and it is both accurate and complete.

## 2022-12-05 NOTE — TELEPHONE ENCOUNTER
Attempted to call no answer left message.  Per Dr Segura Ferritin 110.3 and Iron Sat 23. No IV iron needed at this time

## 2023-01-01 NOTE — PLAN OF CARE
Problem: Adult Inpatient Plan of Care  Goal: Plan of Care Review  Flowsheets (Taken 4/24/2022 1124)  Plan of Care Reviewed With: patient  Outcome Evaluation:  remained at bedside, blood given and tolerated well, voiding without difficulty, no BM this shift,clear liqud diet. Will CTM   Goal Outcome Evaluation:  Plan of Care Reviewed With: patient           Outcome Evaluation:  remained at bedside, blood given and tolerated well, voiding without difficulty, no BM this shift,clear liqud diet. Will CTM  
Goal Outcome Evaluation:  Plan of Care Reviewed With: patient, family        Progress: improving  Pt discharging home today with family     
Goal Outcome Evaluation:  Plan of Care Reviewed With: patient, spouse        Progress: improving   New admit to floor this shift with spouse at bedside. Reports shortness of breath on exertion. Oxygen saturations dropped to 88% at rest on room air.Two liters of oxygen applied and oxygenation improved. Heme/onc consult called. Order to transfuse 1 unit of PRBC. Will CTM  
Goal Outcome Evaluation:  Plan of Care Reviewed With: patient, spouse        Progress: improving   No acute changes this shift. Titration of oxygen as needed. Currently on 2 liters. IV iron given as ordered. Stool sample sent to lab. Potassium replaced per protocol. SCDs refused. Will CTM.  
Goal Outcome Evaluation:  Pt noted to have some increased confusion overnight. She wanders and constantly gets OOB tangling herself in her tubing. Educated the patient and her  about the increased risk of falls. He stated that she does this all the time at home. Her occult stool was negative. The patient and her  stated that they do not want to do the scope that was recommended per GI. Bed alarm activated.                  
n/a

## 2023-05-23 ENCOUNTER — TRANSCRIBE ORDERS (OUTPATIENT)
Dept: ADMINISTRATIVE | Facility: HOSPITAL | Age: 83
End: 2023-05-23
Payer: MEDICARE

## 2023-05-23 ENCOUNTER — LAB (OUTPATIENT)
Dept: LAB | Facility: HOSPITAL | Age: 83
End: 2023-05-23
Payer: MEDICARE

## 2023-05-23 DIAGNOSIS — E78.5 HYPERLIPIDEMIA, UNSPECIFIED HYPERLIPIDEMIA TYPE: Primary | ICD-10-CM

## 2023-05-23 DIAGNOSIS — I10 ESSENTIAL HYPERTENSION, MALIGNANT: ICD-10-CM

## 2023-05-23 DIAGNOSIS — E78.5 HYPERLIPIDEMIA, UNSPECIFIED HYPERLIPIDEMIA TYPE: ICD-10-CM

## 2023-05-23 LAB
ALBUMIN SERPL-MCNC: 4.1 G/DL (ref 3.5–5.2)
ALBUMIN/GLOB SERPL: 1.3 G/DL
ALP SERPL-CCNC: 130 U/L (ref 39–117)
ALT SERPL W P-5'-P-CCNC: 13 U/L (ref 1–33)
ANION GAP SERPL CALCULATED.3IONS-SCNC: 12.2 MMOL/L (ref 5–15)
AST SERPL-CCNC: 19 U/L (ref 1–32)
BILIRUB SERPL-MCNC: 0.4 MG/DL (ref 0–1.2)
BUN SERPL-MCNC: 23 MG/DL (ref 8–23)
BUN/CREAT SERPL: 27.1 (ref 7–25)
CALCIUM SPEC-SCNC: 9.8 MG/DL (ref 8.6–10.5)
CHLORIDE SERPL-SCNC: 105 MMOL/L (ref 98–107)
CHOLEST SERPL-MCNC: 110 MG/DL (ref 0–200)
CO2 SERPL-SCNC: 25.8 MMOL/L (ref 22–29)
CREAT SERPL-MCNC: 0.85 MG/DL (ref 0.57–1)
EGFRCR SERPLBLD CKD-EPI 2021: 68.9 ML/MIN/1.73
GLOBULIN UR ELPH-MCNC: 3.2 GM/DL
GLUCOSE SERPL-MCNC: 99 MG/DL (ref 65–99)
HDLC SERPL-MCNC: 63 MG/DL (ref 40–60)
LDLC SERPL CALC-MCNC: 29 MG/DL (ref 0–100)
LDLC/HDLC SERPL: 0.45 {RATIO}
POTASSIUM SERPL-SCNC: 4 MMOL/L (ref 3.5–5.2)
PROT SERPL-MCNC: 7.3 G/DL (ref 6–8.5)
SODIUM SERPL-SCNC: 143 MMOL/L (ref 136–145)
TRIGL SERPL-MCNC: 92 MG/DL (ref 0–150)
VLDLC SERPL-MCNC: 18 MG/DL (ref 5–40)

## 2023-05-23 PROCEDURE — 80053 COMPREHEN METABOLIC PANEL: CPT

## 2023-05-23 PROCEDURE — 36415 COLL VENOUS BLD VENIPUNCTURE: CPT

## 2023-05-23 PROCEDURE — 80061 LIPID PANEL: CPT

## 2023-06-13 ENCOUNTER — TELEPHONE (OUTPATIENT)
Dept: ONCOLOGY | Facility: CLINIC | Age: 83
End: 2023-06-13

## 2023-06-13 NOTE — TELEPHONE ENCOUNTER
Caller: Dr Tylor Diaz    Relationship to patient: Emergency Contact    Best call back number:   [phone]     Chief complaint: PATIENT TO RESCHEDULE FROM MISSED LAB 6/5/23    Type of visit: LAB    Requested date: PREFERS NOT A TUES OR FRI

## 2023-09-01 ENCOUNTER — LAB (OUTPATIENT)
Dept: LAB | Facility: HOSPITAL | Age: 83
End: 2023-09-01
Payer: MEDICARE

## 2023-09-01 ENCOUNTER — TRANSCRIBE ORDERS (OUTPATIENT)
Dept: ADMINISTRATIVE | Facility: HOSPITAL | Age: 83
End: 2023-09-01
Payer: MEDICARE

## 2023-09-01 DIAGNOSIS — Z00.00 ROUTINE GENERAL MEDICAL EXAMINATION AT A HEALTH CARE FACILITY: ICD-10-CM

## 2023-09-01 DIAGNOSIS — Z00.00 ROUTINE GENERAL MEDICAL EXAMINATION AT A HEALTH CARE FACILITY: Primary | ICD-10-CM

## 2023-09-01 LAB
ALBUMIN SERPL-MCNC: 4.2 G/DL (ref 3.5–5.2)
ALBUMIN/GLOB SERPL: 1.4 G/DL
ALP SERPL-CCNC: 160 U/L (ref 39–117)
ALT SERPL W P-5'-P-CCNC: 24 U/L (ref 1–33)
ANION GAP SERPL CALCULATED.3IONS-SCNC: 10 MMOL/L (ref 5–15)
AST SERPL-CCNC: 31 U/L (ref 1–32)
BASOPHILS # BLD AUTO: 0.04 10*3/MM3 (ref 0–0.2)
BASOPHILS NFR BLD AUTO: 0.7 % (ref 0–1.5)
BILIRUB SERPL-MCNC: 0.4 MG/DL (ref 0–1.2)
BUN SERPL-MCNC: 23 MG/DL (ref 8–23)
BUN/CREAT SERPL: 23.2 (ref 7–25)
CALCIUM SPEC-SCNC: 9.6 MG/DL (ref 8.6–10.5)
CHLORIDE SERPL-SCNC: 104 MMOL/L (ref 98–107)
CHOLEST SERPL-MCNC: 98 MG/DL (ref 0–200)
CO2 SERPL-SCNC: 26 MMOL/L (ref 22–29)
CREAT SERPL-MCNC: 0.99 MG/DL (ref 0.57–1)
DEPRECATED RDW RBC AUTO: 36.1 FL (ref 37–54)
EGFRCR SERPLBLD CKD-EPI 2021: 57.4 ML/MIN/1.73
EOSINOPHIL # BLD AUTO: 0.16 10*3/MM3 (ref 0–0.4)
EOSINOPHIL NFR BLD AUTO: 2.6 % (ref 0.3–6.2)
ERYTHROCYTE [DISTWIDTH] IN BLOOD BY AUTOMATED COUNT: 12.7 % (ref 12.3–15.4)
GLOBULIN UR ELPH-MCNC: 2.9 GM/DL
GLUCOSE SERPL-MCNC: 101 MG/DL (ref 65–99)
HCT VFR BLD AUTO: 34.5 % (ref 34–46.6)
HDLC SERPL-MCNC: 60 MG/DL (ref 40–60)
HGB BLD-MCNC: 11.2 G/DL (ref 12–15.9)
IMM GRANULOCYTES # BLD AUTO: 0.01 10*3/MM3 (ref 0–0.05)
IMM GRANULOCYTES NFR BLD AUTO: 0.2 % (ref 0–0.5)
LDLC SERPL CALC-MCNC: 22 MG/DL (ref 0–100)
LDLC/HDLC SERPL: 0.38 {RATIO}
LYMPHOCYTES # BLD AUTO: 1.39 10*3/MM3 (ref 0.7–3.1)
LYMPHOCYTES NFR BLD AUTO: 22.9 % (ref 19.6–45.3)
MCH RBC QN AUTO: 26 PG (ref 26.6–33)
MCHC RBC AUTO-ENTMCNC: 32.5 G/DL (ref 31.5–35.7)
MCV RBC AUTO: 80.2 FL (ref 79–97)
MONOCYTES # BLD AUTO: 0.59 10*3/MM3 (ref 0.1–0.9)
MONOCYTES NFR BLD AUTO: 9.7 % (ref 5–12)
NEUTROPHILS NFR BLD AUTO: 3.89 10*3/MM3 (ref 1.7–7)
NEUTROPHILS NFR BLD AUTO: 63.9 % (ref 42.7–76)
NRBC BLD AUTO-RTO: 0 /100 WBC (ref 0–0.2)
PLATELET # BLD AUTO: 301 10*3/MM3 (ref 140–450)
PMV BLD AUTO: 11.4 FL (ref 6–12)
POTASSIUM SERPL-SCNC: 4.3 MMOL/L (ref 3.5–5.2)
PROT SERPL-MCNC: 7.1 G/DL (ref 6–8.5)
RBC # BLD AUTO: 4.3 10*6/MM3 (ref 3.77–5.28)
SODIUM SERPL-SCNC: 140 MMOL/L (ref 136–145)
TRIGL SERPL-MCNC: 77 MG/DL (ref 0–150)
VLDLC SERPL-MCNC: 16 MG/DL (ref 5–40)
WBC NRBC COR # BLD: 6.08 10*3/MM3 (ref 3.4–10.8)

## 2023-09-01 PROCEDURE — 80053 COMPREHEN METABOLIC PANEL: CPT

## 2023-09-01 PROCEDURE — 80061 LIPID PANEL: CPT

## 2023-09-01 PROCEDURE — 85025 COMPLETE CBC W/AUTO DIFF WBC: CPT

## 2023-09-01 PROCEDURE — 36415 COLL VENOUS BLD VENIPUNCTURE: CPT

## 2023-12-04 DIAGNOSIS — D50.9 IRON DEFICIENCY ANEMIA, UNSPECIFIED IRON DEFICIENCY ANEMIA TYPE: ICD-10-CM

## 2023-12-04 DIAGNOSIS — T45.4X5S ADVERSE EFFECT OF IRON AND ITS COMPOUNDS, SEQUELA: Primary | ICD-10-CM

## 2023-12-05 ENCOUNTER — TRANSCRIBE ORDERS (OUTPATIENT)
Dept: ADMINISTRATIVE | Facility: HOSPITAL | Age: 83
End: 2023-12-05
Payer: MEDICARE

## 2023-12-05 ENCOUNTER — LAB (OUTPATIENT)
Dept: LAB | Facility: HOSPITAL | Age: 83
End: 2023-12-05
Payer: MEDICARE

## 2023-12-05 DIAGNOSIS — E78.5 HYPERLIPIDEMIA, UNSPECIFIED HYPERLIPIDEMIA TYPE: ICD-10-CM

## 2023-12-05 DIAGNOSIS — Z00.00 ROUTINE GENERAL MEDICAL EXAMINATION AT A HEALTH CARE FACILITY: ICD-10-CM

## 2023-12-05 DIAGNOSIS — D64.9 ANEMIA, UNSPECIFIED TYPE: Primary | ICD-10-CM

## 2023-12-05 DIAGNOSIS — D64.9 ANEMIA, UNSPECIFIED TYPE: ICD-10-CM

## 2023-12-05 DIAGNOSIS — R73.03 PREDIABETES: ICD-10-CM

## 2023-12-05 LAB
ALBUMIN SERPL-MCNC: 4 G/DL (ref 3.5–5.2)
ALBUMIN/GLOB SERPL: 1.3 G/DL
ALP SERPL-CCNC: 124 U/L (ref 39–117)
ALT SERPL W P-5'-P-CCNC: 17 U/L (ref 1–33)
ANION GAP SERPL CALCULATED.3IONS-SCNC: 9 MMOL/L (ref 5–15)
ANISOCYTOSIS BLD QL: ABNORMAL
AST SERPL-CCNC: 26 U/L (ref 1–32)
BACTERIA UR QL AUTO: ABNORMAL /HPF
BILIRUB SERPL-MCNC: 0.4 MG/DL (ref 0–1.2)
BILIRUB UR QL STRIP: NEGATIVE
BUN SERPL-MCNC: 23 MG/DL (ref 8–23)
BUN/CREAT SERPL: 23.2 (ref 7–25)
CALCIUM SPEC-SCNC: 9.5 MG/DL (ref 8.6–10.5)
CHLORIDE SERPL-SCNC: 107 MMOL/L (ref 98–107)
CHOLEST SERPL-MCNC: 97 MG/DL (ref 0–200)
CLARITY UR: CLEAR
CO2 SERPL-SCNC: 27 MMOL/L (ref 22–29)
COLOR UR: YELLOW
CREAT SERPL-MCNC: 0.99 MG/DL (ref 0.57–1)
DEPRECATED RDW RBC AUTO: 38 FL (ref 37–54)
EGFRCR SERPLBLD CKD-EPI 2021: 57 ML/MIN/1.73
ERYTHROCYTE [DISTWIDTH] IN BLOOD BY AUTOMATED COUNT: 14.9 % (ref 12.3–15.4)
GLOBULIN UR ELPH-MCNC: 3 GM/DL
GLUCOSE SERPL-MCNC: 111 MG/DL (ref 65–99)
GLUCOSE UR STRIP-MCNC: NEGATIVE MG/DL
HCT VFR BLD AUTO: 32.6 % (ref 34–46.6)
HDLC SERPL-MCNC: 57 MG/DL (ref 40–60)
HGB BLD-MCNC: 9.7 G/DL (ref 12–15.9)
HGB UR QL STRIP.AUTO: ABNORMAL
HYALINE CASTS UR QL AUTO: ABNORMAL /LPF
IRON 24H UR-MRATE: 20 MCG/DL (ref 37–145)
IRON SATN MFR SERPL: 4 % (ref 20–50)
KETONES UR QL STRIP: NEGATIVE
LDLC SERPL CALC-MCNC: 24 MG/DL (ref 0–100)
LDLC/HDLC SERPL: 0.42 {RATIO}
LEUKOCYTE ESTERASE UR QL STRIP.AUTO: ABNORMAL
LYMPHOCYTES # BLD MANUAL: 0.43 10*3/MM3 (ref 0.7–3.1)
LYMPHOCYTES NFR BLD MANUAL: 10.5 % (ref 5–12)
MCH RBC QN AUTO: 21.5 PG (ref 26.6–33)
MCHC RBC AUTO-ENTMCNC: 29.8 G/DL (ref 31.5–35.7)
MCV RBC AUTO: 72.3 FL (ref 79–97)
MICROCYTES BLD QL: ABNORMAL
MONOCYTES # BLD: 0.72 10*3/MM3 (ref 0.1–0.9)
NEUTROPHILS # BLD AUTO: 5.69 10*3/MM3 (ref 1.7–7)
NEUTROPHILS NFR BLD MANUAL: 83.2 % (ref 42.7–76)
NITRITE UR QL STRIP: NEGATIVE
NRBC BLD AUTO-RTO: 0 /100 WBC (ref 0–0.2)
PH UR STRIP.AUTO: 6 [PH] (ref 5–8)
PLAT MORPH BLD: NORMAL
PLATELET # BLD AUTO: 318 10*3/MM3 (ref 140–450)
PMV BLD AUTO: 10.9 FL (ref 6–12)
POIKILOCYTOSIS BLD QL SMEAR: ABNORMAL
POTASSIUM SERPL-SCNC: 4 MMOL/L (ref 3.5–5.2)
PROT SERPL-MCNC: 7 G/DL (ref 6–8.5)
PROT UR QL STRIP: ABNORMAL
RBC # BLD AUTO: 4.51 10*6/MM3 (ref 3.77–5.28)
RBC # UR STRIP: ABNORMAL /HPF
REF LAB TEST METHOD: ABNORMAL
SODIUM SERPL-SCNC: 143 MMOL/L (ref 136–145)
SP GR UR STRIP: 1.03 (ref 1–1.03)
SQUAMOUS #/AREA URNS HPF: ABNORMAL /HPF
TIBC SERPL-MCNC: 463 MCG/DL (ref 298–536)
TRANSFERRIN SERPL-MCNC: 311 MG/DL (ref 200–360)
TRIGL SERPL-MCNC: 79 MG/DL (ref 0–150)
UROBILINOGEN UR QL STRIP: ABNORMAL
VARIANT LYMPHS NFR BLD MANUAL: 6.3 % (ref 19.6–45.3)
VLDLC SERPL-MCNC: 16 MG/DL (ref 5–40)
WBC # UR STRIP: ABNORMAL /HPF
WBC MORPH BLD: NORMAL
WBC NRBC COR # BLD AUTO: 6.84 10*3/MM3 (ref 3.4–10.8)

## 2023-12-05 PROCEDURE — 85007 BL SMEAR W/DIFF WBC COUNT: CPT

## 2023-12-05 PROCEDURE — 80061 LIPID PANEL: CPT

## 2023-12-05 PROCEDURE — 84466 ASSAY OF TRANSFERRIN: CPT

## 2023-12-05 PROCEDURE — 81001 URINALYSIS AUTO W/SCOPE: CPT

## 2023-12-05 PROCEDURE — 83540 ASSAY OF IRON: CPT

## 2023-12-05 PROCEDURE — 80053 COMPREHEN METABOLIC PANEL: CPT

## 2023-12-05 PROCEDURE — 85025 COMPLETE CBC W/AUTO DIFF WBC: CPT

## 2023-12-05 PROCEDURE — 36415 COLL VENOUS BLD VENIPUNCTURE: CPT

## 2023-12-07 ENCOUNTER — LAB (OUTPATIENT)
Dept: LAB | Facility: HOSPITAL | Age: 83
End: 2023-12-07
Payer: MEDICARE

## 2023-12-07 ENCOUNTER — TRANSCRIBE ORDERS (OUTPATIENT)
Dept: ADMINISTRATIVE | Facility: HOSPITAL | Age: 83
End: 2023-12-07
Payer: MEDICARE

## 2023-12-07 DIAGNOSIS — R80.9 PROTEINURIA, UNSPECIFIED TYPE: ICD-10-CM

## 2023-12-07 DIAGNOSIS — R80.9 PROTEINURIA, UNSPECIFIED TYPE: Primary | ICD-10-CM

## 2023-12-07 LAB
BACTERIA UR QL AUTO: ABNORMAL /HPF
BILIRUB UR QL STRIP: NEGATIVE
CLARITY UR: ABNORMAL
COLOR UR: YELLOW
GLUCOSE UR STRIP-MCNC: NEGATIVE MG/DL
HGB UR QL STRIP.AUTO: ABNORMAL
HYALINE CASTS UR QL AUTO: ABNORMAL /LPF
KETONES UR QL STRIP: ABNORMAL
LEUKOCYTE ESTERASE UR QL STRIP.AUTO: ABNORMAL
MUCOUS THREADS URNS QL MICRO: ABNORMAL /HPF
NITRITE UR QL STRIP: NEGATIVE
PH UR STRIP.AUTO: 6 [PH] (ref 5–8)
PROT UR QL STRIP: ABNORMAL
RBC # UR STRIP: ABNORMAL /HPF
REF LAB TEST METHOD: ABNORMAL
SP GR UR STRIP: 1.02 (ref 1–1.03)
SQUAMOUS #/AREA URNS HPF: ABNORMAL /HPF
UROBILINOGEN UR QL STRIP: ABNORMAL
WBC # UR STRIP: ABNORMAL /HPF

## 2023-12-07 PROCEDURE — 81001 URINALYSIS AUTO W/SCOPE: CPT

## 2023-12-12 ENCOUNTER — OFFICE VISIT (OUTPATIENT)
Dept: ONCOLOGY | Facility: CLINIC | Age: 83
End: 2023-12-12
Payer: MEDICARE

## 2023-12-12 ENCOUNTER — LAB (OUTPATIENT)
Dept: LAB | Facility: HOSPITAL | Age: 83
End: 2023-12-12
Payer: MEDICARE

## 2023-12-12 VITALS
HEART RATE: 64 BPM | TEMPERATURE: 97.7 F | DIASTOLIC BLOOD PRESSURE: 73 MMHG | HEIGHT: 61 IN | SYSTOLIC BLOOD PRESSURE: 153 MMHG | OXYGEN SATURATION: 96 % | RESPIRATION RATE: 18 BRPM | BODY MASS INDEX: 30.21 KG/M2 | WEIGHT: 160 LBS

## 2023-12-12 DIAGNOSIS — D50.9 IRON DEFICIENCY ANEMIA, UNSPECIFIED IRON DEFICIENCY ANEMIA TYPE: Primary | ICD-10-CM

## 2023-12-12 DIAGNOSIS — T45.4X5S ADVERSE EFFECT OF IRON AND ITS COMPOUNDS, SEQUELA: ICD-10-CM

## 2023-12-12 DIAGNOSIS — T45.4X5S ADVERSE EFFECT OF IRON, SEQUELA: ICD-10-CM

## 2023-12-12 DIAGNOSIS — D50.9 IRON DEFICIENCY ANEMIA, UNSPECIFIED IRON DEFICIENCY ANEMIA TYPE: ICD-10-CM

## 2023-12-12 LAB
BASOPHILS # BLD AUTO: 0.04 10*3/MM3 (ref 0–0.2)
BASOPHILS NFR BLD AUTO: 0.6 % (ref 0–1.5)
COLLECT DATE SP2 STL: NORMAL
COLLECT DATE STL: NORMAL
DEPRECATED RDW RBC AUTO: 43.8 FL (ref 37–54)
EOSINOPHIL # BLD AUTO: 0.26 10*3/MM3 (ref 0–0.4)
EOSINOPHIL NFR BLD AUTO: 4.1 % (ref 0.3–6.2)
ERYTHROCYTE [DISTWIDTH] IN BLOOD BY AUTOMATED COUNT: 16.5 % (ref 12.3–15.4)
FERRITIN SERPL-MCNC: 9.73 NG/ML (ref 13–150)
GASTROCULT GAST QL: NEGATIVE
HCT VFR BLD AUTO: 34.7 % (ref 34–46.6)
HEMOCCULT SP2 STL QL: NEGATIVE
HEMOCCULT SP3 STL QL: NEGATIVE
HGB BLD-MCNC: 10.1 G/DL (ref 12–15.9)
IMM GRANULOCYTES # BLD AUTO: 0.02 10*3/MM3 (ref 0–0.05)
IMM GRANULOCYTES NFR BLD AUTO: 0.3 % (ref 0–0.5)
IRON 24H UR-MRATE: 21 MCG/DL (ref 37–145)
IRON SATN MFR SERPL: 5 % (ref 20–50)
LYMPHOCYTES # BLD AUTO: 1.1 10*3/MM3 (ref 0.7–3.1)
LYMPHOCYTES NFR BLD AUTO: 17.4 % (ref 19.6–45.3)
Lab: 1254
Lab: 1254
MCH RBC QN AUTO: 21.5 PG (ref 26.6–33)
MCHC RBC AUTO-ENTMCNC: 29.1 G/DL (ref 31.5–35.7)
MCV RBC AUTO: 74 FL (ref 79–97)
MONOCYTES # BLD AUTO: 0.69 10*3/MM3 (ref 0.1–0.9)
MONOCYTES NFR BLD AUTO: 10.9 % (ref 5–12)
NEUTROPHILS NFR BLD AUTO: 4.22 10*3/MM3 (ref 1.7–7)
NEUTROPHILS NFR BLD AUTO: 66.7 % (ref 42.7–76)
NRBC BLD AUTO-RTO: 0 /100 WBC (ref 0–0.2)
PLATELET # BLD AUTO: 304 10*3/MM3 (ref 140–450)
PMV BLD AUTO: 9.8 FL (ref 6–12)
RBC # BLD AUTO: 4.69 10*6/MM3 (ref 3.77–5.28)
TIBC SERPL-MCNC: 416 MCG/DL (ref 298–536)
TRANSFERRIN SERPL-MCNC: 297 MG/DL (ref 200–360)
WBC NRBC COR # BLD AUTO: 6.33 10*3/MM3 (ref 3.4–10.8)

## 2023-12-12 PROCEDURE — 85025 COMPLETE CBC W/AUTO DIFF WBC: CPT

## 2023-12-12 PROCEDURE — 82272 OCCULT BLD FECES 1-3 TESTS: CPT | Performed by: INTERNAL MEDICINE

## 2023-12-12 PROCEDURE — 82728 ASSAY OF FERRITIN: CPT

## 2023-12-12 PROCEDURE — 84466 ASSAY OF TRANSFERRIN: CPT

## 2023-12-12 PROCEDURE — 36415 COLL VENOUS BLD VENIPUNCTURE: CPT

## 2023-12-12 PROCEDURE — 83540 ASSAY OF IRON: CPT

## 2023-12-12 RX ORDER — DIPHENHYDRAMINE HCL 25 MG
25 CAPSULE ORAL ONCE
OUTPATIENT
Start: 2024-01-18

## 2023-12-12 RX ORDER — ACETAMINOPHEN 325 MG/1
650 TABLET ORAL ONCE
OUTPATIENT
Start: 2024-01-04

## 2023-12-12 RX ORDER — SODIUM CHLORIDE 9 MG/ML
20 INJECTION, SOLUTION INTRAVENOUS ONCE
OUTPATIENT
Start: 2024-01-25

## 2023-12-12 RX ORDER — ACETAMINOPHEN 325 MG/1
650 TABLET ORAL ONCE
OUTPATIENT
Start: 2024-01-11

## 2023-12-12 RX ORDER — SODIUM CHLORIDE 9 MG/ML
20 INJECTION, SOLUTION INTRAVENOUS ONCE
OUTPATIENT
Start: 2024-01-18

## 2023-12-12 RX ORDER — DIPHENHYDRAMINE HCL 25 MG
25 CAPSULE ORAL ONCE
OUTPATIENT
Start: 2023-12-21

## 2023-12-12 RX ORDER — SODIUM CHLORIDE 9 MG/ML
20 INJECTION, SOLUTION INTRAVENOUS ONCE
OUTPATIENT
Start: 2024-01-04

## 2023-12-12 RX ORDER — DIPHENHYDRAMINE HCL 25 MG
25 CAPSULE ORAL ONCE
OUTPATIENT
Start: 2024-01-04

## 2023-12-12 RX ORDER — SODIUM CHLORIDE 9 MG/ML
20 INJECTION, SOLUTION INTRAVENOUS ONCE
OUTPATIENT
Start: 2023-12-21

## 2023-12-12 RX ORDER — ACETAMINOPHEN 325 MG/1
650 TABLET ORAL ONCE
OUTPATIENT
Start: 2023-12-21

## 2023-12-12 RX ORDER — ACETAMINOPHEN 325 MG/1
650 TABLET ORAL ONCE
OUTPATIENT
Start: 2024-01-18

## 2023-12-12 RX ORDER — DIPHENHYDRAMINE HCL 25 MG
25 CAPSULE ORAL ONCE
OUTPATIENT
Start: 2024-01-11

## 2023-12-12 RX ORDER — DIPHENHYDRAMINE HCL 25 MG
25 CAPSULE ORAL ONCE
OUTPATIENT
Start: 2024-01-25

## 2023-12-12 RX ORDER — ACETAMINOPHEN 325 MG/1
650 TABLET ORAL ONCE
OUTPATIENT
Start: 2024-01-25

## 2023-12-12 RX ORDER — SODIUM CHLORIDE 9 MG/ML
20 INJECTION, SOLUTION INTRAVENOUS ONCE
OUTPATIENT
Start: 2024-01-11

## 2023-12-12 NOTE — PROGRESS NOTES
.     REASON FOR FOLLOWUP:     * Iron deficiency anemia.   Symptomatic anemia, required PRBC transfusion in April 2022.  She was given ferric gluconate  mg.  Patient not able to tolerate oral iron treatment with constipation and nausea.      HISTORY OF PRESENT ILLNESS:  The patient is a 83 y.o. year old female with iron deficiency anemia, atrial fibrillation, and history of stroke with chronic anticoagulation, who presented today on 12/12/2023 for annual follow-up evaluation.    The patient reports no melena or hematochezia or any kind of bleeding. She continues taking Xarelto 20 mg anticoagulation because of previous history of stroke as well as atrial fibrillation. She continues to follow up with her cardiologist. The patient reports no fainting or near syncope. She has been eating reasonably well.    Patient also continues on iron containing vitamins once a day.    Laboratory study today on 12/12/2023 reported recurrent iron deficiency with hemoglobin 10.1, MCV 74.0. MCHC 29.1, and iron study ferritin 9.73 ng/mL, free iron 21, TIBC 416, and iron saturation 5 percent. The patient has normal platelets 304,000 and WBC 6,330.      Past Medical History:   Diagnosis Date    Atrial fibrillation     Breast cancer     left    Cancer     Left breast    Depression     Gastrointestinal bleed     ulcer    History of anemia     History of blood transfusion     Hypertension     Reflux esophagitis     GERD    Stroke 2019     Past Surgical History:   Procedure Laterality Date    BREAST BIOPSY      BREAST SURGERY  1998    Left masectomy    COLONOSCOPY N/A 4/27/2019    Procedure: COLONOSCOPY TO CECUM AND TO TI WITH COLD BX POLYPECTOMY;  Surgeon: Peter Jimenez MD;  Location: [facility] ENDOSCOPY;  Service: Gastroenterology    EMBOLECTOMY Left 3/25/2019    Procedure: MECHANICAL THROMBECTOMY;  Surgeon: Truong Chambers MD;  Location: [facility] HYBRID OR 18/19;  Service: Interventional Radiology    ENDOSCOPY N/A 7/9/2016     Procedure: ESOPHAGOGASTRODUODENOSCOPY  WITH BIOPSY;  Surgeon: Peter Corrigan MD;  Location: [facility] ENDOSCOPY;  Service:     ENDOSCOPY N/A 4/27/2019    Procedure: ESOPHAGOGASTRODUODENOSCOPY WITH BIOPSIES;  Surgeon: Peter Jimenez MD;  Location: [facility] ENDOSCOPY;  Service: Gastroenterology    MASTECTOMY Left          HEMATOLOGY HISTORY:  The patient is a 81 y.o. year old female was recurrent iron deficient anemia, hypertension, history of stroke on Xarelto anticoagulation, history of left breast cancer status status post mastectomy, who was admitted for symptomatic severe iron deficiency anemia.  History mostly provided by her  who is at bedside.     Patient and her  report that she had significant fatigue, lightheadedness, and exertional dyspnea.  Family member also noticed patient become very pale.  She had profound fatigue.  She had no pica for ice chips or any other things.  According to , patient was taking oral iron once a day prescribed by her primary care physician Dr. Skinny Contreras, together with daily vitamin B12, however patient developed significant constipation, so she stopped oral iron.  Vitamin B12 was also stopped at the same time a couple months earlier.      Her  reports patient had similar episodes 3 times in the past 10 years.  She previously had gastric ulcer, discovered by EGD examination performed by Dr. Peter Corrigan about 5 to 6 years ago.  That was in July 2016 per documents I reviewed.  Patient also had a scope 3 years ago by Dr. Peter Jimenez.  I reviewed that procedure report dated 4/27/2019 of both EGD and the colonoscopy examination.  EGD examination reported a large hiatal hernia, and large area of erythematous changes in the stomach.  Biopsy was taken.  Colonoscopy examination reported diverticulum in the sigmoid colon, and also a small 3 mm polyp in the rectum which was resected.  Otherwise unremarkable.  Pathology evaluation reported a benign gastric mucosa,  no active gastritis, no H. pylori, no metaplastic or dysplastic changes.  The rectum polyp was adenomatous colonic polyp.  No dysplastic changes.     Nevertheless when patient presented to the ER on 4/23/2022, she was found to having severe anemia with Hb 6.4, MCV 67.8, MCHC 27.1.  Normal platelets 331,000 and a WBC 6340 including neutrophils 5270 lymphocytes 800.  Iron study reported ferritin 6.23 ng/mL, free iron 11 TIBC 460 and iron saturation 2%.     Patient was given 1 unit PRBC transfusion after admission.     Repeat laboratory study this morning on 4/24/2022 reported improved hemoglobin 7.7.  Maintains normal WBC and platelets.    I saw her originally on 4/23/2022 for consultation because of iron deficiency anemia.  She was already given PRBC transfusion due to severe symptomatic anemia.  Patient had a severe iron deficiency and we treated her with 2 doses of ferric gluconate total of 500 mg prior to her discharge on 4/25/2022.    Laboratory studies on 6/13/2022 reported much improved hemoglobin 11.3, and the improved MCV 79.9.  Patient has normal CBC and platelets.  Iron study reported significant improved iron saturation 56% with  free iron 216 TIBC 384, however ferritin is only mildly improved at 27.2 ng/mL.    We recommended to continue oral iron ferrous gluconate.     On 7/25/2022, patient reports no melena, hematochezia.  She denies other bleeding needed.  She denies fainting syncope.  She has worsening fatigue.  Laboratory study on 7/25/2022 reported worsening anemia with Hb 10.1, and iron study showed significant worsening free iron 17, iron saturation 5% with TIBC 339, and also trending down ferritin 21.4 ng/mL.     Patient was given Venofer total 1500 mg in August 2022.      MEDICATIONS    Current Outpatient Medications:     acetaminophen (TYLENOL) 325 MG tablet, Take 2 tablets by mouth Every 6 (Six) Hours As Needed for Mild Pain ., Disp: , Rfl:     amLODIPine (NORVASC) 10 MG tablet, Take 1 tablet  "by mouth Daily., Disp: 30 tablet, Rfl: 1    atorvastatin (LIPITOR) 80 MG tablet, Take 1 tablet by mouth Every Night., Disp: 30 tablet, Rfl: 0    Fe Cbn-Fe Gluc-FA-B12-C-DSS (Ferralet 90) 90-1 MG tablet, Take 1 tablet by mouth Daily., Disp: , Rfl:     metoprolol succinate XL (TOPROL-XL) 25 MG 24 hr tablet, , Disp: , Rfl:     pantoprazole (Protonix) 40 MG EC tablet, Take 1 tablet by mouth Daily., Disp: 30 tablet, Rfl: 0    sertraline (ZOLOFT) 50 MG tablet, Take 1 tablet by mouth Daily., Disp: , Rfl:     Vitamin D, Cholecalciferol, (CHOLECALCIFEROL) 10 MCG (400 UNIT) tablet, Take 2 tablets by mouth Daily., Disp: , Rfl:     Xarelto 20 MG tablet, Take 1 tablet by mouth Daily., Disp: , Rfl:     ALLERGIES:     Allergies   Allergen Reactions    Codeine Other (See Comments)     HEADACHE  States it gives her a headache       SOCIAL HISTORY:       Social History     Socioeconomic History    Marital status:    Tobacco Use    Smoking status: Never    Smokeless tobacco: Never   Substance and Sexual Activity    Alcohol use: Yes     Comment: 1 or 2 on the weekend    Drug use: No    Sexual activity: Defer         FAMILY HISTORY:  Family History   Problem Relation Age of Onset    Heart disease Mother     Clotting disorder Father     Breast cancer Sister     Breast cancer Sister     Heart disease Paternal Uncle             Vitals:    12/12/23 1140   BP: 153/73   Pulse: 64   Resp: 18   Temp: 97.7 °F (36.5 °C)   TempSrc: Temporal   SpO2: 96%   Weight: 72.6 kg (160 lb)   Height: 154.9 cm (61\")   PainSc: 0-No pain         12/12/2023    11:43 AM   Current Status   ECOG score 1        PHYSICAL EXAM: 12/12/2023  GENERAL:  Well-developed, well-nourished elderly female, uses cane, in no acute distress.  Orientated to time place and the people.  SKIN:  Warm, dry without rashes, purpura or petechiae.  HEENT:  Normocephalic.   LYMPHATICS:  No cervical, supraclavicular adenopathy.  CHEST: Normal respiratory effort.  Lungs clear to " auscultation. Good airflow.  CARDIAC:  Regular rate and rhythm without murmurs. Normal S1,S2.  ABDOMEN:  Soft, nontender with no organomegaly or masses.  Bowel sounds normal.  EXTREMITIES:  No lower extremity edema.      RECENT LABS:  Lab Results   Component Value Date    WBC 6.33 12/12/2023    HGB 10.1 (L) 12/12/2023    HCT 34.7 12/12/2023    MCV 74.0 (L) 12/12/2023     12/12/2023     Lab Results   Component Value Date    NEUTROABS 4.22 12/12/2023     Lab Results   Component Value Date    IRON 21 (L) 12/12/2023    TIBC 416 12/12/2023    FERRITIN 9.73 (L) 12/12/2023   Iron saturation 5% on 12/12/2023 .        Lab Results   Component Value Date    FOLATE 17.50 07/25/2022     Lab Results   Component Value Date    [id number] 528 07/25/2022         Assessment & Plan     *Severe symptomatic iron deficiency anemia Hb 6.4 with exertional dyspnea, fatigue, and paleness.  Patient was not able to tolerate oral iron treatment even once a day because of significant constipation.    Patient was given 1 unit of PRBC transfusion on 4/23/2022, and has improved hemoglobin 7.7 on 4/24/2022.   Laboratory study on 4/23/2022 also reported severe iron deficiency with iron saturation 2% and ferritin 6.2 ng/mL.  She has microcytosis and hypochromia.  Patient was not tolerating oral iron treatment.  I recommended to have intravenous iron therapy with ferric gluconate 250 mg daily for 3 days.  She had both EGD and the colonoscopy examination in April 2019 at which time showed large hiatal hernia, in the stomach biopsies showed a normal gastric mucosa.  No evidence for gastric ulcer. (She had gastric ulcer in 2016).   This patient previously was started on oral vitamin B12 by her primary care physician Dr. Skinny Contreras however she discontinued together with oral iron.  I am in agreement for this patient to continue oral vitamin B12 to help with erythropoiesis.  Her labs in January 2020 only showed a mediocre B12 level at 510 pg/mL.   There is no side effects for oral vitamin B12 treatment.   Patient received 2 doses of ferric gluconate total 500 mg in April 2022 before her discharge on 4/25/2022.  Lab study on 6/13/2022 reported improved hemoglobin 11.3, MCV 79.9, and normal platelets 320,000 WBC 7140 including ANC 5210.  Iron study reported significant improvement of free iron 216, iron saturation 56% and the TIBC 384.  Ferritin was 27.2 ng/mL.  Patient  called reporting patient is not able to tolerate oral iron treatment.  However she continues to have deteriorating fatigue.  We brought patient today for reevaluation.  Laboratory study today on 7/25/2022 reported worsening anemia Hb 10.1, and also recurrent iron deficiency with ferritin 21.4 ng/mL, and iron saturation 5% free iron 17 TIBC 339.  Folate 17.5 ng/mL and B12 level 528 mcg/mL.  We will arrange patient to have intravenous iron therapy with Injectafer versus Venofer pending her insurance approval.  The patient was given Venofer five doses, 300 mg x 5, total of 1500 mg in 08/2022. She reported significantly improved energy level. Posttreatment labs on 09/12/2022, reported normalized the hemoglobin 13.8 g/dL, and normalized ferritin 255 mL and iron saturation 24% with a free 172 mL, TIBC 297 mL.   On 12/05/2022, patient has further improved hemoglobin 14.6 g/dL, MCV 91.3, normal WBC 5340  and platelets 235,000. Iron study reported ferritin 110 and iron saturation 23%.  No need for IV iron treatment.  I recommended to monitor labs every 6 months.  Patient presented today on 12/12/2023 for annual follow-up evaluation. Patient reports no fainting, syncope, no evidence for bleeding. She did, however, had recurrent severe iron deficiency anemia with hemoglobin 10.1, MCV 74.0, MCHC 29.1, and ferritin 9.87 ng/mL, iron saturation 5% with free iron 21. Patient actually brought the hemoccult for testing occult blood. We will certainly test at today. She lives alone. She reports no apparent  melena or hematochezia. I recommended to treat her with the intravenous Venofer 300 mg for 5 doses again as we did in 08/2023. Patient voiced understanding and agreeable.        PLAN:  1. Stool occult blood cards for testing, occult blood 3 sets study.   2. Arrange weekly Venofer 300 mg for 5 doses total 1,500 mg.  3. Monitoring laboratory study every 6 months for CBC, ferritin, iron profile.  4. Patient will see me in 12 months for reassessment of the same laboratory studies. Discussed with the patient and her  today and they voiced understanding and agreeable.    I discussed with the patient and her  about laboratory results and further management plan.  They voiced understanding and agreeable.      Addendum:  Stool occult blood test negative for 3 sets.          Richi Segura MD PhD         CC:  Skinny Contreras MD        Transcribed from ambient dictation for Richi Segura MD PhD by Josy Franklin.  12/12/23   12:45 EST    Patient or patient representative verbalized consent to the visit recording.  I have personally performed the services described in this document as transcribed by the above individual, and it is both accurate and complete.      Richi Segura MD PhD

## 2023-12-21 ENCOUNTER — INFUSION (OUTPATIENT)
Dept: ONCOLOGY | Facility: HOSPITAL | Age: 83
End: 2023-12-21
Payer: MEDICARE

## 2023-12-21 VITALS
DIASTOLIC BLOOD PRESSURE: 64 MMHG | WEIGHT: 161.4 LBS | TEMPERATURE: 98.2 F | HEART RATE: 78 BPM | SYSTOLIC BLOOD PRESSURE: 139 MMHG | OXYGEN SATURATION: 94 % | RESPIRATION RATE: 16 BRPM | BODY MASS INDEX: 30.5 KG/M2

## 2023-12-21 DIAGNOSIS — T45.4X5A ADVERSE EFFECT OF IRON AND ITS COMPOUNDS, INITIAL ENCOUNTER: Primary | ICD-10-CM

## 2023-12-21 DIAGNOSIS — D50.9 IRON DEFICIENCY ANEMIA, UNSPECIFIED IRON DEFICIENCY ANEMIA TYPE: ICD-10-CM

## 2023-12-21 PROCEDURE — A9270 NON-COVERED ITEM OR SERVICE: HCPCS | Performed by: INTERNAL MEDICINE

## 2023-12-21 PROCEDURE — 96365 THER/PROPH/DIAG IV INF INIT: CPT

## 2023-12-21 PROCEDURE — 63710000001 ACETAMINOPHEN 325 MG TABLET: Performed by: INTERNAL MEDICINE

## 2023-12-21 PROCEDURE — 63710000001 DIPHENHYDRAMINE PER 50 MG: Performed by: INTERNAL MEDICINE

## 2023-12-21 PROCEDURE — 25010000002 IRON SUCROSE PER 1 MG: Performed by: INTERNAL MEDICINE

## 2023-12-21 PROCEDURE — 25810000003 SODIUM CHLORIDE 0.9 % SOLUTION: Performed by: INTERNAL MEDICINE

## 2023-12-21 RX ORDER — DIPHENHYDRAMINE HCL 25 MG
25 CAPSULE ORAL ONCE
Status: COMPLETED | OUTPATIENT
Start: 2023-12-21 | End: 2023-12-21

## 2023-12-21 RX ORDER — ACETAMINOPHEN 325 MG/1
650 TABLET ORAL ONCE
Status: COMPLETED | OUTPATIENT
Start: 2023-12-21 | End: 2023-12-21

## 2023-12-21 RX ORDER — SODIUM CHLORIDE 9 MG/ML
20 INJECTION, SOLUTION INTRAVENOUS ONCE
Status: COMPLETED | OUTPATIENT
Start: 2023-12-21 | End: 2023-12-21

## 2023-12-21 RX ADMIN — DIPHENHYDRAMINE HYDROCHLORIDE 25 MG: 25 CAPSULE ORAL at 11:27

## 2023-12-21 RX ADMIN — SODIUM CHLORIDE 20 ML/HR: 9 INJECTION, SOLUTION INTRAVENOUS at 11:58

## 2023-12-21 RX ADMIN — SODIUM CHLORIDE 300 MG: 900 INJECTION, SOLUTION INTRAVENOUS at 11:58

## 2023-12-21 RX ADMIN — ACETAMINOPHEN 650 MG: 325 TABLET ORAL at 11:27

## 2024-01-04 ENCOUNTER — INFUSION (OUTPATIENT)
Dept: ONCOLOGY | Facility: HOSPITAL | Age: 84
End: 2024-01-04
Payer: MEDICARE

## 2024-01-04 VITALS
DIASTOLIC BLOOD PRESSURE: 55 MMHG | BODY MASS INDEX: 30.46 KG/M2 | SYSTOLIC BLOOD PRESSURE: 135 MMHG | OXYGEN SATURATION: 98 % | HEART RATE: 55 BPM | WEIGHT: 161.2 LBS | TEMPERATURE: 98.2 F | RESPIRATION RATE: 16 BRPM

## 2024-01-04 DIAGNOSIS — T45.4X5A ADVERSE EFFECT OF IRON AND ITS COMPOUNDS, INITIAL ENCOUNTER: Primary | ICD-10-CM

## 2024-01-04 DIAGNOSIS — D50.9 IRON DEFICIENCY ANEMIA, UNSPECIFIED IRON DEFICIENCY ANEMIA TYPE: ICD-10-CM

## 2024-01-04 PROCEDURE — 63710000001 DIPHENHYDRAMINE PER 50 MG: Performed by: INTERNAL MEDICINE

## 2024-01-04 PROCEDURE — 25810000003 SODIUM CHLORIDE 0.9 % SOLUTION: Performed by: INTERNAL MEDICINE

## 2024-01-04 PROCEDURE — A9270 NON-COVERED ITEM OR SERVICE: HCPCS | Performed by: INTERNAL MEDICINE

## 2024-01-04 PROCEDURE — 25010000002 IRON SUCROSE PER 1 MG: Performed by: INTERNAL MEDICINE

## 2024-01-04 PROCEDURE — 96365 THER/PROPH/DIAG IV INF INIT: CPT

## 2024-01-04 PROCEDURE — 63710000001 ACETAMINOPHEN 325 MG TABLET: Performed by: INTERNAL MEDICINE

## 2024-01-04 RX ORDER — ACETAMINOPHEN 325 MG/1
650 TABLET ORAL ONCE
Status: COMPLETED | OUTPATIENT
Start: 2024-01-04 | End: 2024-01-04

## 2024-01-04 RX ORDER — SODIUM CHLORIDE 9 MG/ML
20 INJECTION, SOLUTION INTRAVENOUS ONCE
Status: COMPLETED | OUTPATIENT
Start: 2024-01-04 | End: 2024-01-04

## 2024-01-04 RX ORDER — DIPHENHYDRAMINE HCL 25 MG
25 CAPSULE ORAL ONCE
Status: COMPLETED | OUTPATIENT
Start: 2024-01-04 | End: 2024-01-04

## 2024-01-04 RX ADMIN — ACETAMINOPHEN 650 MG: 325 TABLET ORAL at 14:02

## 2024-01-04 RX ADMIN — SODIUM CHLORIDE 300 MG: 900 INJECTION, SOLUTION INTRAVENOUS at 14:33

## 2024-01-04 RX ADMIN — SODIUM CHLORIDE 20 ML/HR: 9 INJECTION, SOLUTION INTRAVENOUS at 14:32

## 2024-01-04 RX ADMIN — DIPHENHYDRAMINE HYDROCHLORIDE 25 MG: 25 CAPSULE ORAL at 14:02

## 2024-01-11 ENCOUNTER — INFUSION (OUTPATIENT)
Dept: ONCOLOGY | Facility: HOSPITAL | Age: 84
End: 2024-01-11
Payer: MEDICARE

## 2024-01-11 VITALS
TEMPERATURE: 97.8 F | DIASTOLIC BLOOD PRESSURE: 63 MMHG | HEART RATE: 97 BPM | SYSTOLIC BLOOD PRESSURE: 127 MMHG | RESPIRATION RATE: 16 BRPM | WEIGHT: 161.4 LBS | BODY MASS INDEX: 30.5 KG/M2 | OXYGEN SATURATION: 93 %

## 2024-01-11 DIAGNOSIS — D50.9 IRON DEFICIENCY ANEMIA, UNSPECIFIED IRON DEFICIENCY ANEMIA TYPE: ICD-10-CM

## 2024-01-11 DIAGNOSIS — T45.4X5A ADVERSE EFFECT OF IRON AND ITS COMPOUNDS, INITIAL ENCOUNTER: Primary | ICD-10-CM

## 2024-01-11 PROCEDURE — 25810000003 SODIUM CHLORIDE 0.9 % SOLUTION: Performed by: INTERNAL MEDICINE

## 2024-01-11 PROCEDURE — 25810000003 SODIUM CHLORIDE 0.9 % SOLUTION 250 ML FLEX CONT: Performed by: INTERNAL MEDICINE

## 2024-01-11 PROCEDURE — 96366 THER/PROPH/DIAG IV INF ADDON: CPT

## 2024-01-11 PROCEDURE — 96365 THER/PROPH/DIAG IV INF INIT: CPT

## 2024-01-11 PROCEDURE — A9270 NON-COVERED ITEM OR SERVICE: HCPCS | Performed by: INTERNAL MEDICINE

## 2024-01-11 PROCEDURE — 63710000001 ACETAMINOPHEN 325 MG TABLET: Performed by: INTERNAL MEDICINE

## 2024-01-11 PROCEDURE — 25010000002 IRON SUCROSE PER 1 MG: Performed by: INTERNAL MEDICINE

## 2024-01-11 PROCEDURE — 63710000001 DIPHENHYDRAMINE PER 50 MG: Performed by: INTERNAL MEDICINE

## 2024-01-11 RX ORDER — ACETAMINOPHEN 325 MG/1
650 TABLET ORAL ONCE
Status: COMPLETED | OUTPATIENT
Start: 2024-01-11 | End: 2024-01-11

## 2024-01-11 RX ORDER — DIPHENHYDRAMINE HCL 25 MG
25 CAPSULE ORAL ONCE
Status: COMPLETED | OUTPATIENT
Start: 2024-01-11 | End: 2024-01-11

## 2024-01-11 RX ORDER — SODIUM CHLORIDE 9 MG/ML
20 INJECTION, SOLUTION INTRAVENOUS ONCE
Status: COMPLETED | OUTPATIENT
Start: 2024-01-11 | End: 2024-01-11

## 2024-01-11 RX ADMIN — SODIUM CHLORIDE 300 MG: 900 INJECTION, SOLUTION INTRAVENOUS at 14:44

## 2024-01-11 RX ADMIN — SODIUM CHLORIDE 20 ML/HR: 9 INJECTION, SOLUTION INTRAVENOUS at 14:43

## 2024-01-11 RX ADMIN — ACETAMINOPHEN 650 MG: 325 TABLET ORAL at 14:22

## 2024-01-11 RX ADMIN — DIPHENHYDRAMINE HYDROCHLORIDE 25 MG: 25 CAPSULE ORAL at 14:22

## 2024-01-18 ENCOUNTER — INFUSION (OUTPATIENT)
Dept: ONCOLOGY | Facility: HOSPITAL | Age: 84
End: 2024-01-18
Payer: MEDICARE

## 2024-01-18 VITALS
WEIGHT: 168 LBS | TEMPERATURE: 98.4 F | OXYGEN SATURATION: 92 % | HEART RATE: 73 BPM | SYSTOLIC BLOOD PRESSURE: 147 MMHG | RESPIRATION RATE: 16 BRPM | BODY MASS INDEX: 31.74 KG/M2 | DIASTOLIC BLOOD PRESSURE: 68 MMHG

## 2024-01-18 DIAGNOSIS — D50.9 IRON DEFICIENCY ANEMIA, UNSPECIFIED IRON DEFICIENCY ANEMIA TYPE: ICD-10-CM

## 2024-01-18 DIAGNOSIS — T45.4X5A ADVERSE EFFECT OF IRON AND ITS COMPOUNDS, INITIAL ENCOUNTER: Primary | ICD-10-CM

## 2024-01-18 PROCEDURE — A9270 NON-COVERED ITEM OR SERVICE: HCPCS | Performed by: INTERNAL MEDICINE

## 2024-01-18 PROCEDURE — 96365 THER/PROPH/DIAG IV INF INIT: CPT

## 2024-01-18 PROCEDURE — 63710000001 ACETAMINOPHEN 325 MG TABLET: Performed by: INTERNAL MEDICINE

## 2024-01-18 PROCEDURE — 96366 THER/PROPH/DIAG IV INF ADDON: CPT

## 2024-01-18 PROCEDURE — 63710000001 DIPHENHYDRAMINE PER 50 MG: Performed by: INTERNAL MEDICINE

## 2024-01-18 PROCEDURE — 25810000003 SODIUM CHLORIDE 0.9 % SOLUTION: Performed by: INTERNAL MEDICINE

## 2024-01-18 PROCEDURE — 25010000002 IRON SUCROSE PER 1 MG: Performed by: INTERNAL MEDICINE

## 2024-01-18 RX ORDER — ACETAMINOPHEN 325 MG/1
650 TABLET ORAL ONCE
Status: COMPLETED | OUTPATIENT
Start: 2024-01-18 | End: 2024-01-18

## 2024-01-18 RX ORDER — DIPHENHYDRAMINE HCL 25 MG
25 CAPSULE ORAL ONCE
Status: COMPLETED | OUTPATIENT
Start: 2024-01-18 | End: 2024-01-18

## 2024-01-18 RX ORDER — SODIUM CHLORIDE 9 MG/ML
20 INJECTION, SOLUTION INTRAVENOUS ONCE
Status: COMPLETED | OUTPATIENT
Start: 2024-01-18 | End: 2024-01-18

## 2024-01-18 RX ADMIN — ACETAMINOPHEN 650 MG: 325 TABLET ORAL at 14:05

## 2024-01-18 RX ADMIN — SODIUM CHLORIDE 300 MG: 900 INJECTION, SOLUTION INTRAVENOUS at 14:10

## 2024-01-18 RX ADMIN — DIPHENHYDRAMINE HYDROCHLORIDE 25 MG: 25 CAPSULE ORAL at 14:05

## 2024-01-18 RX ADMIN — SODIUM CHLORIDE 20 ML/HR: 9 INJECTION, SOLUTION INTRAVENOUS at 14:10

## 2024-01-25 ENCOUNTER — INFUSION (OUTPATIENT)
Dept: ONCOLOGY | Facility: HOSPITAL | Age: 84
End: 2024-01-25
Payer: MEDICARE

## 2024-01-25 VITALS
DIASTOLIC BLOOD PRESSURE: 70 MMHG | SYSTOLIC BLOOD PRESSURE: 127 MMHG | TEMPERATURE: 97 F | BODY MASS INDEX: 30.57 KG/M2 | OXYGEN SATURATION: 94 % | WEIGHT: 161.8 LBS | RESPIRATION RATE: 16 BRPM | HEART RATE: 76 BPM

## 2024-01-25 DIAGNOSIS — D50.9 IRON DEFICIENCY ANEMIA, UNSPECIFIED IRON DEFICIENCY ANEMIA TYPE: ICD-10-CM

## 2024-01-25 DIAGNOSIS — T45.4X5A ADVERSE EFFECT OF IRON AND ITS COMPOUNDS, INITIAL ENCOUNTER: Primary | ICD-10-CM

## 2024-01-25 PROCEDURE — 63710000001 DIPHENHYDRAMINE PER 50 MG: Performed by: INTERNAL MEDICINE

## 2024-01-25 PROCEDURE — 63710000001 ACETAMINOPHEN 325 MG TABLET: Performed by: INTERNAL MEDICINE

## 2024-01-25 PROCEDURE — A9270 NON-COVERED ITEM OR SERVICE: HCPCS | Performed by: INTERNAL MEDICINE

## 2024-01-25 PROCEDURE — 25010000002 IRON SUCROSE PER 1 MG: Performed by: INTERNAL MEDICINE

## 2024-01-25 PROCEDURE — 25810000003 SODIUM CHLORIDE 0.9 % SOLUTION: Performed by: INTERNAL MEDICINE

## 2024-01-25 PROCEDURE — 96365 THER/PROPH/DIAG IV INF INIT: CPT

## 2024-01-25 RX ORDER — SODIUM CHLORIDE 9 MG/ML
20 INJECTION, SOLUTION INTRAVENOUS ONCE
Status: COMPLETED | OUTPATIENT
Start: 2024-01-25 | End: 2024-01-25

## 2024-01-25 RX ORDER — DIPHENHYDRAMINE HCL 25 MG
25 CAPSULE ORAL ONCE
Status: COMPLETED | OUTPATIENT
Start: 2024-01-25 | End: 2024-01-25

## 2024-01-25 RX ORDER — ACETAMINOPHEN 325 MG/1
650 TABLET ORAL ONCE
Status: COMPLETED | OUTPATIENT
Start: 2024-01-25 | End: 2024-01-25

## 2024-01-25 RX ADMIN — SODIUM CHLORIDE 300 MG: 9 INJECTION, SOLUTION INTRAVENOUS at 15:04

## 2024-01-25 RX ADMIN — DIPHENHYDRAMINE HYDROCHLORIDE 25 MG: 25 CAPSULE ORAL at 14:33

## 2024-01-25 RX ADMIN — SODIUM CHLORIDE 20 ML/HR: 9 INJECTION, SOLUTION INTRAVENOUS at 14:48

## 2024-01-25 RX ADMIN — ACETAMINOPHEN 650 MG: 325 TABLET ORAL at 14:33

## 2024-02-01 ENCOUNTER — TRANSCRIBE ORDERS (OUTPATIENT)
Dept: ADMINISTRATIVE | Facility: HOSPITAL | Age: 84
End: 2024-02-01
Payer: MEDICARE

## 2024-02-01 ENCOUNTER — LAB (OUTPATIENT)
Dept: LAB | Facility: HOSPITAL | Age: 84
End: 2024-02-01
Payer: MEDICARE

## 2024-02-01 DIAGNOSIS — R31.29 MICROHEMATURIA: Primary | ICD-10-CM

## 2024-02-01 DIAGNOSIS — R31.29 MICROHEMATURIA: ICD-10-CM

## 2024-02-01 LAB
BACTERIA UR QL AUTO: NORMAL /HPF
BILIRUB UR QL STRIP: NEGATIVE
CLARITY UR: CLEAR
COLOR UR: YELLOW
GLUCOSE UR STRIP-MCNC: NEGATIVE MG/DL
HGB UR QL STRIP.AUTO: ABNORMAL
HYALINE CASTS UR QL AUTO: NORMAL /LPF
KETONES UR QL STRIP: NEGATIVE
LEUKOCYTE ESTERASE UR QL STRIP.AUTO: NEGATIVE
NITRITE UR QL STRIP: NEGATIVE
PH UR STRIP.AUTO: 6 [PH] (ref 5–8)
PROT UR QL STRIP: ABNORMAL
RBC # UR STRIP: NORMAL /HPF
REF LAB TEST METHOD: NORMAL
SP GR UR STRIP: 1.02 (ref 1–1.03)
SQUAMOUS #/AREA URNS HPF: NORMAL /HPF
UROBILINOGEN UR QL STRIP: ABNORMAL
WBC # UR STRIP: NORMAL /HPF

## 2024-02-01 PROCEDURE — 81001 URINALYSIS AUTO W/SCOPE: CPT

## 2024-04-17 ENCOUNTER — LAB (OUTPATIENT)
Dept: LAB | Facility: HOSPITAL | Age: 84
End: 2024-04-17
Payer: MEDICARE

## 2024-04-17 ENCOUNTER — TRANSCRIBE ORDERS (OUTPATIENT)
Dept: ADMINISTRATIVE | Facility: HOSPITAL | Age: 84
End: 2024-04-17
Payer: MEDICARE

## 2024-04-17 DIAGNOSIS — E78.5 HYPERLIPIDEMIA, UNSPECIFIED HYPERLIPIDEMIA TYPE: Primary | ICD-10-CM

## 2024-04-17 DIAGNOSIS — D64.9 ANEMIA, UNSPECIFIED TYPE: ICD-10-CM

## 2024-04-17 DIAGNOSIS — I10 ESSENTIAL HYPERTENSION, BENIGN: ICD-10-CM

## 2024-04-17 DIAGNOSIS — E78.5 HYPERLIPIDEMIA, UNSPECIFIED HYPERLIPIDEMIA TYPE: ICD-10-CM

## 2024-04-17 LAB
ALBUMIN SERPL-MCNC: 4.2 G/DL (ref 3.5–5.2)
ALBUMIN/GLOB SERPL: 1.2 G/DL
ALP SERPL-CCNC: 143 U/L (ref 39–117)
ALT SERPL W P-5'-P-CCNC: 13 U/L (ref 1–33)
ANION GAP SERPL CALCULATED.3IONS-SCNC: 10 MMOL/L (ref 5–15)
AST SERPL-CCNC: 17 U/L (ref 1–32)
BILIRUB SERPL-MCNC: 0.5 MG/DL (ref 0–1.2)
BUN SERPL-MCNC: 21 MG/DL (ref 8–23)
BUN/CREAT SERPL: 19.1 (ref 7–25)
CALCIUM SPEC-SCNC: 8.6 MG/DL (ref 8.6–10.5)
CHLORIDE SERPL-SCNC: 105 MMOL/L (ref 98–107)
CHOLEST SERPL-MCNC: 109 MG/DL (ref 0–200)
CO2 SERPL-SCNC: 29 MMOL/L (ref 22–29)
CREAT SERPL-MCNC: 1.1 MG/DL (ref 0.57–1)
EGFRCR SERPLBLD CKD-EPI 2021: 50.3 ML/MIN/1.73
FERRITIN SERPL-MCNC: 119 NG/ML (ref 13–150)
GLOBULIN UR ELPH-MCNC: 3.4 GM/DL
GLUCOSE SERPL-MCNC: 89 MG/DL (ref 65–99)
HDLC SERPL-MCNC: 59 MG/DL (ref 40–60)
IRON 24H UR-MRATE: 52 MCG/DL (ref 37–145)
IRON SATN MFR SERPL: 16 % (ref 20–50)
LDLC SERPL CALC-MCNC: 33 MG/DL (ref 0–100)
LDLC/HDLC SERPL: 0.55 {RATIO}
POTASSIUM SERPL-SCNC: 3.7 MMOL/L (ref 3.5–5.2)
PROT SERPL-MCNC: 7.6 G/DL (ref 6–8.5)
SODIUM SERPL-SCNC: 144 MMOL/L (ref 136–145)
TIBC SERPL-MCNC: 317 MCG/DL (ref 298–536)
TRANSFERRIN SERPL-MCNC: 213 MG/DL (ref 200–360)
TRIGL SERPL-MCNC: 88 MG/DL (ref 0–150)
VLDLC SERPL-MCNC: 17 MG/DL (ref 5–40)

## 2024-04-17 PROCEDURE — 83540 ASSAY OF IRON: CPT

## 2024-04-17 PROCEDURE — 84466 ASSAY OF TRANSFERRIN: CPT

## 2024-04-17 PROCEDURE — 80061 LIPID PANEL: CPT

## 2024-04-17 PROCEDURE — 36415 COLL VENOUS BLD VENIPUNCTURE: CPT

## 2024-04-17 PROCEDURE — 80053 COMPREHEN METABOLIC PANEL: CPT

## 2024-04-17 PROCEDURE — 82728 ASSAY OF FERRITIN: CPT

## 2024-06-10 ENCOUNTER — LAB (OUTPATIENT)
Dept: LAB | Facility: HOSPITAL | Age: 84
End: 2024-06-10
Payer: MEDICARE

## 2024-06-10 ENCOUNTER — TRANSCRIBE ORDERS (OUTPATIENT)
Dept: ADMINISTRATIVE | Facility: HOSPITAL | Age: 84
End: 2024-06-10
Payer: MEDICARE

## 2024-06-10 DIAGNOSIS — E78.5 HYPERLIPIDEMIA, UNSPECIFIED HYPERLIPIDEMIA TYPE: ICD-10-CM

## 2024-06-10 DIAGNOSIS — E78.5 HYPERLIPIDEMIA, UNSPECIFIED HYPERLIPIDEMIA TYPE: Primary | ICD-10-CM

## 2024-06-10 LAB
ALBUMIN SERPL-MCNC: 4 G/DL (ref 3.5–5.2)
ALBUMIN/GLOB SERPL: 1.2 G/DL
ALP SERPL-CCNC: 161 U/L (ref 39–117)
ALT SERPL W P-5'-P-CCNC: 14 U/L (ref 1–33)
ANION GAP SERPL CALCULATED.3IONS-SCNC: 12.5 MMOL/L (ref 5–15)
AST SERPL-CCNC: 18 U/L (ref 1–32)
BILIRUB SERPL-MCNC: 0.4 MG/DL (ref 0–1.2)
BUN SERPL-MCNC: 22 MG/DL (ref 8–23)
BUN/CREAT SERPL: 28.6 (ref 7–25)
CALCIUM SPEC-SCNC: 9.4 MG/DL (ref 8.6–10.5)
CHLORIDE SERPL-SCNC: 104 MMOL/L (ref 98–107)
CHOLEST SERPL-MCNC: 118 MG/DL (ref 0–200)
CO2 SERPL-SCNC: 26.5 MMOL/L (ref 22–29)
CREAT SERPL-MCNC: 0.77 MG/DL (ref 0.57–1)
EGFRCR SERPLBLD CKD-EPI 2021: 77.1 ML/MIN/1.73
GLOBULIN UR ELPH-MCNC: 3.3 GM/DL
GLUCOSE SERPL-MCNC: 106 MG/DL (ref 65–99)
HDLC SERPL-MCNC: 64 MG/DL (ref 40–60)
LDLC SERPL CALC-MCNC: 39 MG/DL (ref 0–100)
LDLC/HDLC SERPL: 0.62 {RATIO}
POTASSIUM SERPL-SCNC: 3.6 MMOL/L (ref 3.5–5.2)
PROT SERPL-MCNC: 7.3 G/DL (ref 6–8.5)
SODIUM SERPL-SCNC: 143 MMOL/L (ref 136–145)
TRIGL SERPL-MCNC: 72 MG/DL (ref 0–150)
VLDLC SERPL-MCNC: 15 MG/DL (ref 5–40)

## 2024-06-10 PROCEDURE — 80053 COMPREHEN METABOLIC PANEL: CPT

## 2024-06-10 PROCEDURE — 80061 LIPID PANEL: CPT

## 2024-06-10 PROCEDURE — 36415 COLL VENOUS BLD VENIPUNCTURE: CPT

## 2024-08-13 ENCOUNTER — LAB (OUTPATIENT)
Dept: LAB | Facility: HOSPITAL | Age: 84
End: 2024-08-13
Payer: MEDICARE

## 2024-08-13 ENCOUNTER — TRANSCRIBE ORDERS (OUTPATIENT)
Dept: ADMINISTRATIVE | Facility: HOSPITAL | Age: 84
End: 2024-08-13
Payer: MEDICARE

## 2024-08-13 DIAGNOSIS — N39.0 URINARY TRACT INFECTION WITHOUT HEMATURIA, SITE UNSPECIFIED: ICD-10-CM

## 2024-08-13 DIAGNOSIS — D64.9 ANEMIA, UNSPECIFIED TYPE: ICD-10-CM

## 2024-08-13 DIAGNOSIS — D64.9 ANEMIA, UNSPECIFIED TYPE: Primary | ICD-10-CM

## 2024-08-13 LAB
BACTERIA UR QL AUTO: ABNORMAL /HPF
BASOPHILS # BLD AUTO: 0.05 10*3/MM3 (ref 0–0.2)
BASOPHILS NFR BLD AUTO: 0.8 % (ref 0–1.5)
BILIRUB UR QL STRIP: NEGATIVE
CLARITY UR: CLEAR
COLOR UR: ABNORMAL
DEPRECATED RDW RBC AUTO: 40.1 FL (ref 37–54)
EOSINOPHIL # BLD AUTO: 0.29 10*3/MM3 (ref 0–0.4)
EOSINOPHIL NFR BLD AUTO: 4.6 % (ref 0.3–6.2)
ERYTHROCYTE [DISTWIDTH] IN BLOOD BY AUTOMATED COUNT: 12.3 % (ref 12.3–15.4)
GLUCOSE UR STRIP-MCNC: NEGATIVE MG/DL
HCT VFR BLD AUTO: 38.3 % (ref 34–46.6)
HGB BLD-MCNC: 12.3 G/DL (ref 12–15.9)
HGB UR QL STRIP.AUTO: NEGATIVE
HYALINE CASTS UR QL AUTO: ABNORMAL /LPF
IMM GRANULOCYTES # BLD AUTO: 0.04 10*3/MM3 (ref 0–0.05)
IMM GRANULOCYTES NFR BLD AUTO: 0.6 % (ref 0–0.5)
KETONES UR QL STRIP: NEGATIVE
LEUKOCYTE ESTERASE UR QL STRIP.AUTO: ABNORMAL
LYMPHOCYTES # BLD AUTO: 1.26 10*3/MM3 (ref 0.7–3.1)
LYMPHOCYTES NFR BLD AUTO: 20 % (ref 19.6–45.3)
MCH RBC QN AUTO: 28.6 PG (ref 26.6–33)
MCHC RBC AUTO-ENTMCNC: 32.1 G/DL (ref 31.5–35.7)
MCV RBC AUTO: 89.1 FL (ref 79–97)
MONOCYTES # BLD AUTO: 0.53 10*3/MM3 (ref 0.1–0.9)
MONOCYTES NFR BLD AUTO: 8.4 % (ref 5–12)
NEUTROPHILS NFR BLD AUTO: 4.14 10*3/MM3 (ref 1.7–7)
NEUTROPHILS NFR BLD AUTO: 65.6 % (ref 42.7–76)
NITRITE UR QL STRIP: POSITIVE
NRBC BLD AUTO-RTO: 0 /100 WBC (ref 0–0.2)
PH UR STRIP.AUTO: 6 [PH] (ref 5–8)
PLATELET # BLD AUTO: 256 10*3/MM3 (ref 140–450)
PMV BLD AUTO: 11.4 FL (ref 6–12)
PROT UR QL STRIP: ABNORMAL
RBC # BLD AUTO: 4.3 10*6/MM3 (ref 3.77–5.28)
RBC # UR STRIP: ABNORMAL /HPF
REF LAB TEST METHOD: ABNORMAL
SP GR UR STRIP: 1.01 (ref 1–1.03)
SQUAMOUS #/AREA URNS HPF: ABNORMAL /HPF
UROBILINOGEN UR QL STRIP: ABNORMAL
WBC # UR STRIP: ABNORMAL /HPF
WBC NRBC COR # BLD AUTO: 6.31 10*3/MM3 (ref 3.4–10.8)

## 2024-08-13 PROCEDURE — 81001 URINALYSIS AUTO W/SCOPE: CPT

## 2024-08-13 PROCEDURE — 87088 URINE BACTERIA CULTURE: CPT

## 2024-08-13 PROCEDURE — 87186 SC STD MICRODIL/AGAR DIL: CPT

## 2024-08-13 PROCEDURE — 85025 COMPLETE CBC W/AUTO DIFF WBC: CPT

## 2024-08-13 PROCEDURE — 87086 URINE CULTURE/COLONY COUNT: CPT

## 2024-08-13 PROCEDURE — 36415 COLL VENOUS BLD VENIPUNCTURE: CPT

## 2024-08-15 LAB — BACTERIA SPEC AEROBE CULT: ABNORMAL

## 2024-09-23 ENCOUNTER — TRANSCRIBE ORDERS (OUTPATIENT)
Dept: ADMINISTRATIVE | Facility: HOSPITAL | Age: 84
End: 2024-09-23
Payer: MEDICARE

## 2024-09-23 ENCOUNTER — LAB (OUTPATIENT)
Dept: LAB | Facility: HOSPITAL | Age: 84
End: 2024-09-23
Payer: MEDICARE

## 2024-09-23 DIAGNOSIS — D64.9 ANEMIA, UNSPECIFIED TYPE: ICD-10-CM

## 2024-09-23 DIAGNOSIS — I10 HYPERTENSION, UNSPECIFIED TYPE: ICD-10-CM

## 2024-09-23 DIAGNOSIS — E78.5 HYPERLIPIDEMIA, UNSPECIFIED HYPERLIPIDEMIA TYPE: ICD-10-CM

## 2024-09-23 DIAGNOSIS — D64.9 ANEMIA, UNSPECIFIED TYPE: Primary | ICD-10-CM

## 2024-09-23 LAB
ALBUMIN SERPL-MCNC: 3.7 G/DL (ref 3.5–5.2)
ALBUMIN/GLOB SERPL: 1 G/DL
ALP SERPL-CCNC: 141 U/L (ref 39–117)
ALT SERPL W P-5'-P-CCNC: 12 U/L (ref 1–33)
ANION GAP SERPL CALCULATED.3IONS-SCNC: 8.7 MMOL/L (ref 5–15)
AST SERPL-CCNC: 18 U/L (ref 1–32)
BASOPHILS # BLD AUTO: 0.06 10*3/MM3 (ref 0–0.2)
BASOPHILS NFR BLD AUTO: 0.9 % (ref 0–1.5)
BILIRUB SERPL-MCNC: 0.5 MG/DL (ref 0–1.2)
BUN SERPL-MCNC: 23 MG/DL (ref 8–23)
BUN/CREAT SERPL: 22.3 (ref 7–25)
CALCIUM SPEC-SCNC: 9.6 MG/DL (ref 8.6–10.5)
CHLORIDE SERPL-SCNC: 103 MMOL/L (ref 98–107)
CHOLEST SERPL-MCNC: 96 MG/DL (ref 0–200)
CO2 SERPL-SCNC: 27.3 MMOL/L (ref 22–29)
CREAT SERPL-MCNC: 1.03 MG/DL (ref 0.57–1)
DEPRECATED RDW RBC AUTO: 38.2 FL (ref 37–54)
EGFRCR SERPLBLD CKD-EPI 2021: 54.1 ML/MIN/1.73
EOSINOPHIL # BLD AUTO: 0.21 10*3/MM3 (ref 0–0.4)
EOSINOPHIL NFR BLD AUTO: 3.1 % (ref 0.3–6.2)
ERYTHROCYTE [DISTWIDTH] IN BLOOD BY AUTOMATED COUNT: 12.1 % (ref 12.3–15.4)
GLOBULIN UR ELPH-MCNC: 3.6 GM/DL
GLUCOSE SERPL-MCNC: 110 MG/DL (ref 65–99)
HCT VFR BLD AUTO: 41.3 % (ref 34–46.6)
HDLC SERPL-MCNC: 56 MG/DL (ref 40–60)
HGB BLD-MCNC: 13.2 G/DL (ref 12–15.9)
IMM GRANULOCYTES # BLD AUTO: 0.02 10*3/MM3 (ref 0–0.05)
IMM GRANULOCYTES NFR BLD AUTO: 0.3 % (ref 0–0.5)
LDLC SERPL CALC-MCNC: 25 MG/DL (ref 0–100)
LDLC/HDLC SERPL: 0.46 {RATIO}
LYMPHOCYTES # BLD AUTO: 1.31 10*3/MM3 (ref 0.7–3.1)
LYMPHOCYTES NFR BLD AUTO: 19.6 % (ref 19.6–45.3)
MCH RBC QN AUTO: 27.6 PG (ref 26.6–33)
MCHC RBC AUTO-ENTMCNC: 32 G/DL (ref 31.5–35.7)
MCV RBC AUTO: 86.4 FL (ref 79–97)
MONOCYTES # BLD AUTO: 0.56 10*3/MM3 (ref 0.1–0.9)
MONOCYTES NFR BLD AUTO: 8.4 % (ref 5–12)
NEUTROPHILS NFR BLD AUTO: 4.52 10*3/MM3 (ref 1.7–7)
NEUTROPHILS NFR BLD AUTO: 67.7 % (ref 42.7–76)
NRBC BLD AUTO-RTO: 0 /100 WBC (ref 0–0.2)
PLATELET # BLD AUTO: 281 10*3/MM3 (ref 140–450)
PMV BLD AUTO: 11.5 FL (ref 6–12)
POTASSIUM SERPL-SCNC: 3.7 MMOL/L (ref 3.5–5.2)
PROT SERPL-MCNC: 7.3 G/DL (ref 6–8.5)
RBC # BLD AUTO: 4.78 10*6/MM3 (ref 3.77–5.28)
SODIUM SERPL-SCNC: 139 MMOL/L (ref 136–145)
TRIGL SERPL-MCNC: 70 MG/DL (ref 0–150)
VLDLC SERPL-MCNC: 15 MG/DL (ref 5–40)
WBC NRBC COR # BLD AUTO: 6.68 10*3/MM3 (ref 3.4–10.8)

## 2024-09-23 PROCEDURE — 36415 COLL VENOUS BLD VENIPUNCTURE: CPT

## 2024-09-23 PROCEDURE — 85025 COMPLETE CBC W/AUTO DIFF WBC: CPT

## 2024-09-23 PROCEDURE — 80053 COMPREHEN METABOLIC PANEL: CPT

## 2024-09-23 PROCEDURE — 80061 LIPID PANEL: CPT

## 2024-12-10 ENCOUNTER — TRANSCRIBE ORDERS (OUTPATIENT)
Dept: ADMINISTRATIVE | Facility: HOSPITAL | Age: 84
End: 2024-12-10
Payer: MEDICARE

## 2024-12-10 ENCOUNTER — LAB (OUTPATIENT)
Dept: LAB | Facility: HOSPITAL | Age: 84
End: 2024-12-10
Payer: MEDICARE

## 2024-12-10 DIAGNOSIS — E03.9 ACQUIRED HYPOTHYROIDISM: ICD-10-CM

## 2024-12-10 DIAGNOSIS — R73.09 OTHER ABNORMAL GLUCOSE: ICD-10-CM

## 2024-12-10 DIAGNOSIS — I10 ESSENTIAL HYPERTENSION, BENIGN: ICD-10-CM

## 2024-12-10 DIAGNOSIS — D50.9 IRON DEFICIENCY ANEMIA, UNSPECIFIED IRON DEFICIENCY ANEMIA TYPE: ICD-10-CM

## 2024-12-10 DIAGNOSIS — Z00.00 ROUTINE GENERAL MEDICAL EXAMINATION AT A HEALTH CARE FACILITY: Primary | ICD-10-CM

## 2024-12-10 DIAGNOSIS — E78.5 HYPERLIPIDEMIA, UNSPECIFIED HYPERLIPIDEMIA TYPE: ICD-10-CM

## 2024-12-10 DIAGNOSIS — Z00.00 ROUTINE GENERAL MEDICAL EXAMINATION AT A HEALTH CARE FACILITY: ICD-10-CM

## 2024-12-10 LAB
ALBUMIN SERPL-MCNC: 3.7 G/DL (ref 3.5–5.2)
ALBUMIN/GLOB SERPL: 1 G/DL
ALP SERPL-CCNC: 130 U/L (ref 39–117)
ALT SERPL W P-5'-P-CCNC: 14 U/L (ref 1–33)
ANION GAP SERPL CALCULATED.3IONS-SCNC: 9.3 MMOL/L (ref 5–15)
AST SERPL-CCNC: 20 U/L (ref 1–32)
BACTERIA UR QL AUTO: ABNORMAL /HPF
BASOPHILS # BLD AUTO: 0.03 10*3/MM3 (ref 0–0.2)
BASOPHILS NFR BLD AUTO: 0.4 % (ref 0–1.5)
BILIRUB SERPL-MCNC: 0.5 MG/DL (ref 0–1.2)
BILIRUB UR QL STRIP: NEGATIVE
BUN SERPL-MCNC: 21 MG/DL (ref 8–23)
BUN/CREAT SERPL: 18.3 (ref 7–25)
CALCIUM SPEC-SCNC: 9.2 MG/DL (ref 8.6–10.5)
CHLORIDE SERPL-SCNC: 105 MMOL/L (ref 98–107)
CHOLEST SERPL-MCNC: 100 MG/DL (ref 0–200)
CLARITY UR: ABNORMAL
CO2 SERPL-SCNC: 26.7 MMOL/L (ref 22–29)
COLOR UR: YELLOW
CREAT SERPL-MCNC: 1.15 MG/DL (ref 0.57–1)
DEPRECATED RDW RBC AUTO: 37.7 FL (ref 37–54)
EGFRCR SERPLBLD CKD-EPI 2021: 47.4 ML/MIN/1.73
EOSINOPHIL # BLD AUTO: 0.2 10*3/MM3 (ref 0–0.4)
EOSINOPHIL NFR BLD AUTO: 2.8 % (ref 0.3–6.2)
ERYTHROCYTE [DISTWIDTH] IN BLOOD BY AUTOMATED COUNT: 12.3 % (ref 12.3–15.4)
GLOBULIN UR ELPH-MCNC: 3.7 GM/DL
GLUCOSE SERPL-MCNC: 124 MG/DL (ref 65–99)
GLUCOSE UR STRIP-MCNC: NEGATIVE MG/DL
HBA1C MFR BLD: 6.1 % (ref 4.8–5.6)
HCT VFR BLD AUTO: 39 % (ref 34–46.6)
HDLC SERPL-MCNC: 59 MG/DL (ref 40–60)
HGB BLD-MCNC: 11.8 G/DL (ref 12–15.9)
HGB UR QL STRIP.AUTO: NEGATIVE
HYALINE CASTS UR QL AUTO: ABNORMAL /LPF
IMM GRANULOCYTES # BLD AUTO: 0.03 10*3/MM3 (ref 0–0.05)
IMM GRANULOCYTES NFR BLD AUTO: 0.4 % (ref 0–0.5)
KETONES UR QL STRIP: ABNORMAL
LDLC SERPL CALC-MCNC: 22 MG/DL (ref 0–100)
LDLC/HDLC SERPL: 0.35 {RATIO}
LEUKOCYTE ESTERASE UR QL STRIP.AUTO: ABNORMAL
LYMPHOCYTES # BLD AUTO: 1.21 10*3/MM3 (ref 0.7–3.1)
LYMPHOCYTES NFR BLD AUTO: 16.8 % (ref 19.6–45.3)
MCH RBC QN AUTO: 25.7 PG (ref 26.6–33)
MCHC RBC AUTO-ENTMCNC: 30.3 G/DL (ref 31.5–35.7)
MCV RBC AUTO: 85 FL (ref 79–97)
MONOCYTES # BLD AUTO: 0.6 10*3/MM3 (ref 0.1–0.9)
MONOCYTES NFR BLD AUTO: 8.3 % (ref 5–12)
NEUTROPHILS NFR BLD AUTO: 5.14 10*3/MM3 (ref 1.7–7)
NEUTROPHILS NFR BLD AUTO: 71.3 % (ref 42.7–76)
NITRITE UR QL STRIP: NEGATIVE
NRBC BLD AUTO-RTO: 0 /100 WBC (ref 0–0.2)
PH UR STRIP.AUTO: 5.5 [PH] (ref 5–8)
PLATELET # BLD AUTO: 310 10*3/MM3 (ref 140–450)
PMV BLD AUTO: 11.6 FL (ref 6–12)
POTASSIUM SERPL-SCNC: 4.5 MMOL/L (ref 3.5–5.2)
PROT SERPL-MCNC: 7.4 G/DL (ref 6–8.5)
PROT UR QL STRIP: ABNORMAL
RBC # BLD AUTO: 4.59 10*6/MM3 (ref 3.77–5.28)
RBC # UR STRIP: ABNORMAL /HPF
REF LAB TEST METHOD: ABNORMAL
SODIUM SERPL-SCNC: 141 MMOL/L (ref 136–145)
SP GR UR STRIP: 1.03 (ref 1–1.03)
SQUAMOUS #/AREA URNS HPF: ABNORMAL /HPF
TRIGL SERPL-MCNC: 101 MG/DL (ref 0–150)
TSH SERPL DL<=0.05 MIU/L-ACNC: 1.78 UIU/ML (ref 0.27–4.2)
UROBILINOGEN UR QL STRIP: ABNORMAL
VLDLC SERPL-MCNC: 19 MG/DL (ref 5–40)
WBC # UR STRIP: ABNORMAL /HPF
WBC NRBC COR # BLD AUTO: 7.21 10*3/MM3 (ref 3.4–10.8)

## 2024-12-10 PROCEDURE — 81001 URINALYSIS AUTO W/SCOPE: CPT

## 2024-12-10 PROCEDURE — 80053 COMPREHEN METABOLIC PANEL: CPT

## 2024-12-10 PROCEDURE — 83036 HEMOGLOBIN GLYCOSYLATED A1C: CPT

## 2024-12-10 PROCEDURE — 36415 COLL VENOUS BLD VENIPUNCTURE: CPT

## 2024-12-10 PROCEDURE — 80061 LIPID PANEL: CPT

## 2024-12-10 PROCEDURE — 85025 COMPLETE CBC W/AUTO DIFF WBC: CPT

## 2024-12-10 PROCEDURE — 84443 ASSAY THYROID STIM HORMONE: CPT

## 2024-12-26 ENCOUNTER — HOSPITAL ENCOUNTER (OUTPATIENT)
Dept: GENERAL RADIOLOGY | Facility: HOSPITAL | Age: 84
Discharge: HOME | End: 2024-12-26
Payer: MEDICARE

## 2024-12-26 ENCOUNTER — TRANSCRIBE ORDERS (OUTPATIENT)
Dept: ADMINISTRATIVE | Facility: HOSPITAL | Age: 84
End: 2024-12-26
Payer: MEDICARE

## 2024-12-26 ENCOUNTER — LAB (OUTPATIENT)
Dept: LAB | Facility: HOSPITAL | Age: 84
End: 2024-12-26
Payer: MEDICARE

## 2024-12-26 DIAGNOSIS — M10.9 GOUT, UNSPECIFIED CAUSE, UNSPECIFIED CHRONICITY, UNSPECIFIED SITE: ICD-10-CM

## 2024-12-26 DIAGNOSIS — N39.0 URINARY TRACT INFECTION WITHOUT HEMATURIA, SITE UNSPECIFIED: ICD-10-CM

## 2024-12-26 DIAGNOSIS — M10.9 GOUT, UNSPECIFIED CAUSE, UNSPECIFIED CHRONICITY, UNSPECIFIED SITE: Primary | ICD-10-CM

## 2024-12-26 DIAGNOSIS — R60.9 EDEMA, UNSPECIFIED TYPE: ICD-10-CM

## 2024-12-26 DIAGNOSIS — M79.674 PAIN IN RIGHT TOE(S): Primary | ICD-10-CM

## 2024-12-26 DIAGNOSIS — M79.674 PAIN IN RIGHT TOE(S): ICD-10-CM

## 2024-12-26 LAB
BACTERIA UR QL AUTO: ABNORMAL /HPF
BILIRUB UR QL STRIP: NEGATIVE
CLARITY UR: ABNORMAL
COLOR UR: YELLOW
GLUCOSE UR STRIP-MCNC: NEGATIVE MG/DL
HGB UR QL STRIP.AUTO: ABNORMAL
HYALINE CASTS UR QL AUTO: ABNORMAL /LPF
KETONES UR QL STRIP: NEGATIVE
LEUKOCYTE ESTERASE UR QL STRIP.AUTO: ABNORMAL
NITRITE UR QL STRIP: NEGATIVE
PH UR STRIP.AUTO: 6 [PH] (ref 5–8)
PROT UR QL STRIP: ABNORMAL
RBC # UR STRIP: ABNORMAL /HPF
REF LAB TEST METHOD: ABNORMAL
SP GR UR STRIP: 1.03 (ref 1–1.03)
SQUAMOUS #/AREA URNS HPF: ABNORMAL /HPF
URATE SERPL-MCNC: 6.5 MG/DL (ref 2.4–5.7)
UROBILINOGEN UR QL STRIP: ABNORMAL
WBC # UR STRIP: ABNORMAL /HPF

## 2024-12-26 PROCEDURE — 36415 COLL VENOUS BLD VENIPUNCTURE: CPT

## 2024-12-26 PROCEDURE — 73660 X-RAY EXAM OF TOE(S): CPT

## 2024-12-26 PROCEDURE — 81001 URINALYSIS AUTO W/SCOPE: CPT

## 2024-12-26 PROCEDURE — 84550 ASSAY OF BLOOD/URIC ACID: CPT

## 2025-01-02 ENCOUNTER — APPOINTMENT (OUTPATIENT)
Dept: CT IMAGING | Facility: HOSPITAL | Age: 85
End: 2025-01-02
Payer: MEDICARE

## 2025-01-02 ENCOUNTER — APPOINTMENT (OUTPATIENT)
Dept: GENERAL RADIOLOGY | Facility: HOSPITAL | Age: 85
End: 2025-01-02
Payer: MEDICARE

## 2025-01-02 ENCOUNTER — HOSPITAL ENCOUNTER (EMERGENCY)
Facility: HOSPITAL | Age: 85
Discharge: HOME | End: 2025-01-02
Attending: STUDENT IN AN ORGANIZED HEALTH CARE EDUCATION/TRAINING PROGRAM
Payer: MEDICARE

## 2025-01-02 VITALS
DIASTOLIC BLOOD PRESSURE: 66 MMHG | TEMPERATURE: 99.1 F | RESPIRATION RATE: 16 BRPM | OXYGEN SATURATION: 95 % | HEART RATE: 83 BPM | SYSTOLIC BLOOD PRESSURE: 153 MMHG

## 2025-01-02 DIAGNOSIS — R06.00 DYSPNEA, UNSPECIFIED TYPE: Primary | ICD-10-CM

## 2025-01-02 DIAGNOSIS — K44.9 HIATAL HERNIA: ICD-10-CM

## 2025-01-02 LAB
ALBUMIN SERPL-MCNC: 3.8 G/DL (ref 3.5–5.2)
ALBUMIN/GLOB SERPL: 1.4 G/DL
ALP SERPL-CCNC: 148 U/L (ref 39–117)
ALT SERPL W P-5'-P-CCNC: 16 U/L (ref 1–33)
ANION GAP SERPL CALCULATED.3IONS-SCNC: 10.2 MMOL/L (ref 5–15)
AST SERPL-CCNC: 23 U/L (ref 1–32)
B PARAPERT DNA SPEC QL NAA+PROBE: NOT DETECTED
B PERT DNA SPEC QL NAA+PROBE: NOT DETECTED
BASOPHILS # BLD AUTO: 0.03 10*3/MM3 (ref 0–0.2)
BASOPHILS NFR BLD AUTO: 0.5 % (ref 0–1.5)
BILIRUB SERPL-MCNC: 0.6 MG/DL (ref 0–1.2)
BUN SERPL-MCNC: 24 MG/DL (ref 8–23)
BUN/CREAT SERPL: 21.6 (ref 7–25)
C PNEUM DNA NPH QL NAA+NON-PROBE: NOT DETECTED
CALCIUM SPEC-SCNC: 8.8 MG/DL (ref 8.6–10.5)
CHLORIDE SERPL-SCNC: 107 MMOL/L (ref 98–107)
CO2 SERPL-SCNC: 24.8 MMOL/L (ref 22–29)
CREAT SERPL-MCNC: 1.11 MG/DL (ref 0.57–1)
DEPRECATED RDW RBC AUTO: 37.1 FL (ref 37–54)
EGFRCR SERPLBLD CKD-EPI 2021: 49.4 ML/MIN/1.73
EOSINOPHIL # BLD AUTO: 0.13 10*3/MM3 (ref 0–0.4)
EOSINOPHIL NFR BLD AUTO: 2.2 % (ref 0.3–6.2)
ERYTHROCYTE [DISTWIDTH] IN BLOOD BY AUTOMATED COUNT: 12.3 % (ref 12.3–15.4)
FLUAV SUBTYP SPEC NAA+PROBE: NOT DETECTED
FLUBV RNA ISLT QL NAA+PROBE: NOT DETECTED
GEN 5 1HR TROPONIN T REFLEX: 18 NG/L
GLOBULIN UR ELPH-MCNC: 2.8 GM/DL
GLUCOSE SERPL-MCNC: 117 MG/DL (ref 65–99)
HADV DNA SPEC NAA+PROBE: NOT DETECTED
HCOV 229E RNA SPEC QL NAA+PROBE: NOT DETECTED
HCOV HKU1 RNA SPEC QL NAA+PROBE: NOT DETECTED
HCOV NL63 RNA SPEC QL NAA+PROBE: NOT DETECTED
HCOV OC43 RNA SPEC QL NAA+PROBE: NOT DETECTED
HCT VFR BLD AUTO: 33.3 % (ref 34–46.6)
HGB BLD-MCNC: 10.2 G/DL (ref 12–15.9)
HMPV RNA NPH QL NAA+NON-PROBE: NOT DETECTED
HOLD SPECIMEN: NORMAL
HOLD SPECIMEN: NORMAL
HPIV1 RNA ISLT QL NAA+PROBE: NOT DETECTED
HPIV2 RNA SPEC QL NAA+PROBE: NOT DETECTED
HPIV3 RNA NPH QL NAA+PROBE: NOT DETECTED
HPIV4 P GENE NPH QL NAA+PROBE: NOT DETECTED
IMM GRANULOCYTES # BLD AUTO: 0.02 10*3/MM3 (ref 0–0.05)
IMM GRANULOCYTES NFR BLD AUTO: 0.3 % (ref 0–0.5)
LYMPHOCYTES # BLD AUTO: 0.89 10*3/MM3 (ref 0.7–3.1)
LYMPHOCYTES NFR BLD AUTO: 14.9 % (ref 19.6–45.3)
M PNEUMO IGG SER IA-ACNC: NOT DETECTED
MCH RBC QN AUTO: 25.2 PG (ref 26.6–33)
MCHC RBC AUTO-ENTMCNC: 30.6 G/DL (ref 31.5–35.7)
MCV RBC AUTO: 82.4 FL (ref 79–97)
MONOCYTES # BLD AUTO: 0.59 10*3/MM3 (ref 0.1–0.9)
MONOCYTES NFR BLD AUTO: 9.9 % (ref 5–12)
NEUTROPHILS NFR BLD AUTO: 4.32 10*3/MM3 (ref 1.7–7)
NEUTROPHILS NFR BLD AUTO: 72.2 % (ref 42.7–76)
NRBC BLD AUTO-RTO: 0 /100 WBC (ref 0–0.2)
NT-PROBNP SERPL-MCNC: 679 PG/ML (ref 0–1800)
PLATELET # BLD AUTO: 270 10*3/MM3 (ref 140–450)
PMV BLD AUTO: 10.1 FL (ref 6–12)
POTASSIUM SERPL-SCNC: 3.3 MMOL/L (ref 3.5–5.2)
PROT SERPL-MCNC: 6.6 G/DL (ref 6–8.5)
QT INTERVAL: 461 MS
QTC INTERVAL: 461 MS
RBC # BLD AUTO: 4.04 10*6/MM3 (ref 3.77–5.28)
RHINOVIRUS RNA SPEC NAA+PROBE: NOT DETECTED
RSV RNA NPH QL NAA+NON-PROBE: NOT DETECTED
SARS-COV-2 RNA NPH QL NAA+NON-PROBE: NOT DETECTED
SODIUM SERPL-SCNC: 142 MMOL/L (ref 136–145)
TROPONIN T % DELTA: 0 %
TROPONIN T NUMERIC DELTA: 0 NG/L
TROPONIN T SERPL HS-MCNC: 18 NG/L
WBC NRBC COR # BLD AUTO: 5.98 10*3/MM3 (ref 3.4–10.8)
WHOLE BLOOD HOLD COAG: NORMAL
WHOLE BLOOD HOLD SPECIMEN: NORMAL

## 2025-01-02 PROCEDURE — 0202U NFCT DS 22 TRGT SARS-COV-2: CPT | Performed by: STUDENT IN AN ORGANIZED HEALTH CARE EDUCATION/TRAINING PROGRAM

## 2025-01-02 PROCEDURE — 83880 ASSAY OF NATRIURETIC PEPTIDE: CPT

## 2025-01-02 PROCEDURE — 36415 COLL VENOUS BLD VENIPUNCTURE: CPT

## 2025-01-02 PROCEDURE — 84484 ASSAY OF TROPONIN QUANT: CPT

## 2025-01-02 PROCEDURE — 80053 COMPREHEN METABOLIC PANEL: CPT

## 2025-01-02 PROCEDURE — 99285 EMERGENCY DEPT VISIT HI MDM: CPT

## 2025-01-02 PROCEDURE — 71045 X-RAY EXAM CHEST 1 VIEW: CPT

## 2025-01-02 PROCEDURE — 71275 CT ANGIOGRAPHY CHEST: CPT

## 2025-01-02 PROCEDURE — 25510000001 IOPAMIDOL PER 1 ML: Performed by: STUDENT IN AN ORGANIZED HEALTH CARE EDUCATION/TRAINING PROGRAM

## 2025-01-02 PROCEDURE — 93005 ELECTROCARDIOGRAM TRACING: CPT | Performed by: STUDENT IN AN ORGANIZED HEALTH CARE EDUCATION/TRAINING PROGRAM

## 2025-01-02 PROCEDURE — 85025 COMPLETE CBC W/AUTO DIFF WBC: CPT

## 2025-01-02 PROCEDURE — 93005 ELECTROCARDIOGRAM TRACING: CPT

## 2025-01-02 RX ORDER — IOPAMIDOL 755 MG/ML
100 INJECTION, SOLUTION INTRAVASCULAR
Status: COMPLETED | OUTPATIENT
Start: 2025-01-02 | End: 2025-01-02

## 2025-01-02 RX ORDER — SODIUM CHLORIDE 0.9 % (FLUSH) 0.9 %
10 SYRINGE (ML) INJECTION AS NEEDED
Status: DISCONTINUED | OUTPATIENT
Start: 2025-01-02 | End: 2025-01-02 | Stop reason: HOSPADM

## 2025-01-02 RX ADMIN — IOPAMIDOL 63 ML: 755 INJECTION, SOLUTION INTRAVENOUS at 19:42

## 2025-01-02 NOTE — ED PROVIDER NOTES
EMERGENCY DEPARTMENT ENCOUNTER  Room Number:  HB2/D  PCP: Skinny Contreras MD  Independent Historians: Patient      HPI:  Chief Complaint: had concerns including Shortness of Breath.     Context: Ivet Diaz is a 84 y.o. female with a medical history of HTN, CVA, reflux, A-fib who presents to the ED c/o acute shortness of breath.  Patient states symptoms been occurring for several weeks.  Patient denies chest pain or cough.  She states she just feels like she cannot get a full breath of air in.  Patient is noted to be borderline hypoxic with a O2 saturation of 91%.  Patient denies sick contacts.      Review of prior external notes (non-ED) -and- Review of prior external test results outside of this encounter: Office visit with family medicine from earlier today reviewed and notable for presentation secondary to shortness of breath.  Patient had to be tachypneic and borderline hypoxemic.  Patient was sent to ER for further evaluation and management.    Prescription drug monitoring program review:         PAST MEDICAL HISTORY  Active Ambulatory Problems     Diagnosis Date Noted    Acute CVA (cerebrovascular accident) 03/25/2019    Symptomatic anemia 04/25/2019    Hypertension 04/25/2019    Depression 04/25/2019    Reflux esophagitis 04/25/2019    Breast cancer 04/25/2019    Chronic diastolic heart failure 04/26/2019    Hemorrhagic disorder due to extrinsic circulating anticoagulants 04/26/2019    Iron deficiency anemia 04/24/2022    Adverse effect of iron 04/24/2022    Acute blood loss anemia 04/25/2022    History of stroke 04/25/2022    Paroxysmal atrial fibrillation 04/25/2022    Chronic anticoagulation 04/25/2022    History of esophagitis 04/25/2022    Adverse effect of iron and its compounds, initial encounter 07/25/2022     Resolved Ambulatory Problems     Diagnosis Date Noted    No Resolved Ambulatory Problems     Past Medical History:   Diagnosis Date    Atrial fibrillation     Cancer     Gastrointestinal  bleed     History of anemia     History of blood transfusion     Stroke 2019         PAST SURGICAL HISTORY  Past Surgical History:   Procedure Laterality Date    BREAST BIOPSY      BREAST SURGERY  1998    Left masectomy    COLONOSCOPY N/A 4/27/2019    Procedure: COLONOSCOPY TO CECUM AND TO TI WITH COLD BX POLYPECTOMY;  Surgeon: Peter Jimenez MD;  Location: [facility] ENDOSCOPY;  Service: Gastroenterology    EMBOLECTOMY Left 3/25/2019    Procedure: MECHANICAL THROMBECTOMY;  Surgeon: Truong Chambers MD;  Location: [facility] HYBRID OR 18/19;  Service: Interventional Radiology    ENDOSCOPY N/A 7/9/2016    Procedure: ESOPHAGOGASTRODUODENOSCOPY  WITH BIOPSY;  Surgeon: Peter Corrigan MD;  Location: [facility] ENDOSCOPY;  Service:     ENDOSCOPY N/A 4/27/2019    Procedure: ESOPHAGOGASTRODUODENOSCOPY WITH BIOPSIES;  Surgeon: Peter Jimenez MD;  Location: [facility] ENDOSCOPY;  Service: Gastroenterology    MASTECTOMY Left          FAMILY HISTORY  Family History   Problem Relation Age of Onset    Heart disease Mother     Clotting disorder Father     Breast cancer Sister     Breast cancer Sister     Heart disease Paternal Uncle          SOCIAL HISTORY  Social History     Socioeconomic History    Marital status:    Tobacco Use    Smoking status: Never    Smokeless tobacco: Never   Substance and Sexual Activity    Alcohol use: Yes     Comment: 1 or 2 on the weekend    Drug use: No    Sexual activity: Defer         ALLERGIES  Codeine      REVIEW OF SYSTEMS  Review of Systems  Included in HPI  All systems reviewed and negative except for those discussed in HPI.      PHYSICAL EXAM    I have reviewed the triage vital signs and nursing notes.    ED Triage Vitals   Temp Heart Rate Resp BP SpO2   01/02/25 1440 01/02/25 1440 01/02/25 1440 01/02/25 1551 01/02/25 1440   99.1 °F (37.3 °C) 69 16 144/69 91 %      Temp src Heart Rate Source Patient Position BP Location FiO2 (%)   01/02/25 1440 -- 01/02/25 1551 01/02/25 1551 --   Tympanic  Sitting  Right arm        Physical Exam  GENERAL: alert, no acute distress  SKIN: Warm, dry  HENT: Normocephalic, atraumatic  EYES: no scleral icterus  CV: regular rhythm, regular rate  RESPIRATORY: normal effort, lungs clear  ABDOMEN: soft, nontender, nondistended  MUSCULOSKELETAL: no deformity  NEURO: alert, moves all extremities, follows commands            LAB RESULTS  Recent Results (from the past 24 hours)   ECG 12 Lead ED Triage Standing Order; SOA    Collection Time: 01/02/25  2:50 PM   Result Value Ref Range    QT Interval 461 ms    QTC Interval 461 ms   Comprehensive Metabolic Panel    Collection Time: 01/02/25  2:54 PM    Specimen: Arm, Right; Blood   Result Value Ref Range    Glucose 117 (H) 65 - 99 mg/dL    BUN 24 (H) 8 - 23 mg/dL    Creatinine 1.11 (H) 0.57 - 1.00 mg/dL    Sodium 142 136 - 145 mmol/L    Potassium 3.3 (L) 3.5 - 5.2 mmol/L    Chloride 107 98 - 107 mmol/L    CO2 24.8 22.0 - 29.0 mmol/L    Calcium 8.8 8.6 - 10.5 mg/dL    Total Protein 6.6 6.0 - 8.5 g/dL    Albumin 3.8 3.5 - 5.2 g/dL    ALT (SGPT) 16 1 - 33 U/L    AST (SGOT) 23 1 - 32 U/L    Alkaline Phosphatase 148 (H) 39 - 117 U/L    Total Bilirubin 0.6 0.0 - 1.2 mg/dL    Globulin 2.8 gm/dL    A/G Ratio 1.4 g/dL    BUN/Creatinine Ratio 21.6 7.0 - 25.0    Anion Gap 10.2 5.0 - 15.0 mmol/L    eGFR 49.4 (L) >60.0 mL/min/1.73   BNP    Collection Time: 01/02/25  2:54 PM    Specimen: Arm, Right; Blood   Result Value Ref Range    proBNP 679.0 0.0 - 1,800.0 pg/mL   High Sensitivity Troponin T    Collection Time: 01/02/25  2:54 PM    Specimen: Arm, Right; Blood   Result Value Ref Range    HS Troponin T 18 (H) <14 ng/L   Green Top (Gel)    Collection Time: 01/02/25  2:54 PM   Result Value Ref Range    Extra Tube Hold for add-ons.    Lavender Top    Collection Time: 01/02/25  2:54 PM   Result Value Ref Range    Extra Tube hold for add-on    Gold Top - SST    Collection Time: 01/02/25  2:54 PM   Result Value Ref Range    Extra Tube Hold for add-ons.    Light  Blue Top    Collection Time: 01/02/25  2:54 PM   Result Value Ref Range    Extra Tube Hold for add-ons.    CBC Auto Differential    Collection Time: 01/02/25  2:54 PM    Specimen: Arm, Right; Blood   Result Value Ref Range    WBC 5.98 3.40 - 10.80 10*3/mm3    RBC 4.04 3.77 - 5.28 10*6/mm3    Hemoglobin 10.2 (L) 12.0 - 15.9 g/dL    Hematocrit 33.3 (L) 34.0 - 46.6 %    MCV 82.4 79.0 - 97.0 fL    MCH 25.2 (L) 26.6 - 33.0 pg    MCHC 30.6 (L) 31.5 - 35.7 g/dL    RDW 12.3 12.3 - 15.4 %    RDW-SD 37.1 37.0 - 54.0 fl    MPV 10.1 6.0 - 12.0 fL    Platelets 270 140 - 450 10*3/mm3    Neutrophil % 72.2 42.7 - 76.0 %    Lymphocyte % 14.9 (L) 19.6 - 45.3 %    Monocyte % 9.9 5.0 - 12.0 %    Eosinophil % 2.2 0.3 - 6.2 %    Basophil % 0.5 0.0 - 1.5 %    Immature Grans % 0.3 0.0 - 0.5 %    Neutrophils, Absolute 4.32 1.70 - 7.00 10*3/mm3    Lymphocytes, Absolute 0.89 0.70 - 3.10 10*3/mm3    Monocytes, Absolute 0.59 0.10 - 0.90 10*3/mm3    Eosinophils, Absolute 0.13 0.00 - 0.40 10*3/mm3    Basophils, Absolute 0.03 0.00 - 0.20 10*3/mm3    Immature Grans, Absolute 0.02 0.00 - 0.05 10*3/mm3    nRBC 0.0 0.0 - 0.2 /100 WBC   High Sensitivity Troponin T 1Hr    Collection Time: 01/02/25  4:02 PM    Specimen: Arm, Left; Blood   Result Value Ref Range    HS Troponin T 18 (H) <14 ng/L    Troponin T Numeric Delta 0 ng/L    Troponin T % Delta 0 Abnormal if >/= 20% %   Respiratory Panel PCR w/COVID-19(SARS-CoV-2) [facility] In-House, NP Swab in UTM/VTM, 2 HR TAT - Swab, Nasopharynx    Collection Time: 01/02/25  4:53 PM    Specimen: Nasopharynx; Swab   Result Value Ref Range    ADENOVIRUS, PCR Not Detected Not Detected    Coronavirus 229E Not Detected Not Detected    Coronavirus HKU1 Not Detected Not Detected    Coronavirus NL63 Not Detected Not Detected    Coronavirus OC43 Not Detected Not Detected    COVID19 Not Detected Not Detected - Ref. Range    Human Metapneumovirus Not Detected Not Detected    Human Rhinovirus/Enterovirus Not  Detected Not Detected    Influenza A PCR Not Detected Not Detected    Influenza B PCR Not Detected Not Detected    Parainfluenza Virus 1 Not Detected Not Detected    Parainfluenza Virus 2 Not Detected Not Detected    Parainfluenza Virus 3 Not Detected Not Detected    Parainfluenza Virus 4 Not Detected Not Detected    RSV, PCR Not Detected Not Detected    Bordetella pertussis pcr Not Detected Not Detected    Bordetella parapertussis PCR Not Detected Not Detected    Chlamydophila pneumoniae PCR Not Detected Not Detected    Mycoplasma pneumo by PCR Not Detected Not Detected         RADIOLOGY  CT Angiogram Chest Pulmonary Embolism    Result Date: 1/2/2025  CT ANGIOGRAM CHEST PULMONARY EMBOLISM-  Radiation dose reduction techniques were utilized, including automated exposure control and exposure modulation based on body size.  CT scan of the chest performed with intravenous contrast, pulmonary embolus protocol to include coronal, sagittal and three-dimensional reconstruction.  Clinical: Progressive shortness of breath times several weeks  COMPARISON examination 4/19/2019  FINDINGS: 1. No pulmonary embolus. There is cardiac enlargement. Atherosclerotic calcification of a normal diameter aorta. There is a large hiatal hernia, it is eccentric and projects towards the right and contains a large portion of the stomach. The esophagus is satisfactory in appearance. It has undergone considerable enlargement since 2019.  2. There is atelectasis demonstrated at the right base along the hiatal hernia. There is vague groundglass opacity demonstrated within both lungs with a basilar predominance. Atypical pneumonia versus vascular congestion. There is however no associated pleural effusion or significant bibasilar septal thickening.  3. There is a linear area of scarring demonstrated at the left base. There is a tiny 3 mm noncalcified pulmonary nodule within the right lung on image #79, this was seen on the previous 2019 examination  supporting a benign etiology. Grossly suspicious pulmonary lesion has developed. No lung consolidation seen.  4. Left mastectomy site is satisfactory in appearance. Right breast parenchyma is normal. No axillary adenopathy seen. The remainder is unremarkable.     This report was finalized on 1/2/2025 8:03 PM by Dr. Reed Hunter M.D on Workstation: [id number]      XR Chest 1 View    Result Date: 1/2/2025  XR CHEST 1 VW-1/3/2025  HISTORY: Shortness of breath.  Heart size is mildly enlarged. Patient is a moderately large hiatal or right diaphragmatic hernia which is seen on the CT of the chest from 4/19/2019 with some air-filled stomach in the right base. There may be small bilateral pleural effusions. Hypertrophic changes are seen in both humeral heads.  No focal infiltrates are seen.      1. Mild cardiomegaly. 2. Small bilateral pleural effusions. 3. Hiatal or right diaphragmatic hernia.   This report was finalized on 1/2/2025 3:44 PM by Dr. George Cabrera M.D on Workstation: [id number]         MEDICATIONS GIVEN IN ER  Medications   sodium chloride 0.9 % flush 10 mL (has no administration in time range)   iopamidol (ISOVUE-370) 76 % injection 100 mL (63 mL Intravenous Given by Other 1/2/25 1942)         ORDERS PLACED DURING THIS VISIT:  Orders Placed This Encounter   Procedures    Respiratory Panel PCR w/COVID-19(SARS-CoV-2) [facility] In-House, NP Swab in UTM/VTM, 2 HR TAT - Swab, Nasopharynx    XR Chest 1 View    CT Angiogram Chest Pulmonary Embolism    [facility] Draw    Comprehensive Metabolic Panel    BNP    High Sensitivity Troponin T    CBC Auto Differential    High Sensitivity Troponin T 1Hr    NPO Diet NPO Type: Strict NPO    Undress & Gown    Continuous Pulse Oximetry    Vital Signs    Oxygen Therapy- Nasal Cannula; Titrate 1-6 LPM Per SpO2; 90 - 95%    ECG 12 Lead ED Triage Standing Order; SOA    Insert Peripheral IV    CBC & Differential    Green Top (Gel)    Lavender Top    Gold Top - SST     Light Blue Top         OUTPATIENT MEDICATION MANAGEMENT:  Current Facility-Administered Medications Ordered in Epic   Medication Dose Route Frequency Provider Last Rate Last Admin    sodium chloride 0.9 % flush 10 mL  10 mL Intravenous PRN Donnell Ny MD         Current Outpatient Medications Ordered in Epic   Medication Sig Dispense Refill    acetaminophen (TYLENOL) 325 MG tablet Take 2 tablets by mouth Every 6 (Six) Hours As Needed for Mild Pain .      amLODIPine (NORVASC) 10 MG tablet Take 1 tablet by mouth Daily. 30 tablet 1    atorvastatin (LIPITOR) 80 MG tablet Take 1 tablet by mouth Every Night. 30 tablet 0    Fe Cbn-Fe Gluc-FA-B12-C-DSS (Ferralet 90) 90-1 MG tablet Take 1 tablet by mouth Daily.      metoprolol succinate XL (TOPROL-XL) 25 MG 24 hr tablet       pantoprazole (Protonix) 40 MG EC tablet Take 1 tablet by mouth Daily. 30 tablet 0    sertraline (ZOLOFT) 50 MG tablet Take 1 tablet by mouth Daily.      Vitamin D, Cholecalciferol, (CHOLECALCIFEROL) 10 MCG (400 UNIT) tablet Take 2 tablets by mouth Daily.      Xarelto 20 MG tablet Take 1 tablet by mouth Daily.           PROCEDURES  Procedures            PROGRESS, DATA ANALYSIS, CONSULTS, AND MEDICAL DECISION MAKING  All labs have been independently interpreted by me.  All radiology studies have been reviewed by me. All EKG's have been independently viewed and interpreted by me.  Discussion below represents my analysis of pertinent findings related to patient's condition, differential diagnosis, treatment plan and final disposition.    Differential diagnosis includes but is not limited to, anxiety, ACS, pneumonia, reflux.    Clinical Scores:                                       ED Course as of 01/02/25 2027   Thu Jan 02, 2025   1642 Chest x-ray interpreted by me and demonstrates no evidence of dense consolidation [MW]   1643 EKG interpreted by me demonstrates sinus rhythm, rate of 60, mild ME prolongation, no QT prolongation, no ST elevation  [MW]   1654 Potassium(!): 3.3 [MW]   1654 Alkaline Phosphatase(!): 148 [MW]   1654 Hemoglobin(!): 10.2 [MW]   2026 Emergency department is notable for mild hypokalemia, elevation of troponin at 18 with a repeat of 18 and mild anemia.  Imaging demonstrates large hiatal hernia which I suspect may be playing a role in patient's dyspnea.  There are some inflammatory changes in the lungs however with a negative RPP, no leukocytosis, no fevers I do not believe this warrants antibiotics at this time.  Will have patient follow-up closely with primary care.  Patient to be discharged as vital signs are in stable condition.  Patient's agreeable with this plan. [MW]      ED Course User Index  [MW] Donnell Ny MD             AS OF 20:27 EST VITALS:    BP - 153/66  HR - 83  TEMP - 99.1 °F (37.3 °C) (Tympanic)  O2 SATS - 95%    COMPLEXITY OF CARE  Admission was considered but after careful review of the patient's presentation, physical examination, diagnostic results, and response to treatment the patient may be safely discharged with outpatient follow-up.      DIAGNOSIS  Final diagnoses:   Dyspnea, unspecified type   Hiatal hernia         DISPOSITION  ED Disposition       ED Disposition   Discharge    Condition   Stable    Comment   --                Please note that portions of this document were completed with a voice recognition program.    Note Disclaimer: At [facility], we believe that sharing information builds trust and better relationships. You are receiving this note because you recently visited [facility]. It is possible you will see health information before a provider has talked with you about it. This kind of information can be easy to misunderstand. To help you fully understand what it means for your health, we urge you to discuss this note with your provider.         Donnell Ny MD  01/02/25 2027

## 2025-01-03 NOTE — DISCHARGE INSTRUCTIONS
Primary care physician within 1 week for repeat evaluation    Please return to the ER with new or worsening symptoms including but not limited to chest pain, shortness of breath, lightheadedness, abdominal pain, fevers, chills, changes in mental status

## 2025-01-28 DIAGNOSIS — D50.9 IRON DEFICIENCY ANEMIA, UNSPECIFIED IRON DEFICIENCY ANEMIA TYPE: ICD-10-CM

## 2025-01-28 DIAGNOSIS — T45.4X5A ADVERSE EFFECT OF IRON AND ITS COMPOUNDS, INITIAL ENCOUNTER: Primary | ICD-10-CM

## 2025-02-14 ENCOUNTER — LAB (OUTPATIENT)
Dept: LAB | Facility: HOSPITAL | Age: 85
End: 2025-02-14
Payer: MEDICARE

## 2025-02-14 ENCOUNTER — OFFICE VISIT (OUTPATIENT)
Dept: ONCOLOGY | Facility: CLINIC | Age: 85
End: 2025-02-14
Payer: MEDICARE

## 2025-02-14 VITALS
OXYGEN SATURATION: 95 % | RESPIRATION RATE: 16 BRPM | TEMPERATURE: 98 F | HEIGHT: 64 IN | DIASTOLIC BLOOD PRESSURE: 78 MMHG | WEIGHT: 158 LBS | SYSTOLIC BLOOD PRESSURE: 119 MMHG | HEART RATE: 94 BPM | BODY MASS INDEX: 26.98 KG/M2

## 2025-02-14 DIAGNOSIS — T45.4X5A ADVERSE EFFECT OF IRON AND ITS COMPOUNDS, INITIAL ENCOUNTER: ICD-10-CM

## 2025-02-14 DIAGNOSIS — D50.9 IRON DEFICIENCY ANEMIA, UNSPECIFIED IRON DEFICIENCY ANEMIA TYPE: ICD-10-CM

## 2025-02-14 DIAGNOSIS — T45.4X5A ADVERSE EFFECT OF IRON AND ITS COMPOUNDS, INITIAL ENCOUNTER: Primary | ICD-10-CM

## 2025-02-14 LAB
BASOPHILS # BLD AUTO: 0.03 10*3/MM3 (ref 0–0.2)
BASOPHILS NFR BLD AUTO: 0.4 % (ref 0–1.5)
DEPRECATED RDW RBC AUTO: 39.9 FL (ref 37–54)
EOSINOPHIL # BLD AUTO: 0.19 10*3/MM3 (ref 0–0.4)
EOSINOPHIL NFR BLD AUTO: 2.7 % (ref 0.3–6.2)
ERYTHROCYTE [DISTWIDTH] IN BLOOD BY AUTOMATED COUNT: 14.2 % (ref 12.3–15.4)
FERRITIN SERPL-MCNC: 16.8 NG/ML (ref 13–150)
HCT VFR BLD AUTO: 35.2 % (ref 34–46.6)
HGB BLD-MCNC: 10.6 G/DL (ref 12–15.9)
IMM GRANULOCYTES # BLD AUTO: 0.02 10*3/MM3 (ref 0–0.05)
IMM GRANULOCYTES NFR BLD AUTO: 0.3 % (ref 0–0.5)
IRON 24H UR-MRATE: 20 MCG/DL (ref 37–145)
IRON SATN MFR SERPL: 5 % (ref 20–50)
LYMPHOCYTES # BLD AUTO: 1.18 10*3/MM3 (ref 0.7–3.1)
LYMPHOCYTES NFR BLD AUTO: 16.9 % (ref 19.6–45.3)
MCH RBC QN AUTO: 23.5 PG (ref 26.6–33)
MCHC RBC AUTO-ENTMCNC: 30.1 G/DL (ref 31.5–35.7)
MCV RBC AUTO: 78 FL (ref 79–97)
MONOCYTES # BLD AUTO: 0.52 10*3/MM3 (ref 0.1–0.9)
MONOCYTES NFR BLD AUTO: 7.4 % (ref 5–12)
NEUTROPHILS NFR BLD AUTO: 5.06 10*3/MM3 (ref 1.7–7)
NEUTROPHILS NFR BLD AUTO: 72.3 % (ref 42.7–76)
NRBC BLD AUTO-RTO: 0 /100 WBC (ref 0–0.2)
PLATELET # BLD AUTO: 324 10*3/MM3 (ref 140–450)
PMV BLD AUTO: 10.2 FL (ref 6–12)
RBC # BLD AUTO: 4.51 10*6/MM3 (ref 3.77–5.28)
TIBC SERPL-MCNC: 392 MCG/DL (ref 298–536)
TRANSFERRIN SERPL-MCNC: 263 MG/DL (ref 200–360)
WBC NRBC COR # BLD AUTO: 7 10*3/MM3 (ref 3.4–10.8)

## 2025-02-14 PROCEDURE — 84466 ASSAY OF TRANSFERRIN: CPT

## 2025-02-14 PROCEDURE — 85025 COMPLETE CBC W/AUTO DIFF WBC: CPT

## 2025-02-14 PROCEDURE — 83540 ASSAY OF IRON: CPT

## 2025-02-14 PROCEDURE — 82728 ASSAY OF FERRITIN: CPT

## 2025-02-14 PROCEDURE — 36415 COLL VENOUS BLD VENIPUNCTURE: CPT

## 2025-02-14 RX ORDER — FAMOTIDINE 10 MG/ML
20 INJECTION, SOLUTION INTRAVENOUS ONCE
Status: CANCELLED | OUTPATIENT
Start: 2025-02-20

## 2025-02-14 RX ORDER — DIPHENHYDRAMINE HYDROCHLORIDE 50 MG/ML
50 INJECTION INTRAMUSCULAR; INTRAVENOUS AS NEEDED
Status: CANCELLED | OUTPATIENT
Start: 2025-02-27

## 2025-02-14 RX ORDER — CETIRIZINE HYDROCHLORIDE 10 MG/1
10 TABLET ORAL ONCE
OUTPATIENT
Start: 2025-03-06

## 2025-02-14 RX ORDER — FAMOTIDINE 10 MG/ML
20 INJECTION, SOLUTION INTRAVENOUS ONCE
OUTPATIENT
Start: 2025-03-06

## 2025-02-14 RX ORDER — HYDROCORTISONE SODIUM SUCCINATE 100 MG/2ML
100 INJECTION INTRAMUSCULAR; INTRAVENOUS AS NEEDED
Status: CANCELLED | OUTPATIENT
Start: 2025-02-20

## 2025-02-14 RX ORDER — HYDROCORTISONE SODIUM SUCCINATE 100 MG/2ML
100 INJECTION INTRAMUSCULAR; INTRAVENOUS AS NEEDED
Status: CANCELLED | OUTPATIENT
Start: 2025-02-27

## 2025-02-14 RX ORDER — FAMOTIDINE 10 MG/ML
20 INJECTION, SOLUTION INTRAVENOUS ONCE
Status: CANCELLED | OUTPATIENT
Start: 2025-02-27

## 2025-02-14 RX ORDER — DIPHENHYDRAMINE HYDROCHLORIDE 50 MG/ML
50 INJECTION INTRAMUSCULAR; INTRAVENOUS AS NEEDED
Status: CANCELLED | OUTPATIENT
Start: 2025-02-20

## 2025-02-14 RX ORDER — SODIUM CHLORIDE 9 MG/ML
20 INJECTION, SOLUTION INTRAVENOUS ONCE
OUTPATIENT
Start: 2025-03-06

## 2025-02-14 RX ORDER — CETIRIZINE HYDROCHLORIDE 10 MG/1
10 TABLET ORAL ONCE
Status: CANCELLED | OUTPATIENT
Start: 2025-02-27

## 2025-02-14 RX ORDER — HYDROCORTISONE SODIUM SUCCINATE 100 MG/2ML
100 INJECTION INTRAMUSCULAR; INTRAVENOUS AS NEEDED
OUTPATIENT
Start: 2025-03-06

## 2025-02-14 RX ORDER — FAMOTIDINE 10 MG/ML
20 INJECTION, SOLUTION INTRAVENOUS AS NEEDED
OUTPATIENT
Start: 2025-03-06

## 2025-02-14 RX ORDER — FAMOTIDINE 10 MG/ML
20 INJECTION, SOLUTION INTRAVENOUS AS NEEDED
Status: CANCELLED | OUTPATIENT
Start: 2025-02-27

## 2025-02-14 RX ORDER — FAMOTIDINE 10 MG/ML
20 INJECTION, SOLUTION INTRAVENOUS AS NEEDED
Status: CANCELLED | OUTPATIENT
Start: 2025-02-20

## 2025-02-14 RX ORDER — DIPHENHYDRAMINE HYDROCHLORIDE 50 MG/ML
50 INJECTION INTRAMUSCULAR; INTRAVENOUS AS NEEDED
OUTPATIENT
Start: 2025-03-06

## 2025-02-14 RX ORDER — SODIUM CHLORIDE 9 MG/ML
20 INJECTION, SOLUTION INTRAVENOUS ONCE
Status: CANCELLED | OUTPATIENT
Start: 2025-02-20

## 2025-02-14 RX ORDER — SODIUM CHLORIDE 9 MG/ML
20 INJECTION, SOLUTION INTRAVENOUS ONCE
Status: CANCELLED | OUTPATIENT
Start: 2025-02-27

## 2025-02-14 RX ORDER — CETIRIZINE HYDROCHLORIDE 10 MG/1
10 TABLET ORAL ONCE
Status: CANCELLED | OUTPATIENT
Start: 2025-02-20

## 2025-02-14 NOTE — PROGRESS NOTES
.     REASON FOR FOLLOWUP:     * Iron deficiency anemia.   Symptomatic anemia, required PRBC transfusion in April 2022.  She was given ferric gluconate  mg.  Patient not able to tolerate oral iron treatment with constipation and nausea.      HISTORY OF PRESENT ILLNESS:  The patient is a 84 y.o. year old female with iron deficiency anemia, atrial fibrillation, and history of stroke with chronic anticoagulation, who presented today for annual follow-up evaluation.    History of Present Illness  The patient presented today on 02/14/2025 for an annual follow-up evaluation.    She reports no instances of melena or hematochezia and has not observed any significant bleeding episodes. She is not currently on any vitamin supplements. She experiences constipation as a side effect of oral iron supplementation.     She was seen in December 2023, and due to her recurrent iron deficiency anemia, she was arranged to receive 5 doses of Venofer, totaling 1500 mg.     Her iron study results showed much improvement on 04/17/2024, with ferritin at 119, iron at 52, and marginal iron saturation at 16 percent. She had no hemoglobin that date.     She reported recurrent iron deficiency anemia with a hemoglobin of 10.6, MCV of 78.0, MCHC of 30.1, ferritin of 16.8, free iron of 20, TIBC of 392, and iron saturation of 5 percent. Her platelets were normal at 324,000, and WBC was 7000. Her hemoglobin normalized to 13.2, MCV to 86.4, and MCHC to 32.0 on 09/23/2024.    Continues to use Humira.    Supplemental Information  She sees Dr. Skinny Contreras, internist, every 6 months.        Results  Laboratory Studies  Iron study results on 04/17/2024 showed ferritin 119, iron 52, and iron saturation 16%.     Lab study today 2/14/2025 hemoglobin 10.6, MCV 78.0, MCHC 30.1, ferritin 16.8, free iron 20, TIBC 392, and iron saturation 5%. Platelets 324,000, WBC 7000. Hemoglobin 13.2, MCV 86.4, MCHC 32.0 on 09/23/2024. Hemoglobin 13.2.             Past  Medical History:   Diagnosis Date    Atrial fibrillation     Breast cancer     left    Cancer     Left breast    Depression     Gastrointestinal bleed     ulcer    History of anemia     History of blood transfusion     Hypertension     Reflux esophagitis     GERD    Stroke 2019     Past Surgical History:   Procedure Laterality Date    BREAST BIOPSY      BREAST SURGERY  1998    Left masectomy    COLONOSCOPY N/A 4/27/2019    Procedure: COLONOSCOPY TO CECUM AND TO TI WITH COLD BX POLYPECTOMY;  Surgeon: Peter Jimenez MD;  Location: [facility] ENDOSCOPY;  Service: Gastroenterology    EMBOLECTOMY Left 3/25/2019    Procedure: MECHANICAL THROMBECTOMY;  Surgeon: Truong Chambers MD;  Location: [facility] HYBRID OR 18/19;  Service: Interventional Radiology    ENDOSCOPY N/A 7/9/2016    Procedure: ESOPHAGOGASTRODUODENOSCOPY  WITH BIOPSY;  Surgeon: Peter Corrigan MD;  Location: [facility] ENDOSCOPY;  Service:     ENDOSCOPY N/A 4/27/2019    Procedure: ESOPHAGOGASTRODUODENOSCOPY WITH BIOPSIES;  Surgeon: Peter Jimenez MD;  Location: [facility] ENDOSCOPY;  Service: Gastroenterology    MASTECTOMY Left          HEMATOLOGY HISTORY:  The patient is a 81 y.o. year old female was recurrent iron deficient anemia, hypertension, history of stroke on Xarelto anticoagulation, history of left breast cancer status status post mastectomy, who was admitted for symptomatic severe iron deficiency anemia.  History mostly provided by her  who is at bedside.     Patient and her  report that she had significant fatigue, lightheadedness, and exertional dyspnea.  Family member also noticed patient become very pale.  She had profound fatigue.  She had no pica for ice chips or any other things.  According to , patient was taking oral iron once a day prescribed by her primary care physician Dr. Skinny Contreras, together with daily vitamin B12, however patient developed significant constipation, so she stopped oral iron.  Vitamin B12 was also stopped at  the same time a couple months earlier.      Her  reports patient had similar episodes 3 times in the past 10 years.  She previously had gastric ulcer, discovered by EGD examination performed by Dr. Peter Corrigan about 5 to 6 years ago.  That was in July 2016 per documents I reviewed.  Patient also had a scope 3 years ago by Dr. Peter Jimenez.  I reviewed that procedure report dated 4/27/2019 of both EGD and the colonoscopy examination.  EGD examination reported a large hiatal hernia, and large area of erythematous changes in the stomach.  Biopsy was taken.  Colonoscopy examination reported diverticulum in the sigmoid colon, and also a small 3 mm polyp in the rectum which was resected.  Otherwise unremarkable.  Pathology evaluation reported a benign gastric mucosa, no active gastritis, no H. pylori, no metaplastic or dysplastic changes.  The rectum polyp was adenomatous colonic polyp.  No dysplastic changes.     Nevertheless when patient presented to the ER on 4/23/2022, she was found to having severe anemia with Hb 6.4, MCV 67.8, MCHC 27.1.  Normal platelets 331,000 and a WBC 6340 including neutrophils 5270 lymphocytes 800.  Iron study reported ferritin 6.23 ng/mL, free iron 11 TIBC 460 and iron saturation 2%.     Patient was given 1 unit PRBC transfusion after admission.     Repeat laboratory study this morning on 4/24/2022 reported improved hemoglobin 7.7.  Maintains normal WBC and platelets.    I saw her originally on 4/23/2022 for consultation because of iron deficiency anemia.  She was already given PRBC transfusion due to severe symptomatic anemia.  Patient had a severe iron deficiency and we treated her with 2 doses of ferric gluconate total of 500 mg prior to her discharge on 4/25/2022.    Laboratory studies on 6/13/2022 reported much improved hemoglobin 11.3, and the improved MCV 79.9.  Patient has normal CBC and platelets.  Iron study reported significant improved iron saturation 56% with  free iron  216 TIBC 384, however ferritin is only mildly improved at 27.2 ng/mL.    We recommended to continue oral iron ferrous gluconate.     On 7/25/2022, patient reports no melena, hematochezia.  She denies other bleeding needed.  She denies fainting syncope.  She has worsening fatigue.  Laboratory study on 7/25/2022 reported worsening anemia with Hb 10.1, and iron study showed significant worsening free iron 17, iron saturation 5% with TIBC 339, and also trending down ferritin 21.4 ng/mL.     Patient was given Venofer total 1500 mg in August 2022.      MEDICATIONS    Current Outpatient Medications:     acetaminophen (TYLENOL) 325 MG tablet, Take 2 tablets by mouth Every 6 (Six) Hours As Needed for Mild Pain ., Disp: , Rfl:     amLODIPine (NORVASC) 10 MG tablet, Take 1 tablet by mouth Daily., Disp: 30 tablet, Rfl: 1    atorvastatin (LIPITOR) 80 MG tablet, Take 1 tablet by mouth Every Night., Disp: 30 tablet, Rfl: 0    Fe Cbn-Fe Gluc-FA-B12-C-DSS (Ferralet 90) 90-1 MG tablet, Take 1 tablet by mouth Daily., Disp: , Rfl:     metoprolol succinate XL (TOPROL-XL) 25 MG 24 hr tablet, , Disp: , Rfl:     pantoprazole (Protonix) 40 MG EC tablet, Take 1 tablet by mouth Daily., Disp: 30 tablet, Rfl: 0    sertraline (ZOLOFT) 50 MG tablet, Take 1 tablet by mouth Daily., Disp: , Rfl:     Vitamin D, Cholecalciferol, (CHOLECALCIFEROL) 10 MCG (400 UNIT) tablet, Take 2 tablets by mouth Daily., Disp: , Rfl:     Xarelto 20 MG tablet, Take 1 tablet by mouth Daily., Disp: , Rfl:     ALLERGIES:     Allergies   Allergen Reactions    Codeine Other (See Comments)     HEADACHE  States it gives her a headache       SOCIAL HISTORY:       Social History     Socioeconomic History    Marital status:    Tobacco Use    Smoking status: Never    Smokeless tobacco: Never   Substance and Sexual Activity    Alcohol use: Yes     Comment: 1 or 2 on the weekend    Drug use: No    Sexual activity: Defer         FAMILY HISTORY:  Family History   Problem  "Relation Age of Onset    Heart disease Mother     Clotting disorder Father     Breast cancer Sister     Breast cancer Sister     Heart disease Paternal Uncle             Vitals:    02/14/25 1442   Weight: 71.7 kg (158 lb)   Height: 162.6 cm (64\")   PainSc: 0-No pain         2/14/2025     2:44 PM   Current Status   ECOG score 1     Physical Exam  Lungs were auscultated.  GENERAL:  Well-developed, well-nourished female in no acute distress.    SKIN:  Warm, dry without rashes, purpura or petechiae.  HEENT:  Normocephalic.   LYMPHATICS:  No cervical, supraclavicular or axillary adenopathy.  CHEST: Normal respiratory effort.  Lungs clear to auscultation. Good airflow.  CARDIAC:  Regular rate and rhythm. Normal S1,S2.  ABDOMEN:  Soft, no tender.  Bowel sounds normal.  EXTREMITIES:  No lower extremity edema.      RECENT LABS:  Lab Results   Component Value Date    WBC 7.00 02/14/2025    HGB 10.6 (L) 02/14/2025    HCT 35.2 02/14/2025    MCV 78.0 (L) 02/14/2025     02/14/2025     Lab Results   Component Value Date    NEUTROABS 5.06 02/14/2025     Lab Results   Component Value Date    IRON 52 04/17/2024    TIBC 317 04/17/2024    FERRITIN 119.00 04/17/2024   Iron saturation 16% 4/17/2024     Lab Results   Component Value Date    IRON 20 (L) 02/14/2025    LABIRON 5 (L) 02/14/2025    TRANSFERRIN 263 02/14/2025    TIBC 392 02/14/2025    FERRITIN 16.80 02/14/2025   Iron saturation 5% 2/14/2025.        Lab Results   Component Value Date    FOLATE 17.50 07/25/2022     Lab Results   Component Value Date    [id number] 528 07/25/2022         Assessment & Plan     Assessment & Plan        *Severe symptomatic iron deficiency anemia Hb 6.4 with exertional dyspnea, fatigue, and paleness.  Patient was not able to tolerate oral iron treatment even once a day because of significant constipation.    Patient was given 1 unit of PRBC transfusion on 4/23/2022, and has improved hemoglobin 7.7 on 4/24/2022.   Laboratory study on 4/23/2022 " also reported severe iron deficiency with iron saturation 2% and ferritin 6.2 ng/mL.  She has microcytosis and hypochromia.  Patient was not tolerating oral iron treatment.  I recommended to have intravenous iron therapy with ferric gluconate 250 mg daily for 3 days.  She had both EGD and the colonoscopy examination in April 2019 at which time showed large hiatal hernia, in the stomach biopsies showed a normal gastric mucosa.  No evidence for gastric ulcer. (She had gastric ulcer in 2016).   This patient previously was started on oral vitamin B12 by her primary care physician Dr. Skinny Contreras however she discontinued together with oral iron.  I am in agreement for this patient to continue oral vitamin B12 to help with erythropoiesis.  Her labs in January 2020 only showed a mediocre B12 level at 510 pg/mL.  There is no side effects for oral vitamin B12 treatment.   Patient received 2 doses of ferric gluconate total 500 mg in April 2022 before her discharge on 4/25/2022.  Lab study on 6/13/2022 reported improved hemoglobin 11.3, MCV 79.9, and normal platelets 320,000 WBC 7140 including ANC 5210.  Iron study reported significant improvement of free iron 216, iron saturation 56% and the TIBC 384.  Ferritin was 27.2 ng/mL.  Patient  called reporting patient is not able to tolerate oral iron treatment.  However she continues to have deteriorating fatigue.  We brought patient today for reevaluation.  Laboratory study today on 7/25/2022 reported worsening anemia Hb 10.1, and also recurrent iron deficiency with ferritin 21.4 ng/mL, and iron saturation 5% free iron 17 TIBC 339.  Folate 17.5 ng/mL and B12 level 528 mcg/mL.  We will arrange patient to have intravenous iron therapy with Injectafer versus Venofer pending her insurance approval.  The patient was given Venofer five doses, 300 mg x 5, total of 1500 mg in 08/2022. She reported significantly improved energy level. Posttreatment labs on 09/12/2022, reported  normalized the hemoglobin 13.8 g/dL, and normalized ferritin 255 mL and iron saturation 24% with a free 172 mL, TIBC 297 mL.   On 12/05/2022, patient has further improved hemoglobin 14.6 g/dL, MCV 91.3, normal WBC 5340  and platelets 235,000. Iron study reported ferritin 110 and iron saturation 23%.  No need for IV iron treatment.  I recommended to monitor labs every 6 months.  Patient presented on 12/12/2023 for annual follow-up evaluation. Patient reports no fainting, syncope, no evidence for bleeding. She did, however, had recurrent severe iron deficiency anemia with hemoglobin 10.1, MCV 74.0, MCHC 29.1, and ferritin 9.87 ng/mL, iron saturation 5% with free iron 21. Patient actually brought the hemoccult for testing occult blood. We will certainly test at today. She lives alone. She reports no apparent melena or hematochezia. I recommended to treat her with the intravenous Venofer 300 mg for 5 doses again as we did in 08/2023. Patient voiced understanding and agreeable.    After receiving 5 doses of iron, her ferritin increased to about 120, and her iron saturation improved. By August 2024, her hemoglobin normalized to 13.2, and her red blood cell size became normal (MCV 86).   Today 2/14/2025 her iron levels have decreased again, with a hemoglobin level of 10.6, MCV of 78, and ferritin level of 16 and iron saturation 5%. These values clearly showed a recurrence of iron deficiency anemia. Intravenous iron therapy will be initiated, with a request for 5 doses submitted to her insurance provider. If only 3 doses are approved, her iron levels will be monitored closely, and additional doses will be administered as needed. Her iron levels will be reassessed at 6, 9, and 12 months post-therapy.        PLAN:  Intravenous iron therapy will be initiated, with a request for 5 doses submitted to her insurance provider. If only 3 doses were approved, her iron levels will be monitored closely, and additional doses will be  administered as needed. Her iron levels will be reassessed at 6, 9, and 12 months post-therapy.  Monitoring laboratory study in 6 months and 9 months for CBC, ferritin, iron profile.  See patient in 12 months for reevaluation with same laboratory studies and possible IV iron therapy.        I discussed with the patient about laboratory results and further management plan.  Patient voiced understanding and agreeable.          Richi Segura MD PhD           CC:  Skinny Contreras MD

## 2025-02-20 ENCOUNTER — INFUSION (OUTPATIENT)
Dept: ONCOLOGY | Facility: HOSPITAL | Age: 85
End: 2025-02-20
Payer: MEDICARE

## 2025-02-20 VITALS
OXYGEN SATURATION: 93 % | TEMPERATURE: 97.4 F | RESPIRATION RATE: 16 BRPM | HEART RATE: 60 BPM | SYSTOLIC BLOOD PRESSURE: 168 MMHG | BODY MASS INDEX: 26.33 KG/M2 | DIASTOLIC BLOOD PRESSURE: 50 MMHG | WEIGHT: 153.4 LBS

## 2025-02-20 DIAGNOSIS — D50.9 IRON DEFICIENCY ANEMIA, UNSPECIFIED IRON DEFICIENCY ANEMIA TYPE: ICD-10-CM

## 2025-02-20 DIAGNOSIS — T45.4X5A ADVERSE EFFECT OF IRON AND ITS COMPOUNDS, INITIAL ENCOUNTER: Primary | ICD-10-CM

## 2025-02-20 PROCEDURE — 96366 THER/PROPH/DIAG IV INF ADDON: CPT

## 2025-02-20 PROCEDURE — 25810000003 SODIUM CHLORIDE 0.9 % SOLUTION 250 ML FLEX CONT: Performed by: INTERNAL MEDICINE

## 2025-02-20 PROCEDURE — 96375 TX/PRO/DX INJ NEW DRUG ADDON: CPT

## 2025-02-20 PROCEDURE — 25010000002 IRON SUCROSE PER 1 MG: Performed by: INTERNAL MEDICINE

## 2025-02-20 PROCEDURE — 63710000001 CETIRIZINE 10 MG TABLET: Performed by: INTERNAL MEDICINE

## 2025-02-20 PROCEDURE — A9270 NON-COVERED ITEM OR SERVICE: HCPCS | Performed by: INTERNAL MEDICINE

## 2025-02-20 PROCEDURE — 96365 THER/PROPH/DIAG IV INF INIT: CPT

## 2025-02-20 RX ORDER — CETIRIZINE HYDROCHLORIDE 10 MG/1
10 TABLET ORAL ONCE
Status: COMPLETED | OUTPATIENT
Start: 2025-02-20 | End: 2025-02-20

## 2025-02-20 RX ORDER — FAMOTIDINE 10 MG/ML
20 INJECTION, SOLUTION INTRAVENOUS ONCE
Status: COMPLETED | OUTPATIENT
Start: 2025-02-20 | End: 2025-02-20

## 2025-02-20 RX ADMIN — CETIRIZINE HYDROCHLORIDE 10 MG: 10 TABLET, FILM COATED ORAL at 14:10

## 2025-02-20 RX ADMIN — FAMOTIDINE 20 MG: 10 INJECTION INTRAVENOUS at 14:10

## 2025-02-20 RX ADMIN — IRON SUCROSE 300 MG: 20 INJECTION, SOLUTION INTRAVENOUS at 14:31

## 2025-02-27 ENCOUNTER — INFUSION (OUTPATIENT)
Dept: ONCOLOGY | Facility: HOSPITAL | Age: 85
End: 2025-02-27
Payer: MEDICARE

## 2025-02-27 VITALS
DIASTOLIC BLOOD PRESSURE: 73 MMHG | OXYGEN SATURATION: 92 % | RESPIRATION RATE: 16 BRPM | BODY MASS INDEX: 26.54 KG/M2 | HEART RATE: 59 BPM | TEMPERATURE: 97.8 F | WEIGHT: 154.6 LBS | SYSTOLIC BLOOD PRESSURE: 164 MMHG

## 2025-02-27 DIAGNOSIS — T45.4X5A ADVERSE EFFECT OF IRON AND ITS COMPOUNDS, INITIAL ENCOUNTER: Primary | ICD-10-CM

## 2025-02-27 DIAGNOSIS — D50.9 IRON DEFICIENCY ANEMIA, UNSPECIFIED IRON DEFICIENCY ANEMIA TYPE: ICD-10-CM

## 2025-02-27 PROCEDURE — 63710000001 CETIRIZINE 10 MG TABLET: Performed by: INTERNAL MEDICINE

## 2025-02-27 PROCEDURE — 25010000002 IRON SUCROSE PER 1 MG: Performed by: INTERNAL MEDICINE

## 2025-02-27 PROCEDURE — A9270 NON-COVERED ITEM OR SERVICE: HCPCS | Performed by: INTERNAL MEDICINE

## 2025-02-27 PROCEDURE — 96375 TX/PRO/DX INJ NEW DRUG ADDON: CPT

## 2025-02-27 PROCEDURE — 96365 THER/PROPH/DIAG IV INF INIT: CPT

## 2025-02-27 PROCEDURE — 25810000003 SODIUM CHLORIDE 0.9 % SOLUTION 250 ML FLEX CONT: Performed by: INTERNAL MEDICINE

## 2025-02-27 RX ORDER — FAMOTIDINE 10 MG/ML
20 INJECTION, SOLUTION INTRAVENOUS ONCE
Status: COMPLETED | OUTPATIENT
Start: 2025-02-27 | End: 2025-02-27

## 2025-02-27 RX ORDER — CETIRIZINE HYDROCHLORIDE 10 MG/1
10 TABLET ORAL ONCE
Status: COMPLETED | OUTPATIENT
Start: 2025-02-27 | End: 2025-02-27

## 2025-02-27 RX ORDER — SODIUM CHLORIDE 9 MG/ML
20 INJECTION, SOLUTION INTRAVENOUS ONCE
Status: DISCONTINUED | OUTPATIENT
Start: 2025-02-27 | End: 2025-02-27 | Stop reason: HOSPADM

## 2025-02-27 RX ADMIN — CETIRIZINE HYDROCHLORIDE 10 MG: 10 TABLET, FILM COATED ORAL at 14:15

## 2025-02-27 RX ADMIN — FAMOTIDINE 20 MG: 10 INJECTION INTRAVENOUS at 14:16

## 2025-02-27 RX ADMIN — SODIUM CHLORIDE 300 MG: 900 INJECTION, SOLUTION INTRAVENOUS at 14:34

## 2025-03-06 ENCOUNTER — INFUSION (OUTPATIENT)
Dept: ONCOLOGY | Facility: HOSPITAL | Age: 85
End: 2025-03-06
Payer: MEDICARE

## 2025-03-06 VITALS
RESPIRATION RATE: 16 BRPM | OXYGEN SATURATION: 93 % | WEIGHT: 155.4 LBS | BODY MASS INDEX: 26.67 KG/M2 | HEART RATE: 66 BPM | SYSTOLIC BLOOD PRESSURE: 157 MMHG | DIASTOLIC BLOOD PRESSURE: 70 MMHG | TEMPERATURE: 96.8 F

## 2025-03-06 DIAGNOSIS — D50.9 IRON DEFICIENCY ANEMIA, UNSPECIFIED IRON DEFICIENCY ANEMIA TYPE: ICD-10-CM

## 2025-03-06 DIAGNOSIS — T45.4X5A ADVERSE EFFECT OF IRON AND ITS COMPOUNDS, INITIAL ENCOUNTER: Primary | ICD-10-CM

## 2025-03-06 PROCEDURE — 25810000003 SODIUM CHLORIDE 0.9 % SOLUTION 250 ML FLEX CONT: Performed by: INTERNAL MEDICINE

## 2025-03-06 PROCEDURE — 25010000002 IRON SUCROSE PER 1 MG: Performed by: INTERNAL MEDICINE

## 2025-03-06 PROCEDURE — 96375 TX/PRO/DX INJ NEW DRUG ADDON: CPT

## 2025-03-06 PROCEDURE — 96365 THER/PROPH/DIAG IV INF INIT: CPT

## 2025-03-06 PROCEDURE — 63710000001 CETIRIZINE 10 MG TABLET: Performed by: INTERNAL MEDICINE

## 2025-03-06 PROCEDURE — A9270 NON-COVERED ITEM OR SERVICE: HCPCS | Performed by: INTERNAL MEDICINE

## 2025-03-06 RX ORDER — FAMOTIDINE 10 MG/ML
20 INJECTION, SOLUTION INTRAVENOUS ONCE
Status: COMPLETED | OUTPATIENT
Start: 2025-03-06 | End: 2025-03-06

## 2025-03-06 RX ORDER — CETIRIZINE HYDROCHLORIDE 10 MG/1
10 TABLET ORAL ONCE
Status: COMPLETED | OUTPATIENT
Start: 2025-03-06 | End: 2025-03-06

## 2025-03-06 RX ADMIN — CETIRIZINE HYDROCHLORIDE 10 MG: 10 TABLET, FILM COATED ORAL at 14:32

## 2025-03-06 RX ADMIN — SODIUM CHLORIDE 300 MG: 900 INJECTION, SOLUTION INTRAVENOUS at 14:58

## 2025-03-06 RX ADMIN — FAMOTIDINE 20 MG: 10 INJECTION INTRAVENOUS at 14:32

## 2025-04-15 ENCOUNTER — TRANSCRIBE ORDERS (OUTPATIENT)
Dept: ADMINISTRATIVE | Facility: HOSPITAL | Age: 85
End: 2025-04-15
Payer: MEDICARE

## 2025-04-15 ENCOUNTER — LAB (OUTPATIENT)
Dept: LAB | Facility: HOSPITAL | Age: 85
End: 2025-04-15
Payer: MEDICARE

## 2025-04-15 DIAGNOSIS — E78.5 HYPERLIPIDEMIA, UNSPECIFIED HYPERLIPIDEMIA TYPE: Primary | ICD-10-CM

## 2025-04-15 DIAGNOSIS — E78.5 HYPERLIPIDEMIA, UNSPECIFIED HYPERLIPIDEMIA TYPE: ICD-10-CM

## 2025-04-15 DIAGNOSIS — R73.09 OTHER ABNORMAL GLUCOSE: ICD-10-CM

## 2025-04-15 DIAGNOSIS — I10 ESSENTIAL HYPERTENSION, BENIGN: ICD-10-CM

## 2025-04-15 LAB
ALBUMIN SERPL-MCNC: 4.2 G/DL (ref 3.5–5.2)
ALBUMIN/GLOB SERPL: 1.4 G/DL
ALP SERPL-CCNC: 152 U/L (ref 39–117)
ALT SERPL W P-5'-P-CCNC: 18 U/L (ref 1–33)
ANION GAP SERPL CALCULATED.3IONS-SCNC: 11.1 MMOL/L (ref 5–15)
AST SERPL-CCNC: 27 U/L (ref 1–32)
BILIRUB SERPL-MCNC: 0.4 MG/DL (ref 0–1.2)
BUN SERPL-MCNC: 15 MG/DL (ref 8–23)
BUN/CREAT SERPL: 14.6 (ref 7–25)
CALCIUM SPEC-SCNC: 9.4 MG/DL (ref 8.6–10.5)
CHLORIDE SERPL-SCNC: 106 MMOL/L (ref 98–107)
CHOLEST SERPL-MCNC: 110 MG/DL (ref 0–200)
CO2 SERPL-SCNC: 27.9 MMOL/L (ref 22–29)
CREAT SERPL-MCNC: 1.03 MG/DL (ref 0.57–1)
EGFRCR SERPLBLD CKD-EPI 2021: 54.1 ML/MIN/1.73
GLOBULIN UR ELPH-MCNC: 3.1 GM/DL
GLUCOSE SERPL-MCNC: 96 MG/DL (ref 65–99)
HBA1C MFR BLD: 5.3 % (ref 4.8–5.6)
HDLC SERPL-MCNC: 62 MG/DL (ref 40–60)
LDLC SERPL CALC-MCNC: 32 MG/DL (ref 0–100)
LDLC/HDLC SERPL: 0.51 {RATIO}
POTASSIUM SERPL-SCNC: 4.1 MMOL/L (ref 3.5–5.2)
PROT SERPL-MCNC: 7.3 G/DL (ref 6–8.5)
SODIUM SERPL-SCNC: 145 MMOL/L (ref 136–145)
TRIGL SERPL-MCNC: 81 MG/DL (ref 0–150)
VLDLC SERPL-MCNC: 16 MG/DL (ref 5–40)

## 2025-04-15 PROCEDURE — 80061 LIPID PANEL: CPT

## 2025-04-15 PROCEDURE — 36415 COLL VENOUS BLD VENIPUNCTURE: CPT

## 2025-04-15 PROCEDURE — 80053 COMPREHEN METABOLIC PANEL: CPT

## 2025-04-15 PROCEDURE — 83036 HEMOGLOBIN GLYCOSYLATED A1C: CPT

## 2025-05-09 ENCOUNTER — TELEPHONE (OUTPATIENT)
Dept: ONCOLOGY | Facility: CLINIC | Age: 85
End: 2025-05-09
Payer: MEDICARE

## 2025-05-09 ENCOUNTER — LAB (OUTPATIENT)
Dept: LAB | Facility: HOSPITAL | Age: 85
End: 2025-05-09
Payer: MEDICARE

## 2025-05-09 DIAGNOSIS — D50.9 IRON DEFICIENCY ANEMIA, UNSPECIFIED IRON DEFICIENCY ANEMIA TYPE: ICD-10-CM

## 2025-05-09 DIAGNOSIS — T45.4X5A ADVERSE EFFECT OF IRON AND ITS COMPOUNDS, INITIAL ENCOUNTER: ICD-10-CM

## 2025-05-09 LAB
BASOPHILS # BLD AUTO: 0.04 10*3/MM3 (ref 0–0.2)
BASOPHILS NFR BLD AUTO: 0.6 % (ref 0–1.5)
DEPRECATED RDW RBC AUTO: 50.1 FL (ref 37–54)
EOSINOPHIL # BLD AUTO: 0.19 10*3/MM3 (ref 0–0.4)
EOSINOPHIL NFR BLD AUTO: 3 % (ref 0.3–6.2)
ERYTHROCYTE [DISTWIDTH] IN BLOOD BY AUTOMATED COUNT: 16.6 % (ref 12.3–15.4)
FERRITIN SERPL-MCNC: 17.4 NG/ML (ref 13–150)
HCT VFR BLD AUTO: 37.5 % (ref 34–46.6)
HGB BLD-MCNC: 11.5 G/DL (ref 12–15.9)
IMM GRANULOCYTES # BLD AUTO: 0.02 10*3/MM3 (ref 0–0.05)
IMM GRANULOCYTES NFR BLD AUTO: 0.3 % (ref 0–0.5)
IRON 24H UR-MRATE: 25 MCG/DL (ref 37–145)
IRON SATN MFR SERPL: 7 % (ref 20–50)
LYMPHOCYTES # BLD AUTO: 0.99 10*3/MM3 (ref 0.7–3.1)
LYMPHOCYTES NFR BLD AUTO: 15.8 % (ref 19.6–45.3)
MCH RBC QN AUTO: 25.7 PG (ref 26.6–33)
MCHC RBC AUTO-ENTMCNC: 30.7 G/DL (ref 31.5–35.7)
MCV RBC AUTO: 83.7 FL (ref 79–97)
MONOCYTES # BLD AUTO: 0.58 10*3/MM3 (ref 0.1–0.9)
MONOCYTES NFR BLD AUTO: 9.2 % (ref 5–12)
NEUTROPHILS NFR BLD AUTO: 4.46 10*3/MM3 (ref 1.7–7)
NEUTROPHILS NFR BLD AUTO: 71.1 % (ref 42.7–76)
NRBC BLD AUTO-RTO: 0 /100 WBC (ref 0–0.2)
PLATELET # BLD AUTO: 249 10*3/MM3 (ref 140–450)
PMV BLD AUTO: 10 FL (ref 6–12)
RBC # BLD AUTO: 4.48 10*6/MM3 (ref 3.77–5.28)
TIBC SERPL-MCNC: 380 MCG/DL (ref 298–536)
TRANSFERRIN SERPL-MCNC: 255 MG/DL (ref 200–360)
WBC NRBC COR # BLD AUTO: 6.28 10*3/MM3 (ref 3.4–10.8)

## 2025-05-09 PROCEDURE — 84466 ASSAY OF TRANSFERRIN: CPT

## 2025-05-09 PROCEDURE — 82728 ASSAY OF FERRITIN: CPT

## 2025-05-09 PROCEDURE — 36415 COLL VENOUS BLD VENIPUNCTURE: CPT

## 2025-05-09 PROCEDURE — 83540 ASSAY OF IRON: CPT

## 2025-05-09 PROCEDURE — 85025 COMPLETE CBC W/AUTO DIFF WBC: CPT

## 2025-05-09 NOTE — TELEPHONE ENCOUNTER
----- Message from Lisandra LEA sent at 5/9/2025 12:57 PM EDT -----  Regarding: IV Venofer X 3  Patient needs IV Venofer times 3 treatments. Thanks

## 2025-05-09 NOTE — TELEPHONE ENCOUNTER
Patient called and labs reviewed. Patient is iron deficient and will need Venofer times 3. Message sent to appointment georges to schedule.    Patient v/u    Lab Results   Component Value Date    WBC 6.28 05/09/2025    HGB 11.5 (L) 05/09/2025    HCT 37.5 05/09/2025    MCV 83.7 05/09/2025     05/09/2025      Lab Results   Component Value Date    IRON 25 (L) 05/09/2025    LABIRON 7 (L) 05/09/2025    TRANSFERRIN 255 05/09/2025    TIBC 380 05/09/2025    FERRITIN 17.40 05/09/2025

## 2025-05-23 ENCOUNTER — INFUSION (OUTPATIENT)
Dept: ONCOLOGY | Facility: HOSPITAL | Age: 85
End: 2025-05-23
Payer: MEDICARE

## 2025-05-23 VITALS
DIASTOLIC BLOOD PRESSURE: 80 MMHG | HEART RATE: 67 BPM | SYSTOLIC BLOOD PRESSURE: 169 MMHG | RESPIRATION RATE: 16 BRPM | TEMPERATURE: 97.3 F | OXYGEN SATURATION: 94 % | WEIGHT: 156.6 LBS | BODY MASS INDEX: 26.88 KG/M2

## 2025-05-23 DIAGNOSIS — T45.4X5A ADVERSE EFFECT OF IRON AND ITS COMPOUNDS, INITIAL ENCOUNTER: Primary | ICD-10-CM

## 2025-05-23 DIAGNOSIS — D50.9 IRON DEFICIENCY ANEMIA, UNSPECIFIED IRON DEFICIENCY ANEMIA TYPE: ICD-10-CM

## 2025-05-23 PROCEDURE — 96365 THER/PROPH/DIAG IV INF INIT: CPT

## 2025-05-23 PROCEDURE — 25010000002 IRON SUCROSE PER 1 MG: Performed by: INTERNAL MEDICINE

## 2025-05-23 PROCEDURE — 25810000003 SODIUM CHLORIDE 0.9 % SOLUTION 250 ML FLEX CONT: Performed by: INTERNAL MEDICINE

## 2025-05-23 PROCEDURE — 25010000002 FAMOTIDINE 10 MG/ML SOLUTION: Performed by: INTERNAL MEDICINE

## 2025-05-23 PROCEDURE — 96375 TX/PRO/DX INJ NEW DRUG ADDON: CPT

## 2025-05-23 PROCEDURE — 63710000001 CETIRIZINE 10 MG TABLET: Performed by: INTERNAL MEDICINE

## 2025-05-23 PROCEDURE — A9270 NON-COVERED ITEM OR SERVICE: HCPCS | Performed by: INTERNAL MEDICINE

## 2025-05-23 RX ORDER — FAMOTIDINE 10 MG/ML
20 INJECTION, SOLUTION INTRAVENOUS ONCE
Status: COMPLETED | OUTPATIENT
Start: 2025-05-23 | End: 2025-05-23

## 2025-05-23 RX ORDER — SODIUM CHLORIDE 9 MG/ML
20 INJECTION, SOLUTION INTRAVENOUS ONCE
Status: DISCONTINUED | OUTPATIENT
Start: 2025-05-23 | End: 2025-05-23 | Stop reason: HOSPADM

## 2025-05-23 RX ORDER — CETIRIZINE HYDROCHLORIDE 10 MG/1
10 TABLET ORAL ONCE
Status: COMPLETED | OUTPATIENT
Start: 2025-05-23 | End: 2025-05-23

## 2025-05-23 RX ADMIN — FAMOTIDINE 20 MG: 10 INJECTION INTRAVENOUS at 11:25

## 2025-05-23 RX ADMIN — SODIUM CHLORIDE 300 MG: 900 INJECTION, SOLUTION INTRAVENOUS at 11:52

## 2025-05-23 RX ADMIN — CETIRIZINE HYDROCHLORIDE 10 MG: 10 TABLET, FILM COATED ORAL at 11:26

## 2025-05-30 ENCOUNTER — INFUSION (OUTPATIENT)
Dept: ONCOLOGY | Facility: HOSPITAL | Age: 85
End: 2025-05-30
Payer: MEDICARE

## 2025-05-30 VITALS
SYSTOLIC BLOOD PRESSURE: 121 MMHG | BODY MASS INDEX: 26.43 KG/M2 | TEMPERATURE: 97 F | RESPIRATION RATE: 16 BRPM | OXYGEN SATURATION: 95 % | WEIGHT: 154 LBS | HEART RATE: 81 BPM | DIASTOLIC BLOOD PRESSURE: 73 MMHG

## 2025-05-30 DIAGNOSIS — D50.9 IRON DEFICIENCY ANEMIA, UNSPECIFIED IRON DEFICIENCY ANEMIA TYPE: ICD-10-CM

## 2025-05-30 DIAGNOSIS — T45.4X5A ADVERSE EFFECT OF IRON AND ITS COMPOUNDS, INITIAL ENCOUNTER: Primary | ICD-10-CM

## 2025-05-30 PROCEDURE — 25810000003 SODIUM CHLORIDE 0.9 % SOLUTION 250 ML FLEX CONT: Performed by: INTERNAL MEDICINE

## 2025-05-30 PROCEDURE — 25010000002 FAMOTIDINE 10 MG/ML SOLUTION: Performed by: INTERNAL MEDICINE

## 2025-05-30 PROCEDURE — 96365 THER/PROPH/DIAG IV INF INIT: CPT

## 2025-05-30 PROCEDURE — 63710000001 CETIRIZINE 10 MG TABLET: Performed by: INTERNAL MEDICINE

## 2025-05-30 PROCEDURE — A9270 NON-COVERED ITEM OR SERVICE: HCPCS | Performed by: INTERNAL MEDICINE

## 2025-05-30 PROCEDURE — 25010000002 IRON SUCROSE PER 1 MG: Performed by: INTERNAL MEDICINE

## 2025-05-30 PROCEDURE — 96375 TX/PRO/DX INJ NEW DRUG ADDON: CPT

## 2025-05-30 RX ORDER — FAMOTIDINE 10 MG/ML
20 INJECTION, SOLUTION INTRAVENOUS ONCE
Status: COMPLETED | OUTPATIENT
Start: 2025-05-30 | End: 2025-05-30

## 2025-05-30 RX ORDER — CETIRIZINE HYDROCHLORIDE 10 MG/1
10 TABLET ORAL ONCE
Status: COMPLETED | OUTPATIENT
Start: 2025-05-30 | End: 2025-05-30

## 2025-05-30 RX ADMIN — CETIRIZINE HYDROCHLORIDE 10 MG: 10 TABLET, FILM COATED ORAL at 10:58

## 2025-05-30 RX ADMIN — SODIUM CHLORIDE 300 MG: 900 INJECTION, SOLUTION INTRAVENOUS at 11:32

## 2025-05-30 RX ADMIN — FAMOTIDINE 20 MG: 10 INJECTION INTRAVENOUS at 10:58

## 2025-06-06 ENCOUNTER — INFUSION (OUTPATIENT)
Dept: ONCOLOGY | Facility: HOSPITAL | Age: 85
End: 2025-06-06
Payer: MEDICARE

## 2025-06-06 VITALS
SYSTOLIC BLOOD PRESSURE: 138 MMHG | DIASTOLIC BLOOD PRESSURE: 66 MMHG | RESPIRATION RATE: 16 BRPM | WEIGHT: 155.4 LBS | TEMPERATURE: 97.3 F | HEART RATE: 79 BPM | BODY MASS INDEX: 26.67 KG/M2 | OXYGEN SATURATION: 91 %

## 2025-06-06 DIAGNOSIS — T45.4X5A ADVERSE EFFECT OF IRON AND ITS COMPOUNDS, INITIAL ENCOUNTER: Primary | ICD-10-CM

## 2025-06-06 DIAGNOSIS — D50.9 IRON DEFICIENCY ANEMIA, UNSPECIFIED IRON DEFICIENCY ANEMIA TYPE: ICD-10-CM

## 2025-06-06 PROCEDURE — 63710000001 CETIRIZINE 10 MG TABLET: Performed by: INTERNAL MEDICINE

## 2025-06-06 PROCEDURE — 96366 THER/PROPH/DIAG IV INF ADDON: CPT

## 2025-06-06 PROCEDURE — 25010000002 FAMOTIDINE 10 MG/ML SOLUTION: Performed by: INTERNAL MEDICINE

## 2025-06-06 PROCEDURE — A9270 NON-COVERED ITEM OR SERVICE: HCPCS | Performed by: INTERNAL MEDICINE

## 2025-06-06 PROCEDURE — 25010000002 IRON SUCROSE PER 1 MG: Performed by: INTERNAL MEDICINE

## 2025-06-06 PROCEDURE — 96365 THER/PROPH/DIAG IV INF INIT: CPT

## 2025-06-06 PROCEDURE — 96375 TX/PRO/DX INJ NEW DRUG ADDON: CPT

## 2025-06-06 PROCEDURE — 25810000003 SODIUM CHLORIDE 0.9 % SOLUTION 250 ML FLEX CONT: Performed by: INTERNAL MEDICINE

## 2025-06-06 RX ORDER — SODIUM CHLORIDE 9 MG/ML
20 INJECTION, SOLUTION INTRAVENOUS ONCE
Status: CANCELLED | OUTPATIENT
Start: 2025-06-06

## 2025-06-06 RX ORDER — CETIRIZINE HYDROCHLORIDE 10 MG/1
10 TABLET ORAL ONCE
Status: COMPLETED | OUTPATIENT
Start: 2025-06-06 | End: 2025-06-06

## 2025-06-06 RX ORDER — HYDROCORTISONE SODIUM SUCCINATE 100 MG/2ML
100 INJECTION INTRAMUSCULAR; INTRAVENOUS AS NEEDED
Status: CANCELLED | OUTPATIENT
Start: 2025-06-06

## 2025-06-06 RX ORDER — DIPHENHYDRAMINE HYDROCHLORIDE 50 MG/ML
50 INJECTION, SOLUTION INTRAMUSCULAR; INTRAVENOUS AS NEEDED
Status: CANCELLED | OUTPATIENT
Start: 2025-06-06

## 2025-06-06 RX ORDER — FAMOTIDINE 10 MG/ML
20 INJECTION, SOLUTION INTRAVENOUS AS NEEDED
Status: CANCELLED | OUTPATIENT
Start: 2025-06-06

## 2025-06-06 RX ORDER — FAMOTIDINE 10 MG/ML
20 INJECTION, SOLUTION INTRAVENOUS ONCE
Status: COMPLETED | OUTPATIENT
Start: 2025-06-06 | End: 2025-06-06

## 2025-06-06 RX ADMIN — CETIRIZINE HYDROCHLORIDE 10 MG: 10 TABLET, FILM COATED ORAL at 11:19

## 2025-06-06 RX ADMIN — SODIUM CHLORIDE 300 MG: 900 INJECTION, SOLUTION INTRAVENOUS at 11:40

## 2025-06-06 RX ADMIN — FAMOTIDINE 20 MG: 10 INJECTION INTRAVENOUS at 11:18

## 2025-06-09 ENCOUNTER — TRANSCRIBE ORDERS (OUTPATIENT)
Dept: ADMINISTRATIVE | Facility: HOSPITAL | Age: 85
End: 2025-06-09
Payer: MEDICARE

## 2025-06-09 ENCOUNTER — LAB (OUTPATIENT)
Dept: LAB | Facility: HOSPITAL | Age: 85
End: 2025-06-09
Payer: MEDICARE

## 2025-06-09 DIAGNOSIS — I10 HYPERTENSION, UNSPECIFIED TYPE: ICD-10-CM

## 2025-06-09 DIAGNOSIS — E78.5 HYPERLIPIDEMIA, UNSPECIFIED HYPERLIPIDEMIA TYPE: ICD-10-CM

## 2025-06-09 DIAGNOSIS — E55.9 VITAMIN D DEFICIENCY: ICD-10-CM

## 2025-06-09 DIAGNOSIS — E78.5 HYPERLIPIDEMIA, UNSPECIFIED HYPERLIPIDEMIA TYPE: Primary | ICD-10-CM

## 2025-06-09 LAB
25(OH)D3 SERPL-MCNC: 10.8 NG/ML (ref 30–100)
ALBUMIN SERPL-MCNC: 4.2 G/DL (ref 3.5–5.2)
ALBUMIN/GLOB SERPL: 1.3 G/DL
ALP SERPL-CCNC: 166 U/L (ref 39–117)
ALT SERPL W P-5'-P-CCNC: 18 U/L (ref 1–33)
ANION GAP SERPL CALCULATED.3IONS-SCNC: 10.7 MMOL/L (ref 5–15)
AST SERPL-CCNC: 25 U/L (ref 1–32)
BILIRUB SERPL-MCNC: 0.7 MG/DL (ref 0–1.2)
BUN SERPL-MCNC: 19 MG/DL (ref 8–23)
BUN/CREAT SERPL: 20.4 (ref 7–25)
CALCIUM SPEC-SCNC: 9.3 MG/DL (ref 8.6–10.5)
CHLORIDE SERPL-SCNC: 105 MMOL/L (ref 98–107)
CHOLEST SERPL-MCNC: 101 MG/DL (ref 0–200)
CO2 SERPL-SCNC: 26.3 MMOL/L (ref 22–29)
CREAT SERPL-MCNC: 0.93 MG/DL (ref 0.57–1)
EGFRCR SERPLBLD CKD-EPI 2021: 61.1 ML/MIN/1.73
GLOBULIN UR ELPH-MCNC: 3.2 GM/DL
GLUCOSE SERPL-MCNC: 105 MG/DL (ref 65–99)
HDLC SERPL-MCNC: 59 MG/DL (ref 40–60)
LDLC SERPL CALC-MCNC: 24 MG/DL (ref 0–100)
LDLC/HDLC SERPL: 0.39 {RATIO}
POTASSIUM SERPL-SCNC: 3.4 MMOL/L (ref 3.5–5.2)
PROT SERPL-MCNC: 7.4 G/DL (ref 6–8.5)
SODIUM SERPL-SCNC: 142 MMOL/L (ref 136–145)
TRIGL SERPL-MCNC: 94 MG/DL (ref 0–150)
VLDLC SERPL-MCNC: 18 MG/DL (ref 5–40)

## 2025-06-09 PROCEDURE — 80053 COMPREHEN METABOLIC PANEL: CPT

## 2025-06-09 PROCEDURE — 82306 VITAMIN D 25 HYDROXY: CPT

## 2025-06-09 PROCEDURE — 36415 COLL VENOUS BLD VENIPUNCTURE: CPT

## 2025-06-09 PROCEDURE — 80061 LIPID PANEL: CPT

## 2025-08-01 ENCOUNTER — LAB (OUTPATIENT)
Dept: LAB | Facility: HOSPITAL | Age: 85
End: 2025-08-01
Payer: MEDICARE

## 2025-08-01 DIAGNOSIS — D50.9 IRON DEFICIENCY ANEMIA, UNSPECIFIED IRON DEFICIENCY ANEMIA TYPE: ICD-10-CM

## 2025-08-01 DIAGNOSIS — T45.4X5A ADVERSE EFFECT OF IRON AND ITS COMPOUNDS, INITIAL ENCOUNTER: ICD-10-CM

## 2025-08-01 LAB
BASOPHILS # BLD AUTO: 0.02 10*3/MM3 (ref 0–0.2)
BASOPHILS NFR BLD AUTO: 0.3 % (ref 0–1.5)
DEPRECATED RDW RBC AUTO: 50.1 FL (ref 37–54)
EOSINOPHIL # BLD AUTO: 0.17 10*3/MM3 (ref 0–0.4)
EOSINOPHIL NFR BLD AUTO: 2.5 % (ref 0.3–6.2)
ERYTHROCYTE [DISTWIDTH] IN BLOOD BY AUTOMATED COUNT: 15.5 % (ref 12.3–15.4)
FERRITIN SERPL-MCNC: 162 NG/ML (ref 13–150)
HCT VFR BLD AUTO: 44.9 % (ref 34–46.6)
HGB BLD-MCNC: 14.1 G/DL (ref 12–15.9)
IMM GRANULOCYTES # BLD AUTO: 0.02 10*3/MM3 (ref 0–0.05)
IMM GRANULOCYTES NFR BLD AUTO: 0.3 % (ref 0–0.5)
IRON 24H UR-MRATE: 64 MCG/DL (ref 37–145)
IRON SATN MFR SERPL: 22 % (ref 20–50)
LYMPHOCYTES # BLD AUTO: 1.04 10*3/MM3 (ref 0.7–3.1)
LYMPHOCYTES NFR BLD AUTO: 15.4 % (ref 19.6–45.3)
MCH RBC QN AUTO: 27.6 PG (ref 26.6–33)
MCHC RBC AUTO-ENTMCNC: 31.4 G/DL (ref 31.5–35.7)
MCV RBC AUTO: 87.9 FL (ref 79–97)
MONOCYTES # BLD AUTO: 0.56 10*3/MM3 (ref 0.1–0.9)
MONOCYTES NFR BLD AUTO: 8.3 % (ref 5–12)
NEUTROPHILS NFR BLD AUTO: 4.93 10*3/MM3 (ref 1.7–7)
NEUTROPHILS NFR BLD AUTO: 73.2 % (ref 42.7–76)
NRBC BLD AUTO-RTO: 0 /100 WBC (ref 0–0.2)
PLATELET # BLD AUTO: 251 10*3/MM3 (ref 140–450)
PMV BLD AUTO: 10.2 FL (ref 6–12)
RBC # BLD AUTO: 5.11 10*6/MM3 (ref 3.77–5.28)
TIBC SERPL-MCNC: 288 MCG/DL (ref 298–536)
TRANSFERRIN SERPL-MCNC: 193 MG/DL (ref 200–360)
WBC NRBC COR # BLD AUTO: 6.74 10*3/MM3 (ref 3.4–10.8)

## 2025-08-01 PROCEDURE — 36415 COLL VENOUS BLD VENIPUNCTURE: CPT

## 2025-08-01 PROCEDURE — 84466 ASSAY OF TRANSFERRIN: CPT

## 2025-08-01 PROCEDURE — 82728 ASSAY OF FERRITIN: CPT

## 2025-08-01 PROCEDURE — 83540 ASSAY OF IRON: CPT

## 2025-08-01 PROCEDURE — 85025 COMPLETE CBC W/AUTO DIFF WBC: CPT

## 2025-08-05 ENCOUNTER — TELEPHONE (OUTPATIENT)
Dept: ONCOLOGY | Facility: CLINIC | Age: 85
End: 2025-08-05
Payer: MEDICARE

## (undated) DEVICE — DEL MICROCATH VELOCITY 2.95F 160CM

## (undated) DEVICE — PK ANGIO CERBRL RAD 40

## (undated) DEVICE — 0.2 MICRON INTRAVENOUS FILTER FOR 96 HOUR USE WITH MICRO-BORE EXTENSION TUBING AN LUER-LOCK ADAPTER: Brand: PALL POSIDYNE® ELD FILTER

## (undated) DEVICE — Device

## (undated) DEVICE — STROKE ACCESS: Brand: SOFIA 5F-125CM STR

## (undated) DEVICE — TBG CONN ASP PENUMBRA SYSTEM MAX .88IN

## (undated) DEVICE — TOTAL TRAY, 16FR 10ML SIL FOLEY, URN: Brand: MEDLINE

## (undated) DEVICE — CATH SELCT NEURON BER 6F 125CM

## (undated) DEVICE — CANSTR SXN PENUMBRA ENGINE

## (undated) DEVICE — GLV SURG BIOGEL LTX PF 7 1/2

## (undated) DEVICE — DRSNG SURESITE WNDW 4X4.5

## (undated) DEVICE — STROKE ACCESS: Brand: SOFIA 6F-125CM STR

## (undated) DEVICE — RADIFOCUS TORQUE DEVICE MULTI-TORQUE VISE: Brand: RADIFOCUS TORQUE DEVICE

## (undated) DEVICE — PINNACLE R/O II INTRODUCER SHEATH WITH RADIOPAQUE MARKER: Brand: PINNACLE

## (undated) DEVICE — SUT SILK 2/0 FS BLK 18IN 685G

## (undated) DEVICE — SHEATH NEURON MAX LNG 6F 90/4 MP

## (undated) DEVICE — RADIFOCUS GLIDEWIRE: Brand: GLIDEWIRE

## (undated) DEVICE — Device: Brand: DEFENDO AIR/WATER/SUCTION AND BIOPSY VALVE

## (undated) DEVICE — ST ACC MICROPUNCTURE STFF .018 ECHO/PLDM/TP 4F/10CM 21G/7CM

## (undated) DEVICE — SYR LUERLOK 50ML

## (undated) DEVICE — GUIDEWIRES: Brand: TRAXCESS GUIDEWIRE

## (undated) DEVICE — TOWEL,OR,DSP,ST,BLUE,STD,4/PK,20PK/CS: Brand: MEDLINE

## (undated) DEVICE — APPL CHLORAPREP W/TINT 26ML ORNG

## (undated) DEVICE — MANIFLD BLCK 200PSI 2PORT OFF RT

## (undated) DEVICE — TUBING, SUCTION, 1/4" X 10', STRAIGHT: Brand: MEDLINE

## (undated) DEVICE — SOL NACL 0.9PCT 1000ML

## (undated) DEVICE — CANN NASL CO2 TRULINK W/O2 A/

## (undated) DEVICE — BG WAST DISPOSABLE DEPOT W/TBG48IN S/COCK SPK1400

## (undated) DEVICE — ANGIO-SEAL VIP VASCULAR CLOSURE DEVICE: Brand: ANGIO-SEAL

## (undated) DEVICE — DEV ANGIO FLOSWITCH HP BX24

## (undated) DEVICE — ADAPT Y ROT GATEWAY PLS

## (undated) DEVICE — THE TORRENT IRRIGATION SCOPE CONNECTOR IS USED WITH THE TORRENT IRRIGATION TUBING TO PROVIDE IRRIGATION FLUIDS SUCH AS STERILE WATER DURING GASTROINTESTINAL ENDOSCOPIC PROCEDURES WHEN USED IN CONJUNCTION WITH AN IRRIGATION PUMP (OR ELECTROSURGICAL UNIT).: Brand: TORRENT

## (undated) DEVICE — CVR PROB 96IN LF STRL

## (undated) DEVICE — FRCP BX RADJAW4 NDL 2.8 240CM LG OG BX40

## (undated) DEVICE — STPCK 3WY HP ROT

## (undated) DEVICE — BITEBLOCK OMNI BLOC